# Patient Record
Sex: FEMALE | Race: WHITE | URBAN - METROPOLITAN AREA
[De-identification: names, ages, dates, MRNs, and addresses within clinical notes are randomized per-mention and may not be internally consistent; named-entity substitution may affect disease eponyms.]

---

## 2017-04-24 ENCOUNTER — OUTPATIENT (OUTPATIENT)
Dept: OUTPATIENT SERVICES | Facility: HOSPITAL | Age: 77
LOS: 1 days | Discharge: HOME | End: 2017-04-24

## 2017-06-27 DIAGNOSIS — Z79.899 OTHER LONG TERM (CURRENT) DRUG THERAPY: ICD-10-CM

## 2017-07-17 ENCOUNTER — OUTPATIENT (OUTPATIENT)
Dept: OUTPATIENT SERVICES | Facility: HOSPITAL | Age: 77
LOS: 1 days | Discharge: HOME | End: 2017-07-17

## 2017-07-17 DIAGNOSIS — R05 COUGH: ICD-10-CM

## 2017-07-17 DIAGNOSIS — L03.119 CELLULITIS OF UNSPECIFIED PART OF LIMB: ICD-10-CM

## 2018-01-11 ENCOUNTER — OUTPATIENT (OUTPATIENT)
Dept: OUTPATIENT SERVICES | Facility: HOSPITAL | Age: 78
LOS: 1 days | Discharge: HOME | End: 2018-01-11

## 2018-01-11 DIAGNOSIS — E03.9 HYPOTHYROIDISM, UNSPECIFIED: ICD-10-CM

## 2018-01-11 DIAGNOSIS — Z79.899 OTHER LONG TERM (CURRENT) DRUG THERAPY: ICD-10-CM

## 2018-01-11 DIAGNOSIS — L03.119 CELLULITIS OF UNSPECIFIED PART OF LIMB: ICD-10-CM

## 2018-01-25 ENCOUNTER — OUTPATIENT (OUTPATIENT)
Dept: OUTPATIENT SERVICES | Facility: HOSPITAL | Age: 78
LOS: 1 days | Discharge: HOME | End: 2018-01-25

## 2018-01-25 DIAGNOSIS — N39.0 URINARY TRACT INFECTION, SITE NOT SPECIFIED: ICD-10-CM

## 2018-02-04 DIAGNOSIS — L03.119 CELLULITIS OF UNSPECIFIED PART OF LIMB: ICD-10-CM

## 2018-03-02 ENCOUNTER — EMERGENCY (EMERGENCY)
Facility: HOSPITAL | Age: 78
LOS: 0 days | Discharge: HOME | End: 2018-03-03
Attending: EMERGENCY MEDICINE

## 2018-03-02 VITALS
HEART RATE: 77 BPM | OXYGEN SATURATION: 96 % | TEMPERATURE: 98 F | DIASTOLIC BLOOD PRESSURE: 65 MMHG | SYSTOLIC BLOOD PRESSURE: 125 MMHG | RESPIRATION RATE: 20 BRPM

## 2018-03-02 VITALS — TEMPERATURE: 98 F

## 2018-03-02 DIAGNOSIS — F31.9 BIPOLAR DISORDER, UNSPECIFIED: ICD-10-CM

## 2018-03-02 DIAGNOSIS — Z79.899 OTHER LONG TERM (CURRENT) DRUG THERAPY: ICD-10-CM

## 2018-03-02 DIAGNOSIS — J11.1 INFLUENZA DUE TO UNIDENTIFIED INFLUENZA VIRUS WITH OTHER RESPIRATORY MANIFESTATIONS: ICD-10-CM

## 2018-03-02 DIAGNOSIS — R50.9 FEVER, UNSPECIFIED: ICD-10-CM

## 2018-03-02 DIAGNOSIS — N39.0 URINARY TRACT INFECTION, SITE NOT SPECIFIED: ICD-10-CM

## 2018-03-02 LAB
ALBUMIN SERPL ELPH-MCNC: 3.6 G/DL — SIGNIFICANT CHANGE UP (ref 3–5.5)
ALP SERPL-CCNC: 57 U/L — SIGNIFICANT CHANGE UP (ref 30–115)
ALT FLD-CCNC: 30 U/L — SIGNIFICANT CHANGE UP (ref 0–41)
ANION GAP SERPL CALC-SCNC: 7 MMOL/L — SIGNIFICANT CHANGE UP (ref 7–14)
APTT BLD: 26.9 SEC — LOW (ref 27–39.2)
AST SERPL-CCNC: 42 U/L — HIGH (ref 0–41)
BASE EXCESS BLDV CALC-SCNC: 5.1 MMOL/L — HIGH (ref -2–2)
BASOPHILS # BLD AUTO: 0.02 K/UL — SIGNIFICANT CHANGE UP (ref 0–0.2)
BASOPHILS NFR BLD AUTO: 0.3 % — SIGNIFICANT CHANGE UP (ref 0–1)
BILIRUB SERPL-MCNC: 1.4 MG/DL — HIGH (ref 0.2–1.2)
BUN SERPL-MCNC: 22 MG/DL — HIGH (ref 10–20)
CA-I SERPL-SCNC: 1.15 MMOL/L — SIGNIFICANT CHANGE UP (ref 1.12–1.3)
CALCIUM SERPL-MCNC: 8.6 MG/DL — SIGNIFICANT CHANGE UP (ref 8.5–10.1)
CHLORIDE SERPL-SCNC: 103 MMOL/L — SIGNIFICANT CHANGE UP (ref 98–110)
CO2 SERPL-SCNC: 27 MMOL/L — SIGNIFICANT CHANGE UP (ref 17–32)
CREAT SERPL-MCNC: 0.6 MG/DL — LOW (ref 0.7–1.5)
EOSINOPHIL # BLD AUTO: 0 K/UL — SIGNIFICANT CHANGE UP (ref 0–0.7)
EOSINOPHIL NFR BLD AUTO: 0 % — SIGNIFICANT CHANGE UP (ref 0–8)
GAS PNL BLDV: 138 MMOL/L — SIGNIFICANT CHANGE UP (ref 136–145)
GAS PNL BLDV: SIGNIFICANT CHANGE UP
GLUCOSE SERPL-MCNC: 75 MG/DL — SIGNIFICANT CHANGE UP (ref 70–110)
HCO3 BLDV-SCNC: 30 MMOL/L — HIGH (ref 22–29)
HCT VFR BLD CALC: 39.3 % — SIGNIFICANT CHANGE UP (ref 37–47)
HGB BLD CALC-MCNC: 14 G/DL — SIGNIFICANT CHANGE UP (ref 14–18)
HGB BLD-MCNC: 13.4 G/DL — SIGNIFICANT CHANGE UP (ref 12–16)
IMM GRANULOCYTES NFR BLD AUTO: 0.5 % — HIGH (ref 0.1–0.3)
INR BLD: 1.15 RATIO — SIGNIFICANT CHANGE UP (ref 0.65–1.3)
LACTATE BLDV-MCNC: 0.8 MMOL/L — SIGNIFICANT CHANGE UP (ref 0.5–1.6)
LACTATE SERPL-SCNC: 1 MMOL/L — SIGNIFICANT CHANGE UP (ref 0.5–2.2)
LIDOCAIN IGE QN: 98 U/L — HIGH (ref 7–60)
LYMPHOCYTES # BLD AUTO: 1.66 K/UL — SIGNIFICANT CHANGE UP (ref 1.2–3.4)
LYMPHOCYTES # BLD AUTO: 28.7 % — SIGNIFICANT CHANGE UP (ref 20.5–51.1)
MAGNESIUM SERPL-MCNC: 2.1 MG/DL — SIGNIFICANT CHANGE UP (ref 1.8–2.4)
MCHC RBC-ENTMCNC: 31.5 PG — HIGH (ref 27–31)
MCHC RBC-ENTMCNC: 34.1 G/DL — SIGNIFICANT CHANGE UP (ref 32–37)
MCV RBC AUTO: 92.5 FL — SIGNIFICANT CHANGE UP (ref 81–99)
MONOCYTES # BLD AUTO: 1.11 K/UL — HIGH (ref 0.1–0.6)
MONOCYTES NFR BLD AUTO: 19.2 % — HIGH (ref 1.7–9.3)
NEUTROPHILS # BLD AUTO: 2.97 K/UL — SIGNIFICANT CHANGE UP (ref 1.4–6.5)
NEUTROPHILS NFR BLD AUTO: 51.3 % — SIGNIFICANT CHANGE UP (ref 42.2–75.2)
NRBC # BLD: 0 /100 WBCS — SIGNIFICANT CHANGE UP (ref 0–0)
PCO2 BLDV: 46 MMHG — SIGNIFICANT CHANGE UP (ref 41–51)
PH BLDV: 7.42 — SIGNIFICANT CHANGE UP (ref 7.26–7.43)
PLATELET # BLD AUTO: 123 K/UL — LOW (ref 130–400)
PO2 BLDV: 55 MMHG — HIGH (ref 20–40)
POTASSIUM BLDV-SCNC: 4.1 MMOL/L — SIGNIFICANT CHANGE UP (ref 3.3–5.6)
POTASSIUM SERPL-MCNC: 6.3 MMOL/L — CRITICAL HIGH (ref 3.5–5)
POTASSIUM SERPL-SCNC: 6.3 MMOL/L — CRITICAL HIGH (ref 3.5–5)
PROT SERPL-MCNC: 5.8 G/DL — LOW (ref 6–8)
PROTHROM AB SERPL-ACNC: 12.5 SEC — SIGNIFICANT CHANGE UP (ref 9.95–12.87)
RBC # BLD: 4.25 M/UL — SIGNIFICANT CHANGE UP (ref 4.2–5.4)
RBC # FLD: 11.7 % — SIGNIFICANT CHANGE UP (ref 11.5–14.5)
SAO2 % BLDV: 86 % — SIGNIFICANT CHANGE UP
SODIUM SERPL-SCNC: 137 MMOL/L — SIGNIFICANT CHANGE UP (ref 135–146)
WBC # BLD: 5.79 K/UL — SIGNIFICANT CHANGE UP (ref 4.8–10.8)
WBC # FLD AUTO: 5.79 K/UL — SIGNIFICANT CHANGE UP (ref 4.8–10.8)

## 2018-03-03 LAB
APPEARANCE UR: (no result)
BACTERIA # UR AUTO: (no result) /HPF
BILIRUB UR-MCNC: NEGATIVE — SIGNIFICANT CHANGE UP
COLOR SPEC: YELLOW — SIGNIFICANT CHANGE UP
DIFF PNL FLD: NEGATIVE — SIGNIFICANT CHANGE UP
EPI CELLS # UR: (no result) /HPF
FLU A RESULT: NEGATIVE — SIGNIFICANT CHANGE UP
FLU A RESULT: NEGATIVE — SIGNIFICANT CHANGE UP
FLUAV AG NPH QL: NEGATIVE — SIGNIFICANT CHANGE UP
FLUBV AG NPH QL: POSITIVE
GLUCOSE UR QL: NEGATIVE MG/DL — SIGNIFICANT CHANGE UP
KETONES UR-MCNC: (no result)
LEUKOCYTE ESTERASE UR-ACNC: (no result)
NITRITE UR-MCNC: POSITIVE
PH UR: 6 — SIGNIFICANT CHANGE UP (ref 5–8)
PROT UR-MCNC: NEGATIVE MG/DL — SIGNIFICANT CHANGE UP
SP GR SPEC: 1.02 — SIGNIFICANT CHANGE UP (ref 1.01–1.03)
UROBILINOGEN FLD QL: 0.2 MG/DL — SIGNIFICANT CHANGE UP (ref 0.2–0.2)
WBC UR QL: (no result) /HPF

## 2018-03-03 RX ORDER — CEPHALEXIN 500 MG
1 CAPSULE ORAL
Qty: 30 | Refills: 0 | OUTPATIENT
Start: 2018-03-03 | End: 2018-03-12

## 2018-03-03 RX ORDER — CEFTRIAXONE 500 MG/1
1 INJECTION, POWDER, FOR SOLUTION INTRAMUSCULAR; INTRAVENOUS ONCE
Qty: 0 | Refills: 0 | Status: COMPLETED | OUTPATIENT
Start: 2018-03-03 | End: 2018-03-03

## 2018-03-03 RX ADMIN — Medication 75 MILLIGRAM(S): at 02:26

## 2018-03-03 RX ADMIN — CEFTRIAXONE 100 GRAM(S): 500 INJECTION, POWDER, FOR SOLUTION INTRAMUSCULAR; INTRAVENOUS at 01:59

## 2018-03-03 NOTE — ED PROVIDER NOTE - PHYSICAL EXAMINATION
VITAL SIGNS: I have reviewed nursing notes and confirm.  CONSTITUTIONAL: Well-developed; well-nourished; in no acute distress.  SKIN: Skin exam is warm and dry, no acute rash.  HEAD: Normocephalic; atraumatic.  EYES: PERRL, EOM intact; conjunctiva and sclera clear.  ENT: clear nasal discharge, airway clear. TMs clear. macroglossia  NECK: Supple; non tender.  CARD: S1, S2 normal; no murmurs, gallops, or rubs. Regular rate and rhythm.  RESP: No wheezes, rales or rhonchi.  ABD: Normal bowel sounds; soft; non-distended; non-tender; no hepatosplenomegaly.  EXT: Normal ROM. No clubbing, cyanosis or edema.  NEURO: Alert, oriented. Grossly unremarkable. No focal deficits.  PSYCH: Cooperative, appropriate.

## 2018-03-03 NOTE — ED PROVIDER NOTE - PROGRESS NOTE DETAILS
+uti and flu b.  started on abx and tamiflu.  aide informed and she will relay message to the home nurse.  will give rx to be filled

## 2018-03-03 NOTE — ED PROVIDER NOTE - OBJECTIVE STATEMENT
76 yo f with pmh of mr, cp, seizures, sent from group home for fever and congestion. as per records, pt had fever of 101 at home, unclear if was given tylenol.  pt arrived afebrile in ED and has remained afebrile.  +nasal congestion.  +soft stool.  no dysuria, no blood in her diaper.  no abd pain, no n/v/d.

## 2018-03-08 LAB
CULTURE RESULTS: SIGNIFICANT CHANGE UP
CULTURE RESULTS: SIGNIFICANT CHANGE UP
SPECIMEN SOURCE: SIGNIFICANT CHANGE UP
SPECIMEN SOURCE: SIGNIFICANT CHANGE UP

## 2018-03-16 ENCOUNTER — EMERGENCY (EMERGENCY)
Facility: HOSPITAL | Age: 78
LOS: 0 days | Discharge: HOME | End: 2018-03-17
Attending: EMERGENCY MEDICINE

## 2018-03-16 VITALS
SYSTOLIC BLOOD PRESSURE: 140 MMHG | DIASTOLIC BLOOD PRESSURE: 56 MMHG | HEART RATE: 87 BPM | RESPIRATION RATE: 20 BRPM | OXYGEN SATURATION: 94 %

## 2018-03-16 DIAGNOSIS — Z87.891 PERSONAL HISTORY OF NICOTINE DEPENDENCE: ICD-10-CM

## 2018-03-16 DIAGNOSIS — Z79.2 LONG TERM (CURRENT) USE OF ANTIBIOTICS: ICD-10-CM

## 2018-03-16 DIAGNOSIS — Z79.899 OTHER LONG TERM (CURRENT) DRUG THERAPY: ICD-10-CM

## 2018-03-16 DIAGNOSIS — F31.9 BIPOLAR DISORDER, UNSPECIFIED: ICD-10-CM

## 2018-03-16 DIAGNOSIS — R05 COUGH: ICD-10-CM

## 2018-03-16 DIAGNOSIS — E78.5 HYPERLIPIDEMIA, UNSPECIFIED: ICD-10-CM

## 2018-03-16 DIAGNOSIS — J02.9 ACUTE PHARYNGITIS, UNSPECIFIED: ICD-10-CM

## 2018-03-16 LAB
ALBUMIN SERPL ELPH-MCNC: 3.7 G/DL — SIGNIFICANT CHANGE UP (ref 3.5–5.2)
ALP SERPL-CCNC: 65 U/L — SIGNIFICANT CHANGE UP (ref 30–115)
ALT FLD-CCNC: 33 U/L — SIGNIFICANT CHANGE UP (ref 0–41)
ANION GAP SERPL CALC-SCNC: 13 MMOL/L — SIGNIFICANT CHANGE UP (ref 7–14)
AST SERPL-CCNC: 42 U/L — HIGH (ref 0–41)
BASE EXCESS BLDV CALC-SCNC: 4.8 MMOL/L — HIGH (ref -2–2)
BASOPHILS # BLD AUTO: 0.04 K/UL — SIGNIFICANT CHANGE UP (ref 0–0.2)
BASOPHILS NFR BLD AUTO: 0.4 % — SIGNIFICANT CHANGE UP (ref 0–1)
BILIRUB SERPL-MCNC: 0.3 MG/DL — SIGNIFICANT CHANGE UP (ref 0.2–1.2)
BUN SERPL-MCNC: 17 MG/DL — SIGNIFICANT CHANGE UP (ref 10–20)
CALCIUM SERPL-MCNC: 8.8 MG/DL — SIGNIFICANT CHANGE UP (ref 8.5–10.1)
CHLORIDE SERPL-SCNC: 105 MMOL/L — SIGNIFICANT CHANGE UP (ref 98–110)
CO2 SERPL-SCNC: 24 MMOL/L — SIGNIFICANT CHANGE UP (ref 17–32)
CREAT SERPL-MCNC: 0.6 MG/DL — LOW (ref 0.7–1.5)
EOSINOPHIL # BLD AUTO: 0.09 K/UL — SIGNIFICANT CHANGE UP (ref 0–0.7)
EOSINOPHIL NFR BLD AUTO: 1 % — SIGNIFICANT CHANGE UP (ref 0–8)
GLUCOSE SERPL-MCNC: 89 MG/DL — SIGNIFICANT CHANGE UP (ref 70–110)
HCO3 BLDV-SCNC: 30 MMOL/L — HIGH (ref 22–29)
HCT VFR BLD CALC: 38.2 % — SIGNIFICANT CHANGE UP (ref 37–47)
HGB BLD-MCNC: 12.8 G/DL — SIGNIFICANT CHANGE UP (ref 12–16)
IMM GRANULOCYTES NFR BLD AUTO: 0.4 % — HIGH (ref 0.1–0.3)
LACTATE BLDV-MCNC: 0.8 MMOL/L — SIGNIFICANT CHANGE UP (ref 0.5–1.6)
LYMPHOCYTES # BLD AUTO: 2.22 K/UL — SIGNIFICANT CHANGE UP (ref 1.2–3.4)
LYMPHOCYTES # BLD AUTO: 24.7 % — SIGNIFICANT CHANGE UP (ref 20.5–51.1)
MCHC RBC-ENTMCNC: 31.4 PG — HIGH (ref 27–31)
MCHC RBC-ENTMCNC: 33.5 G/DL — SIGNIFICANT CHANGE UP (ref 32–37)
MCV RBC AUTO: 93.9 FL — SIGNIFICANT CHANGE UP (ref 81–99)
MONOCYTES # BLD AUTO: 0.71 K/UL — HIGH (ref 0.1–0.6)
MONOCYTES NFR BLD AUTO: 7.9 % — SIGNIFICANT CHANGE UP (ref 1.7–9.3)
NEUTROPHILS # BLD AUTO: 5.88 K/UL — SIGNIFICANT CHANGE UP (ref 1.4–6.5)
NEUTROPHILS NFR BLD AUTO: 65.6 % — SIGNIFICANT CHANGE UP (ref 42.2–75.2)
NRBC # BLD: 0 /100 WBCS — SIGNIFICANT CHANGE UP (ref 0–0)
NT-PROBNP SERPL-SCNC: 216 PG/ML — SIGNIFICANT CHANGE UP (ref 0–300)
PCO2 BLDV: 48 MMHG — SIGNIFICANT CHANGE UP (ref 41–51)
PH BLDV: 7.41 — SIGNIFICANT CHANGE UP (ref 7.26–7.43)
PLATELET # BLD AUTO: 176 K/UL — SIGNIFICANT CHANGE UP (ref 130–400)
PO2 BLDV: 33 MMHG — SIGNIFICANT CHANGE UP (ref 20–40)
POTASSIUM SERPL-MCNC: 5.2 MMOL/L — HIGH (ref 3.5–5)
POTASSIUM SERPL-SCNC: 5.2 MMOL/L — HIGH (ref 3.5–5)
PROT SERPL-MCNC: 6.1 G/DL — SIGNIFICANT CHANGE UP (ref 6–8)
RBC # BLD: 4.07 M/UL — LOW (ref 4.2–5.4)
RBC # FLD: 11.9 % — SIGNIFICANT CHANGE UP (ref 11.5–14.5)
SAO2 % BLDV: 58 % — SIGNIFICANT CHANGE UP
SODIUM SERPL-SCNC: 142 MMOL/L — SIGNIFICANT CHANGE UP (ref 135–146)
WBC # BLD: 8.98 K/UL — SIGNIFICANT CHANGE UP (ref 4.8–10.8)
WBC # FLD AUTO: 8.98 K/UL — SIGNIFICANT CHANGE UP (ref 4.8–10.8)

## 2018-03-16 NOTE — ED PROVIDER NOTE - PHYSICAL EXAMINATION
Constitutional: Well developed, well nourished. NAD.  Head: Atraumatic.  Eyes: EOMI without discomfort.   ENT: No nasal discharge. Mucous membranes moist. Poor dentition. Pharyngeal erythema without exudates.   Neck: Supple. Painless ROM.  Cardiovascular: Regular rhythm. Regular rate. Normal S1 and S2. No murmurs. 2+ pulses in all extremities.   Pulmonary: Normal respiratory rate and effort. Lungs clear to auscultation bilaterally. No wheezing, rales, or rhonchi. Bilateral, equal lung expansion. Dry cough occasionally during exam.   Abdominal: Soft. Nondistended. Nontender. No rebound or guarding.   Extremities: No lower extremity edema. Symmetric calves.  Skin: No rashes.   Neuro: Awake, alert, and oriented to self and location. No focal neurological deficits.  Psych:

## 2018-03-16 NOTE — ED PROVIDER NOTE - OBJECTIVE STATEMENT
77y F w [facility] bipolar, MR, HLD, Brito's esophagus BIBEMS from [facility] for evaluation of a cough for concern of pna. Pt was diagnosed with the flu 2 weeks ago, and since then has had a chronic cough, productive of white mucous, that has not shown significant improvement. No f/c/n/v/d. Normal appetite. Pt complaining only of cough. No CP or abd pn.

## 2018-03-16 NOTE — ED PROVIDER NOTE - NS ED ROS FT
Constitutional: No fever or chills. Normal appetite. No unintended weight loss.   Eyes: No vision changes.  ENT: No hearing changes. No ear pain. +sore throat  Neck: No neck pain or stiffness.  Cardiovascular: No chest pain, palpitations, or edema.  Pulmonary: No SOB. No hemoptysis.  Abdominal: No abdominal pain, nausea, vomiting, or diarrhea.   : No dysuria or frequency. No hematuria.   Neuro: No headache, syncope, or dizziness.  MS: No back pain. No calf pain/swelling.  Psych: No suicidal or homicidal ideations.

## 2018-03-16 NOTE — ED PROVIDER NOTE - ATTENDING CONTRIBUTION TO CARE
pt with recent flu (3/3/18), +cough. no fever, cp, sob. sent for eval. pt in nad, speaking full sent, no resp dist, ctab, rrr, ab soft, nt, no LE pitting edema. will get cxr, labs.

## 2018-03-16 NOTE — ED ADULT NURSE NOTE - OBJECTIVE STATEMENT
pt accompanied by aide from [facility]. pt was recently on tamiflu and keflex for PNA. presents with cough

## 2018-03-29 ENCOUNTER — OUTPATIENT (OUTPATIENT)
Dept: OUTPATIENT SERVICES | Facility: HOSPITAL | Age: 78
LOS: 1 days | Discharge: HOME | End: 2018-03-29

## 2018-03-29 DIAGNOSIS — Z79.899 OTHER LONG TERM (CURRENT) DRUG THERAPY: ICD-10-CM

## 2018-05-13 ENCOUNTER — EMERGENCY (EMERGENCY)
Facility: HOSPITAL | Age: 78
LOS: 0 days | Discharge: HOME | End: 2018-05-13
Admitting: PHYSICIAN ASSISTANT

## 2018-05-13 VITALS
SYSTOLIC BLOOD PRESSURE: 135 MMHG | OXYGEN SATURATION: 98 % | RESPIRATION RATE: 18 BRPM | DIASTOLIC BLOOD PRESSURE: 63 MMHG | HEART RATE: 81 BPM | WEIGHT: 126.1 LBS | TEMPERATURE: 97 F
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
## 2018-05-13 DIAGNOSIS — F79 UNSPECIFIED INTELLECTUAL DISABILITIES: ICD-10-CM
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
## 2018-05-13 DIAGNOSIS — E78.5 HYPERLIPIDEMIA, UNSPECIFIED: ICD-10-CM

## 2018-05-13 DIAGNOSIS — L53.9 ERYTHEMATOUS CONDITION, UNSPECIFIED: ICD-10-CM

## 2018-05-13 DIAGNOSIS — Z79.2 LONG TERM (CURRENT) USE OF ANTIBIOTICS: ICD-10-CM

## 2018-05-13 DIAGNOSIS — S40.021A CONTUSION OF RIGHT UPPER ARM, INITIAL ENCOUNTER: ICD-10-CM

## 2018-05-13 DIAGNOSIS — M79.603 PAIN IN ARM, UNSPECIFIED: ICD-10-CM

## 2018-05-13 DIAGNOSIS — Z79.899 OTHER LONG TERM (CURRENT) DRUG THERAPY: ICD-10-CM

## 2018-05-13 DIAGNOSIS — F03.90 UNSPECIFIED DEMENTIA WITHOUT BEHAVIORAL DISTURBANCE: ICD-10-CM

## 2018-05-13 NOTE — ED ADULT NURSE NOTE - OBJECTIVE STATEMENT
pt was brought in by staff member who states she was showering her and noticed bruising and swelling to right arm. Pain when touching arm.

## 2018-05-13 NOTE — ED PROVIDER NOTE - SKIN, MLM
mild erythema noted  to proximal right forearm with mild swelling and tenderness. mixed of new and old ecchymosis also noted to b/l forearm. temp normal and equal b/ll

## 2018-05-13 NOTE — ED PROVIDER NOTE - MUSCULOSKELETAL, MLM
right shoulder/elbow/wrist without signs of trauma. FROM and normal strength. no bony tenderness to right forearm. 2+ radial pulses. right arm n/v intact.

## 2018-05-13 NOTE — ED PROVIDER NOTE - OBJECTIVE STATEMENT
78 yo female hx of MR/Bipolar/HLD sent from group home 2/2 right forearm swelling and pain. Aid reports she noticed her right forearm pain and swelling when she was cleaning her up. denies fall. as per staff, patient sometime like to pick on her skin.

## 2018-05-22 ENCOUNTER — OUTPATIENT (OUTPATIENT)
Dept: OUTPATIENT SERVICES | Facility: HOSPITAL | Age: 78
LOS: 1 days | Discharge: HOME | End: 2018-05-22

## 2018-05-22 DIAGNOSIS — Z01.20 ENCOUNTER FOR DENTAL EXAMINATION AND CLEANING WITHOUT ABNORMAL FINDINGS: ICD-10-CM

## 2018-06-13 ENCOUNTER — OUTPATIENT (OUTPATIENT)
Dept: OUTPATIENT SERVICES | Facility: HOSPITAL | Age: 78
LOS: 1 days | Discharge: HOME | End: 2018-06-13

## 2018-06-14 DIAGNOSIS — N39.0 URINARY TRACT INFECTION, SITE NOT SPECIFIED: ICD-10-CM

## 2018-06-15 ENCOUNTER — OUTPATIENT (OUTPATIENT)
Dept: OUTPATIENT SERVICES | Facility: HOSPITAL | Age: 78
LOS: 1 days | Discharge: HOME | End: 2018-06-15

## 2018-06-15 DIAGNOSIS — D69.6 THROMBOCYTOPENIA, UNSPECIFIED: ICD-10-CM

## 2018-06-15 DIAGNOSIS — Z79.899 OTHER LONG TERM (CURRENT) DRUG THERAPY: ICD-10-CM

## 2018-10-17 ENCOUNTER — OUTPATIENT (OUTPATIENT)
Dept: OUTPATIENT SERVICES | Facility: HOSPITAL | Age: 78
LOS: 1 days | Discharge: HOME | End: 2018-10-17

## 2018-10-18 DIAGNOSIS — Z79.899 OTHER LONG TERM (CURRENT) DRUG THERAPY: ICD-10-CM

## 2018-10-18 PROBLEM — F31.89 OTHER BIPOLAR DISORDER: Chronic | Status: ACTIVE | Noted: 2018-03-02

## 2018-10-18 PROBLEM — F70 MILD INTELLECTUAL DISABILITIES: Chronic | Status: ACTIVE | Noted: 2018-03-02

## 2018-10-18 PROBLEM — E78.5 HYPERLIPIDEMIA, UNSPECIFIED: Chronic | Status: ACTIVE | Noted: 2018-03-16

## 2018-10-18 PROBLEM — K22.70 BARRETT'S ESOPHAGUS WITHOUT DYSPLASIA: Chronic | Status: ACTIVE | Noted: 2018-03-16

## 2019-01-15 NOTE — ED ADULT NURSE NOTE - PATIENT DISCHARGE SIGNATURE
03-Mar-2018 Complex Repair And Rotation Flap Text: The defect edges were debeveled with a #15 scalpel blade.  The primary defect was closed partially with a complex linear closure.  Given the location of the remaining defect, shape of the defect and the proximity to free margins a rotation flap was deemed most appropriate for complete closure of the defect.  Using a sterile surgical marker, an appropriate advancement flap was drawn incorporating the defect and placing the expected incisions within the relaxed skin tension lines where possible.    The area thus outlined was incised deep to adipose tissue with a #15 scalpel blade.  The skin margins were undermined to an appropriate distance in all directions utilizing iris scissors.

## 2019-03-07 ENCOUNTER — OUTPATIENT (OUTPATIENT)
Dept: OUTPATIENT SERVICES | Facility: HOSPITAL | Age: 79
LOS: 1 days | Discharge: HOME | End: 2019-03-07

## 2019-03-07 DIAGNOSIS — Z79.899 OTHER LONG TERM (CURRENT) DRUG THERAPY: ICD-10-CM

## 2019-04-26 ENCOUNTER — EMERGENCY (EMERGENCY)
Facility: HOSPITAL | Age: 79
LOS: 0 days | Discharge: HOME | End: 2019-04-26
Attending: EMERGENCY MEDICINE | Admitting: EMERGENCY MEDICINE
Payer: MEDICARE

## 2019-04-26 VITALS
SYSTOLIC BLOOD PRESSURE: 120 MMHG | HEART RATE: 110 BPM | WEIGHT: 134.92 LBS | DIASTOLIC BLOOD PRESSURE: 64 MMHG | TEMPERATURE: 99 F | OXYGEN SATURATION: 96 % | RESPIRATION RATE: 17 BRPM | HEIGHT: 62 IN

## 2019-04-26 DIAGNOSIS — E78.5 HYPERLIPIDEMIA, UNSPECIFIED: ICD-10-CM

## 2019-04-26 DIAGNOSIS — R11.10 VOMITING, UNSPECIFIED: ICD-10-CM

## 2019-04-26 DIAGNOSIS — Z93.1 GASTROSTOMY STATUS: Chronic | ICD-10-CM

## 2019-04-26 DIAGNOSIS — R10.84 GENERALIZED ABDOMINAL PAIN: ICD-10-CM

## 2019-04-26 DIAGNOSIS — Z98.890 OTHER SPECIFIED POSTPROCEDURAL STATES: ICD-10-CM

## 2019-04-26 DIAGNOSIS — F31.9 BIPOLAR DISORDER, UNSPECIFIED: ICD-10-CM

## 2019-04-26 DIAGNOSIS — Z79.899 OTHER LONG TERM (CURRENT) DRUG THERAPY: ICD-10-CM

## 2019-04-26 DIAGNOSIS — F70 MILD INTELLECTUAL DISABILITIES: ICD-10-CM

## 2019-04-26 LAB
ALBUMIN SERPL ELPH-MCNC: 4.3 G/DL — SIGNIFICANT CHANGE UP (ref 3.5–5.2)
ALP SERPL-CCNC: 63 U/L — SIGNIFICANT CHANGE UP (ref 30–115)
ALT FLD-CCNC: 7 U/L — SIGNIFICANT CHANGE UP (ref 0–41)
ANION GAP SERPL CALC-SCNC: 11 MMOL/L — SIGNIFICANT CHANGE UP (ref 7–14)
APPEARANCE UR: ABNORMAL
AST SERPL-CCNC: 13 U/L — SIGNIFICANT CHANGE UP (ref 0–41)
BACTERIA # UR AUTO: ABNORMAL
BASOPHILS # BLD AUTO: 0.01 K/UL — SIGNIFICANT CHANGE UP (ref 0–0.2)
BASOPHILS NFR BLD AUTO: 0.1 % — SIGNIFICANT CHANGE UP (ref 0–1)
BILIRUB DIRECT SERPL-MCNC: <0.2 MG/DL — SIGNIFICANT CHANGE UP (ref 0–0.2)
BILIRUB INDIRECT FLD-MCNC: >0.1 MG/DL — LOW (ref 0.2–1.2)
BILIRUB SERPL-MCNC: 0.3 MG/DL — SIGNIFICANT CHANGE UP (ref 0.2–1.2)
BILIRUB UR-MCNC: NEGATIVE — SIGNIFICANT CHANGE UP
BUN SERPL-MCNC: 19 MG/DL — SIGNIFICANT CHANGE UP (ref 10–20)
CALCIUM SERPL-MCNC: 8.8 MG/DL — SIGNIFICANT CHANGE UP (ref 8.5–10.1)
CHLORIDE SERPL-SCNC: 103 MMOL/L — SIGNIFICANT CHANGE UP (ref 98–110)
CO2 SERPL-SCNC: 29 MMOL/L — SIGNIFICANT CHANGE UP (ref 17–32)
COLOR SPEC: YELLOW — SIGNIFICANT CHANGE UP
CREAT SERPL-MCNC: 0.5 MG/DL — LOW (ref 0.7–1.5)
DIFF PNL FLD: ABNORMAL
EOSINOPHIL # BLD AUTO: 0.06 K/UL — SIGNIFICANT CHANGE UP (ref 0–0.7)
EOSINOPHIL NFR BLD AUTO: 0.6 % — SIGNIFICANT CHANGE UP (ref 0–8)
EPI CELLS # UR: ABNORMAL /HPF
GLUCOSE SERPL-MCNC: 113 MG/DL — HIGH (ref 70–99)
GLUCOSE UR QL: NEGATIVE MG/DL — SIGNIFICANT CHANGE UP
HCT VFR BLD CALC: 46.6 % — SIGNIFICANT CHANGE UP (ref 37–47)
HGB BLD-MCNC: 15.9 G/DL — SIGNIFICANT CHANGE UP (ref 12–16)
IMM GRANULOCYTES NFR BLD AUTO: 0.4 % — HIGH (ref 0.1–0.3)
KETONES UR-MCNC: NEGATIVE — SIGNIFICANT CHANGE UP
LACTATE SERPL-SCNC: 1.6 MMOL/L — SIGNIFICANT CHANGE UP (ref 0.5–2.2)
LEUKOCYTE ESTERASE UR-ACNC: NEGATIVE — SIGNIFICANT CHANGE UP
LIDOCAIN IGE QN: 82 U/L — HIGH (ref 7–60)
LYMPHOCYTES # BLD AUTO: 0.34 K/UL — LOW (ref 1.2–3.4)
LYMPHOCYTES # BLD AUTO: 3.6 % — LOW (ref 20.5–51.1)
MCHC RBC-ENTMCNC: 32.9 PG — HIGH (ref 27–31)
MCHC RBC-ENTMCNC: 34.1 G/DL — SIGNIFICANT CHANGE UP (ref 32–37)
MCV RBC AUTO: 96.3 FL — SIGNIFICANT CHANGE UP (ref 81–99)
MONOCYTES # BLD AUTO: 0.42 K/UL — SIGNIFICANT CHANGE UP (ref 0.1–0.6)
MONOCYTES NFR BLD AUTO: 4.5 % — SIGNIFICANT CHANGE UP (ref 1.7–9.3)
NEUTROPHILS # BLD AUTO: 8.54 K/UL — HIGH (ref 1.4–6.5)
NEUTROPHILS NFR BLD AUTO: 90.8 % — HIGH (ref 42.2–75.2)
NITRITE UR-MCNC: NEGATIVE — SIGNIFICANT CHANGE UP
NRBC # BLD: 0 /100 WBCS — SIGNIFICANT CHANGE UP (ref 0–0)
PH UR: 6.5 — SIGNIFICANT CHANGE UP (ref 5–8)
PLATELET # BLD AUTO: 154 K/UL — SIGNIFICANT CHANGE UP (ref 130–400)
POTASSIUM SERPL-MCNC: 4.3 MMOL/L — SIGNIFICANT CHANGE UP (ref 3.5–5)
POTASSIUM SERPL-SCNC: 4.3 MMOL/L — SIGNIFICANT CHANGE UP (ref 3.5–5)
PROT SERPL-MCNC: 6.9 G/DL — SIGNIFICANT CHANGE UP (ref 6–8)
PROT UR-MCNC: >=300 MG/DL
RBC # BLD: 4.84 M/UL — SIGNIFICANT CHANGE UP (ref 4.2–5.4)
RBC # FLD: 11.5 % — SIGNIFICANT CHANGE UP (ref 11.5–14.5)
RBC CASTS # UR COMP ASSIST: ABNORMAL /HPF
SODIUM SERPL-SCNC: 143 MMOL/L — SIGNIFICANT CHANGE UP (ref 135–146)
SP GR SPEC: 1.01 — SIGNIFICANT CHANGE UP (ref 1.01–1.03)
UROBILINOGEN FLD QL: 0.2 MG/DL — SIGNIFICANT CHANGE UP (ref 0.2–0.2)
VALPROATE SERPL-MCNC: 79 UG/ML — SIGNIFICANT CHANGE UP (ref 50–100)
WBC # BLD: 9.41 K/UL — SIGNIFICANT CHANGE UP (ref 4.8–10.8)
WBC # FLD AUTO: 9.41 K/UL — SIGNIFICANT CHANGE UP (ref 4.8–10.8)
WBC UR QL: SIGNIFICANT CHANGE UP /HPF

## 2019-04-26 PROCEDURE — 74177 CT ABD & PELVIS W/CONTRAST: CPT | Mod: 26

## 2019-04-26 PROCEDURE — 99284 EMERGENCY DEPT VISIT MOD MDM: CPT

## 2019-04-26 RX ORDER — QUETIAPINE FUMARATE 200 MG/1
1 TABLET, FILM COATED ORAL
Qty: 0 | Refills: 0 | COMMUNITY

## 2019-04-26 RX ORDER — SODIUM CHLORIDE 9 MG/ML
1000 INJECTION INTRAMUSCULAR; INTRAVENOUS; SUBCUTANEOUS ONCE
Qty: 0 | Refills: 0 | Status: COMPLETED | OUTPATIENT
Start: 2019-04-26 | End: 2019-04-26

## 2019-04-26 RX ADMIN — SODIUM CHLORIDE 2000 MILLILITER(S): 9 INJECTION INTRAMUSCULAR; INTRAVENOUS; SUBCUTANEOUS at 14:08

## 2019-04-26 RX ADMIN — SODIUM CHLORIDE 1000 MILLILITER(S): 9 INJECTION INTRAMUSCULAR; INTRAVENOUS; SUBCUTANEOUS at 15:50

## 2019-04-26 NOTE — ED ADULT NURSE NOTE - CHPI ED NUR SYMPTOMS NEG
no dysuria/no chills/no burning urination/no blood in stool/no abdominal distension/no fever/no hematuria/no nausea/no vomiting/no diarrhea

## 2019-04-26 NOTE — ED ADULT NURSE NOTE - NSIMPLEMENTINTERV_GEN_ALL_ED
Implemented All Fall Risk Interventions:  [employer] to call system. Call bell, personal items and telephone within reach. Instruct patient to call for assistance. Room bathroom lighting operational. Non-slip footwear when patient is off stretcher. Physically safe environment: no spills, clutter or unnecessary equipment. Stretcher in lowest position, wheels locked, appropriate side rails in place. Provide visual cue, wrist band, yellow gown, etc. Monitor gait and stability. Monitor for mental status changes and reorient to person, place, and time. Review medications for side effects contributing to fall risk. Reinforce activity limits and safety measures with patient and family.

## 2019-04-26 NOTE — ED PROVIDER NOTE - ATTENDING CONTRIBUTION TO CARE
I am unable to obtain a comprehensive history, review of systems, past medical history, and/or physical exam due to constraints imposed by the urgency of the patient's clinical condition and/or mental status.     78f w a hx of HLD, bipolar, MR, and prior G tube removed. Pt presents from group home w aide reporting large BM & vomiting x1 today. Symptoms are moderate, episodic, no exacerbating/alleviating. Pt presents w aide from group home who provides hx; patient very poor historian. Pt does report vague abdominal pain. No black/bloody stools.    Review of Systems  Constitutional:  No fever or chills.   Eyes:  Negative.   ENMT:  No nasal congestion, discharge, or throat pain.   Cardiac:  No chest pain, syncope, or edema.  Respiratory:  No dyspnea, wheezing, or cough. No hemoptysis.  GI:  See HPI  :  No dysuria or hematuria.   Musculoskeletal:  No joint swelling, joint pain, or back pain.  Skin:  No skin rash, jaundice, or lesions.  Neuro:  No headache, dizziness, loss of sensation, or focal weakness.  No change in mental status.     Physical Exam  General: Awake, alert, NAD, elderly/frail, non-toxic appearing, NCAT  Eyes: PERRL, EOMI, no icterus, lids and conjunctivae are normal  ENT: External inspection normal, pink/moist membranes, pharynx normal  CV: S1S2, regular rate and rhythm, no murmur/gallops/rubs, no JVD, 2+ pulses b/l, no edema/cords/homans, warm/well-perfused  Respiratory: Normal respiratory rate/effort, no respiratory distress, normal voice, speaking full sentences, lungs clear to auscultation b/l, no wheezing/rales/rhonchi, no retractions, no stridor  Abdomen: Soft abdomen, mild diffuse discomfort, no distended/guarding/rebound, no CVA tender  Musculoskeletal: FROM all 4 extremities, N/V intact  Neck: FROM neck, supple, no meningismus, trachea midline, no JVD  Integumentary: Color normal for race, warm and dry, no rash  Neuro: Alert/interactive, baseline mental status according to aide, CN 2-12 grossly intact, normal motor, normal sensory    78f w bipolar/MR reporting mild abdominal discomfort w vomiting & large BM. nontoxic appearing, n/v intact. --Labs, IV fluids, Analgesia/antiemetics as needed, CT, observe/re-assess.

## 2019-04-26 NOTE — ED PROVIDER NOTE - PHYSICAL EXAMINATION
CONSTITUTIONAL: Well-appearing; well-nourished; in no apparent distress.   NECK: Supple; non-tender; no cervical lymphadenopathy. No JVD.   CARDIOVASCULAR: Normal S1, S2; no murmurs, rubs, or gallops.   RESPIRATORY: Normal chest excursion with respiration; breath sounds clear and equal bilaterally; no wheezes, rhonchi, or rales.  GI/: Normal bowel sounds; non-distended; + mild generalized ttp. No rebound or guarding. Negative murphys sign. No ttp at m[place] point. No CVA ttp.   MS: No evidence of trauma or deformity. Normal ROM in all four extremities; non-tender to palpation; distal pulses are normal.   SKIN: Normal for age and race; warm; dry; good turgor; no apparent lesions or exudate.   NEURO/PSYCH: A & O x 4; grossly unremarkable. mood and manner are appropriate. Grooming and personal hygiene are appropriate.

## 2019-04-26 NOTE — ED ADULT NURSE REASSESSMENT NOTE - NS ED NURSE REASSESS COMMENT FT1
IVF completed. patient had Jello and pudding , tolerated it well, no vomiting , Incontinent of urine , skin care rendered , skin clean and intact to perineal area

## 2019-04-26 NOTE — ED PROVIDER NOTE - CLINICAL SUMMARY MEDICAL DECISION MAKING FREE TEXT BOX
78f w bipolar/MR reporting mild abdominal discomfort w vomiting & large BM. nontoxic appearing, n/v intact. Labs, EKG, & imaging reviewed. IV fluids given. Pt tolerating PO intake in ED. Aide advised regarding symptomatic/supportive care, importance of PMD f/u, and symptoms to prompt ED return. Copy of results given to patient.

## 2019-04-26 NOTE — ED PROVIDER NOTE - NSFOLLOWUPINSTRUCTIONS_ED_ALL_ED_FT
Return to the ER for worsening or concerning symptoms.    See printed discharge information sheets for further instructions on care for your condition.    Abdominal Pain, Adult  Abdominal pain can be caused by many things. Often, abdominal pain is not serious and it gets better with no treatment or by being treated at home. However, sometimes abdominal pain is serious. Your health care provider will do a medical history and a physical exam to try to determine the cause of your abdominal pain.    Follow these instructions at home:  Take over-the-counter and prescription medicines only as told by your health care provider. Do not take a laxative unless told by your health care provider.  ImageDrink enough fluid to keep your urine clear or pale yellow.  Watch your condition for any changes.  Keep all follow-up visits as told by your health care provider. This is important.  Contact a health care provider if:  Your abdominal pain changes or gets worse.  You are not hungry or you lose weight without trying.  You are constipated or have diarrhea for more than 2–3 days.  You have pain when you urinate or have a bowel movement.  Your abdominal pain wakes you up at night.  Your pain gets worse with meals, after eating, or with certain foods.  You are throwing up and cannot keep anything down.  You have a fever.  Get help right away if:  Your pain does not go away as soon as your health care provider told you to expect.  You cannot stop throwing up.  Your pain is only in areas of the abdomen, such as the right side or the left lower portion of the abdomen.  You have bloody or black stools, or stools that look like tar.  You have severe pain, cramping, or bloating in your abdomen.  You have signs of dehydration, such as:    Dark urine, very little urine, or no urine.  Cracked lips.  Dry mouth.  Sunken eyes.  Sleepiness.  Weakness.    This information is not intended to replace advice given to you by your health care provider. Make sure you discuss any questions you have with your health care provider Return to the ER for worsening or concerning symptoms.    See printed discharge information sheets for further instructions on care for your condition.    Abdominal Pain, Adult  Abdominal pain can be caused by many things. Often, abdominal pain is not serious and it gets better with no treatment or by being treated at home. However, sometimes abdominal pain is serious. Your health care provider will do a medical history and a physical exam to try to determine the cause of your abdominal pain.    Follow these instructions at home:  Take over-the-counter and prescription medicines only as told by your health care provider. Do not take a laxative unless told by your health care provider.  ImageDrink enough fluid to keep your urine clear or pale yellow.  Watch your condition for any changes.  Keep all follow-up visits as told by your health care provider. This is important.  Contact a health care provider if:  Your abdominal pain changes or gets worse.  You are not hungry or you lose weight without trying.  You are constipated or have diarrhea for more than 2–3 days.  You have pain when you urinate or have a bowel movement.  Your abdominal pain wakes you up at night.  Your pain gets worse with meals, after eating, or with certain foods.  You are throwing up and cannot keep anything down.  You have a fever.  Get help right away if:  Your pain does not go away as soon as your health care provider told you to expect.  You cannot stop throwing up.  Your pain is only in areas of the abdomen, such as the right side or the left lower portion of the abdomen.  You have bloody or black stools, or stools that look like tar.  You have severe pain, cramping, or bloating in your abdomen.  You have signs of dehydration, such as:    Dark urine, very little urine, or no urine.  Cracked lips.  Dry mouth.  Sunken eyes.  Sleepiness.  Weakness.    This information is not intended to replace advice given to you by your health care provider. Make sure you discuss any questions you have with your health care provider    Please follow up with your primary care doctor within one week

## 2019-04-26 NOTE — ED ADULT NURSE NOTE - PMH
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

## 2019-04-26 NOTE — ED PROVIDER NOTE - NS ED ROS FT
Constitutional: no fever, chills, no recent weight loss, change in appetite or malaise  Cardiac: No chest pain, SOB or edema.  Respiratory: No cough or respiratory distress  GI: + one episode of vomiting. No diarrhea/abdominal pain  : No dysuria, frequency, urgency or hematuria  MS: no pain to back or extremities, no loss of ROM, no weakness  Neuro: No headache or weakness. No LOC.  Skin: No skin rash.  Endocrine: No history of thyroid disease or diabetes.  Except as documented in the HPI, all other systems are negative.

## 2019-04-26 NOTE — ED PROVIDER NOTE - OBJECTIVE STATEMENT
78 year old F with 78 year old F from group home with hx of MRI, bipolar d/o, HLD sent in for episode of vomiting this morning. As per aid pt had one large non bloody bowel movement and had one episode of non bloody vomiting. Sts she was able to eat breakfast today. Denies any further vomiting episodes. As per aid pt is acting at baseline now. Pt is poor historian but denies any abdominal pain, urinary symptoms, chest pain, sob, fever/chills, cough, uri symptoms, sick contacts.

## 2019-04-29 NOTE — ED POST DISCHARGE NOTE - DETAILS
VIELKAO PATIENT CALLED RN [phone] AT FAXED REPORT TO HER -[phone], SHE CONFIRMED THAT SHE RECEIVED REPORT AND WILL CONTACT MD AT FACILITY ABOUT + UCX.  MY TELEPHONE NUMBER WAS GIVEN TO HER TO CALL ME WHEN SHE RECEIVED REPORT. ANJEL PATIENT,  CALLED RNRAVEN [phone] AT FAXED REPORT TO HER -[phone], SHE CONFIRMED THAT SHE RECEIVED REPORT AND WILL CONTACT MD AT FACILITY ABOUT + UCX.  MY TELEPHONE NUMBER WAS GIVEN TO HER TO CALL ME WHEN SHE RECEIVED REPORT.

## 2019-05-21 ENCOUNTER — INPATIENT (INPATIENT)
Facility: HOSPITAL | Age: 79
LOS: 12 days | Discharge: HOME | End: 2019-06-03
Attending: INTERNAL MEDICINE | Admitting: INTERNAL MEDICINE
Payer: MEDICARE

## 2019-05-21 VITALS
DIASTOLIC BLOOD PRESSURE: 82 MMHG | TEMPERATURE: 98 F | OXYGEN SATURATION: 100 % | SYSTOLIC BLOOD PRESSURE: 136 MMHG | HEART RATE: 86 BPM | RESPIRATION RATE: 18 BRPM

## 2019-05-21 DIAGNOSIS — D69.6 THROMBOCYTOPENIA, UNSPECIFIED: ICD-10-CM

## 2019-05-21 DIAGNOSIS — E78.5 HYPERLIPIDEMIA, UNSPECIFIED: ICD-10-CM
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
## 2019-05-21 DIAGNOSIS — R13.10 DYSPHAGIA, UNSPECIFIED: ICD-10-CM

## 2019-05-21 DIAGNOSIS — D62 ACUTE POSTHEMORRHAGIC ANEMIA: ICD-10-CM

## 2019-05-21 DIAGNOSIS — Z93.1 GASTROSTOMY STATUS: Chronic | ICD-10-CM

## 2019-05-21 DIAGNOSIS — S72.001A FRACTURE OF UNSPECIFIED PART OF NECK OF RIGHT FEMUR, INITIAL ENCOUNTER FOR CLOSED FRACTURE: ICD-10-CM

## 2019-05-21 DIAGNOSIS — E83.42 HYPOMAGNESEMIA: ICD-10-CM

## 2019-05-21 DIAGNOSIS — A41.9 SEPSIS, UNSPECIFIED ORGANISM: ICD-10-CM

## 2019-05-21 DIAGNOSIS — F31.9 BIPOLAR DISORDER, UNSPECIFIED: ICD-10-CM

## 2019-05-21 DIAGNOSIS — I85.00 ESOPHAGEAL VARICES WITHOUT BLEEDING: ICD-10-CM

## 2019-05-21 DIAGNOSIS — F79 UNSPECIFIED INTELLECTUAL DISABILITIES: ICD-10-CM
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
## 2019-05-21 DIAGNOSIS — J69.0 PNEUMONITIS DUE TO INHALATION OF FOOD AND VOMIT: ICD-10-CM

## 2019-05-21 PROBLEM — N63.10 UNSPECIFIED LUMP IN THE RIGHT BREAST, UNSPECIFIED QUADRANT: Chronic | Status: ACTIVE | Noted: 2019-04-26

## 2019-05-21 LAB
ALBUMIN SERPL ELPH-MCNC: 4.2 G/DL — SIGNIFICANT CHANGE UP (ref 3.5–5.2)
ALP SERPL-CCNC: 63 U/L — SIGNIFICANT CHANGE UP (ref 30–115)
ALT FLD-CCNC: 10 U/L — SIGNIFICANT CHANGE UP (ref 0–41)
ANION GAP SERPL CALC-SCNC: 15 MMOL/L — HIGH (ref 7–14)
APTT BLD: 28.4 SEC — SIGNIFICANT CHANGE UP (ref 27–39.2)
APTT BLD: SIGNIFICANT CHANGE UP SEC (ref 27–39.2)
AST SERPL-CCNC: 22 U/L — SIGNIFICANT CHANGE UP (ref 0–41)
BASOPHILS # BLD AUTO: 0.02 K/UL — SIGNIFICANT CHANGE UP (ref 0–0.2)
BASOPHILS NFR BLD AUTO: 0.1 % — SIGNIFICANT CHANGE UP (ref 0–1)
BILIRUB SERPL-MCNC: 0.2 MG/DL — SIGNIFICANT CHANGE UP (ref 0.2–1.2)
BLD GP AB SCN SERPL QL: SIGNIFICANT CHANGE UP
BUN SERPL-MCNC: 22 MG/DL — HIGH (ref 10–20)
CALCIUM SERPL-MCNC: 9.2 MG/DL — SIGNIFICANT CHANGE UP (ref 8.5–10.1)
CHLORIDE SERPL-SCNC: 101 MMOL/L — SIGNIFICANT CHANGE UP (ref 98–110)
CO2 SERPL-SCNC: 21 MMOL/L — SIGNIFICANT CHANGE UP (ref 17–32)
CREAT SERPL-MCNC: 0.6 MG/DL — LOW (ref 0.7–1.5)
EOSINOPHIL # BLD AUTO: 0.01 K/UL — SIGNIFICANT CHANGE UP (ref 0–0.7)
EOSINOPHIL NFR BLD AUTO: 0.1 % — SIGNIFICANT CHANGE UP (ref 0–8)
GLUCOSE SERPL-MCNC: 118 MG/DL — HIGH (ref 70–99)
HCT VFR BLD CALC: 39 % — SIGNIFICANT CHANGE UP (ref 37–47)
HGB BLD-MCNC: 13.4 G/DL — SIGNIFICANT CHANGE UP (ref 12–16)
IMM GRANULOCYTES NFR BLD AUTO: 0.7 % — HIGH (ref 0.1–0.3)
INR BLD: 1.01 RATIO — SIGNIFICANT CHANGE UP (ref 0.65–1.3)
INR BLD: 1.05 RATIO — SIGNIFICANT CHANGE UP (ref 0.65–1.3)
LYMPHOCYTES # BLD AUTO: 1 K/UL — LOW (ref 1.2–3.4)
LYMPHOCYTES # BLD AUTO: 7.4 % — LOW (ref 20.5–51.1)
MCHC RBC-ENTMCNC: 32.5 PG — HIGH (ref 27–31)
MCHC RBC-ENTMCNC: 34.4 G/DL — SIGNIFICANT CHANGE UP (ref 32–37)
MCV RBC AUTO: 94.7 FL — SIGNIFICANT CHANGE UP (ref 81–99)
MONOCYTES # BLD AUTO: 0.87 K/UL — HIGH (ref 0.1–0.6)
MONOCYTES NFR BLD AUTO: 6.4 % — SIGNIFICANT CHANGE UP (ref 1.7–9.3)
NEUTROPHILS # BLD AUTO: 11.51 K/UL — HIGH (ref 1.4–6.5)
NEUTROPHILS NFR BLD AUTO: 85.3 % — HIGH (ref 42.2–75.2)
NRBC # BLD: 0 /100 WBCS — SIGNIFICANT CHANGE UP (ref 0–0)
PLATELET # BLD AUTO: 130 K/UL — SIGNIFICANT CHANGE UP (ref 130–400)
POTASSIUM SERPL-MCNC: 4.7 MMOL/L — SIGNIFICANT CHANGE UP (ref 3.5–5)
POTASSIUM SERPL-SCNC: 4.7 MMOL/L — SIGNIFICANT CHANGE UP (ref 3.5–5)
PROT SERPL-MCNC: 6.7 G/DL — SIGNIFICANT CHANGE UP (ref 6–8)
PROTHROM AB SERPL-ACNC: 11.6 SEC — SIGNIFICANT CHANGE UP (ref 9.95–12.87)
PROTHROM AB SERPL-ACNC: 12.1 SEC — SIGNIFICANT CHANGE UP (ref 9.95–12.87)
RBC # BLD: 4.12 M/UL — LOW (ref 4.2–5.4)
RBC # FLD: 11.8 % — SIGNIFICANT CHANGE UP (ref 11.5–14.5)
SODIUM SERPL-SCNC: 137 MMOL/L — SIGNIFICANT CHANGE UP (ref 135–146)
TYPE + AB SCN PNL BLD: SIGNIFICANT CHANGE UP
WBC # BLD: 13.5 K/UL — HIGH (ref 4.8–10.8)
WBC # FLD AUTO: 13.5 K/UL — HIGH (ref 4.8–10.8)

## 2019-05-21 PROCEDURE — 70450 CT HEAD/BRAIN W/O DYE: CPT | Mod: 26

## 2019-05-21 PROCEDURE — 73501 X-RAY EXAM HIP UNI 1 VIEW: CPT | Mod: 26,RT

## 2019-05-21 PROCEDURE — 73560 X-RAY EXAM OF KNEE 1 OR 2: CPT | Mod: 26,RT

## 2019-05-21 PROCEDURE — 71260 CT THORAX DX C+: CPT | Mod: 26

## 2019-05-21 PROCEDURE — 74177 CT ABD & PELVIS W/CONTRAST: CPT | Mod: 26

## 2019-05-21 PROCEDURE — 72125 CT NECK SPINE W/O DYE: CPT | Mod: 26

## 2019-05-21 PROCEDURE — 93010 ELECTROCARDIOGRAM REPORT: CPT

## 2019-05-21 PROCEDURE — 73552 X-RAY EXAM OF FEMUR 2/>: CPT | Mod: 26,RT

## 2019-05-21 PROCEDURE — 99285 EMERGENCY DEPT VISIT HI MDM: CPT

## 2019-05-21 PROCEDURE — 71045 X-RAY EXAM CHEST 1 VIEW: CPT | Mod: 26

## 2019-05-21 RX ORDER — ACETAMINOPHEN 500 MG
650 TABLET ORAL ONCE
Refills: 0 | Status: COMPLETED | OUTPATIENT
Start: 2019-05-21 | End: 2019-05-21

## 2019-05-21 RX ORDER — SODIUM CHLORIDE 9 MG/ML
1000 INJECTION INTRAMUSCULAR; INTRAVENOUS; SUBCUTANEOUS ONCE
Refills: 0 | Status: COMPLETED | OUTPATIENT
Start: 2019-05-21 | End: 2019-05-21

## 2019-05-21 RX ADMIN — Medication 650 MILLIGRAM(S): at 19:20

## 2019-05-21 RX ADMIN — Medication 650 MILLIGRAM(S): at 17:27

## 2019-05-21 RX ADMIN — SODIUM CHLORIDE 1000 MILLILITER(S): 9 INJECTION INTRAMUSCULAR; INTRAVENOUS; SUBCUTANEOUS at 16:40

## 2019-05-21 NOTE — ED ADULT NURSE NOTE - NSIMPLEMENTINTERV_GEN_ALL_ED
Implemented All Fall with Harm Risk Interventions:  [place] to call system. Call bell, personal items and telephone within reach. Instruct patient to call for assistance. Room bathroom lighting operational. Non-slip footwear when patient is off stretcher. Physically safe environment: no spills, clutter or unnecessary equipment. Stretcher in lowest position, wheels locked, appropriate side rails in place. Provide visual cue, wrist band, yellow gown, etc. Monitor gait and stability. Monitor for mental status changes and reorient to person, place, and time. Review medications for side effects contributing to fall risk. Reinforce activity limits and safety measures with patient and family. Provide visual clues: red socks.

## 2019-05-21 NOTE — CONSULT NOTE ADULT - ATTENDING COMMENTS
agree with above  seen and examined  will proceed with surgical intervention pending clearance and or availability

## 2019-05-21 NOTE — CONSULT NOTE ADULT - SUBJECTIVE AND OBJECTIVE BOX
Orthopedics Consult Note:    Patient is a 78F with PMH intellectual disability and psychiatric disorder who sustained a mechincal fall from standing onto her right side. She refused to ambulate subsequently and was brought to the ED. Radiographs revealed a right femoral neck fracture.    Patient resides in a group home and attends a day program. She usually ambulates with a walker and assistance. No healthcare proxy. Patient accompanied by employee of group facility.    NOK: Nichole Dee (Cousin) ph [phone]  : Melquiades Nichols ph [phone]    Past Medical & Surgical History:  Breast mass, right  Hyperlipemia  Brito esophagus  Mild mental retardation  Atypical bipolar disorder  Feeding by G-tube    Allergies:  No Known Allergies    Vital Signs:  T(C): 36.6 (05-21-19 @ 15:59), Max: 36.7 (05-21-19 @ 13:53)  HR: 85 (05-21-19 @ 15:59) (85 - 86)  BP: 127/71 (05-21-19 @ 17:00) (127/71 - 199/140)  RR: 18 (05-21-19 @ 15:59) (18 - 18)  SpO2: 100% (05-21-19 @ 15:59) (100% - 100%)    Labs:                        13.4   13.50 )-----------( 130      ( 21 May 2019 17:30 )             39.0   05-21    137  |  101  |  22<H>  ----------------------------<  118<H>  4.7   |  21  |  0.6<L>    Ca    9.2      21 May 2019 16:05    TPro  6.7  /  Alb  4.2  /  TBili  0.2  /  DBili  x   /  AST  22  /  ALT  10  /  AlkPhos  63  05-21    Img  XR pelvis, R hip, femur, knee  Displaced right femoral neck fracture    PhyX  NAD  Resting comfortably  Responds to name  RLE  Shortened and externally rotated  Moves foot and toes, sensation grossly intact. Foot WWP    Assessment:  78y Female with a right femoral neck fracture    Plan:  Attempted to call [facility] as listed above, no response  Patient added on for surgery tomorrow for ORIF right hip  We will continue to reach out to TAMI WILLIAM to obtain consent  Admit to Medical service for optimization and medical clearance.  f/u medical clearance.  EKG  CXR  Active T&S  f/u labs/imaging.  Complete bedrest  Pain control.  NPO and hold chemical anticoagulation after midnight for possible OR tomorrow pending medical optimization and clearance.   DVT ppx with SCDs for now.    Case discussed with Carrillo who agrees with plan.

## 2019-05-21 NOTE — ED PROVIDER NOTE - PROGRESS NOTE DETAILS
Attending Note: 79 y/o F PMHx MR, not on any blood thinners presents s/p mechanical trip and fall earlier today. Pt cannot provide any other HX. PE: Well appearing, nontoxic, awake, alert. NCAT. PERRLM EOMI. No midline spinal TT. No pain with compression of ribs. Pelvis stable. No bony ext TTP, R leg shortened and rotated. DP pulses intact.  S1S2. CTAB. Abdomen soft nt/nd. GCS15. Motor and sensation grossly intact. No abrasion/laceration. Plan: Will pan-scan and reassess. spoke with ortho. scheduled for surgery tomorrow. admitted to medicine for pre-surgical work up Wrap-up note: Pt with isolated R hip fracture. Spoke to ortho. Will admit pt. colitis found on Ct scan. no diarrhea or fever. MAR aware. no abx at this time.

## 2019-05-21 NOTE — ED ADULT NURSE NOTE - OBJECTIVE STATEMENT
Patient awake, alert, oriented to person and place, not time. Seeks medical attention for fall. No LOC. HHA @bedside. R foot shortened and turned to the right. Pain with the slightest movement. Will continue to monitor/assess while awaiting MD Dispo.

## 2019-05-21 NOTE — ED PROVIDER NOTE - OBJECTIVE STATEMENT
79 y/o F, h/o bipolar,  ID, presents with R leg pain and inability to ambulate s/p witnessed mechanical fall from standing at her program today. no known head injury or LOC. pt is an unreliable historian-but pleasant and acting normally a t baseline according to aid. no AC. denies abd pain, nv/ cp, sob.

## 2019-05-21 NOTE — ED PROVIDER NOTE - PHYSICAL EXAMINATION
VITAL SIGNS: I have reviewed nursing notes and confirm.  CONSTITUTIONAL: Well-developed; well-nourished; in no acute distress.  SKIN: Skin exam is warm and dry, <2 sec cap refill  HEAD: Normocephalic; atraumatic.  EYES: PERRL, EOM intact; normal conjunctiva.  ENT: MMM; airway clear.   NECK: Supple; non tender.  CARD: RRR, 2+ dp pulses  RESP: No wheezes, rales or rhonchi, speaking in full sentences  ABD: soft non tender.   EXT: R leg shortened and externally rotated. NVI. no midline vertebral tenderness. no stepoffs. R hip TTP. no other joint TTP  NEURO: Alert, oriented x 2 (does not know date). Grossly unremarkable. No focal deficits.  PSYCH: Cooperative, appropriate.

## 2019-05-22 ENCOUNTER — RESULT REVIEW (OUTPATIENT)
Age: 79
End: 2019-05-22

## 2019-05-22 LAB
ANION GAP SERPL CALC-SCNC: 12 MMOL/L — SIGNIFICANT CHANGE UP (ref 7–14)
BASOPHILS # BLD AUTO: 0.02 K/UL — SIGNIFICANT CHANGE UP (ref 0–0.2)
BASOPHILS NFR BLD AUTO: 0.2 % — SIGNIFICANT CHANGE UP (ref 0–1)
BUN SERPL-MCNC: 13 MG/DL — SIGNIFICANT CHANGE UP (ref 10–20)
CALCIUM SERPL-MCNC: 8.5 MG/DL — SIGNIFICANT CHANGE UP (ref 8.5–10.1)
CHLORIDE SERPL-SCNC: 105 MMOL/L — SIGNIFICANT CHANGE UP (ref 98–110)
CO2 SERPL-SCNC: 23 MMOL/L — SIGNIFICANT CHANGE UP (ref 17–32)
CREAT SERPL-MCNC: 0.5 MG/DL — LOW (ref 0.7–1.5)
EOSINOPHIL # BLD AUTO: 0 K/UL — SIGNIFICANT CHANGE UP (ref 0–0.7)
EOSINOPHIL NFR BLD AUTO: 0 % — SIGNIFICANT CHANGE UP (ref 0–8)
GLUCOSE BLDC GLUCOMTR-MCNC: 117 MG/DL — HIGH (ref 70–99)
GLUCOSE BLDC GLUCOMTR-MCNC: 118 MG/DL — HIGH (ref 70–99)
GLUCOSE BLDC GLUCOMTR-MCNC: 121 MG/DL — HIGH (ref 70–99)
GLUCOSE BLDC GLUCOMTR-MCNC: 124 MG/DL — HIGH (ref 70–99)
GLUCOSE SERPL-MCNC: 96 MG/DL — SIGNIFICANT CHANGE UP (ref 70–99)
HCT VFR BLD CALC: 29.1 % — LOW (ref 37–47)
HCT VFR BLD CALC: 35.7 % — LOW (ref 37–47)
HGB BLD-MCNC: 12.1 G/DL — SIGNIFICANT CHANGE UP (ref 12–16)
HGB BLD-MCNC: 9.6 G/DL — LOW (ref 12–16)
IMM GRANULOCYTES NFR BLD AUTO: 0.4 % — HIGH (ref 0.1–0.3)
LYMPHOCYTES # BLD AUTO: 1.32 K/UL — SIGNIFICANT CHANGE UP (ref 1.2–3.4)
LYMPHOCYTES # BLD AUTO: 16.4 % — LOW (ref 20.5–51.1)
MCHC RBC-ENTMCNC: 32 PG — HIGH (ref 27–31)
MCHC RBC-ENTMCNC: 32.9 PG — HIGH (ref 27–31)
MCHC RBC-ENTMCNC: 33 G/DL — SIGNIFICANT CHANGE UP (ref 32–37)
MCHC RBC-ENTMCNC: 33.9 G/DL — SIGNIFICANT CHANGE UP (ref 32–37)
MCV RBC AUTO: 94.4 FL — SIGNIFICANT CHANGE UP (ref 81–99)
MCV RBC AUTO: 99.7 FL — HIGH (ref 81–99)
MONOCYTES # BLD AUTO: 0.94 K/UL — HIGH (ref 0.1–0.6)
MONOCYTES NFR BLD AUTO: 11.7 % — HIGH (ref 1.7–9.3)
NEUTROPHILS # BLD AUTO: 5.75 K/UL — SIGNIFICANT CHANGE UP (ref 1.4–6.5)
NEUTROPHILS NFR BLD AUTO: 71.3 % — SIGNIFICANT CHANGE UP (ref 42.2–75.2)
NRBC # BLD: 0 /100 WBCS — SIGNIFICANT CHANGE UP (ref 0–0)
NRBC # BLD: 0 /100 WBCS — SIGNIFICANT CHANGE UP (ref 0–0)
PLATELET # BLD AUTO: 138 K/UL — SIGNIFICANT CHANGE UP (ref 130–400)
PLATELET # BLD AUTO: 90 K/UL — LOW (ref 130–400)
POTASSIUM SERPL-MCNC: 4.1 MMOL/L — SIGNIFICANT CHANGE UP (ref 3.5–5)
POTASSIUM SERPL-SCNC: 4.1 MMOL/L — SIGNIFICANT CHANGE UP (ref 3.5–5)
RBC # BLD: 2.92 M/UL — LOW (ref 4.2–5.4)
RBC # BLD: 3.78 M/UL — LOW (ref 4.2–5.4)
RBC # FLD: 11.9 % — SIGNIFICANT CHANGE UP (ref 11.5–14.5)
RBC # FLD: 12.1 % — SIGNIFICANT CHANGE UP (ref 11.5–14.5)
SODIUM SERPL-SCNC: 140 MMOL/L — SIGNIFICANT CHANGE UP (ref 135–146)
WBC # BLD: 8.06 K/UL — SIGNIFICANT CHANGE UP (ref 4.8–10.8)
WBC # BLD: 8.11 K/UL — SIGNIFICANT CHANGE UP (ref 4.8–10.8)
WBC # FLD AUTO: 8.06 K/UL — SIGNIFICANT CHANGE UP (ref 4.8–10.8)
WBC # FLD AUTO: 8.11 K/UL — SIGNIFICANT CHANGE UP (ref 4.8–10.8)

## 2019-05-22 PROCEDURE — 99222 1ST HOSP IP/OBS MODERATE 55: CPT

## 2019-05-22 PROCEDURE — 88305 TISSUE EXAM BY PATHOLOGIST: CPT | Mod: 26

## 2019-05-22 PROCEDURE — 93306 TTE W/DOPPLER COMPLETE: CPT | Mod: 26

## 2019-05-22 PROCEDURE — 88311 DECALCIFY TISSUE: CPT | Mod: 26

## 2019-05-22 RX ORDER — CALCIUM POLYCARBOPHIL 625 MG/1
1 TABLET, FILM COATED ORAL
Qty: 0 | Refills: 0 | DISCHARGE

## 2019-05-22 RX ORDER — SENNA PLUS 8.6 MG/1
2 TABLET ORAL AT BEDTIME
Refills: 0 | Status: DISCONTINUED | OUTPATIENT
Start: 2019-05-22 | End: 2019-06-03

## 2019-05-22 RX ORDER — SODIUM CHLORIDE 9 MG/ML
1000 INJECTION INTRAMUSCULAR; INTRAVENOUS; SUBCUTANEOUS
Refills: 0 | Status: DISCONTINUED | OUTPATIENT
Start: 2019-05-22 | End: 2019-05-22

## 2019-05-22 RX ORDER — ONDANSETRON 8 MG/1
4 TABLET, FILM COATED ORAL ONCE
Refills: 0 | Status: DISCONTINUED | OUTPATIENT
Start: 2019-05-22 | End: 2019-05-22

## 2019-05-22 RX ORDER — MORPHINE SULFATE 50 MG/1
2 CAPSULE, EXTENDED RELEASE ORAL
Refills: 0 | Status: DISCONTINUED | OUTPATIENT
Start: 2019-05-22 | End: 2019-05-22

## 2019-05-22 RX ORDER — POLYETHYLENE GLYCOL 3350 17 G/17G
17 POWDER, FOR SOLUTION ORAL DAILY
Refills: 0 | Status: DISCONTINUED | OUTPATIENT
Start: 2019-05-22 | End: 2019-05-31

## 2019-05-22 RX ORDER — BENZTROPINE MESYLATE 1 MG
0.5 TABLET ORAL
Refills: 0 | Status: DISCONTINUED | OUTPATIENT
Start: 2019-05-22 | End: 2019-06-03

## 2019-05-22 RX ORDER — ASPIRIN/CALCIUM CARB/MAGNESIUM 324 MG
81 TABLET ORAL
Refills: 0 | Status: DISCONTINUED | OUTPATIENT
Start: 2019-05-22 | End: 2019-05-23

## 2019-05-22 RX ORDER — ONDANSETRON 8 MG/1
4 TABLET, FILM COATED ORAL EVERY 6 HOURS
Refills: 0 | Status: DISCONTINUED | OUTPATIENT
Start: 2019-05-22 | End: 2019-06-03

## 2019-05-22 RX ORDER — MORPHINE SULFATE 50 MG/1
2 CAPSULE, EXTENDED RELEASE ORAL EVERY 4 HOURS
Refills: 0 | Status: DISCONTINUED | OUTPATIENT
Start: 2019-05-22 | End: 2019-05-22

## 2019-05-22 RX ORDER — HALOPERIDOL DECANOATE 100 MG/ML
1 INJECTION INTRAMUSCULAR AT BEDTIME
Refills: 0 | Status: DISCONTINUED | OUTPATIENT
Start: 2019-05-22 | End: 2019-05-22

## 2019-05-22 RX ORDER — SENNA PLUS 8.6 MG/1
2 TABLET ORAL AT BEDTIME
Refills: 0 | Status: DISCONTINUED | OUTPATIENT
Start: 2019-05-22 | End: 2019-05-22

## 2019-05-22 RX ORDER — HALOPERIDOL DECANOATE 100 MG/ML
1 INJECTION INTRAMUSCULAR AT BEDTIME
Refills: 0 | Status: DISCONTINUED | OUTPATIENT
Start: 2019-05-22 | End: 2019-06-03

## 2019-05-22 RX ORDER — SODIUM CHLORIDE 9 MG/ML
1000 INJECTION, SOLUTION INTRAVENOUS
Refills: 0 | Status: DISCONTINUED | OUTPATIENT
Start: 2019-05-22 | End: 2019-05-22

## 2019-05-22 RX ORDER — BENZTROPINE MESYLATE 1 MG
0.5 TABLET ORAL
Refills: 0 | Status: DISCONTINUED | OUTPATIENT
Start: 2019-05-22 | End: 2019-05-22

## 2019-05-22 RX ORDER — OXYCODONE HYDROCHLORIDE 5 MG/1
5 TABLET ORAL EVERY 4 HOURS
Refills: 0 | Status: DISCONTINUED | OUTPATIENT
Start: 2019-05-22 | End: 2019-05-22

## 2019-05-22 RX ORDER — DIVALPROEX SODIUM 500 MG/1
1500 TABLET, DELAYED RELEASE ORAL AT BEDTIME
Refills: 0 | Status: DISCONTINUED | OUTPATIENT
Start: 2019-05-22 | End: 2019-05-22

## 2019-05-22 RX ORDER — BENZTROPINE MESYLATE 1 MG
1 TABLET ORAL
Qty: 0 | Refills: 0 | DISCHARGE

## 2019-05-22 RX ORDER — DIVALPROEX SODIUM 500 MG/1
1500 TABLET, DELAYED RELEASE ORAL AT BEDTIME
Refills: 0 | Status: DISCONTINUED | OUTPATIENT
Start: 2019-05-22 | End: 2019-06-03

## 2019-05-22 RX ORDER — CEFAZOLIN SODIUM 1 G
1000 VIAL (EA) INJECTION EVERY 8 HOURS
Refills: 0 | Status: COMPLETED | OUTPATIENT
Start: 2019-05-23 | End: 2019-05-23

## 2019-05-22 RX ORDER — ACETAMINOPHEN 500 MG
650 TABLET ORAL EVERY 6 HOURS
Refills: 0 | Status: DISCONTINUED | OUTPATIENT
Start: 2019-05-22 | End: 2019-05-27

## 2019-05-22 RX ORDER — DIVALPROEX SODIUM 500 MG/1
1 TABLET, DELAYED RELEASE ORAL
Qty: 0 | Refills: 0 | DISCHARGE

## 2019-05-22 RX ORDER — DOCUSATE SODIUM 100 MG
100 CAPSULE ORAL THREE TIMES A DAY
Refills: 0 | Status: DISCONTINUED | OUTPATIENT
Start: 2019-05-22 | End: 2019-06-03

## 2019-05-22 RX ORDER — DOCUSATE SODIUM 100 MG
100 CAPSULE ORAL THREE TIMES A DAY
Refills: 0 | Status: DISCONTINUED | OUTPATIENT
Start: 2019-05-22 | End: 2019-05-22

## 2019-05-22 RX ORDER — HALOPERIDOL DECANOATE 100 MG/ML
0.5 INJECTION INTRAMUSCULAR
Qty: 0 | Refills: 0 | DISCHARGE

## 2019-05-22 RX ORDER — MORPHINE SULFATE 50 MG/1
2 CAPSULE, EXTENDED RELEASE ORAL
Refills: 0 | Status: DISCONTINUED | OUTPATIENT
Start: 2019-05-22 | End: 2019-05-24

## 2019-05-22 RX ORDER — MAGNESIUM HYDROXIDE 400 MG/1
30 TABLET, CHEWABLE ORAL DAILY
Refills: 0 | Status: DISCONTINUED | OUTPATIENT
Start: 2019-05-22 | End: 2019-05-31

## 2019-05-22 RX ORDER — DIVALPROEX SODIUM 500 MG/1
3 TABLET, DELAYED RELEASE ORAL
Qty: 0 | Refills: 0 | DISCHARGE

## 2019-05-22 RX ORDER — HEPARIN SODIUM 5000 [USP'U]/ML
5000 INJECTION INTRAVENOUS; SUBCUTANEOUS EVERY 8 HOURS
Refills: 0 | Status: DISCONTINUED | OUTPATIENT
Start: 2019-05-22 | End: 2019-05-22

## 2019-05-22 RX ORDER — HALOPERIDOL DECANOATE 100 MG/ML
0 INJECTION INTRAMUSCULAR
Qty: 0 | Refills: 0 | DISCHARGE

## 2019-05-22 RX ORDER — OXYCODONE HYDROCHLORIDE 5 MG/1
10 TABLET ORAL EVERY 4 HOURS
Refills: 0 | Status: DISCONTINUED | OUTPATIENT
Start: 2019-05-22 | End: 2019-05-22

## 2019-05-22 RX ORDER — SODIUM CHLORIDE 9 MG/ML
1000 INJECTION, SOLUTION INTRAVENOUS
Refills: 0 | Status: DISCONTINUED | OUTPATIENT
Start: 2019-05-22 | End: 2019-05-23

## 2019-05-22 RX ADMIN — SODIUM CHLORIDE 50 MILLILITER(S): 9 INJECTION, SOLUTION INTRAVENOUS at 05:35

## 2019-05-22 RX ADMIN — SODIUM CHLORIDE 75 MILLILITER(S): 9 INJECTION INTRAMUSCULAR; INTRAVENOUS; SUBCUTANEOUS at 19:28

## 2019-05-22 RX ADMIN — Medication 0.5 MILLIGRAM(S): at 05:28

## 2019-05-22 NOTE — CONSULT NOTE ADULT - SUBJECTIVE AND OBJECTIVE BOX
CHIEF COMPLAINT:Patient is a 78y old  Female who presents with a chief complaint of Fall (22 May 2019 10:25)      HISTORY OF PRESENT ILLNESS:   HPI:  77 yo lady with hx of MR and bipolar , DLD , esophageal varices sent for the above CC . As per aid , patient was in rehab today , had witnessed mechanical fall , with no head trauma .  No LOC . (22 May 2019 00:44)  Pt has R femoral neck fx and is scheduled for ORIF. Has no complaints at this time except hip pain. Denies SOB, Chest pain, palpitation or dizziness    PAST MEDICAL & SURGICAL HISTORY:  Breast mass, right: s/p lumpectomy &amp; radiation 2007  Hyperlipemia  Brito esophagus  Mild mental retardation  Atypical bipolar disorder  Feeding by G-tube: s/p removal 2012    FAMILY HISTORY:  No pertinent family history in first degree relatives    Allergies    No Known Allergies    Intolerances    	  Home Medications:  alendronate 70 mg oral tablet: 1 tab(s) orally once a week (22 May 2019 00:49)  benztropine 0.5 mg oral tablet: 1 tab(s) orally 2 times a day (22 May 2019 00:49)  Depakote  mg oral tablet, extended release: 3 tab(s) orally once a day (at bedtime) (22 May 2019 00:49)  FiberCon 625 mg oral tablet: 1 tab(s) orally 2 times a day (22 May 2019 00:49)  haloperidol 2 mg/mL oral concentrate: 0.5 milliliter(s) orally once a day (at bedtime) (22 May 2019 00:49)    MEDICATIONS  (STANDING):  benztropine 0.5 milliGRAM(s) Oral two times a day  dextrose 5% + sodium chloride 0.45%. 1000 milliLiter(s) (50 mL/Hr) IV Continuous <Continuous>  diVALproex ER 1500 milliGRAM(s) Oral at bedtime  docusate sodium 100 milliGRAM(s) Oral three times a day  haloperidol    Concentrate 1 milliGRAM(s) Oral at bedtime  heparin  Injectable 5000 Unit(s) SubCutaneous every 8 hours  senna 2 Tablet(s) Oral at bedtime    MEDICATIONS  (PRN):  morphine  - Injectable 2 milliGRAM(s) IV Push every 4 hours PRN Moderate Pain (4 - 6)        SOCIAL HISTORY:    [  ] active smoker  [  ] non smoker  [  ] Etoh  [  ] recreational drugs    REVIEW OF SYSTEMS:  14 point ROS negative except as mentioned above in HPI    PHYSICAL EXAM:  T(C): 36.9 (05-22-19 @ 11:14), Max: 38.2 (05-21-19 @ 23:27)  HR: 97 (05-22-19 @ 11:14) (85 - 113)  BP: 119/82 (05-22-19 @ 11:14) (98/57 - 199/140)  RR: 17 (05-22-19 @ 05:37) (17 - 18)  SpO2: 100% (05-21-19 @ 15:59) (100% - 100%)  Wt(kg): --  I&O's Summary      General Appearance: in NAD	  HEENT: NC, No JVD appreciated  Cardiovascular: Normal S1 S2, No murmurs  Respiratory: cta b/l  Gastrointestinal:  Soft, Non-tender, BS +	  Neurologic: No focal deficits  Extremities: ROM wnl, No clubbing/cyanosis/edema  Skin: No rashes, No ecchymoses, No cyanosis  Vascular: Peripheral pulses palpable 2+ bilaterally    LABS:	 	                        12.1   8.06  )-----------( 138      ( 22 May 2019 06:34 )             35.7     05-22    140  |  105  |  13  ----------------------------<  96  4.1   |  23  |  0.5<L>    Ca    8.5      22 May 2019 06:34    TPro  6.7  /  Alb  4.2  /  TBili  0.2  /  DBili  x   /  AST  22  /  ALT  10  /  AlkPhos  63  05-21    eGFR if Non : 93 mL/min/1.73M2 (05-22-19 @ 06:34)  eGFR if Non African American: 87 mL/min/1.73M2 (05-21-19 @ 16:05)            CARDIAC MARKERS:      TELEMETRY EVENTS: 	    ECG:  	Normal Sinus, LVH.  RADIOLOGY: < from: Xray Femur 2 Views, Right (05.21.19 @ 21:06) >  Right femoral neck displaced fracture with varus deformity.   Right hip degenerative changes. Osteopenia.    < end of copied text >    	    PREVIOUS DIAGNOSTIC TESTING:    [ ] Echocardiogram:  [ ] Catheterization:  [ ] Stress Test: CHIEF COMPLAINT:Patient is a 78y old  Female who presents with a chief complaint of Fall (22 May 2019 10:25)      HISTORY OF PRESENT ILLNESS:   HPI:  77 yo lady with hx of MR and bipolar , DLD , esophageal varices sent for the above CC . As per aid , patient was in rehab today , had witnessed mechanical fall , with no head trauma .  No LOC . (22 May 2019 00:44)  Pt has R femoral neck fx and is scheduled for ORIF. Has no complaints at this time except hip pain. Denies SOB, Chest pain, palpitation or dizziness    PAST MEDICAL & SURGICAL HISTORY:  Breast mass, right: s/p lumpectomy &amp; radiation 2007  Hyperlipemia  Brito esophagus  Mild mental retardation  Atypical bipolar disorder  Feeding by G-tube: s/p removal 2012    FAMILY HISTORY:  No pertinent family history in first degree relatives    Allergies    No Known Allergies    Intolerances    	  Home Medications:  alendronate 70 mg oral tablet: 1 tab(s) orally once a week (22 May 2019 00:49)  benztropine 0.5 mg oral tablet: 1 tab(s) orally 2 times a day (22 May 2019 00:49)  Depakote  mg oral tablet, extended release: 3 tab(s) orally once a day (at bedtime) (22 May 2019 00:49)  FiberCon 625 mg oral tablet: 1 tab(s) orally 2 times a day (22 May 2019 00:49)  haloperidol 2 mg/mL oral concentrate: 0.5 milliliter(s) orally once a day (at bedtime) (22 May 2019 00:49)    MEDICATIONS  (STANDING):  benztropine 0.5 milliGRAM(s) Oral two times a day  dextrose 5% + sodium chloride 0.45%. 1000 milliLiter(s) (50 mL/Hr) IV Continuous <Continuous>  diVALproex ER 1500 milliGRAM(s) Oral at bedtime  docusate sodium 100 milliGRAM(s) Oral three times a day  haloperidol    Concentrate 1 milliGRAM(s) Oral at bedtime  heparin  Injectable 5000 Unit(s) SubCutaneous every 8 hours  senna 2 Tablet(s) Oral at bedtime    MEDICATIONS  (PRN):  morphine  - Injectable 2 milliGRAM(s) IV Push every 4 hours PRN Moderate Pain (4 - 6)        SOCIAL HISTORY:    [  ] active smoker  [  ] non smoker  [  ] Etoh  [  ] recreational drugs    REVIEW OF SYSTEMS:  14 point ROS negative except as mentioned above in HPI    PHYSICAL EXAM:  T(C): 36.9 (05-22-19 @ 11:14), Max: 38.2 (05-21-19 @ 23:27)  HR: 97 (05-22-19 @ 11:14) (85 - 113)  BP: 119/82 (05-22-19 @ 11:14) (98/57 - 199/140)  RR: 17 (05-22-19 @ 05:37) (17 - 18)  SpO2: 100% (05-21-19 @ 15:59) (100% - 100%)  Wt(kg): --  I&O's Summary      General Appearance: in NAD	  HEENT: NC, poor dentition  Neck: No JVD appreciated  Cardiovascular: Normal S1 S2, No murmurs  Respiratory: cta b/l  Gastrointestinal:  Soft, Non-tender, BS +	  Neurologic: No focal deficits  ExtremitiesMSK: ROM wnl, No clubbing/cyanosis/edema  Skin: No rashes, No ecchymoses, No cyanosis  Vascular: Peripheral pulses palpable 2+ bilaterally  Psych: unable to assess    LABS:	 	                        12.1   8.06  )-----------( 138      ( 22 May 2019 06:34 )             35.7     05-22    140  |  105  |  13  ----------------------------<  96  4.1   |  23  |  0.5<L>    Ca    8.5      22 May 2019 06:34    TPro  6.7  /  Alb  4.2  /  TBili  0.2  /  DBili  x   /  AST  22  /  ALT  10  /  AlkPhos  63  05-21    eGFR if Non : 93 mL/min/1.73M2 (05-22-19 @ 06:34)  eGFR if Non African American: 87 mL/min/1.73M2 (05-21-19 @ 16:05)            CARDIAC MARKERS:      TELEMETRY EVENTS: 	    ECG:  	Normal Sinus, LVH.  RADIOLOGY: < from: Xray Femur 2 Views, Right (05.21.19 @ 21:06) >  Right femoral neck displaced fracture with varus deformity.   Right hip degenerative changes. Osteopenia.    < end of copied text >    	    PREVIOUS DIAGNOSTIC TESTING:    [ ] Echocardiogram:  [ ] Catheterization:  [ ] Stress Test:

## 2019-05-22 NOTE — H&P ADULT - ATTENDING COMMENTS
77 yo lady with hx of MR and bipolar , DLD , esophageal varices sent for the above CC . As per aid , patient was in rehab today , had witnessed mechanical fall , with no head trauma .  No LOC .    )R femoral neck fracture    Ortho following    Plan for OR today    Cardio clearance on chart     Pain control     Active T/S    2)Bipolar disorder    Keep Haldol, Valproate , and Benztropine     #DVT px: SCD , if no surgery tomorrow start Hep SC 79 yo lady with hx of MR and bipolar, DLD , esophageal varices sent to ED after staff at group [place] witnessed mechanical fall. Pt/aide deny CP, SOB, palpitations, fevers, seizure activity. As per aid , patient was in rehab today: had witnessed mechanical fall , with no head trauma .  No LOC . Pt is poor historian but answers questions appropriately. Only c/o Rt hip discomfort.    Gen: NAD, AA0x2, pleasant, poor dentition  HEENT: PERRLA, EOM, no LAD  CV: nl S1 S2  Resp: decreased BS b/l  GI: NT/ND/S +BS  MS: no c/c/e  Neuro: decreased ROM RLE, otherwise nonfocal      #R femoral neck fracture    Ortho following    Plan for OR today    Cardio clearance on chart     Pain control     Active T/S    D/w Dr. Menjivar (medical director of the [facility]) - consent should be obtained from Koko Marie (pt's cousin) [phone]    #Esophageal varices  -PPI  -?nonselective BB    #HLD  -statin    #Bipolar disorder    Cont Haldol, Valproate, and Benztropine     #DVT px: SCD for now    #Progress Note Handoff  Pending (specify):  Hip ORIF__,Medical stability______x___, PT________  Disposition: Home______/SNF_______/4A________/To be determined___x_____/Waiting for Auth_____ 79 yo lady with hx of MR and bipolar, DLD , esophageal varices sent to ED after staff at group [facility] witnessed mechanical fall. Pt/aide deny CP, SOB, palpitations, fevers, seizure activity. As per aid , patient was in rehab today: had witnessed mechanical fall , with no head trauma .  No LOC . Pt is poor historian but answers questions appropriately. Only c/o Rt hip discomfort. As per aide, at baseline pt able to walk short distances with the walker but usually uses wheelchair. EKG - no acute ischemic changes (my read), CXR - [facility].    Gen: NAD, AA0x2, pleasant, poor dentition  HEENT: PERRLA, EOM, no LAD  CV: nl S1 S2  Resp: decreased BS b/l  GI: NT/ND/S +BS  MS: no c/c/e  Neuro: decreased ROM RLE, otherwise nonfocal      #R femoral neck fracture    Ortho following    Plan for OR today    Cardio clearance on chart     Pain control     Active T/S    D/w Dr. Menjivar (medical director of the senior care) - consent should be obtained from Koko Marie (pt's cousin) [phone]   NPO, IVFs      #Esophageal varices  -PPI  -?nonselective BB    #HLD  -statin    #Bipolar disorder    Cont Haldol, Valproate, and Benztropine     #DVT px: SCD for now    #Progress Note Handoff  Pending (specify):  Hip ORIF__,Medical stability______x___, PT________  Disposition: Home______/SNF_______/4A________/To be determined___x_____/Waiting for Auth_____

## 2019-05-22 NOTE — CONSULT NOTE ADULT - ASSESSMENT
Assessment and Plan    79 yo lady with hx of MR and bipolar , DLD , esophageal varices admitted for R femoral neck fx    R femoral neck fx scheduled for ORIF    - No cardiac Hx and no Symptoms at this time  - Pt is Intermediate risk for intermediate risk Surgery    Will discuss with Cardiologist Assessment and Plan    77 yo lady with hx of MR and bipolar , DLD , esophageal varices admitted for R femoral neck fx    R femoral neck fx scheduled for ORIF    - No cardiac Hx and no Symptoms at this time  - no PE findings to suggest cardiac pathology  - Pt is Intermediate risk for intermediate risk Surgery

## 2019-05-22 NOTE — H&P ADULT - NSHPLABSRESULTS_GEN_ALL_CORE
13.4   13.50 )-----------( 130      ( 21 May 2019 17:30 )             39.0       05-21    137  |  101  |  22<H>  ----------------------------<  118<H>  4.7   |  21  |  0.6<L>    Ca    9.2      21 May 2019 16:05    TPro  6.7  /  Alb  4.2  /  TBili  0.2  /  DBili  x   /  AST  22  /  ALT  10  /  AlkPhos  63  05-21                  PT/INR - ( 21 May 2019 17:30 )   PT: 12.10 sec;   INR: 1.05 ratio         PTT - ( 21 May 2019 17:30 )  PTT:28.4 sec    Lactate Trend            CAPILLARY BLOOD GLUCOSE

## 2019-05-22 NOTE — PROGRESS NOTE ADULT - SUBJECTIVE AND OBJECTIVE BOX
SUBJECTIVE:    Patient is a 78y old Female who presents with a chief complaint of Fall (22 May 2019 00:44)    Currently admitted to medicine with the primary diagnosis of Femoral neck fracture     Today is hospital day 1d. No acute overnight events. Patient presented from day program s/p mechanical fall with R femoral neck fracture. No reports of pain this AM, patient in good spirits.     PAST MEDICAL & SURGICAL HISTORY  Breast mass, right: s/p lumpectomy &amp; radiation 2007  Hyperlipemia  Brito esophagus  Mild mental retardation  Atypical bipolar disorder  Feeding by G-tube: s/p removal 2012    SOCIAL HISTORY:  No alcohol, tobacco, illicit drug use     From ( ) home ( ) nursing home (x ) other: [facility] [phone] [place] [id number]   Ambulation status: ( ) ambulates independently ( ) ambulates with assistance (x ) ambulates with device (x ) dependent   Device: ( x) rolling walker ( ) cane (x) wheelchair     ALLERGIES:  No Known Allergies    MEDICATIONS:  STANDING MEDICATIONS  benztropine 0.5 milliGRAM(s) Oral two times a day  dextrose 5% + sodium chloride 0.45%. 1000 milliLiter(s) IV Continuous <Continuous>  diVALproex ER 1500 milliGRAM(s) Oral at bedtime  docusate sodium 100 milliGRAM(s) Oral three times a day  haloperidol    Concentrate 1 milliGRAM(s) Oral at bedtime  heparin  Injectable 5000 Unit(s) SubCutaneous every 8 hours  senna 2 Tablet(s) Oral at bedtime    PRN MEDICATIONS  morphine  - Injectable 2 milliGRAM(s) IV Push every 4 hours PRN    VITALS:   T(F): 97.2  HR: 106  BP: 98/57  RR: 17  SpO2: 100%    LABS:                        12.1   8.06  )-----------( 138      ( 22 May 2019 06:34 )             35.7     05-22    140  |  105  |  13  ----------------------------<  96  4.1   |  23  |  0.5<L>    Ca    8.5      22 May 2019 06:34    TPro  6.7  /  Alb  4.2  /  TBili  0.2  /  DBili  x   /  AST  22  /  ALT  10  /  AlkPhos  63  05-21    PT/INR - ( 21 May 2019 17:30 )   PT: 12.10 sec;   INR: 1.05 ratio      PTT - ( 21 May 2019 17:30 )  PTT:28.4 sec    RADIOLOGY:  X-ray femur  Right femoral neck displaced fracture with varus deformity.   Right hip degenerative changes. Osteopenia.    PHYSICAL EXAM:  GEN: No acute distress, lying comfortably in bed   LUNGS: Clear to auscultation bilaterally, no labored breathing   HEART: S1/S2 present. Sinus tachycardia, no murmur appreciated   ABD: Soft, non-tender, non-distended. Bowel sounds present.   EXT: Noncyanotic, nonedematous, 2+ peripheral pulses, skin intact   NEURO: AAOX1, CN II-XII grossly intact

## 2019-05-22 NOTE — H&P ADULT - ASSESSMENT
79 yo lady with hx of MR and bipolar , DLD , esophageal varices presenting S/P mechanical fall     1)R femoral neck fracture    Ortho following    Plan for OR after medical clearance     Pain control     Active T/S    2)Bipolar disorder    Keep Haldol, Valproate , and Benztropine     #DVT px: SCD , if no surgery tomorrow start Hep SC

## 2019-05-22 NOTE — H&P ADULT - NSICDXPASTMEDICALHX_GEN_ALL_CORE_FT
PAST MEDICAL HISTORY:  Atypical bipolar disorder     Brito esophagus     Breast mass, right s/p lumpectomy & radiation 2007    Hyperlipemia     Mild mental retardation

## 2019-05-22 NOTE — PROGRESS NOTE ADULT - ASSESSMENT
78F with PMH of intellectual disability from group home and bipolar disorder, DLD, and esophageal varices presented s/p mechanical fall at day program.    #Acute Right Femoral Neck Fracture s/p mechanical fall  -Fall precautions  -Strict bedrest, NWB R LE   -Orthopedic surgery on board, awaiting consent for ORIF R hip  -Pre-op labs/EKG completed, cardiac fellow called for urgent cardiac clearance, medical clearance to be documented  -Pain control   -On alendronate at home     #PMH Bipolar Disorder: continue haldol, benztropine, Depakote     #PMH DLD: not on statin, f/u lipid panel     Contacted group [facility]. Patient's father used to make medical decisions for her, but he passed away recently. There is 1 documented relative, a cousin (Nichole Dee [phone]) but they do not know if she is the HCP. We will reach out to Nichole.     DVT ppx: Heparin sub-q (held for OR)  GI ppx: not indicated   Diet: NPO for OR -> regular diet   Activity: bedrest, fall precautions   Dispo: from group home, acute, needs ORIF   Code status: FULL CODE

## 2019-05-22 NOTE — H&P ADULT - HISTORY OF PRESENT ILLNESS
77 yo lady with hx of MR and bipolar , DLD , esophageal varices sent for the above CC . As per aid , patient was in rehab today , had witnessed mechanical fall , with no head trauma .  No LOC .

## 2019-05-22 NOTE — H&P ADULT - NSHPPHYSICALEXAM_GEN_ALL_CORE
T(C): 38.2 (05-21-19 @ 23:27), Max: 38.2 (05-21-19 @ 23:27)  HR: 96 (05-21-19 @ 23:27) (85 - 96)  BP: 111/53 (05-21-19 @ 23:27) (111/53 - 199/140)  RR: 18 (05-21-19 @ 23:27) (18 - 18)  SpO2: 100% (05-21-19 @ 15:59) (100% - 100%)    PHYSICAL EXAM:  GENERAL: NAD, well-developed  NECK: Supple, No JVD  CHEST/LUNG: Clear to auscultation bilaterally; No wheeze  HEART: Regular rate and rhythm; No murmurs, rubs, or gallops  ABDOMEN: Soft, Nontender, Nondistended; Bowel sounds present  EXTREMITIES:  2+ Peripheral Pulses, No clubbing, cyanosis, or edema

## 2019-05-23 LAB
ANION GAP SERPL CALC-SCNC: 11 MMOL/L — SIGNIFICANT CHANGE UP (ref 7–14)
BASOPHILS # BLD AUTO: 0.01 K/UL — SIGNIFICANT CHANGE UP (ref 0–0.2)
BASOPHILS NFR BLD AUTO: 0.1 % — SIGNIFICANT CHANGE UP (ref 0–1)
BUN SERPL-MCNC: 16 MG/DL — SIGNIFICANT CHANGE UP (ref 10–20)
CALCIUM SERPL-MCNC: 8 MG/DL — LOW (ref 8.5–10.1)
CHLORIDE SERPL-SCNC: 107 MMOL/L — SIGNIFICANT CHANGE UP (ref 98–110)
CHOLEST SERPL-MCNC: 139 MG/DL — SIGNIFICANT CHANGE UP (ref 100–200)
CO2 SERPL-SCNC: 21 MMOL/L — SIGNIFICANT CHANGE UP (ref 17–32)
CREAT SERPL-MCNC: 0.6 MG/DL — LOW (ref 0.7–1.5)
EOSINOPHIL # BLD AUTO: 0 K/UL — SIGNIFICANT CHANGE UP (ref 0–0.7)
EOSINOPHIL NFR BLD AUTO: 0 % — SIGNIFICANT CHANGE UP (ref 0–8)
GLUCOSE SERPL-MCNC: 127 MG/DL — HIGH (ref 70–99)
HCT VFR BLD CALC: 29.8 % — LOW (ref 37–47)
HDLC SERPL-MCNC: 71 MG/DL — SIGNIFICANT CHANGE UP
HGB BLD-MCNC: 10 G/DL — LOW (ref 12–16)
IMM GRANULOCYTES NFR BLD AUTO: 0.3 % — SIGNIFICANT CHANGE UP (ref 0.1–0.3)
LIPID PNL WITH DIRECT LDL SERPL: 60 MG/DL — SIGNIFICANT CHANGE UP (ref 4–129)
LYMPHOCYTES # BLD AUTO: 1.02 K/UL — LOW (ref 1.2–3.4)
LYMPHOCYTES # BLD AUTO: 11.7 % — LOW (ref 20.5–51.1)
MAGNESIUM SERPL-MCNC: 1.6 MG/DL — LOW (ref 1.8–2.4)
MCHC RBC-ENTMCNC: 32.3 PG — HIGH (ref 27–31)
MCHC RBC-ENTMCNC: 33.6 G/DL — SIGNIFICANT CHANGE UP (ref 32–37)
MCV RBC AUTO: 96.1 FL — SIGNIFICANT CHANGE UP (ref 81–99)
MONOCYTES # BLD AUTO: 1.02 K/UL — HIGH (ref 0.1–0.6)
MONOCYTES NFR BLD AUTO: 11.7 % — HIGH (ref 1.7–9.3)
NEUTROPHILS # BLD AUTO: 6.65 K/UL — HIGH (ref 1.4–6.5)
NEUTROPHILS NFR BLD AUTO: 76.2 % — HIGH (ref 42.2–75.2)
NRBC # BLD: 0 /100 WBCS — SIGNIFICANT CHANGE UP (ref 0–0)
PLATELET # BLD AUTO: 95 K/UL — LOW (ref 130–400)
POTASSIUM SERPL-MCNC: 4.5 MMOL/L — SIGNIFICANT CHANGE UP (ref 3.5–5)
POTASSIUM SERPL-SCNC: 4.5 MMOL/L — SIGNIFICANT CHANGE UP (ref 3.5–5)
RBC # BLD: 3.1 M/UL — LOW (ref 4.2–5.4)
RBC # FLD: 12 % — SIGNIFICANT CHANGE UP (ref 11.5–14.5)
SODIUM SERPL-SCNC: 139 MMOL/L — SIGNIFICANT CHANGE UP (ref 135–146)
TOTAL CHOLESTEROL/HDL RATIO MEASUREMENT: 2 RATIO — LOW (ref 4–5.5)
TRIGL SERPL-MCNC: 71 MG/DL — SIGNIFICANT CHANGE UP (ref 10–149)
WBC # BLD: 8.73 K/UL — SIGNIFICANT CHANGE UP (ref 4.8–10.8)
WBC # FLD AUTO: 8.73 K/UL — SIGNIFICANT CHANGE UP (ref 4.8–10.8)

## 2019-05-23 RX ORDER — MAGNESIUM SULFATE 500 MG/ML
2 VIAL (ML) INJECTION ONCE
Refills: 0 | Status: COMPLETED | OUTPATIENT
Start: 2019-05-23 | End: 2019-05-23

## 2019-05-23 RX ORDER — CHLORHEXIDINE GLUCONATE 213 G/1000ML
1 SOLUTION TOPICAL
Refills: 0 | Status: DISCONTINUED | OUTPATIENT
Start: 2019-05-23 | End: 2019-06-03

## 2019-05-23 RX ORDER — HEPARIN SODIUM 5000 [USP'U]/ML
5000 INJECTION INTRAVENOUS; SUBCUTANEOUS EVERY 8 HOURS
Refills: 0 | Status: DISCONTINUED | OUTPATIENT
Start: 2019-05-23 | End: 2019-06-03

## 2019-05-23 RX ADMIN — Medication 100 MILLIGRAM(S): at 10:21

## 2019-05-23 RX ADMIN — HALOPERIDOL DECANOATE 1 MILLIGRAM(S): 100 INJECTION INTRAMUSCULAR at 21:54

## 2019-05-23 RX ADMIN — Medication 100 MILLIGRAM(S): at 13:03

## 2019-05-23 RX ADMIN — Medication 100 MILLIGRAM(S): at 21:54

## 2019-05-23 RX ADMIN — POLYETHYLENE GLYCOL 3350 17 GRAM(S): 17 POWDER, FOR SOLUTION ORAL at 11:49

## 2019-05-23 RX ADMIN — DIVALPROEX SODIUM 1500 MILLIGRAM(S): 500 TABLET, DELAYED RELEASE ORAL at 21:53

## 2019-05-23 RX ADMIN — HEPARIN SODIUM 5000 UNIT(S): 5000 INJECTION INTRAVENOUS; SUBCUTANEOUS at 21:54

## 2019-05-23 RX ADMIN — Medication 81 MILLIGRAM(S): at 05:09

## 2019-05-23 RX ADMIN — Medication 650 MILLIGRAM(S): at 17:40

## 2019-05-23 RX ADMIN — Medication 100 MILLIGRAM(S): at 05:07

## 2019-05-23 RX ADMIN — Medication 650 MILLIGRAM(S): at 17:41

## 2019-05-23 RX ADMIN — Medication 100 MILLIGRAM(S): at 17:38

## 2019-05-23 RX ADMIN — Medication 650 MILLIGRAM(S): at 11:49

## 2019-05-23 RX ADMIN — SODIUM CHLORIDE 80 MILLILITER(S): 9 INJECTION, SOLUTION INTRAVENOUS at 00:01

## 2019-05-23 RX ADMIN — Medication 100 MILLIGRAM(S): at 05:09

## 2019-05-23 RX ADMIN — Medication 650 MILLIGRAM(S): at 05:09

## 2019-05-23 RX ADMIN — Medication 650 MILLIGRAM(S): at 00:35

## 2019-05-23 RX ADMIN — HEPARIN SODIUM 5000 UNIT(S): 5000 INJECTION INTRAVENOUS; SUBCUTANEOUS at 14:36

## 2019-05-23 RX ADMIN — Medication 650 MILLIGRAM(S): at 07:14

## 2019-05-23 RX ADMIN — Medication 25 GRAM(S): at 11:50

## 2019-05-23 RX ADMIN — MORPHINE SULFATE 2 MILLIGRAM(S): 50 CAPSULE, EXTENDED RELEASE ORAL at 19:08

## 2019-05-23 RX ADMIN — Medication 0.5 MILLIGRAM(S): at 17:40

## 2019-05-23 RX ADMIN — Medication 650 MILLIGRAM(S): at 16:25

## 2019-05-23 RX ADMIN — Medication 0.5 MILLIGRAM(S): at 05:08

## 2019-05-23 RX ADMIN — Medication 650 MILLIGRAM(S): at 23:54

## 2019-05-23 NOTE — PHYSICAL THERAPY INITIAL EVALUATION ADULT - CRITERIA FOR SKILLED THERAPEUTIC INTERVENTIONS
therapy frequency/predicted duration of therapy intervention/anticipated equipment needs at discharge/rehab potential/impairments found/anticipated discharge recommendation/functional limitations in following categories

## 2019-05-23 NOTE — PROGRESS NOTE ADULT - SUBJECTIVE AND OBJECTIVE BOX
BABAR ENRIQUEZ 78y Female  MRN#: [id number]   CODE STATUS       SUBJECTIVE  Patient is a 78y old Female who presents with a chief complaint of Fall (23 May 2019 11:28)  Currently admitted to medicine with the primary diagnosis of Femoral neck fracture  Hospital course has been complicated by undergoing surgery for Right femoral fracture.  Today is hospital day 2d, and this morning she is complaining of pain in the surgical site.   Her speech is incoherent which is her baseline as per the health aid.    OBJECTIVE  PAST MEDICAL & SURGICAL HISTORY  Breast mass, right: s/p lumpectomy &amp; radiation 2007  Hyperlipemia  Brito esophagus  Mild mental retardation  Atypical bipolar disorder  Feeding by G-tube: s/p removal 2012    ALLERGIES:  No Known Allergies    MEDICATIONS:  STANDING MEDICATIONS  acetaminophen   Tablet .. 650 milliGRAM(s) Oral every 6 hours  aspirin enteric coated 81 milliGRAM(s) Oral two times a day  benztropine 0.5 milliGRAM(s) Oral two times a day  ceFAZolin   IVPB 1000 milliGRAM(s) IV Intermittent every 8 hours  chlorhexidine 4% Liquid 1 Application(s) Topical <User Schedule>  diVALproex ER 1500 milliGRAM(s) Oral at bedtime  docusate sodium 100 milliGRAM(s) Oral three times a day  haloperidol    Concentrate 1 milliGRAM(s) Oral at bedtime  heparin  Injectable 5000 Unit(s) SubCutaneous every 8 hours  lactated ringers. 1000 milliLiter(s) IV Continuous <Continuous>  polyethylene glycol 3350 17 Gram(s) Oral daily    PRN MEDICATIONS  aluminum hydroxide/magnesium hydroxide/simethicone Suspension 30 milliLiter(s) Oral four times a day PRN  magnesium hydroxide Suspension 30 milliLiter(s) Oral daily PRN  morphine  - Injectable 2 milliGRAM(s) IV Push every 3 hours PRN  ondansetron Injectable 4 milliGRAM(s) IV Push every 6 hours PRN  oxyCODONE    IR 5 milliGRAM(s) Oral every 4 hours PRN  oxyCODONE    IR 10 milliGRAM(s) Oral every 4 hours PRN  senna 2 Tablet(s) Oral at bedtime PRN      VITAL SIGNS: Last 24 Hours  T(C): 36.4 (23 May 2019 07:52), Max: 37.2 (23 May 2019 05:21)  T(F): 97.5 (23 May 2019 07:52), Max: 98.9 (23 May 2019 05:21)  HR: 76 (23 May 2019 07:52) (72 - 89)  BP: 104/58 (23 May 2019 07:52) (97/55 - 129/68)  BP(mean): --  RR: 18 (23 May 2019 07:52) (12 - 71)  SpO2: 96% (23 May 2019 10:02) (94% - 99%)    LABS:                        10.0   8.73  )-----------( 95       ( 23 May 2019 06:01 )             29.8     05-23    139  |  107  |  16  ----------------------------<  127<H>  4.5   |  21  |  0.6<L>    Ca    8.0<L>      23 May 2019 06:01  Mg     1.6     05-23    TPro  6.7  /  Alb  4.2  /  TBili  0.2  /  DBili  x   /  AST  22  /  ALT  10  /  AlkPhos  63  05-21    PT/INR - ( 21 May 2019 17:30 )   PT: 12.10 sec;   INR: 1.05 ratio         PTT - ( 21 May 2019 17:30 )  PTT:28.4 sec              RADIOLOGY:  < from: CT Head No Cont (05.21.19 @ 22:21) >    IMPRESSION:    No CT evidence of acute hemorrhage, midline shift, or mass effect.    < end of copied text >  < from: Xray Femur 2 Views, Right (05.21.19 @ 21:06) >  Findings/  impression: Right femoral neck displaced fracture with varus deformity.   Right hip degenerative changes. Osteopenia.      < end of copied text >  < from: CT Abdomen and Pelvis w/ IV Cont (05.21.19 @ 18:09) >  IMPRESSION:    Acute right femoral neck fracture of the right femur.    Mild anterior mediastinal adenopathy, nonspecific    Mild rectal stercoral colitis    < end of copied text >      PHYSICAL EXAM:    GENERAL: NAD, AAOx2  HEENT:  Atraumatic, Normocephalic. EOMI, PERRLA, conjunctiva and sclera clear, No JVD  PULMONARY: Clear to auscultation bilaterally; No wheeze  CARDIOVASCULAR: Regular rate and rhythm; No murmurs, rubs, or gallops  GASTROINTESTINAL: Soft, Nontender, Nondistended; Bowel sounds present  MUSCULOSKELETAL:  2+ Peripheral Pulses, No clubbing, cyanosis, or edema. Right hip dressing  NEUROLOGY: non-focal  SKIN: No rashes or lesions

## 2019-05-23 NOTE — PHYSICAL THERAPY INITIAL EVALUATION ADULT - PLANNED THERAPY INTERVENTIONS, PT EVAL
gait training/neuromuscular re-education/postural re-education/stretching/lumbar stabilization/ROM/strengthening/transfer training/bed mobility training/manual therapy techniques/balance training

## 2019-05-23 NOTE — PHYSICAL THERAPY INITIAL EVALUATION ADULT - FOLLOWS COMMANDS/ANSWERS QUESTIONS, REHAB EVAL
50% of the time/unable to follow multi-step instructions/able to follow single-step instructions/speech unintelligible/at times

## 2019-05-23 NOTE — PHYSICAL THERAPY INITIAL EVALUATION ADULT - IMPAIRMENTS FOUND, PT EVAL
hx of MR and bipolar disorder strongly influence pts ability to participate and follow commands/ROM/joint integrity and mobility/aerobic capacity/endurance/poor safety awareness/cognitive impairment/muscle strength/gait, locomotion, and balance

## 2019-05-23 NOTE — OCCUPATIONAL THERAPY INITIAL EVALUATION ADULT - IMPAIRMENTS CONTRIBUTING IMPAIRED BED MOBILITY, REHAB EVAL
impaired balance/cognition/impaired coordination/decreased flexibility/decreased strength/decreased ROM

## 2019-05-23 NOTE — SWALLOW BEDSIDE ASSESSMENT ADULT - COMMENTS
+toleration w/o overt s/s penetration/aspiration w/ puree +toleration w/o overt s/s penetration/aspiration w/ solids

## 2019-05-23 NOTE — OCCUPATIONAL THERAPY INITIAL EVALUATION ADULT - ADDITIONAL COMMENTS
According to aid pt. requires help with all ADL's and primarily uses w/c. Pt uses RW to walk to bathroom.

## 2019-05-23 NOTE — OCCUPATIONAL THERAPY INITIAL EVALUATION ADULT - PLANNED THERAPY INTERVENTIONS, OT EVAL
strengthening/stretching/balance training/joint mobilization/ADL retraining/bed mobility training/ROM

## 2019-05-23 NOTE — CONSULT NOTE ADULT - SUBJECTIVE AND OBJECTIVE BOX
HPI:  77 yo lady with hx of MR and bipolar , DLD , esophageal varices sent for the above CC . As per aid , patient was in rehab today , had witnessed mechanical fall , with no head trauma .  No LOC . (22 May 2019 00:44)      PAST MEDICAL & SURGICAL HISTORY:  Breast mass, right: s/p lumpectomy &amp; radiation 2007  Hyperlipemia  Brito esophagus  Mild mental retardation  Atypical bipolar disorder  Feeding by G-tube: s/p removal 2012      Hospital Course:  She has a history of a developmental disability with intellectual impairment and bipolar ddisorder. she at baseline primarily uses a wheelchiar for mobility but could walk with a walker 1--15 feet S/P ha. sHE IS wbat rle. aBLE TO TAKE ONE STEP WITH pt TODAy.   TODAY'S SUBJECTIVE & REVIEW OF SYMPTOMS:     Constitutional WNL   Cardio WNL   Resp WNL   GI WNL  Heme WNL  Endo WNL  Skin WNL  MSK WNL  Neuro WNL  Cognitive WNL  Psych WNL      MEDICATIONS  (STANDING):  acetaminophen   Tablet .. 650 milliGRAM(s) Oral every 6 hours  aspirin enteric coated 81 milliGRAM(s) Oral two times a day  benztropine 0.5 milliGRAM(s) Oral two times a day  ceFAZolin   IVPB 1000 milliGRAM(s) IV Intermittent every 8 hours  chlorhexidine 4% Liquid 1 Application(s) Topical <User Schedule>  diVALproex ER 1500 milliGRAM(s) Oral at bedtime  docusate sodium 100 milliGRAM(s) Oral three times a day  haloperidol    Concentrate 1 milliGRAM(s) Oral at bedtime  lactated ringers. 1000 milliLiter(s) (80 mL/Hr) IV Continuous <Continuous>  magnesium sulfate  IVPB 2 Gram(s) IV Intermittent once  polyethylene glycol 3350 17 Gram(s) Oral daily    MEDICATIONS  (PRN):  aluminum hydroxide/magnesium hydroxide/simethicone Suspension 30 milliLiter(s) Oral four times a day PRN Indigestion  magnesium hydroxide Suspension 30 milliLiter(s) Oral daily PRN Constipation  morphine  - Injectable 2 milliGRAM(s) IV Push every 3 hours PRN Severe Pain (7 - 10)  ondansetron Injectable 4 milliGRAM(s) IV Push every 6 hours PRN Nausea and/or Vomiting  oxyCODONE    IR 5 milliGRAM(s) Oral every 4 hours PRN Mild Pain (1 - 3)  oxyCODONE    IR 10 milliGRAM(s) Oral every 4 hours PRN Moderate Pain (4 - 6)  senna 2 Tablet(s) Oral at bedtime PRN Constipation      FAMILY HISTORY:  No pertinent family history in first degree relatives      Allergies    No Known Allergies    Intolerances        SOCIAL HISTORY:    [  ] Etoh  [  ] Smoking  [  ] Substance abuse     Home Environment:  [  ] Home Alone  [  ] Lives with Family  [  ] Home Health Aid    Dwelling:  [  ] Apartment  [  ] Private House  [x  ] a very special place--Adult Home  [  ] Skilled Nursing Facility      [  ] Short Term  [  ] Long Term  [  ] Stairs       Elevator [  ]    FUNCTIONAL STATUS PTA: (Check all that apply)  Ambulation: [   ]Independent    [  ] Dependent     [  ] Non-Ambulatory  Assistive Device: [  ] SA Cane  [  ]  Q Cane  [  ] Walker  [  ]  Wheelchair  ADL : [  ] Independent  [  ]  Dependent       Vital Signs Last 24 Hrs  T(C): 36.4 (23 May 2019 07:52), Max: 37.2 (23 May 2019 05:21)  T(F): 97.5 (23 May 2019 07:52), Max: 98.9 (23 May 2019 05:21)  HR: 76 (23 May 2019 07:52) (72 - 89)  BP: 104/58 (23 May 2019 07:52) (97/55 - 129/68)  BP(mean): --  RR: 18 (23 May 2019 07:52) (12 - 71)  SpO2: 97% (23 May 2019 05:21) (94% - 99%)      PHYSICAL EXAM: Alert & Oriented X1, ANXIOUS  GENERAL: NAD, well-groomed, well-developed  HEAD:  Atraumatic, Normocephalic  EYES: EOMI, PERRLA, conjunctiva and sclera clear  NECK: Supple, No JVD, Normal thyroid  CHEST/LUNG: Clear to percussion bilaterally; No rales, rhonchi, wheezing, or rubs  HEART: Regular rate and rhythm; No murmurs, rubs, or gallops  ABDOMEN: Soft, Nontender, Nondistended; Bowel sounds present  EXTREMITIES:  2+ Peripheral Pulses, No clubbing, cyanosis, or edema    NERVOUS SYSTEM:  Cranial Nerves 2-12 intact [  ] Abnormal  [  ]  ROM: WFL all extremities [  ]  Abnormal [  ]  Motor Strength: WFL all extremities  [  ]  Abnormal [  ] WEAKNESS RIGHT HIP WITH PAIN  Sensation: intact to light touch [  ] Abnormal [  ]  Reflexes: Symmetric [  ]  Abnormal [  ]    FUNCTIONAL STATUS:  Bed Mobility: Independent [  ]  Supervision [  ]  Needs Assistance [  ]  N/A [  ]  Transfers: Independent [  ]  Supervision [  ]  Needs Assistance [  ]  N/A [  ]   Ambulation: Independent [  ]  Supervision [  ]  Needs Assistance [  ]  N/A [  ]  ADL: Independent [  ] Requires Assistance [  ] N/A [  ]      LABS:                        10.0   8.73  )-----------( 95       ( 23 May 2019 06:01 )             29.8     05-23    139  |  107  |  16  ----------------------------<  127<H>  4.5   |  21  |  0.6<L>    Ca    8.0<L>      23 May 2019 06:01  Mg     1.6     05-23    TPro  6.7  /  Alb  4.2  /  TBili  0.2  /  DBili  x   /  AST  22  /  ALT  10  /  AlkPhos  63  05-21    PT/INR - ( 21 May 2019 17:30 )   PT: 12.10 sec;   INR: 1.05 ratio         PTT - ( 21 May 2019 17:30 )  PTT:28.4 sec      RADIOLOGY & ADDITIONAL STUDIES:    Assesment:

## 2019-05-23 NOTE — PROGRESS NOTE ADULT - ASSESSMENT
78F with PMH of intellectual disability from group home and bipolar disorder, DLD, and esophageal varices presented s/p mechanical fall at day program.    #Acute Right Femoral Neck Fracture s/p mechanical fall  -s/p Right Hip Giuliano POD # 1  -RLE WBAT, c/w PT/OT.  -Pain control   -Resume DVT ppx; It was held for OR.  -appreciate physiatry eval; pt will go to SNF.    #PMH Bipolar Disorder: continue haldol, benztropine, Depakote       Contacted group home. Patient's father used to make medical decisions for her, but he passed away recently. The cousin (Nichole Dee [phone]) makes decisions for the pt now.    DVT ppx: Heparin sub-q  GI ppx: not indicated   Diet: regular diet   Activity: Increase as tolerated, WBAT LLE fall precautions   Dispo: from group home, will go to SNF.   Code status: FULL CODE

## 2019-05-23 NOTE — PROGRESS NOTE ADULT - SUBJECTIVE AND OBJECTIVE BOX
ORTHO POC    Patient seen and examined after surgery. Resting comfortably, denies fevers, chills, SOB.    PE :  RLE :   Dressings c/d/i  Thigh and calf soft and compressible  EHL TA GS motor intact  SILT distally  Foot warm and perfused    A/P  78F s/p R Hip Giuliano:  - Pain control  - DVT PPX  - IS  - IV ABX for 24 hours  - WBAT RLE  - PT OT OOBTC  - Home meds  - Dispo pending  - Medical management per primary team  - Ortho to continue following

## 2019-05-23 NOTE — CONSULT NOTE ADULT - ASSESSMENT
IMPRESSION: Rehab of RIGHT HIP FX, s/p ha (WITH DR. Zavala), developmental disability with cognitive impairment, bipolar disorder    PRECAUTIONS: [X  ] Cardiac  [  ] Respiratory  [  ] Seizures [  ] Contact Isolation  [  ] Droplet Isolation  [  ] Other    Weight Bearing Status:   wbat rle.    RECOMMENDATION:    Out of Bed to Chair     DVT/Decubiti Prophylaxis    REHAB PLAN:     [   ] Bedside P/T 3-5 times a week   [   ]   Bedside O/T  2-3 times a week             [   ] No Rehab Therapy Indicated                   [   ]  Speech Therapy   Conditioning/ROM                                    ADL  Bed Mobility                                               Conditioning/ROM  Transfers                                                     Bed Mobility  Sitting /Standing Balance                         Transfers                                        Gait Training                                               Sitting/Standing Balance  Stair Training [   ]Applicable                    Home equipment Eval                                                                        Splinting  [   ] Only      GOALS:   ADL   [   ]   Independent                    Transfers  [   ] Independent                          Ambulation  [   ] Independent     [    ] With device                            [   ]  CG                                                         [   ]  CG                                                                  [   ] CG                            [    ] Min A                                                   [   ] Min A                                                              [   ] Min  A          DISCHARGE PLAN:   [   ]  Good candidate for Intensive Rehabilitation/Hospital based-4A [facility]                                             Will tolerate 3hrs Intensive Rehab Daily                                       [  xx  ]  Short Term Rehab in Skilled Nursing Facility is suggested.                                       [    ]  Home with Outpatient or VN services                                         [    ]  Possible Candidate for Intensive Hospital based Rehab

## 2019-05-23 NOTE — PHYSICAL THERAPY INITIAL EVALUATION ADULT - GAIT DISTANCE, PT EVAL
bed to chair/attempted straight line ambulation. Pt able to perform 1 step before returning to chair d/t pain in R hip

## 2019-05-23 NOTE — SWALLOW BEDSIDE ASSESSMENT ADULT - SLP PERTINENT HISTORY OF CURRENT PROBLEM
Pt adm s/p mechanical fall w/ femoral neck fracture. PMHx: MR, bipolar disorder, DLD, esophageal varices, hoover's esophagus, breast mass s/p lumpectomy/RT (2007), hx g-tube removal 2012

## 2019-05-23 NOTE — OCCUPATIONAL THERAPY INITIAL EVALUATION ADULT - PERTINENT HX OF CURRENT PROBLEM, REHAB EVAL
Pt lives in [facility] with h/o MR and Bipolar Disorder. S/P Mechanical Fall at home.

## 2019-05-23 NOTE — PHYSICAL THERAPY INITIAL EVALUATION ADULT - ADDITIONAL COMMENTS
as per HHA at b/s: pt has a W/C and RW at home. Uses W/C most of the time, but is able to walk short distances to the restroom

## 2019-05-24 LAB
ALBUMIN SERPL ELPH-MCNC: 3 G/DL — LOW (ref 3.5–5.2)
ALP SERPL-CCNC: 57 U/L — SIGNIFICANT CHANGE UP (ref 30–115)
ALT FLD-CCNC: 22 U/L — SIGNIFICANT CHANGE UP (ref 0–41)
ANION GAP SERPL CALC-SCNC: 10 MMOL/L — SIGNIFICANT CHANGE UP (ref 7–14)
APPEARANCE UR: ABNORMAL
AST SERPL-CCNC: 25 U/L — SIGNIFICANT CHANGE UP (ref 0–41)
BACTERIA # UR AUTO: ABNORMAL /HPF
BASOPHILS # BLD AUTO: 0.02 K/UL — SIGNIFICANT CHANGE UP (ref 0–0.2)
BASOPHILS NFR BLD AUTO: 0.3 % — SIGNIFICANT CHANGE UP (ref 0–1)
BILIRUB SERPL-MCNC: 0.4 MG/DL — SIGNIFICANT CHANGE UP (ref 0.2–1.2)
BILIRUB UR-MCNC: NEGATIVE — SIGNIFICANT CHANGE UP
BUN SERPL-MCNC: 15 MG/DL — SIGNIFICANT CHANGE UP (ref 10–20)
CALCIUM SERPL-MCNC: 8 MG/DL — LOW (ref 8.5–10.1)
CHLORIDE SERPL-SCNC: 106 MMOL/L — SIGNIFICANT CHANGE UP (ref 98–110)
CO2 SERPL-SCNC: 24 MMOL/L — SIGNIFICANT CHANGE UP (ref 17–32)
COLOR SPEC: YELLOW — SIGNIFICANT CHANGE UP
CREAT SERPL-MCNC: 0.5 MG/DL — LOW (ref 0.7–1.5)
DIFF PNL FLD: NEGATIVE — SIGNIFICANT CHANGE UP
EOSINOPHIL # BLD AUTO: 0.04 K/UL — SIGNIFICANT CHANGE UP (ref 0–0.7)
EOSINOPHIL NFR BLD AUTO: 0.5 % — SIGNIFICANT CHANGE UP (ref 0–8)
EPI CELLS # UR: ABNORMAL /HPF
GLUCOSE SERPL-MCNC: 105 MG/DL — HIGH (ref 70–99)
GLUCOSE UR QL: NEGATIVE MG/DL — SIGNIFICANT CHANGE UP
HCT VFR BLD CALC: 27.1 % — LOW (ref 37–47)
HGB BLD-MCNC: 9.1 G/DL — LOW (ref 12–16)
IMM GRANULOCYTES NFR BLD AUTO: 0.5 % — HIGH (ref 0.1–0.3)
KETONES UR-MCNC: NEGATIVE — SIGNIFICANT CHANGE UP
LEUKOCYTE ESTERASE UR-ACNC: ABNORMAL
LYMPHOCYTES # BLD AUTO: 1.72 K/UL — SIGNIFICANT CHANGE UP (ref 1.2–3.4)
LYMPHOCYTES # BLD AUTO: 22.1 % — SIGNIFICANT CHANGE UP (ref 20.5–51.1)
MAGNESIUM SERPL-MCNC: 1.9 MG/DL — SIGNIFICANT CHANGE UP (ref 1.8–2.4)
MCHC RBC-ENTMCNC: 32.6 PG — HIGH (ref 27–31)
MCHC RBC-ENTMCNC: 33.6 G/DL — SIGNIFICANT CHANGE UP (ref 32–37)
MCV RBC AUTO: 97.1 FL — SIGNIFICANT CHANGE UP (ref 81–99)
MONOCYTES # BLD AUTO: 0.94 K/UL — HIGH (ref 0.1–0.6)
MONOCYTES NFR BLD AUTO: 12.1 % — HIGH (ref 1.7–9.3)
NEUTROPHILS # BLD AUTO: 5.01 K/UL — SIGNIFICANT CHANGE UP (ref 1.4–6.5)
NEUTROPHILS NFR BLD AUTO: 64.5 % — SIGNIFICANT CHANGE UP (ref 42.2–75.2)
NITRITE UR-MCNC: NEGATIVE — SIGNIFICANT CHANGE UP
NRBC # BLD: 0 /100 WBCS — SIGNIFICANT CHANGE UP (ref 0–0)
PH UR: 6 — SIGNIFICANT CHANGE UP (ref 5–8)
PLATELET # BLD AUTO: 92 K/UL — LOW (ref 130–400)
POTASSIUM SERPL-MCNC: 4 MMOL/L — SIGNIFICANT CHANGE UP (ref 3.5–5)
POTASSIUM SERPL-SCNC: 4 MMOL/L — SIGNIFICANT CHANGE UP (ref 3.5–5)
PROT SERPL-MCNC: 5 G/DL — LOW (ref 6–8)
PROT UR-MCNC: NEGATIVE MG/DL — SIGNIFICANT CHANGE UP
RBC # BLD: 2.79 M/UL — LOW (ref 4.2–5.4)
RBC # FLD: 12 % — SIGNIFICANT CHANGE UP (ref 11.5–14.5)
SODIUM SERPL-SCNC: 140 MMOL/L — SIGNIFICANT CHANGE UP (ref 135–146)
SP GR SPEC: 1.02 — SIGNIFICANT CHANGE UP (ref 1.01–1.03)
UROBILINOGEN FLD QL: 0.2 MG/DL — SIGNIFICANT CHANGE UP (ref 0.2–0.2)
WBC # BLD: 7.77 K/UL — SIGNIFICANT CHANGE UP (ref 4.8–10.8)
WBC # FLD AUTO: 7.77 K/UL — SIGNIFICANT CHANGE UP (ref 4.8–10.8)
WBC UR QL: ABNORMAL /HPF

## 2019-05-24 PROCEDURE — 71045 X-RAY EXAM CHEST 1 VIEW: CPT | Mod: 26

## 2019-05-24 PROCEDURE — 93931 UPPER EXTREMITY STUDY: CPT | Mod: 26

## 2019-05-24 RX ADMIN — DIVALPROEX SODIUM 1500 MILLIGRAM(S): 500 TABLET, DELAYED RELEASE ORAL at 21:17

## 2019-05-24 RX ADMIN — Medication 0.5 MILLIGRAM(S): at 17:04

## 2019-05-24 RX ADMIN — CHLORHEXIDINE GLUCONATE 1 APPLICATION(S): 213 SOLUTION TOPICAL at 05:46

## 2019-05-24 RX ADMIN — Medication 650 MILLIGRAM(S): at 11:02

## 2019-05-24 RX ADMIN — Medication 0.5 MILLIGRAM(S): at 05:45

## 2019-05-24 RX ADMIN — Medication 650 MILLIGRAM(S): at 11:45

## 2019-05-24 RX ADMIN — ONDANSETRON 4 MILLIGRAM(S): 8 TABLET, FILM COATED ORAL at 05:46

## 2019-05-24 RX ADMIN — HEPARIN SODIUM 5000 UNIT(S): 5000 INJECTION INTRAVENOUS; SUBCUTANEOUS at 05:45

## 2019-05-24 RX ADMIN — HALOPERIDOL DECANOATE 1 MILLIGRAM(S): 100 INJECTION INTRAMUSCULAR at 21:17

## 2019-05-24 RX ADMIN — Medication 650 MILLIGRAM(S): at 17:04

## 2019-05-24 RX ADMIN — Medication 650 MILLIGRAM(S): at 05:45

## 2019-05-24 RX ADMIN — Medication 650 MILLIGRAM(S): at 00:08

## 2019-05-24 RX ADMIN — Medication 650 MILLIGRAM(S): at 05:46

## 2019-05-24 RX ADMIN — Medication 100 MILLIGRAM(S): at 05:47

## 2019-05-24 RX ADMIN — HEPARIN SODIUM 5000 UNIT(S): 5000 INJECTION INTRAVENOUS; SUBCUTANEOUS at 13:08

## 2019-05-24 RX ADMIN — HEPARIN SODIUM 5000 UNIT(S): 5000 INJECTION INTRAVENOUS; SUBCUTANEOUS at 21:16

## 2019-05-24 RX ADMIN — Medication 100 MILLIGRAM(S): at 21:16

## 2019-05-24 RX ADMIN — POLYETHYLENE GLYCOL 3350 17 GRAM(S): 17 POWDER, FOR SOLUTION ORAL at 11:02

## 2019-05-24 NOTE — PROVIDER CONTACT NOTE (MEDICATION) - SITUATION
Pt requests tylenol, last dose given was at 545am pt is not due for tylenol at the moment  Can pt receive tylenol early?   Pt home health aide requests that pt receive puree diet as pt has hx fo choking.  HHA was educated on speech and swallow evaluation

## 2019-05-24 NOTE — PROGRESS NOTE ADULT - SUBJECTIVE AND OBJECTIVE BOX
BABAR ENRIQUEZ 78y Female  MRN#: [id number]   CODE STATUS:     SUBJECTIVE  Patient is a 78y old Female who presents with a chief complaint of Fall (24 May 2019 07:15)  Currently admitted to medicine with the primary diagnosis of Femoral neck fracture  Hospital course has been complicated by undergoing surgery for Right femoral fracture and developing fever post op.  Today is hospital day 3d, and this morning she is complaining of pain in the surgical site which is controlled with medications.  The health aid is at the bed side who denied any hx of cough, diarrhea, urinary incontinence or drainage from the surgical site.      OBJECTIVE  PAST MEDICAL & SURGICAL HISTORY  Breast mass, right: s/p lumpectomy &amp; radiation   Hyperlipemia  Brito esophagus  Mild mental retardation  Atypical bipolar disorder  Feeding by G-tube: s/p removal     ALLERGIES:  No Known Allergies    MEDICATIONS:  STANDING MEDICATIONS  acetaminophen   Tablet .. 650 milliGRAM(s) Oral every 6 hours  benztropine 0.5 milliGRAM(s) Oral two times a day  chlorhexidine 4% Liquid 1 Application(s) Topical <User Schedule>  diVALproex ER 1500 milliGRAM(s) Oral at bedtime  docusate sodium 100 milliGRAM(s) Oral three times a day  haloperidol    Concentrate 1 milliGRAM(s) Oral at bedtime  heparin  Injectable 5000 Unit(s) SubCutaneous every 8 hours  polyethylene glycol 3350 17 Gram(s) Oral daily    PRN MEDICATIONS  aluminum hydroxide/magnesium hydroxide/simethicone Suspension 30 milliLiter(s) Oral four times a day PRN  bisacodyl Suppository 10 milliGRAM(s) Rectal daily PRN  magnesium hydroxide Suspension 30 milliLiter(s) Oral daily PRN  ondansetron Injectable 4 milliGRAM(s) IV Push every 6 hours PRN  oxyCODONE    IR 5 milliGRAM(s) Oral every 4 hours PRN  oxyCODONE    IR 10 milliGRAM(s) Oral every 4 hours PRN  senna 2 Tablet(s) Oral at bedtime PRN      VITAL SIGNS: Last 24 Hours  T(C): 37.2 (24 May 2019 12:18), Max: 39.1 (24 May 2019 01:32)  T(F): 99 (24 May 2019 12:18), Max: 102.4 (24 May 2019 01:32)  HR: 94 (24 May 2019 07:30) (94 - 109)  BP: 100/52 (24 May 2019 07:30) (100/52 - 110/53)  BP(mean): --  RR: 18 (24 May 2019 08:20) (18 - 18)  SpO2: 96% (24 May 2019 08:20) (91% - 96%)    LABS:                        9.1    7.77  )-----------( 92       ( 24 May 2019 06:22 )             27.1     05-24    140  |  106  |  15  ----------------------------<  105<H>  4.0   |  24  |  0.5<L>    Ca    8.0<L>      24 May 2019 06:22  Mg     1.9         TPro  5.0<L>  /  Alb  3.0<L>  /  TBili  0.4  /  DBili  x   /  AST  25  /  ALT  22  /  AlkPhos  57        Urinalysis Basic - ( 24 May 2019 08:37 )    Color: Yellow / Appearance: Cloudy / S.020 / pH: x  Gluc: x / Ketone: Negative  / Bili: Negative / Urobili: 0.2 mg/dL   Blood: x / Protein: Negative mg/dL / Nitrite: Negative   Leuk Esterase: Small / RBC: x / WBC 6-10 /HPF   Sq Epi: x / Non Sq Epi: Occasional /HPF / Bacteria: Few /HPF                RADIOLOGY:  < from: CT Head No Cont (19 @ 22:21) >    IMPRESSION:    No CT evidence of acute hemorrhage, midline shift, or mass effect.    < end of copied text >  < from: Xray Femur 2 Views, Right (19 @ 21:06) >  Findings/  impression: Right femoral neck displaced fracture with varus deformity.   Right hip degenerative changes. Osteopenia.      < end of copied text >  < from: CT Abdomen and Pelvis w/ IV Cont (19 @ 18:09) >  IMPRESSION:    Acute right femoral neck fracture of the right femur.    Mild anterior mediastinal adenopathy, nonspecific    Mild rectal stercoral colitis    < end of copied text >      PHYSICAL EXAM:    GENERAL: NAD, AAOx2  HEENT:  Atraumatic, Normocephalic. EOMI, PERRLA, conjunctiva and sclera clear, No JVD  PULMONARY: Clear to auscultation bilaterally; No wheeze  CARDIOVASCULAR: Regular rate and rhythm; No murmurs, rubs, or gallops  GASTROINTESTINAL: Soft, Nontender, Nondistended; Bowel sounds present  MUSCULOSKELETAL:  2+ Peripheral Pulses, No clubbing, cyanosis, or edema. Right hip dressing, swelling of RLE non tender.  NEUROLOGY: non-focal  SKIN: No rashes or lesions

## 2019-05-24 NOTE — PROGRESS NOTE ADULT - ATTENDING COMMENTS
pt seen and examined.  agree w above.    wbat  rehab  pain control
#Progress Note Handoff  Pending (specify):  Consults_________, Tests________, Test Results_______, Other___fevers______  Disposition: Home___/SNF__x_/Other________/Unknown at this time________
Patient seen and examined. Denies any pain. OOB to chair. Tolerating diet.     #Acute post op blood loss anemia - stable, monitor   #Thrombocytopenia - mild thrombocytopenia at baseline, worsened after surgery, likely due to dilutional coagulopathy  #Hypomagnesemia - likely due to volume expansion     #Progress Note Handoff  Pending (specify):  Consults_________, Tests________, Test Results_______, Other__STR placement_______  Family discussion: dw cousin   Disposition: Home___/SNF_x__/Other________/Unknown at this time________

## 2019-05-24 NOTE — PROGRESS NOTE ADULT - ASSESSMENT
78F with PMH of intellectual disability from group home and bipolar disorder, DLD, and esophageal varices presented s/p mechanical fall at day program.    #Acute Right Femoral Neck Fracture s/p mechanical fall  -s/p Right Hip Giuliano POD # 2  -RLE WBAT, c/w PT/OT.  -Pain control   -c/w DVT ppx;  -appreciate physiatry eval; pt will go to SNF.    #Post op fever  -pt spiked up fever with Tmax of 102 F  -no localizing signs or symptoms, no elevated WBCs.  -will get CXR, UA, urine cx, blood cx, will check LE duplex to r/o dvt as RLE is swollen which could be from trauma/surgery as well.    #PMH Bipolar Disorder: continue haldol, benztropine, Depakote     #Acute Blood loss anemia s/p surgery  -Hb stable around 9 mg/dl continue to monitor.    #Acute thrombocytopenia  -likely from consumption from trauma/surgery  -continue to monitor.    #Hypomagnesemia - resolved  -continue to monitor.    #Hx of Dysphagia,  -pt was on puree diet at nursing home  -seen by Speech and swallow c/w regular with thins alternating solids with liquids.    Contacted group [facility]. Patient's father used to make medical decisions for her, but he passed away recently. The cousin (Nichole Dee [phone]) makes decisions for the pt now.    DVT ppx: Heparin sub-q  GI ppx: not indicated   Diet: regular diet   Activity: Increase as tolerated, WBAT LLE fall precautions   Dispo: from group home, will go to SNF once Fever resolves.  Code status: FULL CODE

## 2019-05-24 NOTE — PROGRESS NOTE ADULT - SUBJECTIVE AND OBJECTIVE BOX
ORTHO PROGRESS NOTE    Patient seen and examined after surgery. Resting comfortably, denies fevers, chills, SOB.    PE :  RLE :   Dressings c/d/i  Thigh and calf soft and compressible  EHL TA GS motor intact  SILT distally  Foot warm and perfused    A/P  78F s/p R Hip Giuliano:  - Pain control  - DVT PPX  - IS  - WBAT RLE  - PT OT OOBTC  - Home meds  - Dispo pending  - Medical management per primary team  - Ortho to continue following

## 2019-05-25 LAB
ALBUMIN SERPL ELPH-MCNC: 2.6 G/DL — LOW (ref 3.5–5.2)
ALP SERPL-CCNC: 60 U/L — SIGNIFICANT CHANGE UP (ref 30–115)
ALT FLD-CCNC: 46 U/L — HIGH (ref 0–41)
ANION GAP SERPL CALC-SCNC: 9 MMOL/L — SIGNIFICANT CHANGE UP (ref 7–14)
AST SERPL-CCNC: 42 U/L — HIGH (ref 0–41)
BASOPHILS # BLD AUTO: 0.01 K/UL — SIGNIFICANT CHANGE UP (ref 0–0.2)
BASOPHILS # BLD AUTO: 0.02 K/UL — SIGNIFICANT CHANGE UP (ref 0–0.2)
BASOPHILS NFR BLD AUTO: 0.2 % — SIGNIFICANT CHANGE UP (ref 0–1)
BASOPHILS NFR BLD AUTO: 0.3 % — SIGNIFICANT CHANGE UP (ref 0–1)
BILIRUB SERPL-MCNC: 0.3 MG/DL — SIGNIFICANT CHANGE UP (ref 0.2–1.2)
BUN SERPL-MCNC: 12 MG/DL — SIGNIFICANT CHANGE UP (ref 10–20)
CALCIUM SERPL-MCNC: 7.7 MG/DL — LOW (ref 8.5–10.1)
CHLORIDE SERPL-SCNC: 107 MMOL/L — SIGNIFICANT CHANGE UP (ref 98–110)
CO2 SERPL-SCNC: 26 MMOL/L — SIGNIFICANT CHANGE UP (ref 17–32)
CREAT SERPL-MCNC: 0.5 MG/DL — LOW (ref 0.7–1.5)
CULTURE RESULTS: NO GROWTH — SIGNIFICANT CHANGE UP
EOSINOPHIL # BLD AUTO: 0.07 K/UL — SIGNIFICANT CHANGE UP (ref 0–0.7)
EOSINOPHIL # BLD AUTO: 0.08 K/UL — SIGNIFICANT CHANGE UP (ref 0–0.7)
EOSINOPHIL NFR BLD AUTO: 1 % — SIGNIFICANT CHANGE UP (ref 0–8)
EOSINOPHIL NFR BLD AUTO: 1.2 % — SIGNIFICANT CHANGE UP (ref 0–8)
GLUCOSE SERPL-MCNC: 93 MG/DL — SIGNIFICANT CHANGE UP (ref 70–99)
HCT VFR BLD CALC: 23.2 % — LOW (ref 37–47)
HCT VFR BLD CALC: 24.9 % — LOW (ref 37–47)
HGB BLD-MCNC: 7.7 G/DL — LOW (ref 12–16)
HGB BLD-MCNC: 8.4 G/DL — LOW (ref 12–16)
IMM GRANULOCYTES NFR BLD AUTO: 0.5 % — HIGH (ref 0.1–0.3)
IMM GRANULOCYTES NFR BLD AUTO: 0.6 % — HIGH (ref 0.1–0.3)
IRON SATN MFR SERPL: 14 % — LOW (ref 15–50)
IRON SATN MFR SERPL: 26 UG/DL — LOW (ref 35–150)
LYMPHOCYTES # BLD AUTO: 1.54 K/UL — SIGNIFICANT CHANGE UP (ref 1.2–3.4)
LYMPHOCYTES # BLD AUTO: 1.77 K/UL — SIGNIFICANT CHANGE UP (ref 1.2–3.4)
LYMPHOCYTES # BLD AUTO: 22.8 % — SIGNIFICANT CHANGE UP (ref 20.5–51.1)
LYMPHOCYTES # BLD AUTO: 27.3 % — SIGNIFICANT CHANGE UP (ref 20.5–51.1)
MAGNESIUM SERPL-MCNC: 1.8 MG/DL — SIGNIFICANT CHANGE UP (ref 1.8–2.4)
MCHC RBC-ENTMCNC: 32.2 PG — HIGH (ref 27–31)
MCHC RBC-ENTMCNC: 32.9 PG — HIGH (ref 27–31)
MCHC RBC-ENTMCNC: 33.2 G/DL — SIGNIFICANT CHANGE UP (ref 32–37)
MCHC RBC-ENTMCNC: 33.7 G/DL — SIGNIFICANT CHANGE UP (ref 32–37)
MCV RBC AUTO: 97.1 FL — SIGNIFICANT CHANGE UP (ref 81–99)
MCV RBC AUTO: 97.6 FL — SIGNIFICANT CHANGE UP (ref 81–99)
MONOCYTES # BLD AUTO: 0.67 K/UL — HIGH (ref 0.1–0.6)
MONOCYTES # BLD AUTO: 0.69 K/UL — HIGH (ref 0.1–0.6)
MONOCYTES NFR BLD AUTO: 10.6 % — HIGH (ref 1.7–9.3)
MONOCYTES NFR BLD AUTO: 9.9 % — HIGH (ref 1.7–9.3)
NEUTROPHILS # BLD AUTO: 3.9 K/UL — SIGNIFICANT CHANGE UP (ref 1.4–6.5)
NEUTROPHILS # BLD AUTO: 4.4 K/UL — SIGNIFICANT CHANGE UP (ref 1.4–6.5)
NEUTROPHILS NFR BLD AUTO: 60.2 % — SIGNIFICANT CHANGE UP (ref 42.2–75.2)
NEUTROPHILS NFR BLD AUTO: 65.4 % — SIGNIFICANT CHANGE UP (ref 42.2–75.2)
NRBC # BLD: 0 /100 WBCS — SIGNIFICANT CHANGE UP (ref 0–0)
NRBC # BLD: 0 /100 WBCS — SIGNIFICANT CHANGE UP (ref 0–0)
PLATELET # BLD AUTO: 111 K/UL — LOW (ref 130–400)
PLATELET # BLD AUTO: 96 K/UL — LOW (ref 130–400)
POTASSIUM SERPL-MCNC: 4.2 MMOL/L — SIGNIFICANT CHANGE UP (ref 3.5–5)
POTASSIUM SERPL-SCNC: 4.2 MMOL/L — SIGNIFICANT CHANGE UP (ref 3.5–5)
PROT SERPL-MCNC: 4.5 G/DL — LOW (ref 6–8)
RBC # BLD: 2.39 M/UL — LOW (ref 4.2–5.4)
RBC # BLD: 2.55 M/UL — LOW (ref 4.2–5.4)
RBC # FLD: 12.1 % — SIGNIFICANT CHANGE UP (ref 11.5–14.5)
RBC # FLD: 12.1 % — SIGNIFICANT CHANGE UP (ref 11.5–14.5)
SODIUM SERPL-SCNC: 142 MMOL/L — SIGNIFICANT CHANGE UP (ref 135–146)
SPECIMEN SOURCE: SIGNIFICANT CHANGE UP
TIBC SERPL-MCNC: 184 UG/DL — LOW (ref 220–430)
TRANSFERRIN SERPL-MCNC: 148 MG/DL — LOW (ref 200–360)
UIBC SERPL-MCNC: 158 UG/DL — SIGNIFICANT CHANGE UP (ref 110–370)
WBC # BLD: 6.48 K/UL — SIGNIFICANT CHANGE UP (ref 4.8–10.8)
WBC # BLD: 6.74 K/UL — SIGNIFICANT CHANGE UP (ref 4.8–10.8)
WBC # FLD AUTO: 6.48 K/UL — SIGNIFICANT CHANGE UP (ref 4.8–10.8)
WBC # FLD AUTO: 6.74 K/UL — SIGNIFICANT CHANGE UP (ref 4.8–10.8)

## 2019-05-25 RX ADMIN — HEPARIN SODIUM 5000 UNIT(S): 5000 INJECTION INTRAVENOUS; SUBCUTANEOUS at 21:26

## 2019-05-25 RX ADMIN — HEPARIN SODIUM 5000 UNIT(S): 5000 INJECTION INTRAVENOUS; SUBCUTANEOUS at 13:41

## 2019-05-25 RX ADMIN — Medication 100 MILLIGRAM(S): at 13:40

## 2019-05-25 RX ADMIN — Medication 0.5 MILLIGRAM(S): at 17:21

## 2019-05-25 RX ADMIN — CHLORHEXIDINE GLUCONATE 1 APPLICATION(S): 213 SOLUTION TOPICAL at 06:03

## 2019-05-25 RX ADMIN — Medication 0.5 MILLIGRAM(S): at 06:03

## 2019-05-25 RX ADMIN — Medication 650 MILLIGRAM(S): at 06:03

## 2019-05-25 RX ADMIN — Medication 100 MILLIGRAM(S): at 06:03

## 2019-05-25 RX ADMIN — HALOPERIDOL DECANOATE 1 MILLIGRAM(S): 100 INJECTION INTRAMUSCULAR at 21:27

## 2019-05-25 RX ADMIN — HEPARIN SODIUM 5000 UNIT(S): 5000 INJECTION INTRAVENOUS; SUBCUTANEOUS at 06:03

## 2019-05-25 RX ADMIN — POLYETHYLENE GLYCOL 3350 17 GRAM(S): 17 POWDER, FOR SOLUTION ORAL at 13:40

## 2019-05-25 RX ADMIN — Medication 100 MILLIGRAM(S): at 21:26

## 2019-05-25 RX ADMIN — DIVALPROEX SODIUM 1500 MILLIGRAM(S): 500 TABLET, DELAYED RELEASE ORAL at 21:25

## 2019-05-25 RX ADMIN — Medication 650 MILLIGRAM(S): at 17:22

## 2019-05-25 RX ADMIN — Medication 650 MILLIGRAM(S): at 17:21

## 2019-05-25 NOTE — PROGRESS NOTE ADULT - SUBJECTIVE AND OBJECTIVE BOX
BABAR ENRIQUEZ 78y Female  MRN#: [id number]   CODE STATUS:       SUBJECTIVE  Patient is a 78y old Female who presents with a chief complaint of Fall (25 May 2019 06:24)  Patient is a 78y old Female who presents with a chief complaint of Fall (24 May 2019 07:15)  Currently admitted to medicine with the primary diagnosis of Femoral neck fracture  Hospital course has been complicated by undergoing surgery for Right femoral fracture and developing fever post op.  Today is hospital day 4d, and this morning she is complaining of pain in the surgical site which is controlled with medications.  The health aid is at the bed side who denied any hx of cough, diarrhea, urinary incontinence or drainage from the surgical site.        OBJECTIVE  PAST MEDICAL & SURGICAL HISTORY  Breast mass, right: s/p lumpectomy &amp; radiation   Hyperlipemia  Brito esophagus  Mild mental retardation  Atypical bipolar disorder  Feeding by G-tube: s/p removal     ALLERGIES:  No Known Allergies    MEDICATIONS:  STANDING MEDICATIONS  acetaminophen   Tablet .. 650 milliGRAM(s) Oral every 6 hours  benztropine 0.5 milliGRAM(s) Oral two times a day  chlorhexidine 4% Liquid 1 Application(s) Topical <User Schedule>  diVALproex ER 1500 milliGRAM(s) Oral at bedtime  docusate sodium 100 milliGRAM(s) Oral three times a day  haloperidol    Concentrate 1 milliGRAM(s) Oral at bedtime  heparin  Injectable 5000 Unit(s) SubCutaneous every 8 hours  polyethylene glycol 3350 17 Gram(s) Oral daily    PRN MEDICATIONS  aluminum hydroxide/magnesium hydroxide/simethicone Suspension 30 milliLiter(s) Oral four times a day PRN  bisacodyl Suppository 10 milliGRAM(s) Rectal daily PRN  magnesium hydroxide Suspension 30 milliLiter(s) Oral daily PRN  ondansetron Injectable 4 milliGRAM(s) IV Push every 6 hours PRN  oxyCODONE    IR 5 milliGRAM(s) Oral every 4 hours PRN  oxyCODONE    IR 10 milliGRAM(s) Oral every 4 hours PRN  senna 2 Tablet(s) Oral at bedtime PRN      VITAL SIGNS: Last 24 Hours  T(C): 36.1 (25 May 2019 08:46), Max: 37.7 (25 May 2019 00:00)  T(F): 96.9 (25 May 2019 08:46), Max: 99.8 (25 May 2019 00:00)  HR: 94 (25 May 2019 08:46) (94 - 109)  BP: 99/53 (25 May 2019 08:46) (99/53 - 113/58)  BP(mean): --  RR: 16 (25 May 2019 08:46) (16 - 18)  SpO2: --    LABS:                        7.7    6.48  )-----------( 96       ( 25 May 2019 06:01 )             23.2         142  |  107  |  12  ----------------------------<  93  4.2   |  26  |  0.5<L>    Ca    7.7<L>      25 May 2019 06:01  Mg     1.8         TPro  4.5<L>  /  Alb  2.6<L>  /  TBili  0.3  /  DBili  x   /  AST  42<H>  /  ALT  46<H>  /  AlkPhos  60        Urinalysis Basic - ( 24 May 2019 08:37 )    Color: Yellow / Appearance: Cloudy / S.020 / pH: x  Gluc: x / Ketone: Negative  / Bili: Negative / Urobili: 0.2 mg/dL   Blood: x / Protein: Negative mg/dL / Nitrite: Negative   Leuk Esterase: Small / RBC: x / WBC 6-10 /HPF   Sq Epi: x / Non Sq Epi: Occasional /HPF / Bacteria: Few /HPF            Culture - Urine (collected 24 May 2019 08:37)  Source: .Urine Clean Catch (Midstream)  Final Report (25 May 2019 11:39):    No growth          RADIOLOGY:  < from: CT Head No Cont (19 @ 22:21) >    IMPRESSION:    No CT evidence of acute hemorrhage, midline shift, or mass effect.    < end of copied text >  < from: Xray Femur 2 Views, Right (19 @ 21:06) >  Findings/  impression: Right femoral neck displaced fracture with varus deformity.   Right hip degenerative changes. Osteopenia.      < end of copied text >  < from: CT Abdomen and Pelvis w/ IV Cont (19 @ 18:09) >  IMPRESSION:    Acute right femoral neck fracture of the right femur.    Mild anterior mediastinal adenopathy, nonspecific    Mild rectal stercoral colitis    < end of copied text >      PHYSICAL EXAM:    GENERAL: NAD, AAOx2  HEENT:  Atraumatic, Normocephalic. EOMI, PERRLA, conjunctiva and sclera clear, No JVD  PULMONARY: Clear to auscultation bilaterally; No wheeze  CARDIOVASCULAR: Regular rate and rhythm; No murmurs, rubs, or gallops  GASTROINTESTINAL: Soft, Nontender, Nondistended; Bowel sounds present  MUSCULOSKELETAL:  2+ Peripheral Pulses, No clubbing, cyanosis, or edema. Right hip dressing, swelling of RLE non tender.  NEUROLOGY: non-focal  SKIN: No rashes or lesions

## 2019-05-25 NOTE — PROGRESS NOTE ADULT - ASSESSMENT
78F with PMH of intellectual disability from group home and bipolar disorder, DLD, and esophageal varices presented s/p mechanical fall at day program.    #Acute Right Femoral Neck Fracture s/p mechanical fall  -s/p Right Hip Giuliano POD # 3  -RLE WBAT, c/w PT/OT.  -Pain control   -c/w DVT ppx;  -appreciate physiatry eval; pt will go to SNF.    #Post op fever - resolved  -pt spiked up fever with Tmax of 102 F on 05/24/2019  -no localizing signs or symptoms, no elevated WBCs.  -CXR -ve, UA -ve, urine cx and blood cx NGTD, LE duplex -ve for DVT  -continue to monitor off ABX for now    #PMH Bipolar Disorder: continue haldol, benztropine, Depakote     #Acute Blood loss anemia s/p surgery  -Hb dropped to 7.7 s/p surgery from 10 on 05/23, baseline Hb around 12  -will repeat CBC in pm and transfuse prn.  -will check iron stores, b 12 and folate    #Acute thrombocytopenia  -likely from consumption from trauma/surgery  -continue to monitor.  -will check iron stores, b 12 and folate    #Hypomagnesemia - resolved  -continue to monitor.    #Hx of Dysphagia,  -pt was on puree diet at nursing home  -seen by Speech and swallow c/w regular with thins alternating solids with liquids.    Contacted group [place]. Patient's father used to make medical decisions for her, but he passed away recently. The cousin (Nichole Dee [phone]) makes decisions for the pt now.    DVT ppx: Heparin sub-q  GI ppx: not indicated   Diet: regular diet   Activity: Increase as tolerated, WBAT LLE fall precautions   Dispo: from group home, will go to SNF once Fever resolves and CBC is stable.  Code status: FULL CODE

## 2019-05-25 NOTE — PROGRESS NOTE ADULT - SUBJECTIVE AND OBJECTIVE BOX
KRISH ENRIQUEZYN  78y  Female    s/p rt hip surgery  assumed her care since she belongs to [facility]  low grade temp  vitals stable  no acute events  mg replaced  k stable  platelets stable  acute decrease in hgb- 7.7, may need transfusion- consent from cousin  has cough, productive of copious clear sputum-  INTERVAL EVENTS:    T(C): 37.3 (19 @ 16:07), Max: 37.7 (19 @ 00:00)  HR: 111 (19 @ 16:07) (94 - 111)  BP: 113/50 (19 @ 16:07) (99/53 - 113/50)  RR: 18 (19 @ 16:07) (16 - 18)  SpO2: --  Wt(kg): --Vital Signs Last 24 Hrs  T(C): 37.3 (25 May 2019 16:07), Max: 37.7 (25 May 2019 00:00)  T(F): 99.2 (25 May 2019 16:07), Max: 99.8 (25 May 2019 00:00)  HR: 111 (25 May 2019 16:07) (94 - 111)  BP: 113/50 (25 May 2019 16:07) (99/53 - 113/50)  BP(mean): 75 (25 May 2019 16:07) (75 - 75)  RR: 18 (25 May 2019 16:07) (16 - 18)  SpO2: --    PHYSICAL EXAM:  GENERAL: resting comfortably  NECK: Supple, No JVD, Normal thyroid  CHEST/LUNG: Clear; No crackles or wheezing  HEART: S1, S2, Regular rate and rhythm;   ABDOMEN: Soft, Nontender, Nondistended; Bowel sounds present  EXTREMITIES: No clubbing, cyanosis, or edema  SKIN: No rashes or lesions    LABS:                          7.7    6.48  )-----------( 96       ( 25 May 2019 06:01 )             23.2         142  |  107  |  12  ----------------------------<  93  4.2   |  26  |  0.5<L>    Ca    7.7<L>      25 May 2019 06:01  Mg     1.8         TPro  4.5<L>  /  Alb  2.6<L>  /  TBili  0.3  /  DBili  x   /  AST  42<H>  /  ALT  46<H>  /  AlkPhos  60  05-25      Urinalysis Basic - ( 24 May 2019 08:37 )    Color: Yellow / Appearance: Cloudy / S.020 / pH: x  Gluc: x / Ketone: Negative  / Bili: Negative / Urobili: 0.2 mg/dL   Blood: x / Protein: Negative mg/dL / Nitrite: Negative   Leuk Esterase: Small / RBC: x / WBC 6-10 /HPF   Sq Epi: x / Non Sq Epi: Occasional /HPF / Bacteria: Few /HPF        Culture - Urine (collected 24 May 2019 08:37)  Source: .Urine Clean Catch (Midstream)  Final Report (25 May 2019 11:39):    No growth          acetaminophen   Tablet .. 650 milliGRAM(s) Oral every 6 hours  aluminum hydroxide/magnesium hydroxide/simethicone Suspension 30 milliLiter(s) Oral four times a day PRN  benztropine 0.5 milliGRAM(s) Oral two times a day  bisacodyl Suppository 10 milliGRAM(s) Rectal daily PRN  chlorhexidine 4% Liquid 1 Application(s) Topical <User Schedule>  diVALproex ER 1500 milliGRAM(s) Oral at bedtime  docusate sodium 100 milliGRAM(s) Oral three times a day  haloperidol    Concentrate 1 milliGRAM(s) Oral at bedtime  heparin  Injectable 5000 Unit(s) SubCutaneous every 8 hours  magnesium hydroxide Suspension 30 milliLiter(s) Oral daily PRN  ondansetron Injectable 4 milliGRAM(s) IV Push every 6 hours PRN  oxyCODONE    IR 5 milliGRAM(s) Oral every 4 hours PRN  oxyCODONE    IR 10 milliGRAM(s) Oral every 4 hours PRN  polyethylene glycol 3350 17 Gram(s) Oral daily  senna 2 Tablet(s) Oral at bedtime PRN      RADIOLOGY & ADDITIONAL TESTS:    case discussed with the resident  Available consult notes reviewed    Assessment and plan:   cxr-  urine culture-  duplex-  decrease in hgb- had large bm- repeat and transfuse if lower than8.0  low albumin- poor oral intake  monitor closely

## 2019-05-25 NOTE — PROGRESS NOTE ADULT - SUBJECTIVE AND OBJECTIVE BOX
ORTHO PROGRESS NOTE    Patient seen and examined after surgery. Resting comfortably, denies fevers, chills, SOB.    Vital Signs Last 24 Hrs  T(C): 37.7 (25 May 2019 00:00), Max: 37.9 (24 May 2019 07:30)  T(F): 99.8 (25 May 2019 00:00), Max: 100.2 (24 May 2019 07:30)  HR: 98 (25 May 2019 00:00) (94 - 109)  BP: 105/52 (25 May 2019 00:00) (100/52 - 113/58)  BP(mean): --  RR: 16 (25 May 2019 00:00) (16 - 18)  SpO2: 96% (24 May 2019 08:20) (91% - 96%)    PE :  RLE :   Dressings c/d/i  Thigh and calf soft and compressible  EHL TA GS motor intact  SILT distally  Foot warm and perfused    A/P  78F s/p R Hip Giuliano:  - Pain control  - DVT PPX  - IS  - WBAT RLE  - PT OT OOBTC  - Home meds  - Dispo pending  - Medical management per primary team  - Ortho to continue following

## 2019-05-26 LAB
ALBUMIN SERPL ELPH-MCNC: 2.7 G/DL — LOW (ref 3.5–5.2)
ALP SERPL-CCNC: 63 U/L — SIGNIFICANT CHANGE UP (ref 30–115)
ALT FLD-CCNC: 39 U/L — SIGNIFICANT CHANGE UP (ref 0–41)
ANION GAP SERPL CALC-SCNC: 12 MMOL/L — SIGNIFICANT CHANGE UP (ref 7–14)
AST SERPL-CCNC: 26 U/L — SIGNIFICANT CHANGE UP (ref 0–41)
BASOPHILS # BLD AUTO: 0.01 K/UL — SIGNIFICANT CHANGE UP (ref 0–0.2)
BASOPHILS NFR BLD AUTO: 0.2 % — SIGNIFICANT CHANGE UP (ref 0–1)
BILIRUB SERPL-MCNC: 0.4 MG/DL — SIGNIFICANT CHANGE UP (ref 0.2–1.2)
BUN SERPL-MCNC: 13 MG/DL — SIGNIFICANT CHANGE UP (ref 10–20)
CALCIUM SERPL-MCNC: 8 MG/DL — LOW (ref 8.5–10.1)
CHLORIDE SERPL-SCNC: 109 MMOL/L — SIGNIFICANT CHANGE UP (ref 98–110)
CO2 SERPL-SCNC: 24 MMOL/L — SIGNIFICANT CHANGE UP (ref 17–32)
CREAT SERPL-MCNC: <0.5 MG/DL — LOW (ref 0.7–1.5)
EOSINOPHIL # BLD AUTO: 0.08 K/UL — SIGNIFICANT CHANGE UP (ref 0–0.7)
EOSINOPHIL NFR BLD AUTO: 1.3 % — SIGNIFICANT CHANGE UP (ref 0–8)
FERRITIN SERPL-MCNC: 426 NG/ML — HIGH (ref 15–150)
FOLATE SERPL-MCNC: 13.2 NG/ML — SIGNIFICANT CHANGE UP
GLUCOSE SERPL-MCNC: 92 MG/DL — SIGNIFICANT CHANGE UP (ref 70–99)
HCT VFR BLD CALC: 24.7 % — LOW (ref 37–47)
HGB BLD-MCNC: 8.1 G/DL — LOW (ref 12–16)
IMM GRANULOCYTES NFR BLD AUTO: 0.3 % — SIGNIFICANT CHANGE UP (ref 0.1–0.3)
LYMPHOCYTES # BLD AUTO: 1.72 K/UL — SIGNIFICANT CHANGE UP (ref 1.2–3.4)
LYMPHOCYTES # BLD AUTO: 27.3 % — SIGNIFICANT CHANGE UP (ref 20.5–51.1)
MAGNESIUM SERPL-MCNC: 1.9 MG/DL — SIGNIFICANT CHANGE UP (ref 1.8–2.4)
MCHC RBC-ENTMCNC: 32.3 PG — HIGH (ref 27–31)
MCHC RBC-ENTMCNC: 32.8 G/DL — SIGNIFICANT CHANGE UP (ref 32–37)
MCV RBC AUTO: 98.4 FL — SIGNIFICANT CHANGE UP (ref 81–99)
MONOCYTES # BLD AUTO: 0.63 K/UL — HIGH (ref 0.1–0.6)
MONOCYTES NFR BLD AUTO: 10 % — HIGH (ref 1.7–9.3)
NEUTROPHILS # BLD AUTO: 3.85 K/UL — SIGNIFICANT CHANGE UP (ref 1.4–6.5)
NEUTROPHILS NFR BLD AUTO: 60.9 % — SIGNIFICANT CHANGE UP (ref 42.2–75.2)
NRBC # BLD: 0 /100 WBCS — SIGNIFICANT CHANGE UP (ref 0–0)
PLATELET # BLD AUTO: 123 K/UL — LOW (ref 130–400)
POTASSIUM SERPL-MCNC: 4.6 MMOL/L — SIGNIFICANT CHANGE UP (ref 3.5–5)
POTASSIUM SERPL-SCNC: 4.6 MMOL/L — SIGNIFICANT CHANGE UP (ref 3.5–5)
PROT SERPL-MCNC: 4.8 G/DL — LOW (ref 6–8)
RBC # BLD: 2.51 M/UL — LOW (ref 4.2–5.4)
RBC # FLD: 12.2 % — SIGNIFICANT CHANGE UP (ref 11.5–14.5)
SODIUM SERPL-SCNC: 145 MMOL/L — SIGNIFICANT CHANGE UP (ref 135–146)
VIT B12 SERPL-MCNC: 667 PG/ML — SIGNIFICANT CHANGE UP (ref 232–1245)
WBC # BLD: 6.31 K/UL — SIGNIFICANT CHANGE UP (ref 4.8–10.8)
WBC # FLD AUTO: 6.31 K/UL — SIGNIFICANT CHANGE UP (ref 4.8–10.8)

## 2019-05-26 RX ADMIN — HALOPERIDOL DECANOATE 1 MILLIGRAM(S): 100 INJECTION INTRAMUSCULAR at 22:41

## 2019-05-26 RX ADMIN — Medication 0.5 MILLIGRAM(S): at 17:28

## 2019-05-26 RX ADMIN — Medication 100 MILLIGRAM(S): at 05:41

## 2019-05-26 RX ADMIN — DIVALPROEX SODIUM 1500 MILLIGRAM(S): 500 TABLET, DELAYED RELEASE ORAL at 22:25

## 2019-05-26 RX ADMIN — HEPARIN SODIUM 5000 UNIT(S): 5000 INJECTION INTRAVENOUS; SUBCUTANEOUS at 05:41

## 2019-05-26 RX ADMIN — HEPARIN SODIUM 5000 UNIT(S): 5000 INJECTION INTRAVENOUS; SUBCUTANEOUS at 13:33

## 2019-05-26 RX ADMIN — Medication 100 MILLIGRAM(S): at 22:24

## 2019-05-26 RX ADMIN — CHLORHEXIDINE GLUCONATE 1 APPLICATION(S): 213 SOLUTION TOPICAL at 05:42

## 2019-05-26 RX ADMIN — HEPARIN SODIUM 5000 UNIT(S): 5000 INJECTION INTRAVENOUS; SUBCUTANEOUS at 22:26

## 2019-05-26 RX ADMIN — Medication 0.5 MILLIGRAM(S): at 05:41

## 2019-05-26 RX ADMIN — POLYETHYLENE GLYCOL 3350 17 GRAM(S): 17 POWDER, FOR SOLUTION ORAL at 13:33

## 2019-05-26 NOTE — PROGRESS NOTE ADULT - SUBJECTIVE AND OBJECTIVE BOX
BABAR ENRIQUEZ  78y  Female  s/p hip arthroplasty  t max 99.7  cough, but clear phlegm  no diarrhea  hgb relatively stable  platelets improving  ortho noted    INTERVAL EVENTS:    T(C): 37.6 (05-26-19 @ 07:47), Max: 37.6 (05-26-19 @ 07:47)  HR: 97 (05-26-19 @ 07:47) (97 - 111)  BP: 114/58 (05-26-19 @ 07:47) (110/53 - 114/58)  RR: 18 (05-26-19 @ 07:47) (18 - 18)  SpO2: --  Wt(kg): --Vital Signs Last 24 Hrs  T(C): 37.6 (26 May 2019 07:47), Max: 37.6 (26 May 2019 07:47)  T(F): 99.7 (26 May 2019 07:47), Max: 99.7 (26 May 2019 07:47)  HR: 97 (26 May 2019 07:47) (97 - 111)  BP: 114/58 (26 May 2019 07:47) (110/53 - 114/58)  BP(mean): 75 (25 May 2019 16:07) (75 - 75)  RR: 18 (26 May 2019 07:47) (18 - 18)  SpO2: --    PHYSICAL EXAM:  GENERAL: resting comfortably  NECK: Supple, No JVD, Normal thyroid  CHEST/LUNG: Clear; No crackles or wheezing  HEART: S1, S2, Regular rate and rhythm;   ABDOMEN: Soft, Nontender, Nondistended; Bowel sounds present  EXTREMITIES: No clubbing, cyanosis, or edema  SKIN: No rashes or lesions    LABS:                          8.1    6.31  )-----------( 123      ( 26 May 2019 04:30 )             24.7     05-26    145  |  109  |  13  ----------------------------<  92  4.6   |  24  |  <0.5<L>    Ca    8.0<L>      26 May 2019 04:30  Mg     1.9     05-26    TPro  4.8<L>  /  Alb  2.7<L>  /  TBili  0.4  /  DBili  x   /  AST  26  /  ALT  39  /  AlkPhos  63  05-26          Culture - Blood (collected 24 May 2019 11:19)  Source: .Blood None  Preliminary Report (26 May 2019 01:01):    No growth to date.    Culture - Urine (collected 24 May 2019 08:37)  Source: .Urine Clean Catch (Midstream)  Final Report (25 May 2019 11:39):    No growth          acetaminophen   Tablet .. 650 milliGRAM(s) Oral every 6 hours  aluminum hydroxide/magnesium hydroxide/simethicone Suspension 30 milliLiter(s) Oral four times a day PRN  benztropine 0.5 milliGRAM(s) Oral two times a day  bisacodyl Suppository 10 milliGRAM(s) Rectal daily PRN  chlorhexidine 4% Liquid 1 Application(s) Topical <User Schedule>  diVALproex ER 1500 milliGRAM(s) Oral at bedtime  docusate sodium 100 milliGRAM(s) Oral three times a day  haloperidol    Concentrate 1 milliGRAM(s) Oral at bedtime  heparin  Injectable 5000 Unit(s) SubCutaneous every 8 hours  magnesium hydroxide Suspension 30 milliLiter(s) Oral daily PRN  ondansetron Injectable 4 milliGRAM(s) IV Push every 6 hours PRN  oxyCODONE    IR 5 milliGRAM(s) Oral every 4 hours PRN  oxyCODONE    IR 10 milliGRAM(s) Oral every 4 hours PRN  polyethylene glycol 3350 17 Gram(s) Oral daily  senna 2 Tablet(s) Oral at bedtime PRN      RADIOLOGY & ADDITIONAL TESTS:    case discussed with the resident  Available consult notes reviewed    Assessment and plan:     attempt PT  OOB  robitussin  if stable, will consider d/c to her own group home w OP PT  monitor cbc

## 2019-05-26 NOTE — PROGRESS NOTE ADULT - SUBJECTIVE AND OBJECTIVE BOX
ORTHO PROGRESS NOTE    Patient seen and examined after surgery. Resting comfortably, denies fevers, chills, SOB.    Vital Signs Last 24 Hrs  T(C): 37.6 (26 May 2019 07:47), Max: 37.6 (26 May 2019 07:47)  T(F): 99.7 (26 May 2019 07:47), Max: 99.7 (26 May 2019 07:47)  HR: 97 (26 May 2019 07:47) (94 - 111)  BP: 114/58 (26 May 2019 07:47) (99/53 - 114/58)  BP(mean): 75 (25 May 2019 16:07) (75 - 75)  RR: 18 (26 May 2019 07:47) (16 - 18)  SpO2: --    PE :  RLE :   Dressings c/d/i, removed  Incision c/d/i without drainage on bandage  Dressing replaced  Thigh and calf soft and compressible  EHL TA GS motor intact  SILT distally  Foot warm and perfused    A/P  78F s/p R Hip Giuliano:  - Pain control  - DVT PPX  - IS  - WBAT RLE  - PT OT OOBTC  - Home meds  - Dispo pending  - Medical management per primary team  - Ortho to continue following

## 2019-05-27 LAB
ALBUMIN SERPL ELPH-MCNC: 2.8 G/DL — LOW (ref 3.5–5.2)
ALBUMIN SERPL ELPH-MCNC: 3.1 G/DL — LOW (ref 3.5–5.2)
ALP SERPL-CCNC: 59 U/L — SIGNIFICANT CHANGE UP (ref 30–115)
ALP SERPL-CCNC: 65 U/L — SIGNIFICANT CHANGE UP (ref 30–115)
ALT FLD-CCNC: 27 U/L — SIGNIFICANT CHANGE UP (ref 0–41)
ALT FLD-CCNC: 34 U/L — SIGNIFICANT CHANGE UP (ref 0–41)
ANION GAP SERPL CALC-SCNC: 12 MMOL/L — SIGNIFICANT CHANGE UP (ref 7–14)
ANION GAP SERPL CALC-SCNC: 13 MMOL/L — SIGNIFICANT CHANGE UP (ref 7–14)
AST SERPL-CCNC: 15 U/L — SIGNIFICANT CHANGE UP (ref 0–41)
AST SERPL-CCNC: 19 U/L — SIGNIFICANT CHANGE UP (ref 0–41)
BASOPHILS # BLD AUTO: 0.02 K/UL — SIGNIFICANT CHANGE UP (ref 0–0.2)
BASOPHILS # BLD AUTO: 0.02 K/UL — SIGNIFICANT CHANGE UP (ref 0–0.2)
BASOPHILS # BLD AUTO: 0.03 K/UL — SIGNIFICANT CHANGE UP (ref 0–0.2)
BASOPHILS NFR BLD AUTO: 0.2 % — SIGNIFICANT CHANGE UP (ref 0–1)
BASOPHILS NFR BLD AUTO: 0.2 % — SIGNIFICANT CHANGE UP (ref 0–1)
BASOPHILS NFR BLD AUTO: 0.3 % — SIGNIFICANT CHANGE UP (ref 0–1)
BILIRUB SERPL-MCNC: 0.5 MG/DL — SIGNIFICANT CHANGE UP (ref 0.2–1.2)
BILIRUB SERPL-MCNC: 0.5 MG/DL — SIGNIFICANT CHANGE UP (ref 0.2–1.2)
BLD GP AB SCN SERPL QL: SIGNIFICANT CHANGE UP
BUN SERPL-MCNC: 14 MG/DL — SIGNIFICANT CHANGE UP (ref 10–20)
BUN SERPL-MCNC: 15 MG/DL — SIGNIFICANT CHANGE UP (ref 10–20)
CALCIUM SERPL-MCNC: 8.2 MG/DL — LOW (ref 8.5–10.1)
CALCIUM SERPL-MCNC: 8.4 MG/DL — LOW (ref 8.5–10.1)
CHLORIDE SERPL-SCNC: 102 MMOL/L — SIGNIFICANT CHANGE UP (ref 98–110)
CHLORIDE SERPL-SCNC: 103 MMOL/L — SIGNIFICANT CHANGE UP (ref 98–110)
CO2 SERPL-SCNC: 23 MMOL/L — SIGNIFICANT CHANGE UP (ref 17–32)
CO2 SERPL-SCNC: 24 MMOL/L — SIGNIFICANT CHANGE UP (ref 17–32)
CREAT SERPL-MCNC: 0.5 MG/DL — LOW (ref 0.7–1.5)
CREAT SERPL-MCNC: 0.5 MG/DL — LOW (ref 0.7–1.5)
EOSINOPHIL # BLD AUTO: 0.03 K/UL — SIGNIFICANT CHANGE UP (ref 0–0.7)
EOSINOPHIL # BLD AUTO: 0.03 K/UL — SIGNIFICANT CHANGE UP (ref 0–0.7)
EOSINOPHIL # BLD AUTO: 0.04 K/UL — SIGNIFICANT CHANGE UP (ref 0–0.7)
EOSINOPHIL NFR BLD AUTO: 0.3 % — SIGNIFICANT CHANGE UP (ref 0–8)
EOSINOPHIL NFR BLD AUTO: 0.3 % — SIGNIFICANT CHANGE UP (ref 0–8)
EOSINOPHIL NFR BLD AUTO: 0.4 % — SIGNIFICANT CHANGE UP (ref 0–8)
GLUCOSE SERPL-MCNC: 107 MG/DL — HIGH (ref 70–99)
GLUCOSE SERPL-MCNC: 124 MG/DL — HIGH (ref 70–99)
HCT VFR BLD CALC: 25.1 % — LOW (ref 37–47)
HCT VFR BLD CALC: 26 % — LOW (ref 37–47)
HCT VFR BLD CALC: 27.1 % — LOW (ref 37–47)
HGB BLD-MCNC: 8.5 G/DL — LOW (ref 12–16)
HGB BLD-MCNC: 8.7 G/DL — LOW (ref 12–16)
HGB BLD-MCNC: 9 G/DL — LOW (ref 12–16)
IMM GRANULOCYTES NFR BLD AUTO: 0.5 % — HIGH (ref 0.1–0.3)
IMM GRANULOCYTES NFR BLD AUTO: 0.8 % — HIGH (ref 0.1–0.3)
IMM GRANULOCYTES NFR BLD AUTO: 0.9 % — HIGH (ref 0.1–0.3)
LYMPHOCYTES # BLD AUTO: 1.25 K/UL — SIGNIFICANT CHANGE UP (ref 1.2–3.4)
LYMPHOCYTES # BLD AUTO: 1.26 K/UL — SIGNIFICANT CHANGE UP (ref 1.2–3.4)
LYMPHOCYTES # BLD AUTO: 1.9 K/UL — SIGNIFICANT CHANGE UP (ref 1.2–3.4)
LYMPHOCYTES # BLD AUTO: 11.4 % — LOW (ref 20.5–51.1)
LYMPHOCYTES # BLD AUTO: 13.8 % — LOW (ref 20.5–51.1)
LYMPHOCYTES # BLD AUTO: 16.6 % — LOW (ref 20.5–51.1)
MAGNESIUM SERPL-MCNC: 1.7 MG/DL — LOW (ref 1.8–2.4)
MCHC RBC-ENTMCNC: 31.9 PG — HIGH (ref 27–31)
MCHC RBC-ENTMCNC: 32.3 PG — HIGH (ref 27–31)
MCHC RBC-ENTMCNC: 32.6 PG — HIGH (ref 27–31)
MCHC RBC-ENTMCNC: 33.2 G/DL — SIGNIFICANT CHANGE UP (ref 32–37)
MCHC RBC-ENTMCNC: 33.5 G/DL — SIGNIFICANT CHANGE UP (ref 32–37)
MCHC RBC-ENTMCNC: 33.9 G/DL — SIGNIFICANT CHANGE UP (ref 32–37)
MCV RBC AUTO: 96.1 FL — SIGNIFICANT CHANGE UP (ref 81–99)
MCV RBC AUTO: 96.2 FL — SIGNIFICANT CHANGE UP (ref 81–99)
MCV RBC AUTO: 96.7 FL — SIGNIFICANT CHANGE UP (ref 81–99)
MONOCYTES # BLD AUTO: 0.94 K/UL — HIGH (ref 0.1–0.6)
MONOCYTES # BLD AUTO: 1.09 K/UL — HIGH (ref 0.1–0.6)
MONOCYTES # BLD AUTO: 1.18 K/UL — HIGH (ref 0.1–0.6)
MONOCYTES NFR BLD AUTO: 10.3 % — HIGH (ref 1.7–9.3)
MONOCYTES NFR BLD AUTO: 10.8 % — HIGH (ref 1.7–9.3)
MONOCYTES NFR BLD AUTO: 9.5 % — HIGH (ref 1.7–9.3)
NEUTROPHILS # BLD AUTO: 6.83 K/UL — HIGH (ref 1.4–6.5)
NEUTROPHILS # BLD AUTO: 8.27 K/UL — HIGH (ref 1.4–6.5)
NEUTROPHILS # BLD AUTO: 8.38 K/UL — HIGH (ref 1.4–6.5)
NEUTROPHILS NFR BLD AUTO: 72.4 % — SIGNIFICANT CHANGE UP (ref 42.2–75.2)
NEUTROPHILS NFR BLD AUTO: 74.9 % — SIGNIFICANT CHANGE UP (ref 42.2–75.2)
NEUTROPHILS NFR BLD AUTO: 76.4 % — HIGH (ref 42.2–75.2)
NRBC # BLD: 0 /100 WBCS — SIGNIFICANT CHANGE UP (ref 0–0)
PLATELET # BLD AUTO: 152 K/UL — SIGNIFICANT CHANGE UP (ref 130–400)
PLATELET # BLD AUTO: 177 K/UL — SIGNIFICANT CHANGE UP (ref 130–400)
PLATELET # BLD AUTO: 187 K/UL — SIGNIFICANT CHANGE UP (ref 130–400)
POTASSIUM SERPL-MCNC: 4.2 MMOL/L — SIGNIFICANT CHANGE UP (ref 3.5–5)
POTASSIUM SERPL-MCNC: 4.3 MMOL/L — SIGNIFICANT CHANGE UP (ref 3.5–5)
POTASSIUM SERPL-SCNC: 4.2 MMOL/L — SIGNIFICANT CHANGE UP (ref 3.5–5)
POTASSIUM SERPL-SCNC: 4.3 MMOL/L — SIGNIFICANT CHANGE UP (ref 3.5–5)
PROT SERPL-MCNC: 5 G/DL — LOW (ref 6–8)
PROT SERPL-MCNC: 5.3 G/DL — LOW (ref 6–8)
RBC # BLD: 2.61 M/UL — LOW (ref 4.2–5.4)
RBC # BLD: 2.69 M/UL — LOW (ref 4.2–5.4)
RBC # BLD: 2.82 M/UL — LOW (ref 4.2–5.4)
RBC # FLD: 12 % — SIGNIFICANT CHANGE UP (ref 11.5–14.5)
RBC # FLD: 12 % — SIGNIFICANT CHANGE UP (ref 11.5–14.5)
RBC # FLD: 12.1 % — SIGNIFICANT CHANGE UP (ref 11.5–14.5)
SODIUM SERPL-SCNC: 138 MMOL/L — SIGNIFICANT CHANGE UP (ref 135–146)
SODIUM SERPL-SCNC: 139 MMOL/L — SIGNIFICANT CHANGE UP (ref 135–146)
TYPE + AB SCN PNL BLD: SIGNIFICANT CHANGE UP
WBC # BLD: 10.96 K/UL — HIGH (ref 4.8–10.8)
WBC # BLD: 11.42 K/UL — HIGH (ref 4.8–10.8)
WBC # BLD: 9.13 K/UL — SIGNIFICANT CHANGE UP (ref 4.8–10.8)
WBC # FLD AUTO: 10.96 K/UL — HIGH (ref 4.8–10.8)
WBC # FLD AUTO: 11.42 K/UL — HIGH (ref 4.8–10.8)
WBC # FLD AUTO: 9.13 K/UL — SIGNIFICANT CHANGE UP (ref 4.8–10.8)

## 2019-05-27 PROCEDURE — 71045 X-RAY EXAM CHEST 1 VIEW: CPT | Mod: 26

## 2019-05-27 RX ORDER — IPRATROPIUM BROMIDE 0.2 MG/ML
500 SOLUTION, NON-ORAL INHALATION EVERY 6 HOURS
Refills: 0 | Status: DISCONTINUED | OUTPATIENT
Start: 2019-05-27 | End: 2019-05-29

## 2019-05-27 RX ORDER — CEFEPIME 1 G/1
INJECTION, POWDER, FOR SOLUTION INTRAMUSCULAR; INTRAVENOUS
Refills: 0 | Status: DISCONTINUED | OUTPATIENT
Start: 2019-05-27 | End: 2019-05-28

## 2019-05-27 RX ORDER — ACETAMINOPHEN 500 MG
650 TABLET ORAL EVERY 6 HOURS
Refills: 0 | Status: DISCONTINUED | OUTPATIENT
Start: 2019-05-27 | End: 2019-06-03

## 2019-05-27 RX ORDER — CEFEPIME 1 G/1
2000 INJECTION, POWDER, FOR SOLUTION INTRAMUSCULAR; INTRAVENOUS ONCE
Refills: 0 | Status: COMPLETED | OUTPATIENT
Start: 2019-05-27 | End: 2019-05-27

## 2019-05-27 RX ORDER — MAGNESIUM SULFATE 500 MG/ML
1 VIAL (ML) INJECTION ONCE
Refills: 0 | Status: COMPLETED | OUTPATIENT
Start: 2019-05-27 | End: 2019-05-27

## 2019-05-27 RX ORDER — CEFEPIME 1 G/1
2000 INJECTION, POWDER, FOR SOLUTION INTRAMUSCULAR; INTRAVENOUS EVERY 8 HOURS
Refills: 0 | Status: DISCONTINUED | OUTPATIENT
Start: 2019-05-27 | End: 2019-05-28

## 2019-05-27 RX ADMIN — DIVALPROEX SODIUM 1500 MILLIGRAM(S): 500 TABLET, DELAYED RELEASE ORAL at 22:05

## 2019-05-27 RX ADMIN — CHLORHEXIDINE GLUCONATE 1 APPLICATION(S): 213 SOLUTION TOPICAL at 05:18

## 2019-05-27 RX ADMIN — Medication 650 MILLIGRAM(S): at 08:41

## 2019-05-27 RX ADMIN — HEPARIN SODIUM 5000 UNIT(S): 5000 INJECTION INTRAVENOUS; SUBCUTANEOUS at 22:04

## 2019-05-27 RX ADMIN — Medication 650 MILLIGRAM(S): at 17:00

## 2019-05-27 RX ADMIN — CEFEPIME 100 MILLIGRAM(S): 1 INJECTION, POWDER, FOR SOLUTION INTRAMUSCULAR; INTRAVENOUS at 13:16

## 2019-05-27 RX ADMIN — Medication 650 MILLIGRAM(S): at 09:30

## 2019-05-27 RX ADMIN — Medication 500 MICROGRAM(S): at 12:10

## 2019-05-27 RX ADMIN — Medication 100 MILLIGRAM(S): at 13:16

## 2019-05-27 RX ADMIN — POLYETHYLENE GLYCOL 3350 17 GRAM(S): 17 POWDER, FOR SOLUTION ORAL at 11:57

## 2019-05-27 RX ADMIN — Medication 0.5 MILLIGRAM(S): at 05:18

## 2019-05-27 RX ADMIN — HEPARIN SODIUM 5000 UNIT(S): 5000 INJECTION INTRAVENOUS; SUBCUTANEOUS at 13:16

## 2019-05-27 RX ADMIN — Medication 100 MILLIGRAM(S): at 05:18

## 2019-05-27 RX ADMIN — HALOPERIDOL DECANOATE 1 MILLIGRAM(S): 100 INJECTION INTRAMUSCULAR at 22:07

## 2019-05-27 RX ADMIN — Medication 0.5 MILLIGRAM(S): at 17:00

## 2019-05-27 RX ADMIN — HEPARIN SODIUM 5000 UNIT(S): 5000 INJECTION INTRAVENOUS; SUBCUTANEOUS at 05:18

## 2019-05-27 RX ADMIN — CEFEPIME 100 MILLIGRAM(S): 1 INJECTION, POWDER, FOR SOLUTION INTRAMUSCULAR; INTRAVENOUS at 22:05

## 2019-05-27 RX ADMIN — Medication 25 GRAM(S): at 19:30

## 2019-05-27 RX ADMIN — Medication 100 MILLIGRAM(S): at 22:04

## 2019-05-27 RX ADMIN — Medication 650 MILLIGRAM(S): at 16:46

## 2019-05-27 NOTE — PROGRESS NOTE ADULT - ASSESSMENT
78F with PMH of intellectual disability from group home and bipolar disorder, DLD, and esophageal varices s/p mechanical fall and right giuliano-arthroplasty POD 5    #Acute Right Femoral Neck Fracture s/p mechanical fall  -s/p Right Hip Giuliano POD # 3  -RLE WBAT, c/w PT/OT.  -Pain control   -c/w DVT ppx;    #Post op fever - resolved  -pt spiked up fever with Tmax of 102 F on 05/24/2019  -no localizing signs or symptoms, no elevated WBCs.  -CXR -ve, UA -ve, urine cx and blood cx NGTD, LE duplex -ve for DVT  -continue to monitor off ABX for now  -f/u chest xray today, having more congestion    #PMH Bipolar Disorder: continue haldol, benztropine, Depakote     #Acute Blood loss anemia s/p surgery  -Hb above 8 the past three days    #Acute thrombocytopenia  -likely from consumption from trauma/surgery  -resolved    #Hypomagnesemia - resolved  -continue to monitor.    #Hx of Dysphagia,  -pt was on puree diet at nursing home  -seen by Speech and swallow c/w regular with thins alternating solids with liquids.  -changed back to pureed dysphagia 1 nectar thick due to worsening chest congestion    Contacted group home. Patient's father used to make medical decisions for her, but he passed away recently. The cousin (Nichole Dee [phone]) makes decisions for the pt now.    PLAN: f/u chest xray, wbc, hgb, likely discharge tomorrow to group home    DVT ppx: Heparin sub-q  GI ppx: not indicated   Diet: pureed dysphagia 1 nectar thick   Activity: Increase as tolerated, WBAT LLE fall precautions   Dispo: seen by Dr. Bishop, will go back to group home possibly tomorrow assuming no increase in WBC or drop in Hb  Code status: FULL CODE

## 2019-05-27 NOTE — PROGRESS NOTE ADULT - SUBJECTIVE AND OBJECTIVE BOX
SUBJECTIVE:    Patient is a 78y old Female who presents with a chief complaint of Fall (26 May 2019 12:59)      HPI:  79 yo lady with hx of MR and bipolar , DLD , esophageal varices sent for the above CC . As per aid , patient was in rehab today , had witnessed mechanical fall , with no head trauma .  No LOC . (22 May 2019 00:44)      Currently admitted to medicine with the primary diagnosis of Femoral neck fracture     patient is having more congestion today, not altered not septic today    Besides the pertinent positives and negatives described above, the ROS was within normal limits.    PAST MEDICAL & SURGICAL HISTORY  Breast mass, right: s/p lumpectomy &amp; radiation 2007  Hyperlipemia  Brito esophagus  Mild mental retardation  Atypical bipolar disorder  Feeding by G-tube: s/p removal 2012    SOCIAL HISTORY:    ALLERGIES:  No Known Allergies    MEDICATIONS:  STANDING MEDICATIONS  acetaminophen   Tablet .. 650 milliGRAM(s) Oral every 6 hours  benztropine 0.5 milliGRAM(s) Oral two times a day  chlorhexidine 4% Liquid 1 Application(s) Topical <User Schedule>  diVALproex ER 1500 milliGRAM(s) Oral at bedtime  docusate sodium 100 milliGRAM(s) Oral three times a day  haloperidol    Concentrate 1 milliGRAM(s) Oral at bedtime  heparin  Injectable 5000 Unit(s) SubCutaneous every 8 hours  polyethylene glycol 3350 17 Gram(s) Oral daily    PRN MEDICATIONS  aluminum hydroxide/magnesium hydroxide/simethicone Suspension 30 milliLiter(s) Oral four times a day PRN  bisacodyl Suppository 10 milliGRAM(s) Rectal daily PRN  magnesium hydroxide Suspension 30 milliLiter(s) Oral daily PRN  ondansetron Injectable 4 milliGRAM(s) IV Push every 6 hours PRN  oxyCODONE    IR 5 milliGRAM(s) Oral every 4 hours PRN  oxyCODONE    IR 10 milliGRAM(s) Oral every 4 hours PRN  senna 2 Tablet(s) Oral at bedtime PRN    VITALS:   T(F): 99  HR: 104  BP: 135/64  RR: 18  SpO2: --    LABS:                        8.7    9.13  )-----------( 152      ( 27 May 2019 06:07 )             26.0     05-26    145  |  109  |  13  ----------------------------<  92  4.6   |  24  |  <0.5<L>    Ca    8.0<L>      26 May 2019 04:30  Mg     1.9     05-26    TPro  4.8<L>  /  Alb  2.7<L>  /  TBili  0.4  /  DBili  x   /  AST  26  /  ALT  39  /  AlkPhos  63  05-26              Culture - Blood (collected 24 May 2019 11:19)  Source: .Blood None  Preliminary Report (26 May 2019 01:01):    No growth to date.    Culture - Urine (collected 24 May 2019 08:37)  Source: .Urine Clean Catch (Midstream)  Final Report (25 May 2019 11:39):    No growth          RADIOLOGY:    PHYSICAL EXAM:  GEN: No acute distress  LUNGS: productive cough, more left sided congestion   HEART: Regular, tachycardic  ABD: Soft, non-tender, non-distended.  EXT: NC/NE/2+PP/SCOTT/Skin Intact.   NEURO: AAOX3    Intravenous access: yes  NG tube: no   Mcqueen Catheter: no

## 2019-05-27 NOTE — PROGRESS NOTE ADULT - SUBJECTIVE AND OBJECTIVE BOX
Orthopaedics Progress Note    BABAR ENRIQUEZ  [id number]      Patient is a 78y year old Female who is POD#5 from right hip hemiarthroplasty. Patient seen and examined bedside. Pain well controlled. No other complaints    acetaminophen   Tablet .. 650 milliGRAM(s) Oral every 6 hours  aluminum hydroxide/magnesium hydroxide/simethicone Suspension 30 milliLiter(s) Oral four times a day PRN  benztropine 0.5 milliGRAM(s) Oral two times a day  bisacodyl Suppository 10 milliGRAM(s) Rectal daily PRN  chlorhexidine 4% Liquid 1 Application(s) Topical <User Schedule>  diVALproex ER 1500 milliGRAM(s) Oral at bedtime  docusate sodium 100 milliGRAM(s) Oral three times a day  haloperidol    Concentrate 1 milliGRAM(s) Oral at bedtime  heparin  Injectable 5000 Unit(s) SubCutaneous every 8 hours  magnesium hydroxide Suspension 30 milliLiter(s) Oral daily PRN  ondansetron Injectable 4 milliGRAM(s) IV Push every 6 hours PRN  oxyCODONE    IR 5 milliGRAM(s) Oral every 4 hours PRN  oxyCODONE    IR 10 milliGRAM(s) Oral every 4 hours PRN  polyethylene glycol 3350 17 Gram(s) Oral daily  senna 2 Tablet(s) Oral at bedtime PRN      T(C): 37.2 (05-27-19 @ 00:00), Max: 37.6 (05-26-19 @ 07:47)  HR: 104 (05-27-19 @ 00:00) (97 - 104)  BP: 135/64 (05-27-19 @ 00:00) (112/55 - 135/64)  RR: 18 (05-27-19 @ 00:00) (18 - 18)  SpO2: --    Physical Exam  NAD,  Breathing comfortably on RA  Resting comfortably    RLE   Dressings c/d/i  Thigh and calf soft and compressible  EHL TA GS motor intact  SILT distally  Foot warm and perfused    Labs                        8.7    9.13  )-----------( 152      ( 27 May 2019 06:07 )             26.0     05-26    145  |  109  |  13  ----------------------------<  92  4.6   |  24  |  <0.5<L>    Ca    8.0<L>      26 May 2019 04:30  Mg     1.9     05-26    TPro  4.8<L>  /  Alb  2.7<L>  /  TBili  0.4  /  DBili  x   /  AST  26  /  ALT  39  /  AlkPhos  63  05-26    LIVER FUNCTIONS - ( 26 May 2019 04:30 )  Alb: 2.7 g/dL / Pro: 4.8 g/dL / ALK PHOS: 63 U/L / ALT: 39 U/L / AST: 26 U/L / GGT: x           Img  No new    A/P: Patient is a 78y year old Female as above    PT / Physiatry consult  WBAT on RLE  DVT ppx: SCD, HSQ  Abx: ppx completed  Pain control  Home medications: resume  Trend labs  Dispo: Pending PT recommendations

## 2019-05-27 NOTE — PROGRESS NOTE ADULT - SUBJECTIVE AND OBJECTIVE BOX
BABAR ENRIQUEZ  78y  Female  s/p hemiarthroplasty  has cough, productive of sputum- now turned green- low grade temp,?bronchitis-get cxr, may need nebulizers and robitussin, antibiotics if elevated wbc, temp  got oob yesterday  somewhat more subdued today- monitor after breakfast  had loose bm- monitor  hgb stable, platelets improved    INTERVAL EVENTS:    T(C): 37.2 (05-27-19 @ 00:00), Max: 37.6 (05-26-19 @ 07:47)  HR: 104 (05-27-19 @ 00:00) (97 - 104)  BP: 135/64 (05-27-19 @ 00:00) (112/55 - 135/64)  RR: 18 (05-27-19 @ 00:00) (18 - 18)  SpO2: --  Wt(kg): --Vital Signs Last 24 Hrs  T(C): 37.2 (27 May 2019 00:00), Max: 37.6 (26 May 2019 07:47)  T(F): 99 (27 May 2019 00:00), Max: 99.7 (26 May 2019 07:47)  HR: 104 (27 May 2019 00:00) (97 - 104)  BP: 135/64 (27 May 2019 00:00) (112/55 - 135/64)  BP(mean): --  RR: 18 (27 May 2019 00:00) (18 - 18)  SpO2: --    PHYSICAL EXAM:  GENERAL: resting comfortably  NECK: Supple, No JVD, Normal thyroid  CHEST/LUNG: Clear; No crackles or wheezing, scattered rhonchi  HEART: S1, S2, Regular rate and rhythm;   ABDOMEN: Soft, Nontender, Nondistended; Bowel sounds present  EXTREMITIES: No clubbing, cyanosis, or edema  SKIN: No rashes or lesions    LABS:                          8.7    9.13  )-----------( 152      ( 27 May 2019 06:07 )             26.0     05-27    139  |  103  |  14  ----------------------------<  107<H>  4.3   |  24  |  0.5<L>    Ca    8.4<L>      27 May 2019 06:07  Mg     1.7     05-27    TPro  5.3<L>  /  Alb  3.1<L>  /  TBili  0.5  /  DBili  x   /  AST  19  /  ALT  34  /  AlkPhos  65  05-27          Culture - Blood (collected 24 May 2019 11:19)  Source: .Blood None  Preliminary Report (26 May 2019 01:01):    No growth to date.    Culture - Urine (collected 24 May 2019 08:37)  Source: .Urine Clean Catch (Midstream)  Final Report (25 May 2019 11:39):    No growth          acetaminophen   Tablet .. 650 milliGRAM(s) Oral every 6 hours  aluminum hydroxide/magnesium hydroxide/simethicone Suspension 30 milliLiter(s) Oral four times a day PRN  benztropine 0.5 milliGRAM(s) Oral two times a day  bisacodyl Suppository 10 milliGRAM(s) Rectal daily PRN  chlorhexidine 4% Liquid 1 Application(s) Topical <User Schedule>  diVALproex ER 1500 milliGRAM(s) Oral at bedtime  docusate sodium 100 milliGRAM(s) Oral three times a day  haloperidol    Concentrate 1 milliGRAM(s) Oral at bedtime  heparin  Injectable 5000 Unit(s) SubCutaneous every 8 hours  magnesium hydroxide Suspension 30 milliLiter(s) Oral daily PRN  ondansetron Injectable 4 milliGRAM(s) IV Push every 6 hours PRN  oxyCODONE    IR 5 milliGRAM(s) Oral every 4 hours PRN  oxyCODONE    IR 10 milliGRAM(s) Oral every 4 hours PRN  polyethylene glycol 3350 17 Gram(s) Oral daily  senna 2 Tablet(s) Oral at bedtime PRN      RADIOLOGY & ADDITIONAL TESTS:    case discussed with the resident  Available consult notes reviewed    Assessment and plan:     monitor cough, get cxr again  repeat one more cbc for hgb and wbc- if stable, may anticipate d/c to her own group home tomorrow w OP PT

## 2019-05-28 LAB
ALBUMIN SERPL ELPH-MCNC: 2.8 G/DL — LOW (ref 3.5–5.2)
ALP SERPL-CCNC: 65 U/L — SIGNIFICANT CHANGE UP (ref 30–115)
ALT FLD-CCNC: 25 U/L — SIGNIFICANT CHANGE UP (ref 0–41)
ANION GAP SERPL CALC-SCNC: 11 MMOL/L — SIGNIFICANT CHANGE UP (ref 7–14)
AST SERPL-CCNC: 16 U/L — SIGNIFICANT CHANGE UP (ref 0–41)
BASOPHILS # BLD AUTO: 0.02 K/UL — SIGNIFICANT CHANGE UP (ref 0–0.2)
BASOPHILS NFR BLD AUTO: 0.2 % — SIGNIFICANT CHANGE UP (ref 0–1)
BILIRUB SERPL-MCNC: 0.5 MG/DL — SIGNIFICANT CHANGE UP (ref 0.2–1.2)
BUN SERPL-MCNC: 11 MG/DL — SIGNIFICANT CHANGE UP (ref 10–20)
CALCIUM SERPL-MCNC: 8.1 MG/DL — LOW (ref 8.5–10.1)
CHLORIDE SERPL-SCNC: 103 MMOL/L — SIGNIFICANT CHANGE UP (ref 98–110)
CO2 SERPL-SCNC: 26 MMOL/L — SIGNIFICANT CHANGE UP (ref 17–32)
CREAT SERPL-MCNC: <0.5 MG/DL — LOW (ref 0.7–1.5)
EOSINOPHIL # BLD AUTO: 0.09 K/UL — SIGNIFICANT CHANGE UP (ref 0–0.7)
EOSINOPHIL NFR BLD AUTO: 0.8 % — SIGNIFICANT CHANGE UP (ref 0–8)
GLUCOSE SERPL-MCNC: 94 MG/DL — SIGNIFICANT CHANGE UP (ref 70–99)
HCT VFR BLD CALC: 25.7 % — LOW (ref 37–47)
HGB BLD-MCNC: 8.6 G/DL — LOW (ref 12–16)
IMM GRANULOCYTES NFR BLD AUTO: 0.8 % — HIGH (ref 0.1–0.3)
LACTATE SERPL-SCNC: 0.7 MMOL/L — SIGNIFICANT CHANGE UP (ref 0.5–2.2)
LYMPHOCYTES # BLD AUTO: 1.89 K/UL — SIGNIFICANT CHANGE UP (ref 1.2–3.4)
LYMPHOCYTES # BLD AUTO: 17.1 % — LOW (ref 20.5–51.1)
MAGNESIUM SERPL-MCNC: 2 MG/DL — SIGNIFICANT CHANGE UP (ref 1.8–2.4)
MCHC RBC-ENTMCNC: 32.6 PG — HIGH (ref 27–31)
MCHC RBC-ENTMCNC: 33.5 G/DL — SIGNIFICANT CHANGE UP (ref 32–37)
MCV RBC AUTO: 97.3 FL — SIGNIFICANT CHANGE UP (ref 81–99)
MONOCYTES # BLD AUTO: 0.92 K/UL — HIGH (ref 0.1–0.6)
MONOCYTES NFR BLD AUTO: 8.3 % — SIGNIFICANT CHANGE UP (ref 1.7–9.3)
NEUTROPHILS # BLD AUTO: 8.06 K/UL — HIGH (ref 1.4–6.5)
NEUTROPHILS NFR BLD AUTO: 72.8 % — SIGNIFICANT CHANGE UP (ref 42.2–75.2)
NRBC # BLD: 0 /100 WBCS — SIGNIFICANT CHANGE UP (ref 0–0)
PLATELET # BLD AUTO: 200 K/UL — SIGNIFICANT CHANGE UP (ref 130–400)
POTASSIUM SERPL-MCNC: 4.1 MMOL/L — SIGNIFICANT CHANGE UP (ref 3.5–5)
POTASSIUM SERPL-SCNC: 4.1 MMOL/L — SIGNIFICANT CHANGE UP (ref 3.5–5)
PROT SERPL-MCNC: 5.2 G/DL — LOW (ref 6–8)
RBC # BLD: 2.64 M/UL — LOW (ref 4.2–5.4)
RBC # FLD: 12.1 % — SIGNIFICANT CHANGE UP (ref 11.5–14.5)
SODIUM SERPL-SCNC: 140 MMOL/L — SIGNIFICANT CHANGE UP (ref 135–146)
WBC # BLD: 11.07 K/UL — HIGH (ref 4.8–10.8)
WBC # FLD AUTO: 11.07 K/UL — HIGH (ref 4.8–10.8)

## 2019-05-28 RX ORDER — SODIUM CHLORIDE 9 MG/ML
500 INJECTION INTRAMUSCULAR; INTRAVENOUS; SUBCUTANEOUS ONCE
Refills: 0 | Status: COMPLETED | OUTPATIENT
Start: 2019-05-28 | End: 2019-05-28

## 2019-05-28 RX ORDER — CEFEPIME 1 G/1
1000 INJECTION, POWDER, FOR SOLUTION INTRAMUSCULAR; INTRAVENOUS EVERY 12 HOURS
Refills: 0 | Status: DISCONTINUED | OUTPATIENT
Start: 2019-05-28 | End: 2019-06-01

## 2019-05-28 RX ORDER — CEFEPIME 1 G/1
2000 INJECTION, POWDER, FOR SOLUTION INTRAMUSCULAR; INTRAVENOUS ONCE
Refills: 0 | Status: DISCONTINUED | OUTPATIENT
Start: 2019-05-28 | End: 2019-05-28

## 2019-05-28 RX ORDER — CEFEPIME 1 G/1
INJECTION, POWDER, FOR SOLUTION INTRAMUSCULAR; INTRAVENOUS
Refills: 0 | Status: DISCONTINUED | OUTPATIENT
Start: 2019-05-28 | End: 2019-06-01

## 2019-05-28 RX ORDER — CEFEPIME 1 G/1
1000 INJECTION, POWDER, FOR SOLUTION INTRAMUSCULAR; INTRAVENOUS ONCE
Refills: 0 | Status: COMPLETED | OUTPATIENT
Start: 2019-05-28 | End: 2019-05-28

## 2019-05-28 RX ADMIN — Medication 0.5 MILLIGRAM(S): at 18:13

## 2019-05-28 RX ADMIN — CEFEPIME 100 MILLIGRAM(S): 1 INJECTION, POWDER, FOR SOLUTION INTRAMUSCULAR; INTRAVENOUS at 17:14

## 2019-05-28 RX ADMIN — CHLORHEXIDINE GLUCONATE 1 APPLICATION(S): 213 SOLUTION TOPICAL at 06:20

## 2019-05-28 RX ADMIN — DIVALPROEX SODIUM 1500 MILLIGRAM(S): 500 TABLET, DELAYED RELEASE ORAL at 21:16

## 2019-05-28 RX ADMIN — SODIUM CHLORIDE 500 MILLILITER(S): 9 INJECTION INTRAMUSCULAR; INTRAVENOUS; SUBCUTANEOUS at 10:01

## 2019-05-28 RX ADMIN — HEPARIN SODIUM 5000 UNIT(S): 5000 INJECTION INTRAVENOUS; SUBCUTANEOUS at 14:00

## 2019-05-28 RX ADMIN — CEFEPIME 100 MILLIGRAM(S): 1 INJECTION, POWDER, FOR SOLUTION INTRAMUSCULAR; INTRAVENOUS at 10:40

## 2019-05-28 RX ADMIN — Medication 0.5 MILLIGRAM(S): at 06:01

## 2019-05-28 RX ADMIN — HEPARIN SODIUM 5000 UNIT(S): 5000 INJECTION INTRAVENOUS; SUBCUTANEOUS at 06:03

## 2019-05-28 RX ADMIN — HEPARIN SODIUM 5000 UNIT(S): 5000 INJECTION INTRAVENOUS; SUBCUTANEOUS at 21:15

## 2019-05-28 RX ADMIN — POLYETHYLENE GLYCOL 3350 17 GRAM(S): 17 POWDER, FOR SOLUTION ORAL at 12:42

## 2019-05-28 RX ADMIN — Medication 100 MILLIGRAM(S): at 06:01

## 2019-05-28 RX ADMIN — Medication 100 MILLIGRAM(S): at 21:16

## 2019-05-28 RX ADMIN — HALOPERIDOL DECANOATE 1 MILLIGRAM(S): 100 INJECTION INTRAMUSCULAR at 21:21

## 2019-05-28 NOTE — CONSULT NOTE ADULT - ASSESSMENT
ASSESSMENT  78y F breast mass, hoover esophagus, mild MR, bipolar, HLD admitted with FEMORAL NECK FRACTURE  Found to have a hip fracture, s/p R hemarthroplasty 5/22. Noted to be febrile 5/23 to 101, 5/24 102.4, 5/25-26 afebrile then febrile again 5/27. Pt received ancef 5/22 pre-op then cefepime started 5/27.     PROBLEMS  Sepsis ruled out on admission, now with sepsis tm 102.4   CXR no PNA  Duplex no DVT  5/24 UA & ucx NG, bcx NG    unclear etiology of fever, r/o UTI, atelectasis    RECOMMENDATIONS  - repeat cultures as febrile  - Decrease to Cefepime 1g q12h   - Incentive spirometry     Spectra [id number]

## 2019-05-28 NOTE — DIETITIAN INITIAL EVALUATION ADULT. - DIET TYPE
Diet order placed 5/27. Prior to this, pt was receiving Regular diet from 5/22 to 5/27. Per staff, pt has been consuming 25-50% of meals./dysphagia 1, pureed, nectar consistency fluid

## 2019-05-28 NOTE — DIETITIAN INITIAL EVALUATION ADULT. - PHYSICAL APPEARANCE
BMI: 23.6. Confused, disoriented. Last BM 5/27. Skin: Bruised, ecchymotic. R hip surgical incision. Per 5/23 SLP eval - +toleration for thin liquids and regular consistency solids w/o overt s/s penetration/aspiration. Recommends Regular consistency solids w/ Thin liquids.

## 2019-05-28 NOTE — DIETITIAN INITIAL EVALUATION ADULT. - SOURCE
NKFA per chart. Pt unable to provide nutrition. No family at bedside during multiple attempts. No response from emergency contact x3 attempts.

## 2019-05-28 NOTE — DIETITIAN INITIAL EVALUATION ADULT. - ENERGY NEEDS
Energy: 9217-1772 kcal/day (MSJx1.2-1.3 AF)    Protein: 64-77 g/day (1-1.2 g/kg ABW)    Fluids: 6277-5007 mL/day (1 mL/kcal)

## 2019-05-28 NOTE — PROGRESS NOTE ADULT - SUBJECTIVE AND OBJECTIVE BOX
BABAR ENRIQUEZ  78y  Female    was improving after hemiarthroplasty post hip fracture, but spiked fever yesterday and developed green sputum  afebrile today, but more lethargic  poor oral intake  had hypotensive episode, improved w fluid bolus  septic due to temp     INTERVAL EVENTS:    T(C): 36.3 (05-28-19 @ 15:51), Max: 38.5 (05-28-19 @ 00:00)  HR: 95 (05-28-19 @ 15:51) (95 - 105)  BP: 98/49 (05-28-19 @ 15:51) (98/49 - 108/57)  RR: 18 (05-28-19 @ 15:51) (18 - 18)  SpO2: --  Wt(kg): --Vital Signs Last 24 Hrs  T(C): 36.3 (28 May 2019 15:51), Max: 38.5 (28 May 2019 00:00)  T(F): 97.3 (28 May 2019 15:51), Max: 101.3 (28 May 2019 00:00)  HR: 95 (28 May 2019 15:51) (95 - 105)  BP: 98/49 (28 May 2019 15:51) (98/49 - 108/57)  BP(mean): --  RR: 18 (28 May 2019 15:51) (18 - 18)  SpO2: --    PHYSICAL EXAM:  GENERAL: resting comfortably, more lethargic than usual  NECK: Supple, No JVD, Normal thyroid  CHEST/LUNG: b/lat creps and rhonchi  HEART: S1, S2, Regular rate and rhythm;   ABDOMEN: Soft, Nontender, Nondistended; Bowel sounds present  EXTREMITIES: No clubbing, cyanosis, or edema, rt hip wound clear  SKIN: No rashes or lesions    LABS:                          8.6    11.07 )-----------( 200      ( 28 May 2019 07:11 )             25.7     05-28    140  |  103  |  11  ----------------------------<  94  4.1   |  26  |  <0.5<L>    Ca    8.1<L>      28 May 2019 07:11  Mg     2.0     05-28    TPro  5.2<L>  /  Alb  2.8<L>  /  TBili  0.5  /  DBili  x   /  AST  16  /  ALT  25  /  AlkPhos  65  05-28              acetaminophen   Tablet .. 650 milliGRAM(s) Oral every 6 hours PRN  aluminum hydroxide/magnesium hydroxide/simethicone Suspension 30 milliLiter(s) Oral four times a day PRN  benztropine 0.5 milliGRAM(s) Oral two times a day  bisacodyl Suppository 10 milliGRAM(s) Rectal daily PRN  cefepime   IVPB      cefepime   IVPB 1000 milliGRAM(s) IV Intermittent every 12 hours  chlorhexidine 4% Liquid 1 Application(s) Topical <User Schedule>  diVALproex ER 1500 milliGRAM(s) Oral at bedtime  docusate sodium 100 milliGRAM(s) Oral three times a day  haloperidol    Concentrate 1 milliGRAM(s) Oral at bedtime  heparin  Injectable 5000 Unit(s) SubCutaneous every 8 hours  ipratropium    for Nebulization 500 MICROGram(s) Nebulizer every 6 hours PRN  magnesium hydroxide Suspension 30 milliLiter(s) Oral daily PRN  ondansetron Injectable 4 milliGRAM(s) IV Push every 6 hours PRN  oxyCODONE    IR 5 milliGRAM(s) Oral every 4 hours PRN  oxyCODONE    IR 10 milliGRAM(s) Oral every 4 hours PRN  polyethylene glycol 3350 17 Gram(s) Oral daily  senna 2 Tablet(s) Oral at bedtime PRN      RADIOLOGY & ADDITIONAL TESTS:    case discussed with the resident  Available consult notes reviewed    Assessment and plan:     septic, probably bronchopna, few days ago cxr-  ID noted  cont antibiotics  monitor closely  doubt will be able to perform incentive stirometry

## 2019-05-28 NOTE — PROGRESS NOTE ADULT - SUBJECTIVE AND OBJECTIVE BOX
SUBJECTIVE:    Patient is a 78y old Female who presents with a chief complaint of Fall (27 May 2019 07:35)      HPI:  77 yo lady with hx of MR and bipolar , DLD , esophageal varices sent for the above CC . As per aid , patient was in rehab today , had witnessed mechanical fall , with no head trauma .  No LOC . (22 May 2019 00:44)      Currently admitted to medicine with the primary diagnosis of Femoral neck fracture     as per aid patient at her baseline mentally, has been slightly lethargic    Besides the pertinent positives and negatives described above, the ROS was within normal limits.    PAST MEDICAL & SURGICAL HISTORY  Breast mass, right: s/p lumpectomy &amp; radiation 2007  Hyperlipemia  Brito esophagus  Mild mental retardation  Atypical bipolar disorder  Feeding by G-tube: s/p removal 2012    SOCIAL HISTORY:    ALLERGIES:  No Known Allergies    MEDICATIONS:  STANDING MEDICATIONS  benztropine 0.5 milliGRAM(s) Oral two times a day  cefepime   IVPB      chlorhexidine 4% Liquid 1 Application(s) Topical <User Schedule>  diVALproex ER 1500 milliGRAM(s) Oral at bedtime  docusate sodium 100 milliGRAM(s) Oral three times a day  haloperidol    Concentrate 1 milliGRAM(s) Oral at bedtime  heparin  Injectable 5000 Unit(s) SubCutaneous every 8 hours  polyethylene glycol 3350 17 Gram(s) Oral daily    PRN MEDICATIONS  acetaminophen   Tablet .. 650 milliGRAM(s) Oral every 6 hours PRN  aluminum hydroxide/magnesium hydroxide/simethicone Suspension 30 milliLiter(s) Oral four times a day PRN  bisacodyl Suppository 10 milliGRAM(s) Rectal daily PRN  ipratropium    for Nebulization 500 MICROGram(s) Nebulizer every 6 hours PRN  magnesium hydroxide Suspension 30 milliLiter(s) Oral daily PRN  ondansetron Injectable 4 milliGRAM(s) IV Push every 6 hours PRN  oxyCODONE    IR 5 milliGRAM(s) Oral every 4 hours PRN  oxyCODONE    IR 10 milliGRAM(s) Oral every 4 hours PRN  senna 2 Tablet(s) Oral at bedtime PRN    VITALS:   T(F): 99.6  HR: 99  BP: 107/53  RR: 18  SpO2: --    LABS:                        8.6    11.07 )-----------( 200      ( 28 May 2019 07:11 )             25.7     05-28    140  |  103  |  11  ----------------------------<  94  4.1   |  26  |  <0.5<L>    Ca    8.1<L>      28 May 2019 07:11  Mg     2.0     05-28    TPro  5.2<L>  /  Alb  2.8<L>  /  TBili  0.5  /  DBili  x   /  AST  16  /  ALT  25  /  AlkPhos  65  05-28          Lactate, Blood: 0.7 mmol/L (05-28-19 @ 07:11)          RADIOLOGY:    PHYSICAL EXAM:  GEN: No acute distress  LUNGS: mild left sided wheezing likely congestion  HEART: Regular  ABD: Soft, non-tender, non-distended.  EXT: NC/NE/2+PP/SCOTT/Skin Intact.   NEURO: AAOX1 MR    Intravenous access: yes  NG tube: no  Mcqueen Catheter: no

## 2019-05-28 NOTE — DIETITIAN INITIAL EVALUATION ADULT. - MD RECOMMEND
Recommendation: (1) Order Ensure Pudding q12hrs. (2) Order MVI q24hrs. (3) Please re-consult SLP to evaluate swallow function and provide diet consistency recommendations.

## 2019-05-28 NOTE — CONSULT NOTE ADULT - SUBJECTIVE AND OBJECTIVE BOX
BABAR ENRIQUEZ  78y, Female  Allergy: No Known Allergies      CHIEF COMPLAINT: Fall (28 May 2019 10:08)      HPI:  77 yo lady with hx of MR and bipolar , DLD , esophageal varices sent for the above CC . As per aid , patient was in rehab today , had witnessed mechanical fall , with no head trauma .  No LOC . (22 May 2019 00:44)    Found to have a hip fracture, s/p R hemarthroplasty 5/22. Noted to be febrile 5/23 to 101, 5/24 102.4, 5/25-26 afebrile then febrile again 5/27. Pt received ancef 5/22 pre-op then cefepime started 5/27.     FAMILY HISTORY:  No pertinent family history in first degree relatives    PAST MEDICAL & SURGICAL HISTORY:  Breast mass, right: s/p lumpectomy &amp; radiation 2007  Hyperlipemia  Brito esophagus  Mild mental retardation  Atypical bipolar disorder  Feeding by G-tube: s/p removal 2012      SOCIAL HISTORY    Substance Use (  ) never used  (  ) IVDU (  ) Other:  Tobacco Usage:  (   ) never smoked   (   ) former smoker   (   ) current smoker   Alcohol Usage: (   ) social  (   ) daily use (   ) denies  Sexual History:       ROS  General: Denies fevers, chills, nightsweats, weight loss  HEENT: Denies headache, rhinorrhea, sore throat, eye pain  CV: Denies CP, palpitations  PULM: Denies SOB, cough  GI: Denies abdominal pain, diarrhea  : Denies dysuria, hematuria  MSK: Denies arthralgias  SKIN: Denies rash  NEURO: Denies paresthesias, weakness  PSYCH: Denies depression    VITALS:  T(F): 99.6, Max: 101.3 (05-28-19 @ 00:00)  HR: 99  BP: 107/53  RR: 18Vital Signs Last 24 Hrs  T(C): 37.6 (28 May 2019 08:04), Max: 38.5 (28 May 2019 00:00)  T(F): 99.6 (28 May 2019 08:04), Max: 101.3 (28 May 2019 00:00)  HR: 99 (28 May 2019 08:04) (99 - 107)  BP: 107/53 (28 May 2019 08:04) (107/53 - 111/56)  BP(mean): --  RR: 18 (28 May 2019 08:04) (18 - 18)  SpO2: --    PHYSICAL EXAM:  Gen: NAD, resting in bed  HEENT: Normocephalic, atraumatic  Neck: supple, no lymphadenopathy  CV: Regular rate & regular rhythm  Lungs: decreased BS at bases, no fremitus  Abdomen: Soft, BS present  Ext: Warm, well perfused  Neuro: non focal, awake  Skin: no rash, no erythema    TESTS & MEASUREMENTS:                        8.6    11.07 )-----------( 200      ( 28 May 2019 07:11 )             25.7     05-28    140  |  103  |  11  ----------------------------<  94  4.1   |  26  |  <0.5<L>    Ca    8.1<L>      28 May 2019 07:11  Mg     2.0     05-28    TPro  5.2<L>  /  Alb  2.8<L>  /  TBili  0.5  /  DBili  x   /  AST  16  /  ALT  25  /  AlkPhos  65  05-28    eGFR if Non African American: 100 mL/min/1.73M2 (05-28-19 @ 07:11)  eGFR if African American: 116 mL/min/1.73M2 (05-28-19 @ 07:11)  eGFR if Non : 93 mL/min/1.73M2 (05-27-19 @ 21:57)  eGFR if : 107 mL/min/1.73M2 (05-27-19 @ 21:57)    LIVER FUNCTIONS - ( 28 May 2019 07:11 )  Alb: 2.8 g/dL / Pro: 5.2 g/dL / ALK PHOS: 65 U/L / ALT: 25 U/L / AST: 16 U/L / GGT: x               Culture - Blood (collected 05-24-19 @ 11:19)  Source: .Blood None  Preliminary Report (05-26-19 @ 01:01):    No growth to date.    Culture - Urine (collected 05-24-19 @ 08:37)  Source: .Urine Clean Catch (Midstream)  Final Report (05-25-19 @ 11:39):    No growth        Lactate, Blood: 0.7 mmol/L (05-28-19 @ 07:11)      INFECTIOUS DISEASES TESTING      RADIOLOGY & ADDITIONAL TESTS:  I have personally reviewed the last Chest xray  CXR  Xray Chest 1 View- PORTABLE-Urgent:   EXAM:  XR CHEST PORTABLE URGENT 1V            PROCEDURE DATE:  05/27/2019            INTERPRETATION:  Clinical History / Reason for exam: Congestion    Comparison : Chest radiograph 5/24/2019.    Technique/Positioning: Satisfactory.    Findings:    Support devices: None.    Cardiac/mediastinum/hilum: Difficult to evaluate due to rotation.    Lung parenchyma/Pleura: Within normal limits.    Skeleton/soft tissues: Unchanged.    Impression:      No focal pulmonary consolidation.                      XUAN FITZGERALD M.D., ATTENDING RADIOLOGIST  This document has been electronically signed. May 27 2019 11:54AM             (05-27-19 @ 09:56)      CT      CARDIOLOGY TESTING  Transthoracic Echocardiogram:    EXAM:  2-D ECHO (TTE) COMPLETE        PROCEDURE DATE:  05/22/2019      INTERPRETATION:  REPORT:    TRANSTHORACIC ECHOCARDIOGRAM REPORT         Patient Name:   BABAR ENRIQUEZ Accession #: [id number]  Medical Rec #:  [id number]      Height:      60.0 in 152.4 cm  YOB: 1940     Weight:      185.0 lb 83.91 kg  Patient Age:    78 years      BSA:         1.81 m²  Patient Gender: F             BP:          119/82 mmHg       Date of Exam:        5/22/2019 12:44:59 PM  Referring Physician: [id number] RANJANA LOPEZ  Sonographer:         Ellyn Felipe  Reading Physician:   Kayode Nash M.D.    Procedure:     2D Echo/Doppler/Color Doppler Complete.  Indications:   R94.31 - Abnormal Electrocardiogram [ECG] [EKG]  Diagnosis:     Abnormal electrocardiogram [ECG] [EKG] - R94.31  Study Details: Technically suboptimal study. Study quality was adversely                 affected due to the patient being uncooperative.         Summary:   1. LV Ejection Fraction by Penaloza's Method with a biplane EF of69 %.   2. Spectral Doppler shows impaired relaxation pattern of left   ventricular myocardial filling (Grade I diastolic dysfunction).   3. Moderate mitral annular calcification.   4. Moderate thickening and calcification of the posterior mitral valve   leaflet.    PHYSICIAN INTERPRETATION:  Left Ventricle: Normal left ventricular size and wall thicknesses, with   normal systolic and diastolic function. Spectral Doppler shows impaired   relaxation pattern of left ventricular myocardial filling (Grade I   diastolic dysfunction).  Right Ventricle: Normal right ventricular size and function.  Left Atrium: Normal left atrial size.  Right Atrium: Normal right atrial size.  Pericardium: There is no evidence of pericardial effusion.  Mitral Valve: Structurally normal mitral valve, with normal leaflet   excursion. The mitral valve is normal in structure. Moderate thickening   and calcification of the posterior mitral valve leaflet. There is   moderate mitral annular calcification. No evidence ofmitral valve   regurgitation is seen.  Tricuspid Valve: Structurally normal tricuspid valve, with normal leaflet   excursion. The tricuspid valve is normal in structure. Mild tricuspid   regurgitation is visualized.  Aortic Valve: The aortic valve was not well visualized. The aortic valve   is normal. No evidence of aortic stenosis. No evidence of aortic valve   regurgitation is seen.  Pulmonic Valve: The pulmonic valve was not well visualized. The pulmonic   valve is normal. No indication of pulmonic valve regurgitation.  Aorta: The aortic root and ascending aorta are structurally normal, with   no evidence of dilitation.  Pulmonary Artery: The main pulmonary artery is normal in size.       2D AND M-MODE MEASUREMENTS (normal ranges within parentheses):  Left                  Normal   Aorta/Left             Normal  Ventricle:                     Atrium:  IVSd (2D):  1.04 cm  (0.7-1.1) AoV Cusp       1.67  (1.5-2.6)  LVPWd       0.98 cm  (0.7-1.1) Separation:     cm  (2D):        Left Atrium    2.81  (1.9-4.0)  LVIDd       3.41 cm  (3.4-5.7) (Mmode):        cm  (2D):                          LA Volume      10.2  LVIDs       1.79 cm            Index         ml/m²  (2D):                          Right  LV FS       47.7 %    (>25%)   Ventricle:  (2D):                          RVd (2D):      2.68 cm  IVSd        0.71 cm  (0.7-1.1)  (Mmode):  LVPWd       0.86 cm  (0.7-1.1)  (Mmode):  LVIDd       3.86 cm  (3.4-5.7)  (Mmode):  LVIDs       2.07 cm  (Mmode):  LV FS       46.4 %    (>25%)  (Mmode):  Relative     0.57     (<0.42)  Wall  Thickness  Rel. Wall    0.45     (<0.42)  Thickness  Mm  LV Mass    47.7 g/m²  Index:  Mmode    SPECTRAL DOPPLER ANALYSIS:  LV DIASTOLIC FUNCTION:  MV Peak E: 0.84 m/s Decel Time: 140 msec  MV Peak A: 1.27 m/s  E/A Ratio: 0.66    LVOT Vmax: 1.22 m/s  LVOT VTI:  0.19 m  LVOT Diam: 1.96 cm    Mitral Valve:  MV P1/2 Time: 40.49 msec  MV Area, PHT: 5.43 cm²    Tricuspid Valve and PA/RV Systolic Pressure: TR Max Velocity: 1.24 m/s RA   Pressure: 10 mmHg RVSP/PASP: 16.2 mmHg       [id number] Kayode Nash M.D., Electronically signed on 5/22/2019 at 2:51:04   PM              *** Final ***                    KAYODE NASH MD  This document has been electronically signed. May 22 2019 12:44PM            (05-22-19 @ 12:44)  12 Lead ECG:   Ventricular Rate 111 BPM    Atrial Rate 111 BPM    P-R Interval 168 ms    QRS Duration 88 ms    Q-T Interval 340 ms    QTC Calculation(Bezet) 462 ms    P Axis 60 degrees    R Axis -46 degrees    T Axis 19 degrees    Diagnosis Line Sinus tachycardia  Pulmonary disease pattern  Left anterior fascicular block  Moderate voltage criteria for LVH, may be normal variant  Abnormal ECG    Confirmed by Jas Lopez ([id number]) on 5/21/2019 10:26:44 PM (05-21-19 @ 21:42)      MEDICATIONS  benztropine 0.5  cefepime   IVPB   cefepime   IVPB 1000  chlorhexidine 4% Liquid 1  diVALproex ER 1500  docusate sodium 100  haloperidol    Concentrate 1  heparin  Injectable 5000  polyethylene glycol 3350 17      ANTIBIOTICS:  cefepime   IVPB      cefepime   IVPB 1000 milliGRAM(s) IV Intermittent every 12 hours

## 2019-05-28 NOTE — DIETITIAN INITIAL EVALUATION ADULT. - OTHER INFO
Reason for RD assessment: LOS assessment. Pertinent Medical Information: Admitted s/p fall. Pt was improving after hemiarthroplasty post hip fracture, but spiked fever yesterday and developed green sputum. Noted afebrile today but more lethargic.

## 2019-05-28 NOTE — PROGRESS NOTE ADULT - ASSESSMENT
78F with PMH of intellectual disability from group home and bipolar disorder, DLD, and esophageal varices s/p mechanical fall and right giuliano-arthroplasty POD 6    #Acute Right Femoral Neck Fracture s/p mechanical fall  -s/p Right Hip Giuliano POD # 6  -RLE WBAT, c/w PT/OT.  -Pain control   -c/w DVT ppx;    #Post op fever likely infectious, no consolidation seen on xray but having congestion and coughing  -pt spiked up fever with Tmax of 102 F on 05/24/2019  -WBCs trending up  -CXR -ve, UA -ve, urine cx and blood cx NGTD, LE duplex -ve for DVT  -on cefepime 1g q12h    #PMH Bipolar Disorder: continue haldol, benztropine, Depakote     #Acute Blood loss anemia s/p surgery  -resolved    #Acute thrombocytopenia  -likely from consumption from trauma/surgery  -resolved    #Hypomagnesemia - resolved  -continue to monitor.    #Hx of Dysphagia,  -pt was on puree diet at nursing home  -seen by Speech and swallow c/w regular with thins alternating solids with liquids.  -changed back to pureed dysphagia 1 nectar thick due to worsening chest congestion    Contacted group home. Patient's father used to make medical decisions for her, but he passed away recently. The cousin (Nichole Dee [phone]) makes decisions for the pt now.    PLAN: monitor temp and white count on cefepime    DVT ppx: Heparin sub-q  GI ppx: not indicated   Diet: pureed dysphagia 1 nectar thick   Activity: Increase as tolerated, WBAT LLE fall precautions   Dispo: back to group [place] once possible infxn resolved  Code status: FULL CODE

## 2019-05-29 LAB
ALBUMIN SERPL ELPH-MCNC: 2.7 G/DL — LOW (ref 3.5–5.2)
ALP SERPL-CCNC: 55 U/L — SIGNIFICANT CHANGE UP (ref 30–115)
ALT FLD-CCNC: 25 U/L — SIGNIFICANT CHANGE UP (ref 0–41)
ANION GAP SERPL CALC-SCNC: 11 MMOL/L — SIGNIFICANT CHANGE UP (ref 7–14)
AST SERPL-CCNC: 18 U/L — SIGNIFICANT CHANGE UP (ref 0–41)
BASOPHILS # BLD AUTO: 0.02 K/UL — SIGNIFICANT CHANGE UP (ref 0–0.2)
BASOPHILS NFR BLD AUTO: 0.2 % — SIGNIFICANT CHANGE UP (ref 0–1)
BILIRUB SERPL-MCNC: 0.3 MG/DL — SIGNIFICANT CHANGE UP (ref 0.2–1.2)
BUN SERPL-MCNC: 17 MG/DL — SIGNIFICANT CHANGE UP (ref 10–20)
CALCIUM SERPL-MCNC: 8.4 MG/DL — LOW (ref 8.5–10.1)
CHLORIDE SERPL-SCNC: 109 MMOL/L — SIGNIFICANT CHANGE UP (ref 98–110)
CO2 SERPL-SCNC: 23 MMOL/L — SIGNIFICANT CHANGE UP (ref 17–32)
CREAT SERPL-MCNC: <0.5 MG/DL — LOW (ref 0.7–1.5)
EOSINOPHIL # BLD AUTO: 0.15 K/UL — SIGNIFICANT CHANGE UP (ref 0–0.7)
EOSINOPHIL NFR BLD AUTO: 1.8 % — SIGNIFICANT CHANGE UP (ref 0–8)
GLUCOSE SERPL-MCNC: 103 MG/DL — HIGH (ref 70–99)
HCT VFR BLD CALC: 24.2 % — LOW (ref 37–47)
HGB BLD-MCNC: 7.8 G/DL — LOW (ref 12–16)
IMM GRANULOCYTES NFR BLD AUTO: 1.2 % — HIGH (ref 0.1–0.3)
LACTATE SERPL-SCNC: 1.1 MMOL/L — SIGNIFICANT CHANGE UP (ref 0.5–2.2)
LYMPHOCYTES # BLD AUTO: 1.07 K/UL — LOW (ref 1.2–3.4)
LYMPHOCYTES # BLD AUTO: 12.8 % — LOW (ref 20.5–51.1)
MAGNESIUM SERPL-MCNC: 2 MG/DL — SIGNIFICANT CHANGE UP (ref 1.8–2.4)
MCHC RBC-ENTMCNC: 32 PG — HIGH (ref 27–31)
MCHC RBC-ENTMCNC: 32.2 G/DL — SIGNIFICANT CHANGE UP (ref 32–37)
MCV RBC AUTO: 99.2 FL — HIGH (ref 81–99)
MONOCYTES # BLD AUTO: 0.75 K/UL — HIGH (ref 0.1–0.6)
MONOCYTES NFR BLD AUTO: 8.9 % — SIGNIFICANT CHANGE UP (ref 1.7–9.3)
NEUTROPHILS # BLD AUTO: 6.29 K/UL — SIGNIFICANT CHANGE UP (ref 1.4–6.5)
NEUTROPHILS NFR BLD AUTO: 75.1 % — SIGNIFICANT CHANGE UP (ref 42.2–75.2)
NRBC # BLD: 0 /100 WBCS — SIGNIFICANT CHANGE UP (ref 0–0)
PLATELET # BLD AUTO: 203 K/UL — SIGNIFICANT CHANGE UP (ref 130–400)
POTASSIUM SERPL-MCNC: 3.9 MMOL/L — SIGNIFICANT CHANGE UP (ref 3.5–5)
POTASSIUM SERPL-SCNC: 3.9 MMOL/L — SIGNIFICANT CHANGE UP (ref 3.5–5)
PROT SERPL-MCNC: 4.9 G/DL — LOW (ref 6–8)
RBC # BLD: 2.44 M/UL — LOW (ref 4.2–5.4)
RBC # FLD: 12 % — SIGNIFICANT CHANGE UP (ref 11.5–14.5)
SODIUM SERPL-SCNC: 143 MMOL/L — SIGNIFICANT CHANGE UP (ref 135–146)
WBC # BLD: 8.38 K/UL — SIGNIFICANT CHANGE UP (ref 4.8–10.8)
WBC # FLD AUTO: 8.38 K/UL — SIGNIFICANT CHANGE UP (ref 4.8–10.8)

## 2019-05-29 PROCEDURE — 71045 X-RAY EXAM CHEST 1 VIEW: CPT | Mod: 26

## 2019-05-29 RX ORDER — IPRATROPIUM BROMIDE 0.2 MG/ML
500 SOLUTION, NON-ORAL INHALATION EVERY 6 HOURS
Refills: 0 | Status: DISCONTINUED | OUTPATIENT
Start: 2019-05-29 | End: 2019-06-03

## 2019-05-29 RX ORDER — MULTIVIT-MIN/FERROUS GLUCONATE 9 MG/15 ML
1 LIQUID (ML) ORAL DAILY
Refills: 0 | Status: DISCONTINUED | OUTPATIENT
Start: 2019-05-29 | End: 2019-06-03

## 2019-05-29 RX ORDER — SODIUM CHLORIDE 9 MG/ML
1000 INJECTION INTRAMUSCULAR; INTRAVENOUS; SUBCUTANEOUS
Refills: 0 | Status: DISCONTINUED | OUTPATIENT
Start: 2019-05-29 | End: 2019-06-01

## 2019-05-29 RX ADMIN — Medication 100 MILLIGRAM(S): at 21:11

## 2019-05-29 RX ADMIN — HEPARIN SODIUM 5000 UNIT(S): 5000 INJECTION INTRAVENOUS; SUBCUTANEOUS at 13:54

## 2019-05-29 RX ADMIN — Medication 0.5 MILLIGRAM(S): at 05:44

## 2019-05-29 RX ADMIN — POLYETHYLENE GLYCOL 3350 17 GRAM(S): 17 POWDER, FOR SOLUTION ORAL at 12:00

## 2019-05-29 RX ADMIN — HALOPERIDOL DECANOATE 1 MILLIGRAM(S): 100 INJECTION INTRAMUSCULAR at 21:12

## 2019-05-29 RX ADMIN — Medication 0.5 MILLIGRAM(S): at 16:59

## 2019-05-29 RX ADMIN — Medication 500 MICROGRAM(S): at 20:08

## 2019-05-29 RX ADMIN — Medication 100 MILLIGRAM(S): at 05:46

## 2019-05-29 RX ADMIN — HEPARIN SODIUM 5000 UNIT(S): 5000 INJECTION INTRAVENOUS; SUBCUTANEOUS at 05:45

## 2019-05-29 RX ADMIN — Medication 1 TABLET(S): at 12:00

## 2019-05-29 RX ADMIN — CEFEPIME 100 MILLIGRAM(S): 1 INJECTION, POWDER, FOR SOLUTION INTRAMUSCULAR; INTRAVENOUS at 17:00

## 2019-05-29 RX ADMIN — DIVALPROEX SODIUM 1500 MILLIGRAM(S): 500 TABLET, DELAYED RELEASE ORAL at 21:12

## 2019-05-29 RX ADMIN — CEFEPIME 100 MILLIGRAM(S): 1 INJECTION, POWDER, FOR SOLUTION INTRAMUSCULAR; INTRAVENOUS at 05:54

## 2019-05-29 RX ADMIN — SODIUM CHLORIDE 50 MILLILITER(S): 9 INJECTION INTRAMUSCULAR; INTRAVENOUS; SUBCUTANEOUS at 07:58

## 2019-05-29 RX ADMIN — CHLORHEXIDINE GLUCONATE 1 APPLICATION(S): 213 SOLUTION TOPICAL at 05:50

## 2019-05-29 RX ADMIN — HEPARIN SODIUM 5000 UNIT(S): 5000 INJECTION INTRAVENOUS; SUBCUTANEOUS at 21:13

## 2019-05-29 NOTE — PROGRESS NOTE ADULT - ASSESSMENT
78F with PMH of intellectual disability from group home and bipolar disorder, DLD, and esophageal varices s/p mechanical fall and right giuliano-arthroplasty POD 7, has been having post op fever with productive cough and congestion    #Acute Right Femoral Neck Fracture s/p mechanical fall  -s/p Right Hip Giuliano POD # 7  -RLE WBAT, c/w PT/OT.  -Pain control   -c/w DVT ppx;    #Post op fever likely infectious, no consolidation seen on xray but having congestion and coughing, started needing NC 2L  -pt spiked up fever with Tmax of 102 F on 05/24/2019  -WBCs trending up  -CXR -ve, UA -ve, urine cx and blood cx NGTD, LE duplex -ve for DVT / repeating chest xray today  -on cefepime 1g q12h as per ID    #PMH Bipolar Disorder: continue haldol, benztropine, Depakote     #Acute Blood loss anemia s/p surgery  -resolved    #Acute thrombocytopenia  -likely from consumption from trauma/surgery  -resolved    #Hypomagnesemia - resolved  -continue to monitor.    #Hx of Dysphagia,  -pt was on puree diet at nursing home  -seen by Speech and swallow c/w regular with thins alternating solids with liquids.  -changed back to pureed dysphagia 1 nectar thick due to worsening chest congestion    Contacted group home. Patient's father used to make medical decisions for her, but he passed away recently. The cousin (Nichole Dee [phone]) makes decisions for the pt now.    PLAN: monitor temp and white count on cefepime, f/u chest xray and urinalysis urine culture    DVT ppx: Heparin sub-q  GI ppx: not indicated   Diet: pureed dysphagia 1 nectar thick   Activity: Increase as tolerated, WBAT LLE fall precautions   Dispo: back to group home once possible infxn resolved  Code status: FULL CODE

## 2019-05-29 NOTE — PROGRESS NOTE ADULT - SUBJECTIVE AND OBJECTIVE BOX
BABAR ENRIQUEZ  78y  Female  missed few doses of antibiotics and had high temp again  lethargic  ate well  less cough    INTERVAL EVENTS:    T(C): 37.4 (05-29-19 @ 07:59), Max: 37.6 (05-29-19 @ 00:00)  HR: 94 (05-29-19 @ 07:59) (94 - 101)  BP: 103/61 (05-29-19 @ 07:59) (98/49 - 125/56)  RR: 18 (05-29-19 @ 07:59) (16 - 18)  SpO2: --  Wt(kg): --Vital Signs Last 24 Hrs  T(C): 37.4 (29 May 2019 07:59), Max: 37.6 (29 May 2019 00:00)  T(F): 99.4 (29 May 2019 07:59), Max: 99.6 (29 May 2019 00:00)  HR: 94 (29 May 2019 07:59) (94 - 101)  BP: 103/61 (29 May 2019 07:59) (98/49 - 125/56)  BP(mean): --  RR: 18 (29 May 2019 07:59) (16 - 18)  SpO2: --    PHYSICAL EXAM:  GENERAL: resting comfortably  NECK: Supple, No JVD, Normal thyroid  CHEST/LUNG: Clear; No crackles or wheezing  HEART: S1, S2, Regular rate and rhythm;   ABDOMEN: Soft, Nontender, Nondistended; Bowel sounds present  EXTREMITIES: No clubbing, cyanosis, or edema  SKIN: No rashes or lesions    LABS:                          8.6    11.07 )-----------( 200      ( 28 May 2019 07:11 )             25.7     05-28    140  |  103  |  11  ----------------------------<  94  4.1   |  26  |  <0.5<L>    Ca    8.1<L>      28 May 2019 07:11  Mg     2.0     05-28    TPro  5.2<L>  /  Alb  2.8<L>  /  TBili  0.5  /  DBili  x   /  AST  16  /  ALT  25  /  AlkPhos  65  05-28          Culture - Blood (collected 27 May 2019 21:57)  Source: .Blood None  Preliminary Report (29 May 2019 06:01):    No growth to date.          acetaminophen   Tablet .. 650 milliGRAM(s) Oral every 6 hours PRN  aluminum hydroxide/magnesium hydroxide/simethicone Suspension 30 milliLiter(s) Oral four times a day PRN  benztropine 0.5 milliGRAM(s) Oral two times a day  bisacodyl Suppository 10 milliGRAM(s) Rectal daily PRN  cefepime   IVPB      cefepime   IVPB 1000 milliGRAM(s) IV Intermittent every 12 hours  chlorhexidine 4% Liquid 1 Application(s) Topical <User Schedule>  diVALproex ER 1500 milliGRAM(s) Oral at bedtime  docusate sodium 100 milliGRAM(s) Oral three times a day  haloperidol    Concentrate 1 milliGRAM(s) Oral at bedtime  heparin  Injectable 5000 Unit(s) SubCutaneous every 8 hours  ipratropium    for Nebulization 500 MICROGram(s) Nebulizer every 6 hours  magnesium hydroxide Suspension 30 milliLiter(s) Oral daily PRN  multivitamin/minerals 1 Tablet(s) Oral daily  ondansetron Injectable 4 milliGRAM(s) IV Push every 6 hours PRN  oxyCODONE    IR 5 milliGRAM(s) Oral every 4 hours PRN  oxyCODONE    IR 10 milliGRAM(s) Oral every 4 hours PRN  polyethylene glycol 3350 17 Gram(s) Oral daily  senna 2 Tablet(s) Oral at bedtime PRN  sodium chloride 0.9%. 1000 milliLiter(s) IV Continuous <Continuous>      RADIOLOGY & ADDITIONAL TESTS:    case discussed with the resident  Available consult notes reviewed    Assessment and plan:     would repeat cxr  ID f/u  monitor cbc

## 2019-05-29 NOTE — PROGRESS NOTE ADULT - SUBJECTIVE AND OBJECTIVE BOX
SUBJECTIVE:    Patient is a 78y old Female who presents with a chief complaint of Fall (28 May 2019 16:38)      HPI:  79 yo lady with hx of MR and bipolar , DLD , esophageal varices sent for the above CC . As per aid , patient was in rehab today , had witnessed mechanical fall , with no head trauma .  No LOC . (22 May 2019 00:44)      Currently admitted to medicine with the primary diagnosis of Femoral neck fracture     appearing more lethargic today, needing 2L NC satting 89-90 without it, 94 with it    Besides the pertinent positives and negatives described above, the ROS was within normal limits.    PAST MEDICAL & SURGICAL HISTORY  Breast mass, right: s/p lumpectomy &amp; radiation 2007  Hyperlipemia  Brito esophagus  Mild mental retardation  Atypical bipolar disorder  Feeding by G-tube: s/p removal 2012    SOCIAL HISTORY:    ALLERGIES:  No Known Allergies    MEDICATIONS:  STANDING MEDICATIONS  benztropine 0.5 milliGRAM(s) Oral two times a day  cefepime   IVPB      cefepime   IVPB 1000 milliGRAM(s) IV Intermittent every 12 hours  chlorhexidine 4% Liquid 1 Application(s) Topical <User Schedule>  diVALproex ER 1500 milliGRAM(s) Oral at bedtime  docusate sodium 100 milliGRAM(s) Oral three times a day  haloperidol    Concentrate 1 milliGRAM(s) Oral at bedtime  heparin  Injectable 5000 Unit(s) SubCutaneous every 8 hours  multivitamin/minerals 1 Tablet(s) Oral daily  polyethylene glycol 3350 17 Gram(s) Oral daily  sodium chloride 0.9%. 1000 milliLiter(s) IV Continuous <Continuous>    PRN MEDICATIONS  acetaminophen   Tablet .. 650 milliGRAM(s) Oral every 6 hours PRN  aluminum hydroxide/magnesium hydroxide/simethicone Suspension 30 milliLiter(s) Oral four times a day PRN  bisacodyl Suppository 10 milliGRAM(s) Rectal daily PRN  ipratropium    for Nebulization 500 MICROGram(s) Nebulizer every 6 hours PRN  magnesium hydroxide Suspension 30 milliLiter(s) Oral daily PRN  ondansetron Injectable 4 milliGRAM(s) IV Push every 6 hours PRN  oxyCODONE    IR 5 milliGRAM(s) Oral every 4 hours PRN  oxyCODONE    IR 10 milliGRAM(s) Oral every 4 hours PRN  senna 2 Tablet(s) Oral at bedtime PRN    VITALS:   T(F): 99.4  HR: 94  BP: 103/61  RR: 18  SpO2: --    LABS:                        8.6    11.07 )-----------( 200      ( 28 May 2019 07:11 )             25.7     05-28    140  |  103  |  11  ----------------------------<  94  4.1   |  26  |  <0.5<L>    Ca    8.1<L>      28 May 2019 07:11  Mg     2.0     05-28    TPro  5.2<L>  /  Alb  2.8<L>  /  TBili  0.5  /  DBili  x   /  AST  16  /  ALT  25  /  AlkPhos  65  05-28              Culture - Blood (collected 27 May 2019 21:57)  Source: .Blood None  Preliminary Report (29 May 2019 06:01):    No growth to date.          RADIOLOGY:    PHYSICAL EXAM:  GEN: No acute distress  LUNGS: mild wheezes from congestion bl  HEART: Regular  ABD: Soft, non-tender, non-distended.  EXT: NC/NE/2+PP/SCOTT/Skin Intact.   NEURO: AAOX1 MR and lethargic    Intravenous access: yes  NG tube: no  Mcqueen Catheter: no

## 2019-05-29 NOTE — PROGRESS NOTE ADULT - SUBJECTIVE AND OBJECTIVE BOX
BABAR ENRIQUEZ  78y, Female  Allergy: No Known Allergies      CHIEF COMPLAINT: Fall (29 May 2019 09:37)      INTERVAL EVENTS/HPI  - No acute events overnight  - T(F): , Max: 99.6 (05-29-19 @ 00:00)  - Denies any worsening symptoms  - Tolerating medication    ROS  Negative except as per noted above in HPI    FH noncontributory    VITALS:  T(F): 99.4, Max: 99.6 (05-29-19 @ 00:00)  HR: 94  BP: 103/61  RR: 18Vital Signs Last 24 Hrs  T(C): 37.4 (29 May 2019 07:59), Max: 37.6 (29 May 2019 00:00)  T(F): 99.4 (29 May 2019 07:59), Max: 99.6 (29 May 2019 00:00)  HR: 94 (29 May 2019 07:59) (94 - 101)  BP: 103/61 (29 May 2019 07:59) (98/49 - 125/56)  BP(mean): --  RR: 18 (29 May 2019 07:59) (16 - 18)  SpO2: --    PHYSICAL EXAM:  Gen: NAD, resting in bed  HEENT: Normocephalic, atraumatic  Neck: supple, no lymphadenopathy  CV: Regular rate & regular rhythm  Lungs: decreased BS at bases, no fremitus  Abdomen: Soft, BS present  Ext: Warm, well perfused, R hip no erythema or fluctuance  Neuro: non focal, awake  Skin: no rash, no erythema      TESTS & MEASUREMENTS:                        8.6    11.07 )-----------( 200      ( 28 May 2019 07:11 )             25.7     05-28    140  |  103  |  11  ----------------------------<  94  4.1   |  26  |  <0.5<L>    Ca    8.1<L>      28 May 2019 07:11  Mg     2.0     05-28    TPro  5.2<L>  /  Alb  2.8<L>  /  TBili  0.5  /  DBili  x   /  AST  16  /  ALT  25  /  AlkPhos  65  05-28      LIVER FUNCTIONS - ( 28 May 2019 07:11 )  Alb: 2.8 g/dL / Pro: 5.2 g/dL / ALK PHOS: 65 U/L / ALT: 25 U/L / AST: 16 U/L / GGT: x               Culture - Blood (collected 05-27-19 @ 21:57)  Source: .Blood None  Preliminary Report (05-29-19 @ 06:01):    No growth to date.    Culture - Blood (collected 05-24-19 @ 11:19)  Source: .Blood None  Preliminary Report (05-26-19 @ 01:01):    No growth to date.    Culture - Urine (collected 05-24-19 @ 08:37)  Source: .Urine Clean Catch (Midstream)  Final Report (05-25-19 @ 11:39):    No growth        Lactate, Blood: 0.7 mmol/L (05-28-19 @ 07:11)      INFECTIOUS DISEASES TESTING      RADIOLOGY & ADDITIONAL TESTS:  I have personally reviewed the last Chest xray  CXR  Xray Chest 1 View- PORTABLE-Urgent:   EXAM:  XR CHEST PORTABLE URGENT 1V            PROCEDURE DATE:  05/27/2019            INTERPRETATION:  Clinical History / Reason for exam: Congestion    Comparison : Chest radiograph 5/24/2019.    Technique/Positioning: Satisfactory.    Findings:    Support devices: None.    Cardiac/mediastinum/hilum: Difficult to evaluate due to rotation.    Lung parenchyma/Pleura: Within normal limits.    Skeleton/soft tissues: Unchanged.    Impression:      No focal pulmonary consolidation.                      XUAN FITZGERALD M.D., ATTENDING RADIOLOGIST  This document has been electronically signed. May 27 2019 11:54AM             (05-27-19 @ 09:56)      CT      CARDIOLOGY TESTING  Transthoracic Echocardiogram:    EXAM:  2-D ECHO (TTE) COMPLETE        PROCEDURE DATE:  05/22/2019      INTERPRETATION:  REPORT:    TRANSTHORACIC ECHOCARDIOGRAM REPORT         Patient Name:   BABAR ENRIQUEZ Accession #: [id number]  Medical Rec #:  [id number]      Height:      60.0 in 152.4 cm  YOB: 1940     Weight:      185.0 lb 83.91 kg  Patient Age:    78 years      BSA:         1.81 m²  Patient Gender: F             BP:          119/82 mmHg       Date of Exam:        5/22/2019 12:44:59 PM  Referring Physician: [id number] RANJANA LOPEZ  Sonographer:         Ellyn Felipe  Reading Physician:   Kayode Nash M.D.    Procedure:     2D Echo/Doppler/Color Doppler Complete.  Indications:   R94.31 - Abnormal Electrocardiogram [ECG] [EKG]  Diagnosis:     Abnormal electrocardiogram [ECG] [EKG] - R94.31  Study Details: Technically suboptimal study. Study quality was adversely                 affected due to the patient being uncooperative.         Summary:   1. LV Ejection Fraction by Penaloza's Method with a biplane EF of69 %.   2. Spectral Doppler shows impaired relaxation pattern of left   ventricular myocardial filling (Grade I diastolic dysfunction).   3. Moderate mitral annular calcification.   4. Moderate thickening and calcification of the posterior mitral valve   leaflet.    PHYSICIAN INTERPRETATION:  Left Ventricle: Normal left ventricular size and wall thicknesses, with   normal systolic and diastolic function. Spectral Doppler shows impaired   relaxation pattern of left ventricular myocardial filling (Grade I   diastolic dysfunction).  Right Ventricle: Normal right ventricular size and function.  Left Atrium: Normal left atrial size.  Right Atrium: Normal right atrial size.  Pericardium: There is no evidence of pericardial effusion.  Mitral Valve: Structurally normal mitral valve, with normal leaflet   excursion. The mitral valve is normal in structure. Moderate thickening   and calcification of the posterior mitral valve leaflet. There is   moderate mitral annular calcification. No evidence ofmitral valve   regurgitation is seen.  Tricuspid Valve: Structurally normal tricuspid valve, with normal leaflet   excursion. The tricuspid valve is normal in structure. Mild tricuspid   regurgitation is visualized.  Aortic Valve: The aortic valve was not well visualized. The aortic valve   is normal. No evidence of aortic stenosis. No evidence of aortic valve   regurgitation is seen.  Pulmonic Valve: The pulmonic valve was not well visualized. The pulmonic   valve is normal. No indication of pulmonic valve regurgitation.  Aorta: The aortic root and ascending aorta are structurally normal, with   no evidence of dilitation.  Pulmonary Artery: The main pulmonary artery is normal in size.       2D AND M-MODE MEASUREMENTS (normal ranges within parentheses):  Left                  Normal   Aorta/Left             Normal  Ventricle:                     Atrium:  IVSd (2D):  1.04 cm  (0.7-1.1) AoV Cusp       1.67  (1.5-2.6)  LVPWd       0.98 cm  (0.7-1.1) Separation:     cm  (2D):        Left Atrium    2.81  (1.9-4.0)  LVIDd       3.41 cm  (3.4-5.7) (Mmode):        cm  (2D):                          LA Volume      10.2  LVIDs       1.79 cm            Index         ml/m²  (2D):                          Right  LV FS       47.7 %    (>25%)   Ventricle:  (2D):                          RVd (2D):      2.68 cm  IVSd        0.71 cm  (0.7-1.1)  (Mmode):  LVPWd       0.86 cm  (0.7-1.1)  (Mmode):  LVIDd       3.86 cm  (3.4-5.7)  (Mmode):  LVIDs       2.07 cm  (Mmode):  LV FS       46.4 %    (>25%)  (Mmode):  Relative     0.57     (<0.42)  Wall  Thickness  Rel. Wall    0.45     (<0.42)  Thickness  Mm  LV Mass    47.7 g/m²  Index:  Mmode    SPECTRAL DOPPLER ANALYSIS:  LV DIASTOLIC FUNCTION:  MV Peak E: 0.84 m/s Decel Time: 140 msec  MV Peak A: 1.27 m/s  E/A Ratio: 0.66    LVOT Vmax: 1.22 m/s  LVOT VTI:  0.19 m  LVOT Diam: 1.96 cm    Mitral Valve:  MV P1/2 Time: 40.49 msec  MV Area, PHT: 5.43 cm²    Tricuspid Valve and PA/RV Systolic Pressure: TR Max Velocity: 1.24 m/s RA   Pressure: 10 mmHg RVSP/PASP: 16.2 mmHg       [id number] Kayode Nash M.D., Electronically signed on 5/22/2019 at 2:51:04   PM              *** Final ***                    KAYODE NASH MD  This document has been electronically signed. May 22 2019 12:44PM            (05-22-19 @ 12:44)  12 Lead ECG:   Ventricular Rate 111 BPM    Atrial Rate 111 BPM    P-R Interval 168 ms    QRS Duration 88 ms    Q-T Interval 340 ms    QTC Calculation(Bezet) 462 ms    P Axis 60 degrees    R Axis -46 degrees    T Axis 19 degrees    Diagnosis Line Sinus tachycardia  Pulmonary disease pattern  Left anterior fascicular block  Moderate voltage criteria for LVH, may be normal variant  Abnormal ECG    Confirmed by Jas Lopez ([id number]) on 5/21/2019 10:26:44 PM (05-21-19 @ 21:42)      MEDICATIONS  benztropine 0.5  cefepime   IVPB   cefepime   IVPB 1000  chlorhexidine 4% Liquid 1  diVALproex ER 1500  docusate sodium 100  haloperidol    Concentrate 1  heparin  Injectable 5000  ipratropium    for Nebulization 500  multivitamin/minerals 1  polyethylene glycol 3350 17  sodium chloride 0.9%. 1000      ANTIBIOTICS:  cefepime   IVPB      cefepime   IVPB 1000 milliGRAM(s) IV Intermittent every 12 hours BABAR ENRIQUEZ  78y, Female  Allergy: No Known Allergies      CHIEF COMPLAINT: Fall (29 May 2019 09:37)      INTERVAL EVENTS/HPI  - No acute events overnight, reports cough , feels constipated  - T(F): , Max: 99.6 (05-29-19 @ 00:00)  - Denies any worsening symptoms  - Tolerating medication    ROS  Negative except as per noted above in HPI    FH noncontributory    VITALS:  T(F): 99.4, Max: 99.6 (05-29-19 @ 00:00)  HR: 94  BP: 103/61  RR: 18Vital Signs Last 24 Hrs  T(C): 37.4 (29 May 2019 07:59), Max: 37.6 (29 May 2019 00:00)  T(F): 99.4 (29 May 2019 07:59), Max: 99.6 (29 May 2019 00:00)  HR: 94 (29 May 2019 07:59) (94 - 101)  BP: 103/61 (29 May 2019 07:59) (98/49 - 125/56)  BP(mean): --  RR: 18 (29 May 2019 07:59) (16 - 18)  SpO2: --    PHYSICAL EXAM:  Gen: NAD, resting in bed  HEENT: Normocephalic, atraumatic  Neck: supple, no lymphadenopathy  CV: Regular rate & regular rhythm  Lungs: decreased BS at bases, no fremitus  Abdomen: Soft, BS present  Ext: Warm, well perfused, R hip no erythema or fluctuance  Neuro: non focal, awake  Skin: no rash, no erythema      TESTS & MEASUREMENTS:                        8.6    11.07 )-----------( 200      ( 28 May 2019 07:11 )             25.7     05-28    140  |  103  |  11  ----------------------------<  94  4.1   |  26  |  <0.5<L>    Ca    8.1<L>      28 May 2019 07:11  Mg     2.0     05-28    TPro  5.2<L>  /  Alb  2.8<L>  /  TBili  0.5  /  DBili  x   /  AST  16  /  ALT  25  /  AlkPhos  65  05-28      LIVER FUNCTIONS - ( 28 May 2019 07:11 )  Alb: 2.8 g/dL / Pro: 5.2 g/dL / ALK PHOS: 65 U/L / ALT: 25 U/L / AST: 16 U/L / GGT: x               Culture - Blood (collected 05-27-19 @ 21:57)  Source: .Blood None  Preliminary Report (05-29-19 @ 06:01):    No growth to date.    Culture - Blood (collected 05-24-19 @ 11:19)  Source: .Blood None  Preliminary Report (05-26-19 @ 01:01):    No growth to date.    Culture - Urine (collected 05-24-19 @ 08:37)  Source: .Urine Clean Catch (Midstream)  Final Report (05-25-19 @ 11:39):    No growth        Lactate, Blood: 0.7 mmol/L (05-28-19 @ 07:11)      INFECTIOUS DISEASES TESTING      RADIOLOGY & ADDITIONAL TESTS:  I have personally reviewed the last Chest xray  CXR  Xray Chest 1 View- PORTABLE-Urgent:   EXAM:  XR CHEST PORTABLE URGENT 1V            PROCEDURE DATE:  05/27/2019            INTERPRETATION:  Clinical History / Reason for exam: Congestion    Comparison : Chest radiograph 5/24/2019.    Technique/Positioning: Satisfactory.    Findings:    Support devices: None.    Cardiac/mediastinum/hilum: Difficult to evaluate due to rotation.    Lung parenchyma/Pleura: Within normal limits.    Skeleton/soft tissues: Unchanged.    Impression:      No focal pulmonary consolidation.                      XUAN FITZGERALD M.D., ATTENDING RADIOLOGIST  This document has been electronically signed. May 27 2019 11:54AM             (05-27-19 @ 09:56)      CT      CARDIOLOGY TESTING  Transthoracic Echocardiogram:    EXAM:  2-D ECHO (TTE) COMPLETE        PROCEDURE DATE:  05/22/2019      INTERPRETATION:  REPORT:    TRANSTHORACIC ECHOCARDIOGRAM REPORT         Patient Name:   BABAR ENRIQUEZ Accession #: [id number]  Medical Rec #:  [id number]      Height:      60.0 in 152.4 cm  YOB: 1940     Weight:      185.0 lb 83.91 kg  Patient Age:    78 years      BSA:         1.81 m²  Patient Gender: F             BP:          119/82 mmHg       Date of Exam:        5/22/2019 12:44:59 PM  Referring Physician: [id number] RANJANA LOPEZ  Sonographer:         Ellyn Felipe  Reading Physician:   Kayode Nash M.D.    Procedure:     2D Echo/Doppler/Color Doppler Complete.  Indications:   R94.31 - Abnormal Electrocardiogram [ECG] [EKG]  Diagnosis:     Abnormal electrocardiogram [ECG] [EKG] - R94.31  Study Details: Technically suboptimal study. Study quality was adversely                 affected due to the patient being uncooperative.         Summary:   1. LV Ejection Fraction by Penaloza's Method with a biplane EF of69 %.   2. Spectral Doppler shows impaired relaxation pattern of left   ventricular myocardial filling (Grade I diastolic dysfunction).   3. Moderate mitral annular calcification.   4. Moderate thickening and calcification of the posterior mitral valve   leaflet.    PHYSICIAN INTERPRETATION:  Left Ventricle: Normal left ventricular size and wall thicknesses, with   normal systolic and diastolic function. Spectral Doppler shows impaired   relaxation pattern of left ventricular myocardial filling (Grade I   diastolic dysfunction).  Right Ventricle: Normal right ventricular size and function.  Left Atrium: Normal left atrial size.  Right Atrium: Normal right atrial size.  Pericardium: There is no evidence of pericardial effusion.  Mitral Valve: Structurally normal mitral valve, with normal leaflet   excursion. The mitral valve is normal in structure. Moderate thickening   and calcification of the posterior mitral valve leaflet. There is   moderate mitral annular calcification. No evidence ofmitral valve   regurgitation is seen.  Tricuspid Valve: Structurally normal tricuspid valve, with normal leaflet   excursion. The tricuspid valve is normal in structure. Mild tricuspid   regurgitation is visualized.  Aortic Valve: The aortic valve was not well visualized. The aortic valve   is normal. No evidence of aortic stenosis. No evidence of aortic valve   regurgitation is seen.  Pulmonic Valve: The pulmonic valve was not well visualized. The pulmonic   valve is normal. No indication of pulmonic valve regurgitation.  Aorta: The aortic root and ascending aorta are structurally normal, with   no evidence of dilitation.  Pulmonary Artery: The main pulmonary artery is normal in size.       2D AND M-MODE MEASUREMENTS (normal ranges within parentheses):  Left                  Normal   Aorta/Left             Normal  Ventricle:                     Atrium:  IVSd (2D):  1.04 cm  (0.7-1.1) AoV Cusp       1.67  (1.5-2.6)  LVPWd       0.98 cm  (0.7-1.1) Separation:     cm  (2D):        Left Atrium    2.81  (1.9-4.0)  LVIDd       3.41 cm  (3.4-5.7) (Mmode):        cm  (2D):                          LA Volume      10.2  LVIDs       1.79 cm            Index         ml/m²  (2D):                          Right  LV FS       47.7 %    (>25%)   Ventricle:  (2D):                          RVd (2D):      2.68 cm  IVSd        0.71 cm  (0.7-1.1)  (Mmode):  LVPWd       0.86 cm  (0.7-1.1)  (Mmode):  LVIDd       3.86 cm  (3.4-5.7)  (Mmode):  LVIDs       2.07 cm  (Mmode):  LV FS       46.4 %    (>25%)  (Mmode):  Relative     0.57     (<0.42)  Wall  Thickness  Rel. Wall    0.45     (<0.42)  Thickness  Mm  LV Mass    47.7 g/m²  Index:  Mmode    SPECTRAL DOPPLER ANALYSIS:  LV DIASTOLIC FUNCTION:  MV Peak E: 0.84 m/s Decel Time: 140 msec  MV Peak A: 1.27 m/s  E/A Ratio: 0.66    LVOT Vmax: 1.22 m/s  LVOT VTI:  0.19 m  LVOT Diam: 1.96 cm    Mitral Valve:  MV P1/2 Time: 40.49 msec  MV Area, PHT: 5.43 cm²    Tricuspid Valve and PA/RV Systolic Pressure: TR Max Velocity: 1.24 m/s RA   Pressure: 10 mmHg RVSP/PASP: 16.2 mmHg       [id number] Kayode Nash M.D., Electronically signed on 5/22/2019 at 2:51:04   PM              *** Final ***                    KAYODE NASH MD  This document has been electronically signed. May 22 2019 12:44PM            (05-22-19 @ 12:44)  12 Lead ECG:   Ventricular Rate 111 BPM    Atrial Rate 111 BPM    P-R Interval 168 ms    QRS Duration 88 ms    Q-T Interval 340 ms    QTC Calculation(Bezet) 462 ms    P Axis 60 degrees    R Axis -46 degrees    T Axis 19 degrees    Diagnosis Line Sinus tachycardia  Pulmonary disease pattern  Left anterior fascicular block  Moderate voltage criteria for LVH, may be normal variant  Abnormal ECG    Confirmed by Jas Lopez ([id number]) on 5/21/2019 10:26:44 PM (05-21-19 @ 21:42)      MEDICATIONS  benztropine 0.5  cefepime   IVPB   cefepime   IVPB 1000  chlorhexidine 4% Liquid 1  diVALproex ER 1500  docusate sodium 100  haloperidol    Concentrate 1  heparin  Injectable 5000  ipratropium    for Nebulization 500  multivitamin/minerals 1  polyethylene glycol 3350 17  sodium chloride 0.9%. 1000      ANTIBIOTICS:  cefepime   IVPB      cefepime   IVPB 1000 milliGRAM(s) IV Intermittent every 12 hours

## 2019-05-29 NOTE — PROGRESS NOTE ADULT - ASSESSMENT
ASSESSMENT  78y F breast mass, hoover esophagus, mild MR, bipolar, HLD admitted with FEMORAL NECK FRACTURE  Found to have a hip fracture, s/p R hemarthroplasty 5/22. Noted to be febrile 5/23 to 101, 5/24 102.4, 5/25-26 afebrile then febrile again 5/27. Pt received ancef 5/22 pre-op then cefepime started 5/27.     PROBLEMS  Sepsis ruled out on admission, now with sepsis tm 102.4   CXR no PNA  Duplex no DVT  5/24 UA & ucx NG, bcx NG  5/27 bcx NG    now afebrile    RECOMMENDATIONS  - Cefepime 1g q12h (5/27-), likely plan for empiric antibiotic 5-7 day course  - Incentive spirometry   - ?PE , duplex LE neg    Spectra [id number] ASSESSMENT  78y F breast mass, hoover esophagus, mild MR, bipolar, HLD admitted with FEMORAL NECK FRACTURE  Found to have a hip fracture, s/p R hemarthroplasty 5/22. Noted to be febrile 5/23 to 101, 5/24 102.4, 5/25-26 afebrile then febrile again 5/27. Pt received ancef 5/22 pre-op then cefepime started 5/27.     PROBLEMS  Sepsis ruled out on admission, now with sepsis tm 102.4   CXR no PNA  Duplex no DVT  5/24 UA & ucx NG, bcx NG  5/27 bcx NG    now afebrile  ?viral etiology ?PE    RECOMMENDATIONS  - Cefepime 1g q12h (5/27-), likely plan for empiric antibiotic 5-7 day course  - Incentive spirometry       Spectra [id number]

## 2019-05-30 LAB
ALBUMIN SERPL ELPH-MCNC: 2.8 G/DL — LOW (ref 3.5–5.2)
ALP SERPL-CCNC: 54 U/L — SIGNIFICANT CHANGE UP (ref 30–115)
ALT FLD-CCNC: 26 U/L — SIGNIFICANT CHANGE UP (ref 0–41)
ANION GAP SERPL CALC-SCNC: 13 MMOL/L — SIGNIFICANT CHANGE UP (ref 7–14)
AST SERPL-CCNC: 16 U/L — SIGNIFICANT CHANGE UP (ref 0–41)
BASOPHILS # BLD AUTO: 0.03 K/UL — SIGNIFICANT CHANGE UP (ref 0–0.2)
BASOPHILS NFR BLD AUTO: 0.4 % — SIGNIFICANT CHANGE UP (ref 0–1)
BILIRUB SERPL-MCNC: 0.4 MG/DL — SIGNIFICANT CHANGE UP (ref 0.2–1.2)
BUN SERPL-MCNC: 16 MG/DL — SIGNIFICANT CHANGE UP (ref 10–20)
CALCIUM SERPL-MCNC: 8.4 MG/DL — LOW (ref 8.5–10.1)
CHLORIDE SERPL-SCNC: 106 MMOL/L — SIGNIFICANT CHANGE UP (ref 98–110)
CO2 SERPL-SCNC: 24 MMOL/L — SIGNIFICANT CHANGE UP (ref 17–32)
CREAT SERPL-MCNC: <0.5 MG/DL — LOW (ref 0.7–1.5)
CULTURE RESULTS: SIGNIFICANT CHANGE UP
EOSINOPHIL # BLD AUTO: 0.22 K/UL — SIGNIFICANT CHANGE UP (ref 0–0.7)
EOSINOPHIL NFR BLD AUTO: 2.6 % — SIGNIFICANT CHANGE UP (ref 0–8)
GLUCOSE SERPL-MCNC: 103 MG/DL — HIGH (ref 70–99)
HCT VFR BLD CALC: 25.4 % — LOW (ref 37–47)
HGB BLD-MCNC: 8.2 G/DL — LOW (ref 12–16)
IMM GRANULOCYTES NFR BLD AUTO: 1.4 % — HIGH (ref 0.1–0.3)
LACTATE SERPL-SCNC: 1.2 MMOL/L — SIGNIFICANT CHANGE UP (ref 0.5–2.2)
LYMPHOCYTES # BLD AUTO: 1.83 K/UL — SIGNIFICANT CHANGE UP (ref 1.2–3.4)
LYMPHOCYTES # BLD AUTO: 21.6 % — SIGNIFICANT CHANGE UP (ref 20.5–51.1)
MAGNESIUM SERPL-MCNC: 2 MG/DL — SIGNIFICANT CHANGE UP (ref 1.8–2.4)
MCHC RBC-ENTMCNC: 32.3 G/DL — SIGNIFICANT CHANGE UP (ref 32–37)
MCHC RBC-ENTMCNC: 32.3 PG — HIGH (ref 27–31)
MCV RBC AUTO: 100 FL — HIGH (ref 81–99)
MONOCYTES # BLD AUTO: 0.69 K/UL — HIGH (ref 0.1–0.6)
MONOCYTES NFR BLD AUTO: 8.2 % — SIGNIFICANT CHANGE UP (ref 1.7–9.3)
NEUTROPHILS # BLD AUTO: 5.57 K/UL — SIGNIFICANT CHANGE UP (ref 1.4–6.5)
NEUTROPHILS NFR BLD AUTO: 65.8 % — SIGNIFICANT CHANGE UP (ref 42.2–75.2)
NRBC # BLD: 0 /100 WBCS — SIGNIFICANT CHANGE UP (ref 0–0)
PLATELET # BLD AUTO: 254 K/UL — SIGNIFICANT CHANGE UP (ref 130–400)
POTASSIUM SERPL-MCNC: 4.2 MMOL/L — SIGNIFICANT CHANGE UP (ref 3.5–5)
POTASSIUM SERPL-SCNC: 4.2 MMOL/L — SIGNIFICANT CHANGE UP (ref 3.5–5)
PROT SERPL-MCNC: 5.1 G/DL — LOW (ref 6–8)
RBC # BLD: 2.54 M/UL — LOW (ref 4.2–5.4)
RBC # FLD: 12 % — SIGNIFICANT CHANGE UP (ref 11.5–14.5)
SODIUM SERPL-SCNC: 143 MMOL/L — SIGNIFICANT CHANGE UP (ref 135–146)
SPECIMEN SOURCE: SIGNIFICANT CHANGE UP
WBC # BLD: 8.46 K/UL — SIGNIFICANT CHANGE UP (ref 4.8–10.8)
WBC # FLD AUTO: 8.46 K/UL — SIGNIFICANT CHANGE UP (ref 4.8–10.8)

## 2019-05-30 RX ORDER — GUAIFENESIN/DEXTROMETHORPHAN 600MG-30MG
10 TABLET, EXTENDED RELEASE 12 HR ORAL EVERY 4 HOURS
Refills: 0 | Status: DISCONTINUED | OUTPATIENT
Start: 2019-05-30 | End: 2019-06-01

## 2019-05-30 RX ADMIN — CHLORHEXIDINE GLUCONATE 1 APPLICATION(S): 213 SOLUTION TOPICAL at 05:29

## 2019-05-30 RX ADMIN — HEPARIN SODIUM 5000 UNIT(S): 5000 INJECTION INTRAVENOUS; SUBCUTANEOUS at 05:29

## 2019-05-30 RX ADMIN — Medication 500 MICROGRAM(S): at 11:13

## 2019-05-30 RX ADMIN — Medication 1 TABLET(S): at 14:14

## 2019-05-30 RX ADMIN — POLYETHYLENE GLYCOL 3350 17 GRAM(S): 17 POWDER, FOR SOLUTION ORAL at 14:06

## 2019-05-30 RX ADMIN — Medication 100 MILLIGRAM(S): at 21:43

## 2019-05-30 RX ADMIN — HEPARIN SODIUM 5000 UNIT(S): 5000 INJECTION INTRAVENOUS; SUBCUTANEOUS at 21:42

## 2019-05-30 RX ADMIN — Medication 0.5 MILLIGRAM(S): at 17:37

## 2019-05-30 RX ADMIN — Medication 500 MICROGRAM(S): at 14:04

## 2019-05-30 RX ADMIN — SODIUM CHLORIDE 50 MILLILITER(S): 9 INJECTION INTRAMUSCULAR; INTRAVENOUS; SUBCUTANEOUS at 08:21

## 2019-05-30 RX ADMIN — DIVALPROEX SODIUM 1500 MILLIGRAM(S): 500 TABLET, DELAYED RELEASE ORAL at 21:43

## 2019-05-30 RX ADMIN — Medication 1200 MILLIGRAM(S): at 14:06

## 2019-05-30 RX ADMIN — Medication 100 MILLIGRAM(S): at 14:07

## 2019-05-30 RX ADMIN — Medication 500 MICROGRAM(S): at 02:41

## 2019-05-30 RX ADMIN — Medication 10 MILLILITER(S): at 21:41

## 2019-05-30 RX ADMIN — HEPARIN SODIUM 5000 UNIT(S): 5000 INJECTION INTRAVENOUS; SUBCUTANEOUS at 14:08

## 2019-05-30 RX ADMIN — CEFEPIME 100 MILLIGRAM(S): 1 INJECTION, POWDER, FOR SOLUTION INTRAMUSCULAR; INTRAVENOUS at 17:45

## 2019-05-30 RX ADMIN — Medication 0.5 MILLIGRAM(S): at 05:29

## 2019-05-30 RX ADMIN — CEFEPIME 100 MILLIGRAM(S): 1 INJECTION, POWDER, FOR SOLUTION INTRAMUSCULAR; INTRAVENOUS at 05:29

## 2019-05-30 RX ADMIN — Medication 100 MILLIGRAM(S): at 05:29

## 2019-05-30 RX ADMIN — HALOPERIDOL DECANOATE 1 MILLIGRAM(S): 100 INJECTION INTRAMUSCULAR at 21:40

## 2019-05-30 RX ADMIN — Medication 500 MICROGRAM(S): at 20:51

## 2019-05-30 RX ADMIN — Medication 500 MICROGRAM(S): at 08:45

## 2019-05-30 NOTE — PROGRESS NOTE ADULT - SUBJECTIVE AND OBJECTIVE BOX
SUBJECTIVE:    Patient is a 78y old Female who presents with a chief complaint of Fall (30 May 2019 07:40)      HPI:  79 yo lady with hx of MR and bipolar , DLD , esophageal varices sent for the above CC . As per aid , patient was in rehab today , had witnessed mechanical fall , with no head trauma .  No LOC . (22 May 2019 00:44)      Currently admitted to medicine with the primary diagnosis of Femoral neck fracture     much more alert today, continues to cough    Besides the pertinent positives and negatives described above, the ROS was within normal limits.    PAST MEDICAL & SURGICAL HISTORY  Breast mass, right: s/p lumpectomy &amp; radiation 2007  Hyperlipemia  Brito esophagus  Mild mental retardation  Atypical bipolar disorder  Feeding by G-tube: s/p removal 2012    SOCIAL HISTORY:    ALLERGIES:  No Known Allergies    MEDICATIONS:  STANDING MEDICATIONS  benztropine 0.5 milliGRAM(s) Oral two times a day  cefepime   IVPB      cefepime   IVPB 1000 milliGRAM(s) IV Intermittent every 12 hours  chlorhexidine 4% Liquid 1 Application(s) Topical <User Schedule>  diVALproex ER 1500 milliGRAM(s) Oral at bedtime  docusate sodium 100 milliGRAM(s) Oral three times a day  guaiFENesin ER 1200 milliGRAM(s) Oral every 12 hours  haloperidol    Concentrate 1 milliGRAM(s) Oral at bedtime  heparin  Injectable 5000 Unit(s) SubCutaneous every 8 hours  ipratropium    for Nebulization 500 MICROGram(s) Nebulizer every 6 hours  multivitamin/minerals 1 Tablet(s) Oral daily  polyethylene glycol 3350 17 Gram(s) Oral daily  sodium chloride 0.9%. 1000 milliLiter(s) IV Continuous <Continuous>    PRN MEDICATIONS  acetaminophen   Tablet .. 650 milliGRAM(s) Oral every 6 hours PRN  aluminum hydroxide/magnesium hydroxide/simethicone Suspension 30 milliLiter(s) Oral four times a day PRN  bisacodyl Suppository 10 milliGRAM(s) Rectal daily PRN  magnesium hydroxide Suspension 30 milliLiter(s) Oral daily PRN  ondansetron Injectable 4 milliGRAM(s) IV Push every 6 hours PRN  senna 2 Tablet(s) Oral at bedtime PRN    VITALS:   T(F): 97.4  HR: 88  BP: 105/54  RR: 18  SpO2: 94%    LABS:                        8.2    8.46  )-----------( 254      ( 30 May 2019 06:36 )             25.4     05-30    143  |  106  |  16  ----------------------------<  103<H>  4.2   |  24  |  <0.5<L>    Ca    8.4<L>      30 May 2019 06:36  Mg     2.0     05-30    TPro  5.1<L>  /  Alb  2.8<L>  /  TBili  0.4  /  DBili  x   /  AST  16  /  ALT  26  /  AlkPhos  54  05-30          Lactate, Blood: 1.2 mmol/L (05-30-19 @ 06:36)  Lactate, Blood: 1.1 mmol/L (05-29-19 @ 11:36)      Culture - Blood (collected 28 May 2019 18:35)  Source: .Blood None  Preliminary Report (30 May 2019 01:01):    No growth to date.    Culture - Blood (collected 27 May 2019 21:57)  Source: .Blood None  Preliminary Report (29 May 2019 06:01):    No growth to date.          RADIOLOGY:    PHYSICAL EXAM:  GEN: No acute distress  LUNGS: Clear to auscultation bilaterally   HEART: Regular  ABD: Soft, non-tender, non-distended.  EXT: NC/NE/2+PP/SCOTT/Skin Intact.   NEURO: AAOX2 MR, at baseline    Intravenous access: yes  NG tube: no  Mcqueen Catheter: no

## 2019-05-30 NOTE — PROGRESS NOTE ADULT - SUBJECTIVE AND OBJECTIVE BOX
BABAR ENRIQUEZ  78y  Female  still low grade temp  still a little lethargic  deep cough productive of thick sputum, but more clear    INTERVAL EVENTS:    T(C): 37.8 (05-30-19 @ 00:07), Max: 37.8 (05-30-19 @ 00:07)  HR: 99 (05-30-19 @ 00:07) (93 - 99)  BP: 131/59 (05-30-19 @ 00:07) (103/61 - 131/59)  RR: 18 (05-30-19 @ 00:07) (18 - 18)  SpO2: 94% (05-29-19 @ 15:31) (94% - 94%)  Wt(kg): --Vital Signs Last 24 Hrs  T(C): 37.8 (30 May 2019 00:07), Max: 37.8 (30 May 2019 00:07)  T(F): 100 (30 May 2019 00:07), Max: 100 (30 May 2019 00:07)  HR: 99 (30 May 2019 00:07) (93 - 99)  BP: 131/59 (30 May 2019 00:07) (103/61 - 131/59)  BP(mean): --  RR: 18 (30 May 2019 00:07) (18 - 18)  SpO2: 94% (29 May 2019 15:31) (94% - 94%)    PHYSICAL EXAM:  GENERAL: resting comfortably  NECK: Supple, No JVD, Normal thyroid  CHEST/LUNG: congested, b/lat creps, wheezes  HEART: S1, S2, Regular rate and rhythm;   ABDOMEN: Soft, Nontender, Nondistended; Bowel sounds present  EXTREMITIES: No clubbing, cyanosis, or edema  SKIN: No rashes or lesions    LABS:                          8.2    8.46  )-----------( 254      ( 30 May 2019 06:36 )             25.4     05-29    143  |  109  |  17  ----------------------------<  103<H>  3.9   |  23  |  <0.5<L>    Ca    8.4<L>      29 May 2019 11:36  Mg     2.0     05-29    TPro  4.9<L>  /  Alb  2.7<L>  /  TBili  0.3  /  DBili  x   /  AST  18  /  ALT  25  /  AlkPhos  55  05-29          Culture - Blood (collected 28 May 2019 18:35)  Source: .Blood None  Preliminary Report (30 May 2019 01:01):    No growth to date.    Culture - Blood (collected 27 May 2019 21:57)  Source: .Blood None  Preliminary Report (29 May 2019 06:01):    No growth to date.          acetaminophen   Tablet .. 650 milliGRAM(s) Oral every 6 hours PRN  aluminum hydroxide/magnesium hydroxide/simethicone Suspension 30 milliLiter(s) Oral four times a day PRN  benztropine 0.5 milliGRAM(s) Oral two times a day  bisacodyl Suppository 10 milliGRAM(s) Rectal daily PRN  cefepime   IVPB      cefepime   IVPB 1000 milliGRAM(s) IV Intermittent every 12 hours  chlorhexidine 4% Liquid 1 Application(s) Topical <User Schedule>  diVALproex ER 1500 milliGRAM(s) Oral at bedtime  docusate sodium 100 milliGRAM(s) Oral three times a day  guaiFENesin ER 1200 milliGRAM(s) Oral every 12 hours  haloperidol    Concentrate 1 milliGRAM(s) Oral at bedtime  heparin  Injectable 5000 Unit(s) SubCutaneous every 8 hours  ipratropium    for Nebulization 500 MICROGram(s) Nebulizer every 6 hours  magnesium hydroxide Suspension 30 milliLiter(s) Oral daily PRN  multivitamin/minerals 1 Tablet(s) Oral daily  ondansetron Injectable 4 milliGRAM(s) IV Push every 6 hours PRN  polyethylene glycol 3350 17 Gram(s) Oral daily  senna 2 Tablet(s) Oral at bedtime PRN  sodium chloride 0.9%. 1000 milliLiter(s) IV Continuous <Continuous>      RADIOLOGY & ADDITIONAL TESTS:    case discussed with the resident  Available consult notes reviewed    Assessment and plan:       bronchitis  ? repeat cxr?  ID appreciated  wbc improving  hgb stable  robitussin  unable to comply w incentive spirometer, but attendant advised to encourage deep breathing and pulmonary toilet  cont nebulizers, antibiotics

## 2019-05-30 NOTE — CHART NOTE - NSCHARTNOTEFT_GEN_A_CORE
Registered Dietitian Follow-Up    ***Scroll to the bottom for RD recommendation***    Patient Profile Reviewed                           Yes [x]   No []  Nutrition History Previously Obtained        Yes []  No [x]  - aide from group home at bedside. see below for details      PERTINENT MEDICAL INFORMATIONS:  (1) Productive cough w/ thick sputum resolving.  (2) ID consulted for abx. WBC improving  (3) Hgb stable. Robitussin. c/w nebs      PERTINENT SUBJECTIVE INFORMATION:  (1) per Group home aide of patient for the past 3 years- pt usually eats well on dysphagia 1 pureed - nectar despite not liking them. No vitamin use. Used to drink ensure but no longer. NKFA.   (2) Pt currently eating well 1:1. Home regimen diet.      DIET ORDER:  Dysphagia 1 Pureed + Nectar        ANTHROPOMETRICS:  - Ht. 165.1cm  - Wt.  (5/22): 64.4kg - no new weight.  - BMI. 23.6  - IBW       PERTINENT LAB DATA: 5/30: RBC 2.54, h/h 8.2/25.4, Cr <0.5, glucose 103, ca 8.4, albumin 2.8  PERTINENT MEDS: heparin, abx, acetaminophen, bisacodyl, docusate, haldol, ondansetron, miralax, senna      PHYSICAL FINDINGS  - APPEARANCE:        pt is able to answer some questions. responsive. No edema  - GI FUNCTION:        LBM 5/29  - TUBES:                       - ORAL/MOUTH:      none reported  - SKIN:                        surgical incision        NUTRITION REQUIREMENTS  WEIGHT USED:                          Ht: 165.1 cm. Wt: 64.4 kg. IBW: 56.8 kg.  ESTIMATED ENERGY NEEDS:       CONTINUE [  x]      ADJUST [  ] - from admission note    ESTIMATED ENERGY NEEDS:         4360-1972 kcal/day (MSJx1.2-1.3 AF)  ESTIMATED PROTEIN NEEDS:        64-77 g/day (1-1.2 g/kg ABW)  ESTIMATED FLUID NEEDS:             1ml/kcal    CURRENT NUTRIENT NEEDS:    meeting likely          [ x ] PREVIOUS NUTRITION DIAGNOSIS:    (1) Inadequate oral intake - resolved             [ x ] ONGOING        [  ] RESOLVED             PATIENT INTERVENTION:    [ x ] ORAL        [ ] EN/TF     GOAL/EXPECTED OUTCOME:     pt to consume and tolerate >75% of all meals and snacks vs eat at baseline through LOS  INDICATOR/MONITORING:       RD to monitor diet order, energy intake, body composition, nutrition focused physical findings  NUTRITION INTERVENTION:        Meals and snacks.     RECS: (1) Continue current DYSPHAGIA 1 - PUREED - NECTAR diet (home regimen). Registered Dietitian Follow-Up    ***Scroll to the bottom for RD recommendation***    Patient Profile Reviewed                           Yes [x]   No []  Nutrition History Previously Obtained        Yes []  No [x]  - aide from group home at bedside. see below for details      PERTINENT MEDICAL INFORMATIONS:  (1) Productive cough w/ thick sputum resolving.  (2) ID consulted for abx. WBC improving  (3) Hgb stable. Robitussin. c/w nebs      PERTINENT SUBJECTIVE INFORMATION:  (1) per Group home aide of patient for the past 3 years- pt usually eats well on dysphagia 1 pureed - nectar despite not liking them. No vitamin use. Used to drink ensure but no longer. NKFA.   (2) Pt currently eating well 1:1. Home regimen diet.      DIET ORDER:  Dysphagia 1 Pureed + Nectar        ANTHROPOMETRICS:  - Ht. 165.1cm  - Wt.  (5/22): 64.4kg - no new weight.  - BMI. 23.6  - IBW       PERTINENT LAB DATA: 5/30: RBC 2.54, h/h 8.2/25.4, Cr <0.5, glucose 103, ca 8.4, albumin 2.8  PERTINENT MEDS: heparin, abx, acetaminophen, bisacodyl, docusate, haldol, ondansetron, miralax, senna      PHYSICAL FINDINGS  - APPEARANCE:        pt is able to answer some questions. responsive. No edema  - GI FUNCTION:        LBM 5/29  - TUBES:                       - ORAL/MOUTH:      on dysphagia diet, poor dentition   - SKIN:                        surgical incision        NUTRITION REQUIREMENTS  WEIGHT USED:                          Ht: 165.1 cm. Wt: 64.4 kg. IBW: 56.8 kg.  ESTIMATED ENERGY NEEDS:       CONTINUE [  x]      ADJUST [  ] - from admission note    ESTIMATED ENERGY NEEDS:         7235-1003 kcal/day (MSJx1.2-1.3 AF)  ESTIMATED PROTEIN NEEDS:        64-77 g/day (1-1.2 g/kg ABW)  ESTIMATED FLUID NEEDS:             1ml/kcal    CURRENT NUTRIENT NEEDS:    meeting likely          [ x ] PREVIOUS NUTRITION DIAGNOSIS:    (1) Inadequate oral intake - resolved             [ x ] ONGOING        [  ] RESOLVED             PATIENT INTERVENTION:    [ x ] ORAL        [ ] EN/TF     GOAL/EXPECTED OUTCOME:     pt to consume and tolerate >75% of all meals and snacks vs eat at baseline through LOS  INDICATOR/MONITORING:       RD to monitor diet order, energy intake, body composition, nutrition focused physical findings  NUTRITION INTERVENTION:        Meals and snacks.     RECS: (1) Continue current DYSPHAGIA 1 - PUREED - NECTAR diet (home regimen).

## 2019-05-30 NOTE — PROGRESS NOTE ADULT - ASSESSMENT
78F with PMH of intellectual disability from group home and bipolar disorder, DLD, and esophageal varices s/p mechanical fall and right giuliano-arthroplasty POD 8, has been having post op fever with productive cough and congestion which has been improving on cefepime    # Acute Right Femoral Neck Fracture s/p mechanical fall  -s/p Right Hip Giuliano POD # 8  -RLE WBAT, c/w PT/OT.  -Pain control   -c/w DVT ppx;    # Post op fever likely aspiration pneumonia, resolving  -no longer having fever, wbc normal now  -CXR shows right lower lobe opacities, UA -ve, urine cx and blood cx NGTD, LE duplex -ve for DVT  -on cefepime 1g q12h as per ID    # PMH Bipolar Disorder: continue haldol, benztropine, Depakote     #Hx of Dysphagia,  -pt was on puree diet at nursing home  -seen by Speech and swallow c/w regular with thins alternating solids with liquids.  -changed back to pureed dysphagia 1 nectar thick due to worsening chest congestion    Contacted group home. Patient's father used to make medical decisions for her, but he passed away recently. The cousin (Nichole Dee [phone]) makes decisions for the pt now.    PLAN: monitor temp, chest PT for cough, out of bed to chair    DVT ppx: Heparin sub-q  GI ppx: not indicated   Diet: pureed dysphagia 1 nectar thick   Activity: Increase as tolerated, WBAT LLE fall precautions   Dispo: back to group home once possible infxn resolved  Code status: FULL CODE

## 2019-05-31 LAB
ALBUMIN SERPL ELPH-MCNC: 2.7 G/DL — LOW (ref 3.5–5.2)
ALP SERPL-CCNC: 53 U/L — SIGNIFICANT CHANGE UP (ref 30–115)
ALT FLD-CCNC: 23 U/L — SIGNIFICANT CHANGE UP (ref 0–41)
ANION GAP SERPL CALC-SCNC: 9 MMOL/L — SIGNIFICANT CHANGE UP (ref 7–14)
AST SERPL-CCNC: 15 U/L — SIGNIFICANT CHANGE UP (ref 0–41)
BASOPHILS # BLD AUTO: 0 K/UL — SIGNIFICANT CHANGE UP (ref 0–0.2)
BASOPHILS NFR BLD AUTO: 0 % — SIGNIFICANT CHANGE UP (ref 0–1)
BILIRUB SERPL-MCNC: 0.4 MG/DL — SIGNIFICANT CHANGE UP (ref 0.2–1.2)
BUN SERPL-MCNC: 13 MG/DL — SIGNIFICANT CHANGE UP (ref 10–20)
CALCIUM SERPL-MCNC: 8.4 MG/DL — LOW (ref 8.5–10.1)
CHLORIDE SERPL-SCNC: 108 MMOL/L — SIGNIFICANT CHANGE UP (ref 98–110)
CO2 SERPL-SCNC: 26 MMOL/L — SIGNIFICANT CHANGE UP (ref 17–32)
CREAT SERPL-MCNC: <0.5 MG/DL — LOW (ref 0.7–1.5)
EOSINOPHIL # BLD AUTO: 0.28 K/UL — SIGNIFICANT CHANGE UP (ref 0–0.7)
EOSINOPHIL NFR BLD AUTO: 3.5 % — SIGNIFICANT CHANGE UP (ref 0–8)
GIANT PLATELETS BLD QL SMEAR: PRESENT — SIGNIFICANT CHANGE UP
GLUCOSE SERPL-MCNC: 92 MG/DL — SIGNIFICANT CHANGE UP (ref 70–99)
HCT VFR BLD CALC: 24.2 % — LOW (ref 37–47)
HGB BLD-MCNC: 7.9 G/DL — LOW (ref 12–16)
HYPOCHROMIA BLD QL: SLIGHT — SIGNIFICANT CHANGE UP
LYMPHOCYTES # BLD AUTO: 1.44 K/UL — SIGNIFICANT CHANGE UP (ref 1.2–3.4)
LYMPHOCYTES # BLD AUTO: 18.2 % — LOW (ref 20.5–51.1)
MAGNESIUM SERPL-MCNC: 1.9 MG/DL — SIGNIFICANT CHANGE UP (ref 1.8–2.4)
MCHC RBC-ENTMCNC: 32.1 PG — HIGH (ref 27–31)
MCHC RBC-ENTMCNC: 32.6 G/DL — SIGNIFICANT CHANGE UP (ref 32–37)
MCV RBC AUTO: 98.4 FL — SIGNIFICANT CHANGE UP (ref 81–99)
METAMYELOCYTES # FLD: 0.9 % — HIGH (ref 0–0)
MONOCYTES # BLD AUTO: 0.41 K/UL — SIGNIFICANT CHANGE UP (ref 0.1–0.6)
MONOCYTES NFR BLD AUTO: 5.2 % — SIGNIFICANT CHANGE UP (ref 1.7–9.3)
NEUTROPHILS # BLD AUTO: 5.72 K/UL — SIGNIFICANT CHANGE UP (ref 1.4–6.5)
NEUTROPHILS NFR BLD AUTO: 72.2 % — SIGNIFICANT CHANGE UP (ref 42.2–75.2)
PLAT MORPH BLD: NORMAL — SIGNIFICANT CHANGE UP
PLATELET # BLD AUTO: 256 K/UL — SIGNIFICANT CHANGE UP (ref 130–400)
POTASSIUM SERPL-MCNC: 4.5 MMOL/L — SIGNIFICANT CHANGE UP (ref 3.5–5)
POTASSIUM SERPL-SCNC: 4.5 MMOL/L — SIGNIFICANT CHANGE UP (ref 3.5–5)
PROT SERPL-MCNC: 5 G/DL — LOW (ref 6–8)
RBC # BLD: 2.46 M/UL — LOW (ref 4.2–5.4)
RBC # FLD: 11.9 % — SIGNIFICANT CHANGE UP (ref 11.5–14.5)
RBC BLD AUTO: NORMAL — SIGNIFICANT CHANGE UP
SODIUM SERPL-SCNC: 143 MMOL/L — SIGNIFICANT CHANGE UP (ref 135–146)
WBC # BLD: 7.92 K/UL — SIGNIFICANT CHANGE UP (ref 4.8–10.8)
WBC # FLD AUTO: 7.92 K/UL — SIGNIFICANT CHANGE UP (ref 4.8–10.8)

## 2019-05-31 RX ADMIN — DIVALPROEX SODIUM 1500 MILLIGRAM(S): 500 TABLET, DELAYED RELEASE ORAL at 21:52

## 2019-05-31 RX ADMIN — Medication 0.5 MILLIGRAM(S): at 18:00

## 2019-05-31 RX ADMIN — CEFEPIME 100 MILLIGRAM(S): 1 INJECTION, POWDER, FOR SOLUTION INTRAMUSCULAR; INTRAVENOUS at 17:57

## 2019-05-31 RX ADMIN — Medication 100 MILLIGRAM(S): at 14:23

## 2019-05-31 RX ADMIN — Medication 10 MILLILITER(S): at 01:14

## 2019-05-31 RX ADMIN — Medication 10 MILLILITER(S): at 18:00

## 2019-05-31 RX ADMIN — Medication 500 MICROGRAM(S): at 20:50

## 2019-05-31 RX ADMIN — CHLORHEXIDINE GLUCONATE 1 APPLICATION(S): 213 SOLUTION TOPICAL at 05:54

## 2019-05-31 RX ADMIN — Medication 10 MILLILITER(S): at 11:35

## 2019-05-31 RX ADMIN — Medication 1 TABLET(S): at 11:33

## 2019-05-31 RX ADMIN — Medication 650 MILLIGRAM(S): at 14:24

## 2019-05-31 RX ADMIN — Medication 10 MILLILITER(S): at 05:53

## 2019-05-31 RX ADMIN — HEPARIN SODIUM 5000 UNIT(S): 5000 INJECTION INTRAVENOUS; SUBCUTANEOUS at 21:51

## 2019-05-31 RX ADMIN — CEFEPIME 100 MILLIGRAM(S): 1 INJECTION, POWDER, FOR SOLUTION INTRAMUSCULAR; INTRAVENOUS at 05:52

## 2019-05-31 RX ADMIN — HEPARIN SODIUM 5000 UNIT(S): 5000 INJECTION INTRAVENOUS; SUBCUTANEOUS at 05:54

## 2019-05-31 RX ADMIN — HEPARIN SODIUM 5000 UNIT(S): 5000 INJECTION INTRAVENOUS; SUBCUTANEOUS at 14:25

## 2019-05-31 RX ADMIN — Medication 100 MILLIGRAM(S): at 21:52

## 2019-05-31 RX ADMIN — SODIUM CHLORIDE 50 MILLILITER(S): 9 INJECTION INTRAMUSCULAR; INTRAVENOUS; SUBCUTANEOUS at 21:50

## 2019-05-31 RX ADMIN — Medication 10 MILLILITER(S): at 21:53

## 2019-05-31 RX ADMIN — Medication 500 MICROGRAM(S): at 13:30

## 2019-05-31 RX ADMIN — Medication 0.5 MILLIGRAM(S): at 05:54

## 2019-05-31 RX ADMIN — Medication 100 MILLIGRAM(S): at 05:53

## 2019-05-31 RX ADMIN — Medication 500 MICROGRAM(S): at 08:03

## 2019-05-31 RX ADMIN — HALOPERIDOL DECANOATE 1 MILLIGRAM(S): 100 INJECTION INTRAMUSCULAR at 21:51

## 2019-05-31 NOTE — PROGRESS NOTE ADULT - SUBJECTIVE AND OBJECTIVE BOX
SUBJECTIVE:    Patient is a 78y old Female who presents with a chief complaint of Fall (31 May 2019 11:46)      HPI:  79 yo lady with hx of MR and bipolar , DLD , esophageal varices sent for the above CC . As per aid , patient was in rehab today , had witnessed mechanical fall , with no head trauma .  No LOC . (22 May 2019 00:44)      Currently admitted to medicine with the primary diagnosis of Femoral neck fracture     looks better today more responsive, continuing to have productive cough    Besides the pertinent positives and negatives described above, the ROS was within normal limits.    PAST MEDICAL & SURGICAL HISTORY  Breast mass, right: s/p lumpectomy &amp; radiation 2007  Hyperlipemia  Brito esophagus  Mild mental retardation  Atypical bipolar disorder  Feeding by G-tube: s/p removal 2012    SOCIAL HISTORY:    ALLERGIES:  No Known Allergies    MEDICATIONS:  STANDING MEDICATIONS  benztropine 0.5 milliGRAM(s) Oral two times a day  cefepime   IVPB      cefepime   IVPB 1000 milliGRAM(s) IV Intermittent every 12 hours  chlorhexidine 4% Liquid 1 Application(s) Topical <User Schedule>  diVALproex ER 1500 milliGRAM(s) Oral at bedtime  docusate sodium 100 milliGRAM(s) Oral three times a day  guaiFENesin/dextromethorphan  Syrup 10 milliLiter(s) Oral every 4 hours  haloperidol    Concentrate 1 milliGRAM(s) Oral at bedtime  heparin  Injectable 5000 Unit(s) SubCutaneous every 8 hours  ipratropium    for Nebulization 500 MICROGram(s) Nebulizer every 6 hours  multivitamin/minerals 1 Tablet(s) Oral daily  sodium chloride 0.9%. 1000 milliLiter(s) IV Continuous <Continuous>    PRN MEDICATIONS  acetaminophen   Tablet .. 650 milliGRAM(s) Oral every 6 hours PRN  aluminum hydroxide/magnesium hydroxide/simethicone Suspension 30 milliLiter(s) Oral four times a day PRN  bisacodyl Suppository 10 milliGRAM(s) Rectal daily PRN  ondansetron Injectable 4 milliGRAM(s) IV Push every 6 hours PRN  senna 2 Tablet(s) Oral at bedtime PRN    VITALS:   T(F): 97.3  HR: 81  BP: 108/52  RR: 16  SpO2: --    LABS:                        7.9    7.92  )-----------( 256      ( 31 May 2019 04:59 )             24.2     05-31    143  |  108  |  13  ----------------------------<  92  4.5   |  26  |  <0.5<L>    Ca    8.4<L>      31 May 2019 04:59  Mg     1.9     05-31    TPro  5.0<L>  /  Alb  2.7<L>  /  TBili  0.4  /  DBili  x   /  AST  15  /  ALT  23  /  AlkPhos  53  05-31              Culture - Blood (collected 28 May 2019 18:35)  Source: .Blood None  Preliminary Report (30 May 2019 01:01):    No growth to date.          RADIOLOGY:    PHYSICAL EXAM:  GEN: No acute distress  LUNGS: Clear to auscultation bilaterally, productive cough with greenish sputum  HEART: Regular  ABD: Soft, non-tender, non-distended.  EXT: NC/NE/2+PP/SCOTT/Skin Intact.   NEURO: AAOX2, MR, mental status at baseline    Intravenous access: yes  NG tube: no  Mcqueen Catheter: no

## 2019-05-31 NOTE — PROGRESS NOTE ADULT - ASSESSMENT
ASSESSMENT  78y F breast mass, hoover esophagus, mild MR, bipolar, HLD admitted with FEMORAL NECK FRACTURE  Found to have a hip fracture, s/p R hemarthroplasty 5/22. Noted to be febrile 5/23 to 101, 5/24 102.4, 5/25-26 afebrile then febrile again 5/27. Pt received ancef 5/22 pre-op then cefepime started 5/27.     PROBLEMS  Sepsis ruled out on admission, now with sepsis tm 102.4   CXR no PNA  Duplex no DVT  5/24 UA & ucx NG, bcx NG  5/27 bcx NG    now afebrile, clinically improved    RECOMMENDATIONS  - D/C abx after dosing today to complete 5 day empiric course  - IF continued dysuria, send UA  - Incentive spirometry       Spectra [phone]

## 2019-05-31 NOTE — PROGRESS NOTE ADULT - SUBJECTIVE AND OBJECTIVE BOX
BABAR ENRIQUEZ  78y, Female  Allergy: No Known Allergies      CHIEF COMPLAINT: Fall (31 May 2019 08:54)      INTERVAL EVENTS/HPI  - No acute events overnight, reports "my vagina hurts" +dysuria  - T(F): , Max: 97.3 (05-30-19 @ 15:40)  - WBC Count: 7.92 K/uL (05-31-19 @ 04:59)  - Tolerating medication    ROS  Negative except as per noted above in HPI  Denies cough  Denies diarrhea  Denies pain at any IV site     FH noncontributory    VITALS:  T(F): 97.3, Max: 97.3 (05-30-19 @ 15:40)  HR: 81  BP: 108/52  RR: 16Vital Signs Last 24 Hrs  T(C): 36.3 (31 May 2019 00:00), Max: 36.3 (30 May 2019 15:40)  T(F): 97.3 (31 May 2019 00:00), Max: 97.3 (30 May 2019 15:40)  HR: 81 (31 May 2019 00:00) (81 - 84)  BP: 108/52 (31 May 2019 00:00) (108/52 - 114/55)  BP(mean): --  RR: 16 (31 May 2019 00:00) (16 - 18)  SpO2: --      PHYSICAL EXAM:  Gen: NAD, resting in bed  HEENT: Normocephalic, atraumatic, poor dentition  Neck: supple, no lymphadenopathy  CV: Regular rate & regular rhythm  Lungs: decreased BS at bases, no fremitus  Abdomen: Soft, BS present  Ext: Warm, well perfused, R hip no erythema or fluctuance  Neuro: non focal, awake  Skin: no rash, no erythema        TESTS & MEASUREMENTS:                        7.9    7.92  )-----------( 256      ( 31 May 2019 04:59 )             24.2     05-31    143  |  108  |  13  ----------------------------<  92  4.5   |  26  |  <0.5<L>    Ca    8.4<L>      31 May 2019 04:59  Mg     1.9     05-31    TPro  5.0<L>  /  Alb  2.7<L>  /  TBili  0.4  /  DBili  x   /  AST  15  /  ALT  23  /  AlkPhos  53  05-31    eGFR if Non African American: 100 mL/min/1.73M2 (05-31-19 @ 04:59)  eGFR if African American: 116 mL/min/1.73M2 (05-31-19 @ 04:59)    LIVER FUNCTIONS - ( 31 May 2019 04:59 )  Alb: 2.7 g/dL / Pro: 5.0 g/dL / ALK PHOS: 53 U/L / ALT: 23 U/L / AST: 15 U/L / GGT: x               Culture - Blood (collected 05-28-19 @ 18:35)  Source: .Blood None  Preliminary Report (05-30-19 @ 01:01):    No growth to date.    Culture - Blood (collected 05-27-19 @ 21:57)  Source: .Blood None  Preliminary Report (05-29-19 @ 06:01):    No growth to date.        Lactate, Blood: 1.2 mmol/L (05-30-19 @ 06:36)  Lactate, Blood: 1.1 mmol/L (05-29-19 @ 11:36)  Lactate, Blood: 0.7 mmol/L (05-28-19 @ 07:11)      INFECTIOUS DISEASES TESTING      RADIOLOGY & ADDITIONAL TESTS:  I have personally reviewed the last Chest xray  CXR  Xray Chest 1 View- PORTABLE-Urgent:   EXAM:  XR CHEST PORTABLE URGENT 1V            PROCEDURE DATE:  05/29/2019            INTERPRETATION:  Clinical History / Reason for exam: Congestion.    Comparison : Chest radiograph May 27, 2019.    Technique/Positioning: AP portable chest radiograph, rotated exam.    Findings:    Support devices: None.    Cardiac/mediastinum/hilum: Unchanged.    Lung parenchyma/Pleura: Persistent pulmonary vascular congestion.   Increased right lung base opacity. No pneumothorax.    Skeleton/soft tissues: Unchanged.    Impression:      Increased right lung base opacity.    Persistent pulmonary vascular congestion.                  MARLIN CAGE M.D., RESIDENT RADIOLOGIST  This document has been electronically signed.  TARAN GOODEN M.D., ATTENDING RADIOLOGIST  This document has been electronically signed. May 29 2019 12:25PM             (05-29-19 @ 09:25)      CT      CARDIOLOGY TESTING  Transthoracic Echocardiogram:    EXAM:  2-D ECHO (TTE) COMPLETE        PROCEDURE DATE:  05/22/2019      INTERPRETATION:  REPORT:    TRANSTHORACIC ECHOCARDIOGRAM REPORT         Patient Name:   BABAR ENRIQUEZ Accession #: [id number]  Medical Rec #:  [id number]      Height:      60.0 in 152.4 cm  YOB: 1940     Weight:      185.0 lb 83.91 kg  Patient Age:    78 years      BSA:         1.81 m²  Patient Gender: F             BP:          119/82 mmHg       Date of Exam:        5/22/2019 12:44:59 PM  Referring Physician: [id number] RANJANA LOPEZ  Sonographer:         Ellyn Felipe  Reading Physician:   Kayode Nash M.D.    Procedure:     2D Echo/Doppler/Color Doppler Complete.  Indications:   R94.31 - Abnormal Electrocardiogram [ECG] [EKG]  Diagnosis:     Abnormal electrocardiogram [ECG] [EKG] - R94.31  Study Details: Technically suboptimal study. Study quality was adversely                 affected due to the patient being uncooperative.         Summary:   1. LV Ejection Fraction by Penaloza's Method with a biplane EF of69 %.   2. Spectral Doppler shows impaired relaxation pattern of left   ventricular myocardial filling (Grade I diastolic dysfunction).   3. Moderate mitral annular calcification.   4. Moderate thickening and calcification of the posterior mitral valve   leaflet.    PHYSICIAN INTERPRETATION:  Left Ventricle: Normal left ventricular size and wall thicknesses, with   normal systolic and diastolic function. Spectral Doppler shows impaired   relaxation pattern of left ventricular myocardial filling (Grade I   diastolic dysfunction).  Right Ventricle: Normal right ventricular size and function.  Left Atrium: Normal left atrial size.  Right Atrium: Normal right atrial size.  Pericardium: There is no evidence of pericardial effusion.  Mitral Valve: Structurally normal mitral valve, with normal leaflet   excursion. The mitral valve is normal in structure. Moderate thickening   and calcification of the posterior mitral valve leaflet. There is   moderate mitral annular calcification. No evidence ofmitral valve   regurgitation is seen.  Tricuspid Valve: Structurally normal tricuspid valve, with normal leaflet   excursion. The tricuspid valve is normal in structure. Mild tricuspid   regurgitation is visualized.  Aortic Valve: The aortic valve was not well visualized. The aortic valve   is normal. No evidence of aortic stenosis. No evidence of aortic valve   regurgitation is seen.  Pulmonic Valve: The pulmonic valve was not well visualized. The pulmonic   valve is normal. No indication of pulmonic valve regurgitation.  Aorta: The aortic root and ascending aorta are structurally normal, with   no evidence of dilitation.  Pulmonary Artery: The main pulmonary artery is normal in size.       2D AND M-MODE MEASUREMENTS (normal ranges within parentheses):  Left                  Normal   Aorta/Left             Normal  Ventricle:                     Atrium:  IVSd (2D):  1.04 cm  (0.7-1.1) AoV Cusp       1.67  (1.5-2.6)  LVPWd       0.98 cm  (0.7-1.1) Separation:     cm  (2D):        Left Atrium    2.81  (1.9-4.0)  LVIDd       3.41 cm  (3.4-5.7) (Mmode):        cm  (2D):                          LA Volume      10.2  LVIDs       1.79 cm            Index         ml/m²  (2D):                          Right  LV FS       47.7 %    (>25%)   Ventricle:  (2D):                          RVd (2D):      2.68 cm  IVSd        0.71 cm  (0.7-1.1)  (Mmode):  LVPWd       0.86 cm  (0.7-1.1)  (Mmode):  LVIDd       3.86 cm  (3.4-5.7)  (Mmode):  LVIDs       2.07 cm  (Mmode):  LV FS       46.4 %    (>25%)  (Mmode):  Relative     0.57     (<0.42)  Wall  Thickness  Rel. Wall    0.45     (<0.42)  Thickness  Mm  LV Mass    47.7 g/m²  Index:  Mmode    SPECTRAL DOPPLER ANALYSIS:  LV DIASTOLIC FUNCTION:  MV Peak E: 0.84 m/s Decel Time: 140 msec  MV Peak A: 1.27 m/s  E/A Ratio: 0.66    LVOT Vmax: 1.22 m/s  LVOT VTI:  0.19 m  LVOT Diam: 1.96 cm    Mitral Valve:  MV P1/2 Time: 40.49 msec  MV Area, PHT: 5.43 cm²    Tricuspid Valve and PA/RV Systolic Pressure: TR Max Velocity: 1.24 m/s RA   Pressure: 10 mmHg RVSP/PASP: 16.2 mmHg       [id number] Kayode Nash M.D., Electronically signed on 5/22/2019 at 2:51:04   PM              *** Final ***                    KAYODE NASH MD  This document has been electronically signed. May 22 2019 12:44PM            (05-22-19 @ 12:44)  12 Lead ECG:   Ventricular Rate 111 BPM    Atrial Rate 111 BPM    P-R Interval 168 ms    QRS Duration 88 ms    Q-T Interval 340 ms    QTC Calculation(Bezet) 462 ms    P Axis 60 degrees    R Axis -46 degrees    T Axis 19 degrees    Diagnosis Line Sinus tachycardia  Pulmonary disease pattern  Left anterior fascicular block  Moderate voltage criteria for LVH, may be normal variant  Abnormal ECG    Confirmed by Jas Lopez ([id number]) on 5/21/2019 10:26:44 PM (05-21-19 @ 21:42)      MEDICATIONS  benztropine 0.5  cefepime   IVPB   cefepime   IVPB 1000  chlorhexidine 4% Liquid 1  diVALproex ER 1500  docusate sodium 100  guaiFENesin/dextromethorphan  Syrup 10  haloperidol    Concentrate 1  heparin  Injectable 5000  ipratropium    for Nebulization 500  multivitamin/minerals 1  polyethylene glycol 3350 17  sodium chloride 0.9%. 1000      ANTIBIOTICS:  cefepime   IVPB      cefepime   IVPB 1000 milliGRAM(s) IV Intermittent every 12 hours

## 2019-05-31 NOTE — PROGRESS NOTE ADULT - ASSESSMENT
78F with PMH of intellectual disability from group home and bipolar disorder, DLD, and esophageal varices s/p mechanical fall and right giuliano-arthroplasty POD 8, has been having post op fever with productive cough and congestion which has been improving on cefepime    # Acute Right Femoral Neck Fracture s/p mechanical fall  -s/p Right Hip Giuliano POD # 8  -RLE WBAT, c/w PT/OT.  -Pain control   -c/w DVT ppx;    # Post op fever likely aspiration pneumonia, resolving  -no longer having fever, wbc normal now  -CXR shows right lower lobe opacities, UA -ve, urine cx and blood cx NGTD, LE duplex -ve for DVT  -on cefepime 1g q12h as per ID    # PMH Bipolar Disorder: continue haldol, benztropine, Depakote     #Hx of Dysphagia,  -pt was on puree diet at nursing home  -seen by Speech and swallow c/w regular with thins alternating solids with liquids.  -changed back to pureed dysphagia 1 nectar thick due to worsening chest congestion    Contacted group home. Patient's father used to make medical decisions for her, but he passed away recently. The cousin (Nichole Dee [phone]) makes decisions for the pt now.    PLAN: monitor temp, chest PT for cough, out of bed to chair    DVT ppx: Heparin sub-q  GI ppx: not indicated   Diet: pureed dysphagia 1 nectar thick   Activity: Increase as tolerated, WBAT LLE fall precautions   Dispo: back to group home once possible infxn resolved  Code status: FULL CODE 78F with PMH of intellectual disability from group home and bipolar disorder, DLD, and esophageal varices s/p mechanical fall and right giuliano-arthroplasty POD 9, has been having post op fever with productive cough and congestion which has been improving on cefepime    # Acute Right Femoral Neck Fracture s/p mechanical fall  -s/p Right Hip Giuliano POD # 9  -RLE WBAT, c/w PT/OT.  -Pain control   -c/w DVT ppx;    # Post op fever likely aspiration pneumonia, resolving  -no longer having fever, wbc normal now  -CXR shows right lower lobe opacities, UA -ve, urine cx and blood cx NGTD, LE duplex -ve for DVT  -on cefepime 1g q12h as per ID, last dose today    # PMH Bipolar Disorder: continue haldol, benztropine, Depakote     #Hx of Dysphagia,  -pt was on puree diet at nursing home  -seen by Speech and swallow c/w regular with thins alternating solids with liquids.  -changed back to pureed dysphagia 1 nectar thick due to worsening chest congestion    The cousin (Nichole Dee [phone]) makes decisions for the pt.    PLAN: monitor temp, chest PT for cough, out of bed to chair, stopped daily miralax due to soft bowel movements, monitor for cdif    DVT ppx: Heparin sub-q  GI ppx: not indicated   Diet: pureed dysphagia 1 nectar thick   Activity: Increase as tolerated, WBAT LLE fall precautions   Dispo: back to group [place] once possible infxn resolved  Code status: FULL CODE

## 2019-05-31 NOTE — PROGRESS NOTE ADULT - SUBJECTIVE AND OBJECTIVE BOX
BABAR ENRIQUEZ  78y  Female  looks better  more alert  sitting on chair  still has deep productive cough w greenish phlegm  had one loose bm  o2 sat 93 on ra    INTERVAL EVENTS:    T(C): 36.3 (05-31-19 @ 00:00), Max: 36.3 (05-30-19 @ 15:40)  HR: 81 (05-31-19 @ 00:00) (81 - 84)  BP: 108/52 (05-31-19 @ 00:00) (108/52 - 114/55)  RR: 16 (05-31-19 @ 00:00) (16 - 18)  SpO2: --  Wt(kg): --Vital Signs Last 24 Hrs  T(C): 36.3 (31 May 2019 00:00), Max: 36.3 (30 May 2019 15:40)  T(F): 97.3 (31 May 2019 00:00), Max: 97.3 (30 May 2019 15:40)  HR: 81 (31 May 2019 00:00) (81 - 84)  BP: 108/52 (31 May 2019 00:00) (108/52 - 114/55)  BP(mean): --  RR: 16 (31 May 2019 00:00) (16 - 18)  SpO2: --    PHYSICAL EXAM:  GENERAL: resting comfortably  NECK: Supple, No JVD, Normal thyroid  CHEST/LUNG: b/lat scattered creps and rhonchi  HEART: S1, S2, Regular rate and rhythm;   ABDOMEN: Soft, Nontender, Nondistended; Bowel sounds present  EXTREMITIES: No clubbing, cyanosis, or edema  SKIN: No rashes or lesions    LABS:                          7.9    7.92  )-----------( 256      ( 31 May 2019 04:59 )             24.2     05-31    143  |  108  |  13  ----------------------------<  92  4.5   |  26  |  <0.5<L>    Ca    8.4<L>      31 May 2019 04:59  Mg     1.9     05-31    TPro  5.0<L>  /  Alb  2.7<L>  /  TBili  0.4  /  DBili  x   /  AST  15  /  ALT  23  /  AlkPhos  53  05-31          Culture - Blood (collected 28 May 2019 18:35)  Source: .Blood None  Preliminary Report (30 May 2019 01:01):    No growth to date.          acetaminophen   Tablet .. 650 milliGRAM(s) Oral every 6 hours PRN  aluminum hydroxide/magnesium hydroxide/simethicone Suspension 30 milliLiter(s) Oral four times a day PRN  benztropine 0.5 milliGRAM(s) Oral two times a day  bisacodyl Suppository 10 milliGRAM(s) Rectal daily PRN  cefepime   IVPB      cefepime   IVPB 1000 milliGRAM(s) IV Intermittent every 12 hours  chlorhexidine 4% Liquid 1 Application(s) Topical <User Schedule>  diVALproex ER 1500 milliGRAM(s) Oral at bedtime  docusate sodium 100 milliGRAM(s) Oral three times a day  guaiFENesin/dextromethorphan  Syrup 10 milliLiter(s) Oral every 4 hours  haloperidol    Concentrate 1 milliGRAM(s) Oral at bedtime  heparin  Injectable 5000 Unit(s) SubCutaneous every 8 hours  ipratropium    for Nebulization 500 MICROGram(s) Nebulizer every 6 hours  magnesium hydroxide Suspension 30 milliLiter(s) Oral daily PRN  multivitamin/minerals 1 Tablet(s) Oral daily  ondansetron Injectable 4 milliGRAM(s) IV Push every 6 hours PRN  polyethylene glycol 3350 17 Gram(s) Oral daily  senna 2 Tablet(s) Oral at bedtime PRN  sodium chloride 0.9%. 1000 milliLiter(s) IV Continuous <Continuous>      RADIOLOGY & ADDITIONAL TESTS:    case discussed with the resident  Available consult notes reviewed    Assessment and plan:     cxr noted  previous one didnt indicate pulmonary vascular congestion or rll infiltrate  review w radiology  ID f/u for antibiotics  monitor cbc, hgn 7.9  probiotics, monitor bms  ? repeat cxr

## 2019-06-01 LAB
APPEARANCE UR: ABNORMAL
BACTERIA # UR AUTO: ABNORMAL /HPF
BILIRUB UR-MCNC: NEGATIVE — SIGNIFICANT CHANGE UP
COLOR SPEC: YELLOW — SIGNIFICANT CHANGE UP
DIFF PNL FLD: NEGATIVE — SIGNIFICANT CHANGE UP
EPI CELLS # UR: ABNORMAL /HPF
GLUCOSE UR QL: NEGATIVE MG/DL — SIGNIFICANT CHANGE UP
KETONES UR-MCNC: NEGATIVE — SIGNIFICANT CHANGE UP
LEUKOCYTE ESTERASE UR-ACNC: NEGATIVE — SIGNIFICANT CHANGE UP
NITRITE UR-MCNC: NEGATIVE — SIGNIFICANT CHANGE UP
PH UR: 7 — SIGNIFICANT CHANGE UP (ref 5–8)
PROT UR-MCNC: NEGATIVE MG/DL — SIGNIFICANT CHANGE UP
RBC CASTS # UR COMP ASSIST: ABNORMAL /HPF
SP GR SPEC: 1.01 — SIGNIFICANT CHANGE UP (ref 1.01–1.03)
UROBILINOGEN FLD QL: 0.2 MG/DL — SIGNIFICANT CHANGE UP (ref 0.2–0.2)
WBC UR QL: ABNORMAL /HPF

## 2019-06-01 RX ORDER — CEFEPIME 1 G/1
1000 INJECTION, POWDER, FOR SOLUTION INTRAMUSCULAR; INTRAVENOUS EVERY 12 HOURS
Refills: 0 | Status: DISCONTINUED | OUTPATIENT
Start: 2019-06-02 | End: 2019-06-03

## 2019-06-01 RX ORDER — CEFEPIME 1 G/1
1000 INJECTION, POWDER, FOR SOLUTION INTRAMUSCULAR; INTRAVENOUS ONCE
Refills: 0 | Status: COMPLETED | OUTPATIENT
Start: 2019-06-01 | End: 2019-06-01

## 2019-06-01 RX ORDER — CEFEPIME 1 G/1
INJECTION, POWDER, FOR SOLUTION INTRAMUSCULAR; INTRAVENOUS
Refills: 0 | Status: DISCONTINUED | OUTPATIENT
Start: 2019-06-01 | End: 2019-06-03

## 2019-06-01 RX ADMIN — Medication 0.5 MILLIGRAM(S): at 05:28

## 2019-06-01 RX ADMIN — Medication 200 MILLIGRAM(S): at 14:29

## 2019-06-01 RX ADMIN — Medication 500 MICROGRAM(S): at 15:33

## 2019-06-01 RX ADMIN — Medication 100 MILLIGRAM(S): at 22:40

## 2019-06-01 RX ADMIN — Medication 1 TABLET(S): at 14:28

## 2019-06-01 RX ADMIN — Medication 10 MILLILITER(S): at 05:28

## 2019-06-01 RX ADMIN — HEPARIN SODIUM 5000 UNIT(S): 5000 INJECTION INTRAVENOUS; SUBCUTANEOUS at 05:29

## 2019-06-01 RX ADMIN — HALOPERIDOL DECANOATE 1 MILLIGRAM(S): 100 INJECTION INTRAMUSCULAR at 22:39

## 2019-06-01 RX ADMIN — Medication 500 MICROGRAM(S): at 20:40

## 2019-06-01 RX ADMIN — Medication 200 MILLIGRAM(S): at 18:24

## 2019-06-01 RX ADMIN — CEFEPIME 100 MILLIGRAM(S): 1 INJECTION, POWDER, FOR SOLUTION INTRAMUSCULAR; INTRAVENOUS at 18:26

## 2019-06-01 RX ADMIN — Medication 100 MILLIGRAM(S): at 05:28

## 2019-06-01 RX ADMIN — HEPARIN SODIUM 5000 UNIT(S): 5000 INJECTION INTRAVENOUS; SUBCUTANEOUS at 22:38

## 2019-06-01 RX ADMIN — HEPARIN SODIUM 5000 UNIT(S): 5000 INJECTION INTRAVENOUS; SUBCUTANEOUS at 14:29

## 2019-06-01 RX ADMIN — DIVALPROEX SODIUM 1500 MILLIGRAM(S): 500 TABLET, DELAYED RELEASE ORAL at 22:39

## 2019-06-01 RX ADMIN — Medication 0.5 MILLIGRAM(S): at 18:25

## 2019-06-01 RX ADMIN — CHLORHEXIDINE GLUCONATE 1 APPLICATION(S): 213 SOLUTION TOPICAL at 05:29

## 2019-06-01 RX ADMIN — CEFEPIME 100 MILLIGRAM(S): 1 INJECTION, POWDER, FOR SOLUTION INTRAMUSCULAR; INTRAVENOUS at 05:29

## 2019-06-01 NOTE — PROGRESS NOTE ADULT - SUBJECTIVE AND OBJECTIVE BOX
SUBJECTIVE:    Patient is a 78y old Female who presents with a chief complaint of Fall (31 May 2019 14:20)      HPI:  77 yo lady with hx of MR and bipolar , DLD , esophageal varices sent for the above CC . As per aid , patient was in rehab today , had witnessed mechanical fall , with no head trauma .  No LOC . (22 May 2019 00:44)      Currently admitted to medicine with the primary diagnosis of Femoral neck fracture     stating that she's unable to urinate    Besides the pertinent positives and negatives described above, the ROS was within normal limits.    PAST MEDICAL & SURGICAL HISTORY  Breast mass, right: s/p lumpectomy &amp; radiation   Hyperlipemia  Brito esophagus  Mild mental retardation  Atypical bipolar disorder  Feeding by G-tube: s/p removal     SOCIAL HISTORY:    ALLERGIES:  No Known Allergies    MEDICATIONS:  STANDING MEDICATIONS  benztropine 0.5 milliGRAM(s) Oral two times a day  cefepime   IVPB      cefepime   IVPB 1000 milliGRAM(s) IV Intermittent every 12 hours  chlorhexidine 4% Liquid 1 Application(s) Topical <User Schedule>  diVALproex ER 1500 milliGRAM(s) Oral at bedtime  docusate sodium 100 milliGRAM(s) Oral three times a day  guaiFENesin/dextromethorphan  Syrup 10 milliLiter(s) Oral every 4 hours  haloperidol    Concentrate 1 milliGRAM(s) Oral at bedtime  heparin  Injectable 5000 Unit(s) SubCutaneous every 8 hours  ipratropium    for Nebulization 500 MICROGram(s) Nebulizer every 6 hours  multivitamin/minerals 1 Tablet(s) Oral daily  sodium chloride 0.9%. 1000 milliLiter(s) IV Continuous <Continuous>    PRN MEDICATIONS  acetaminophen   Tablet .. 650 milliGRAM(s) Oral every 6 hours PRN  aluminum hydroxide/magnesium hydroxide/simethicone Suspension 30 milliLiter(s) Oral four times a day PRN  ondansetron Injectable 4 milliGRAM(s) IV Push every 6 hours PRN  senna 2 Tablet(s) Oral at bedtime PRN    VITALS:   T(F): 96.5  HR: 88  BP: 106/61  RR: 19  SpO2: 94%    LABS:                        7.9    7.92  )-----------( 256      ( 31 May 2019 04:59 )             24.2     05-31    143  |  108  |  13  ----------------------------<  92  4.5   |  26  |  <0.5<L>    Ca    8.4<L>      31 May 2019 04:59  Mg     1.9         TPro  5.0<L>  /  Alb  2.7<L>  /  TBili  0.4  /  DBili  x   /  AST  15  /  ALT  23  /  AlkPhos  53        Urinalysis Basic - ( 2019 06:25 )    Color: Yellow / Appearance: Cloudy / S.015 / pH: x  Gluc: x / Ketone: Negative  / Bili: Negative / Urobili: 0.2 mg/dL   Blood: x / Protein: Negative mg/dL / Nitrite: Negative   Leuk Esterase: Negative / RBC: 3-5 /HPF / WBC 6-10 /HPF   Sq Epi: x / Non Sq Epi: Moderate /HPF / Bacteria: Few /HPF      RADIOLOGY:    PHYSICAL EXAM:  GEN: No acute distress  LUNGS: Clear to auscultation bilaterally   HEART: Regular  ABD: Soft, non-tender, non-distended.  EXT: NC/NE/2+PP/SCOTT/Skin Intact.   NEURO: AAOX3    Intravenous access: yes  NG tube: no  Mcqueen Catheter: no

## 2019-06-01 NOTE — PROGRESS NOTE ADULT - ASSESSMENT
78F with PMH of intellectual disability from group home and bipolar disorder, DLD, and esophageal varices s/p mechanical fall and right giuliano-arthroplasty POD 10, has been having post op fever with productive cough and congestion which has been improving on cefepime    # Acute Right Femoral Neck Fracture s/p mechanical fall  -s/p Right Hip Giuliano POD # 10  -RLE WBAT, c/w PT/OT.  -Pain control   -c/w DVT ppx;    # Urinary retention  -getting bladder scan now, UA negative    # Post op fever likely aspiration pneumonia, resolving  -no longer having fever, wbc normal now  -CXR shows right lower lobe opacities, UA -ve, urine cx and blood cx NGTD, LE duplex -ve for DVT  -completed course of cefepime    # PMH Bipolar Disorder: continue haldol, benztropine, Depakote     #Hx of Dysphagia,  -pt was on puree diet at nursing home  -seen by Speech and swallow c/w regular with thins alternating solids with liquids.  -on pureed dysphagia 1 nectar thick due to worsening chest congestion    The cousin (Nichole Dee [phone]) makes decisions for the pt.    PLAN: monitor temp, chest PT for cough, out of bed to chair, likely discharge Monday to group home, f/u urine culture    DVT ppx: Heparin sub-q  GI ppx: not indicated   Diet: pureed dysphagia 1 nectar thick   Activity: Increase as tolerated, WBAT LLE fall precautions   Code status: FULL CODE

## 2019-06-01 NOTE — PROGRESS NOTE ADULT - SUBJECTIVE AND OBJECTIVE BOX
BABAR ENRIQUEZ  78y  Female  afebrile, comfortable  thick yellow sputum production  ua-  cxr reviewed w radiologist- rortated, but could be rll infiltrate- aspiration pna likely- cont antibiotics for now    INTERVAL EVENTS:    T(C): 35.8 (19 @ 15:30), Max: 35.8 (19 @ 07:10)  HR: 83 (19 @ 15:30) (83 - 88)  BP: 118/56 (19 @ 15:30) (106/61 - 118/56)  RR: 18 (19 @ 15:30) (18 - 19)  SpO2: --  Wt(kg): --Vital Signs Last 24 Hrs  T(C): 35.8 (2019 15:30), Max: 35.8 (2019 07:10)  T(F): 96.5 (2019 15:30), Max: 96.5 (2019 07:10)  HR: 83 (2019 15:30) (83 - 88)  BP: 118/56 (2019 15:30) (106/61 - 118/56)  BP(mean): --  RR: 18 (2019 15:30) (18 - 19)  SpO2: --    PHYSICAL EXAM:  GENERAL: resting comfortably  NECK: Supple, No JVD, Normal thyroid  CHEST/LUNG: Clear; No crackles or wheezing  HEART: S1, S2, Regular rate and rhythm;   ABDOMEN: Soft, Nontender, Nondistended; Bowel sounds present  EXTREMITIES: No clubbing, cyanosis, or edema  SKIN: No rashes or lesions    LABS:                          7.9    7.92  )-----------( 256      ( 31 May 2019 04:59 )             24.2         143  |  108  |  13  ----------------------------<  92  4.5   |  26  |  <0.5<L>    Ca    8.4<L>      31 May 2019 04:59  Mg     1.9         TPro  5.0<L>  /  Alb  2.7<L>  /  TBili  0.4  /  DBili  x   /  AST  15  /  ALT  23  /  AlkPhos  53        Urinalysis Basic - ( 2019 06:25 )    Color: Yellow / Appearance: Cloudy / S.015 / pH: x  Gluc: x / Ketone: Negative  / Bili: Negative / Urobili: 0.2 mg/dL   Blood: x / Protein: Negative mg/dL / Nitrite: Negative   Leuk Esterase: Negative / RBC: 3-5 /HPF / WBC 6-10 /HPF   Sq Epi: x / Non Sq Epi: Moderate /HPF / Bacteria: Few /HPF            acetaminophen   Tablet .. 650 milliGRAM(s) Oral every 6 hours PRN  aluminum hydroxide/magnesium hydroxide/simethicone Suspension 30 milliLiter(s) Oral four times a day PRN  benztropine 0.5 milliGRAM(s) Oral two times a day  chlorhexidine 4% Liquid 1 Application(s) Topical <User Schedule>  diVALproex ER 1500 milliGRAM(s) Oral at bedtime  docusate sodium 100 milliGRAM(s) Oral three times a day  guaiFENesin    Syrup 200 milliGRAM(s) Oral every 6 hours  haloperidol    Concentrate 1 milliGRAM(s) Oral at bedtime  heparin  Injectable 5000 Unit(s) SubCutaneous every 8 hours  ipratropium    for Nebulization 500 MICROGram(s) Nebulizer every 6 hours  multivitamin/minerals 1 Tablet(s) Oral daily  ondansetron Injectable 4 milliGRAM(s) IV Push every 6 hours PRN  senna 2 Tablet(s) Oral at bedtime PRN      RADIOLOGY & ADDITIONAL TESTS:    case discussed with the resident  Available consult notes reviewed    Assessment and plan:     cont antibiotics, resp treatments,  repeat cbc tomorrow  anticipate d/c monday

## 2019-06-01 NOTE — PROVIDER CONTACT NOTE (OTHER) - SITUATION
Unable to obtain IV Access, pt has IV antibiotics.
Spoke with MD to inform that pt has a temp of 101 after tylenol was given.
bladder scan showed 148cc.

## 2019-06-01 NOTE — PROVIDER CONTACT NOTE (OTHER) - ACTION/TREATMENT ORDERED:
MD Sloan ext [phone] made aware. Made 3 attempts to use ultra sound guided with no success. MDs aware will retry to obtain access. IV antibiotics held.
MD stated she will order motrin
as per MD Cohen, continue to monitor urine output.
Will put in an order for Tylenol and neb.

## 2019-06-02 ENCOUNTER — TRANSCRIPTION ENCOUNTER (OUTPATIENT)
Age: 79
End: 2019-06-02

## 2019-06-02 LAB
ALBUMIN SERPL ELPH-MCNC: 2.9 G/DL — LOW (ref 3.5–5.2)
ALBUMIN SERPL ELPH-MCNC: 3 G/DL — LOW (ref 3.5–5.2)
ALP SERPL-CCNC: 53 U/L — SIGNIFICANT CHANGE UP (ref 30–115)
ALP SERPL-CCNC: 62 U/L — SIGNIFICANT CHANGE UP (ref 30–115)
ALT FLD-CCNC: 21 U/L — SIGNIFICANT CHANGE UP (ref 0–41)
ALT FLD-CCNC: 22 U/L — SIGNIFICANT CHANGE UP (ref 0–41)
ANION GAP SERPL CALC-SCNC: 11 MMOL/L — SIGNIFICANT CHANGE UP (ref 7–14)
ANION GAP SERPL CALC-SCNC: 11 MMOL/L — SIGNIFICANT CHANGE UP (ref 7–14)
AST SERPL-CCNC: 14 U/L — SIGNIFICANT CHANGE UP (ref 0–41)
AST SERPL-CCNC: 14 U/L — SIGNIFICANT CHANGE UP (ref 0–41)
BASOPHILS # BLD AUTO: 0.04 K/UL — SIGNIFICANT CHANGE UP (ref 0–0.2)
BASOPHILS # BLD AUTO: 0.06 K/UL — SIGNIFICANT CHANGE UP (ref 0–0.2)
BASOPHILS NFR BLD AUTO: 0.5 % — SIGNIFICANT CHANGE UP (ref 0–1)
BASOPHILS NFR BLD AUTO: 0.6 % — SIGNIFICANT CHANGE UP (ref 0–1)
BILIRUB SERPL-MCNC: 0.4 MG/DL — SIGNIFICANT CHANGE UP (ref 0.2–1.2)
BILIRUB SERPL-MCNC: 0.5 MG/DL — SIGNIFICANT CHANGE UP (ref 0.2–1.2)
BUN SERPL-MCNC: 11 MG/DL — SIGNIFICANT CHANGE UP (ref 10–20)
BUN SERPL-MCNC: 9 MG/DL — LOW (ref 10–20)
CALCIUM SERPL-MCNC: 8.6 MG/DL — SIGNIFICANT CHANGE UP (ref 8.5–10.1)
CALCIUM SERPL-MCNC: 8.8 MG/DL — SIGNIFICANT CHANGE UP (ref 8.5–10.1)
CHLORIDE SERPL-SCNC: 106 MMOL/L — SIGNIFICANT CHANGE UP (ref 98–110)
CHLORIDE SERPL-SCNC: 106 MMOL/L — SIGNIFICANT CHANGE UP (ref 98–110)
CO2 SERPL-SCNC: 25 MMOL/L — SIGNIFICANT CHANGE UP (ref 17–32)
CO2 SERPL-SCNC: 26 MMOL/L — SIGNIFICANT CHANGE UP (ref 17–32)
CREAT SERPL-MCNC: <0.5 MG/DL — LOW (ref 0.7–1.5)
CREAT SERPL-MCNC: <0.5 MG/DL — LOW (ref 0.7–1.5)
CULTURE RESULTS: SIGNIFICANT CHANGE UP
EOSINOPHIL # BLD AUTO: 0.17 K/UL — SIGNIFICANT CHANGE UP (ref 0–0.7)
EOSINOPHIL # BLD AUTO: 0.19 K/UL — SIGNIFICANT CHANGE UP (ref 0–0.7)
EOSINOPHIL NFR BLD AUTO: 1.9 % — SIGNIFICANT CHANGE UP (ref 0–8)
EOSINOPHIL NFR BLD AUTO: 1.9 % — SIGNIFICANT CHANGE UP (ref 0–8)
GLUCOSE SERPL-MCNC: 94 MG/DL — SIGNIFICANT CHANGE UP (ref 70–99)
GLUCOSE SERPL-MCNC: 97 MG/DL — SIGNIFICANT CHANGE UP (ref 70–99)
HCT VFR BLD CALC: 24.7 % — LOW (ref 37–47)
HCT VFR BLD CALC: 26.9 % — LOW (ref 37–47)
HGB BLD-MCNC: 7.9 G/DL — LOW (ref 12–16)
HGB BLD-MCNC: 8.8 G/DL — LOW (ref 12–16)
IMM GRANULOCYTES NFR BLD AUTO: 3.2 % — HIGH (ref 0.1–0.3)
IMM GRANULOCYTES NFR BLD AUTO: 3.9 % — HIGH (ref 0.1–0.3)
LYMPHOCYTES # BLD AUTO: 2.26 K/UL — SIGNIFICANT CHANGE UP (ref 1.2–3.4)
LYMPHOCYTES # BLD AUTO: 2.59 K/UL — SIGNIFICANT CHANGE UP (ref 1.2–3.4)
LYMPHOCYTES # BLD AUTO: 25.7 % — SIGNIFICANT CHANGE UP (ref 20.5–51.1)
LYMPHOCYTES # BLD AUTO: 25.8 % — SIGNIFICANT CHANGE UP (ref 20.5–51.1)
MAGNESIUM SERPL-MCNC: 2 MG/DL — SIGNIFICANT CHANGE UP (ref 1.8–2.4)
MCHC RBC-ENTMCNC: 31.7 PG — HIGH (ref 27–31)
MCHC RBC-ENTMCNC: 32 G/DL — SIGNIFICANT CHANGE UP (ref 32–37)
MCHC RBC-ENTMCNC: 32.4 PG — HIGH (ref 27–31)
MCHC RBC-ENTMCNC: 32.7 G/DL — SIGNIFICANT CHANGE UP (ref 32–37)
MCV RBC AUTO: 98.9 FL — SIGNIFICANT CHANGE UP (ref 81–99)
MCV RBC AUTO: 99.2 FL — HIGH (ref 81–99)
MONOCYTES # BLD AUTO: 0.55 K/UL — SIGNIFICANT CHANGE UP (ref 0.1–0.6)
MONOCYTES # BLD AUTO: 0.67 K/UL — HIGH (ref 0.1–0.6)
MONOCYTES NFR BLD AUTO: 6.3 % — SIGNIFICANT CHANGE UP (ref 1.7–9.3)
MONOCYTES NFR BLD AUTO: 6.7 % — SIGNIFICANT CHANGE UP (ref 1.7–9.3)
NEUTROPHILS # BLD AUTO: 5.49 K/UL — SIGNIFICANT CHANGE UP (ref 1.4–6.5)
NEUTROPHILS # BLD AUTO: 6.14 K/UL — SIGNIFICANT CHANGE UP (ref 1.4–6.5)
NEUTROPHILS NFR BLD AUTO: 61.1 % — SIGNIFICANT CHANGE UP (ref 42.2–75.2)
NEUTROPHILS NFR BLD AUTO: 62.4 % — SIGNIFICANT CHANGE UP (ref 42.2–75.2)
NRBC # BLD: 0 /100 WBCS — SIGNIFICANT CHANGE UP (ref 0–0)
NRBC # BLD: 0 /100 WBCS — SIGNIFICANT CHANGE UP (ref 0–0)
PLATELET # BLD AUTO: 339 K/UL — SIGNIFICANT CHANGE UP (ref 130–400)
PLATELET # BLD AUTO: 391 K/UL — SIGNIFICANT CHANGE UP (ref 130–400)
POTASSIUM SERPL-MCNC: 4.4 MMOL/L — SIGNIFICANT CHANGE UP (ref 3.5–5)
POTASSIUM SERPL-MCNC: 4.4 MMOL/L — SIGNIFICANT CHANGE UP (ref 3.5–5)
POTASSIUM SERPL-SCNC: 4.4 MMOL/L — SIGNIFICANT CHANGE UP (ref 3.5–5)
POTASSIUM SERPL-SCNC: 4.4 MMOL/L — SIGNIFICANT CHANGE UP (ref 3.5–5)
PROT SERPL-MCNC: 5.2 G/DL — LOW (ref 6–8)
PROT SERPL-MCNC: 5.5 G/DL — LOW (ref 6–8)
RBC # BLD: 2.49 M/UL — LOW (ref 4.2–5.4)
RBC # BLD: 2.72 M/UL — LOW (ref 4.2–5.4)
RBC # FLD: 12.1 % — SIGNIFICANT CHANGE UP (ref 11.5–14.5)
RBC # FLD: 12.3 % — SIGNIFICANT CHANGE UP (ref 11.5–14.5)
SODIUM SERPL-SCNC: 142 MMOL/L — SIGNIFICANT CHANGE UP (ref 135–146)
SODIUM SERPL-SCNC: 143 MMOL/L — SIGNIFICANT CHANGE UP (ref 135–146)
SPECIMEN SOURCE: SIGNIFICANT CHANGE UP
WBC # BLD: 10.04 K/UL — SIGNIFICANT CHANGE UP (ref 4.8–10.8)
WBC # BLD: 8.79 K/UL — SIGNIFICANT CHANGE UP (ref 4.8–10.8)
WBC # FLD AUTO: 10.04 K/UL — SIGNIFICANT CHANGE UP (ref 4.8–10.8)
WBC # FLD AUTO: 8.79 K/UL — SIGNIFICANT CHANGE UP (ref 4.8–10.8)

## 2019-06-02 RX ORDER — MULTIVIT-MIN/FERROUS GLUCONATE 9 MG/15 ML
1 LIQUID (ML) ORAL
Qty: 0 | Refills: 0 | DISCHARGE
Start: 2019-06-02

## 2019-06-02 RX ADMIN — Medication 200 MILLIGRAM(S): at 05:09

## 2019-06-02 RX ADMIN — Medication 650 MILLIGRAM(S): at 00:59

## 2019-06-02 RX ADMIN — HEPARIN SODIUM 5000 UNIT(S): 5000 INJECTION INTRAVENOUS; SUBCUTANEOUS at 14:35

## 2019-06-02 RX ADMIN — CEFEPIME 100 MILLIGRAM(S): 1 INJECTION, POWDER, FOR SOLUTION INTRAMUSCULAR; INTRAVENOUS at 05:08

## 2019-06-02 RX ADMIN — Medication 200 MILLIGRAM(S): at 23:57

## 2019-06-02 RX ADMIN — HALOPERIDOL DECANOATE 1 MILLIGRAM(S): 100 INJECTION INTRAMUSCULAR at 21:54

## 2019-06-02 RX ADMIN — Medication 0.5 MILLIGRAM(S): at 18:10

## 2019-06-02 RX ADMIN — CHLORHEXIDINE GLUCONATE 1 APPLICATION(S): 213 SOLUTION TOPICAL at 05:07

## 2019-06-02 RX ADMIN — Medication 200 MILLIGRAM(S): at 00:42

## 2019-06-02 RX ADMIN — Medication 100 MILLIGRAM(S): at 21:54

## 2019-06-02 RX ADMIN — Medication 200 MILLIGRAM(S): at 18:11

## 2019-06-02 RX ADMIN — DIVALPROEX SODIUM 1500 MILLIGRAM(S): 500 TABLET, DELAYED RELEASE ORAL at 21:54

## 2019-06-02 RX ADMIN — HEPARIN SODIUM 5000 UNIT(S): 5000 INJECTION INTRAVENOUS; SUBCUTANEOUS at 21:55

## 2019-06-02 RX ADMIN — Medication 500 MICROGRAM(S): at 07:50

## 2019-06-02 RX ADMIN — HEPARIN SODIUM 5000 UNIT(S): 5000 INJECTION INTRAVENOUS; SUBCUTANEOUS at 05:08

## 2019-06-02 RX ADMIN — Medication 1 TABLET(S): at 11:07

## 2019-06-02 RX ADMIN — CEFEPIME 100 MILLIGRAM(S): 1 INJECTION, POWDER, FOR SOLUTION INTRAMUSCULAR; INTRAVENOUS at 18:14

## 2019-06-02 RX ADMIN — Medication 0.5 MILLIGRAM(S): at 05:08

## 2019-06-02 RX ADMIN — Medication 650 MILLIGRAM(S): at 00:52

## 2019-06-02 RX ADMIN — Medication 100 MILLIGRAM(S): at 05:08

## 2019-06-02 RX ADMIN — Medication 200 MILLIGRAM(S): at 11:07

## 2019-06-02 RX ADMIN — Medication 500 MICROGRAM(S): at 14:09

## 2019-06-02 NOTE — PROGRESS NOTE ADULT - SUBJECTIVE AND OBJECTIVE BOX
BABAR ENRIQUEZ  78y  Female  s/p hip arthroplasty, complicated by aspiration pna  clinically improving  still has deep cough w phlegm, but now getting clear  no diarrhea  afebrile    INTERVAL EVENTS:    T(C): 35.9 (19 @ 00:00), Max: 35.9 (19 @ 00:00)  HR: 95 (19 @ 00:00) (83 - 95)  BP: 121/56 (19 @ 00:00) (118/56 - 121/56)  RR: 18 (19 @ 00:00) (18 - 18)  SpO2: --  Wt(kg): --Vital Signs Last 24 Hrs  T(C): 35.9 (2019 00:00), Max: 35.9 (2019 00:00)  T(F): 96.7 (2019 00:00), Max: 96.7 (2019 00:00)  HR: 95 (2019 00:00) (83 - 95)  BP: 121/56 (2019 00:00) (118/56 - 121/56)  BP(mean): --  RR: 18 (2019 00:00) (18 - 18)  SpO2: --    PHYSICAL EXAM:  GENERAL: resting comfortably  NECK: Supple, No JVD, Normal thyroid  CHEST/LUNG: still scattered creps and coughs when breathing deep  HEART: S1, S2, Regular rate and rhythm;   ABDOMEN: Soft, Nontender, Nondistended; Bowel sounds present  EXTREMITIES: No clubbing, cyanosis, or edema  SKIN: No rashes or lesions    LABS:                          8.8    10.04 )-----------( 391      ( 2019 06:50 )             26.9     06-02    142  |  106  |  11  ----------------------------<  97  4.4   |  25  |  <0.5<L>    Ca    8.6      2019 01:13    TPro  5.2<L>  /  Alb  2.9<L>  /  TBili  0.4  /  DBili  x   /  AST  14  /  ALT  21  /  AlkPhos  53  06-02      Urinalysis Basic - ( 2019 06:25 )    Color: Yellow / Appearance: Cloudy / S.015 / pH: x  Gluc: x / Ketone: Negative  / Bili: Negative / Urobili: 0.2 mg/dL   Blood: x / Protein: Negative mg/dL / Nitrite: Negative   Leuk Esterase: Negative / RBC: 3-5 /HPF / WBC 6-10 /HPF   Sq Epi: x / Non Sq Epi: Moderate /HPF / Bacteria: Few /HPF            acetaminophen   Tablet .. 650 milliGRAM(s) Oral every 6 hours PRN  aluminum hydroxide/magnesium hydroxide/simethicone Suspension 30 milliLiter(s) Oral four times a day PRN  benztropine 0.5 milliGRAM(s) Oral two times a day  cefepime   IVPB      cefepime   IVPB 1000 milliGRAM(s) IV Intermittent every 12 hours  chlorhexidine 4% Liquid 1 Application(s) Topical <User Schedule>  diVALproex ER 1500 milliGRAM(s) Oral at bedtime  docusate sodium 100 milliGRAM(s) Oral three times a day  guaiFENesin    Syrup 200 milliGRAM(s) Oral every 6 hours  haloperidol    Concentrate 1 milliGRAM(s) Oral at bedtime  heparin  Injectable 5000 Unit(s) SubCutaneous every 8 hours  ipratropium    for Nebulization 500 MICROGram(s) Nebulizer every 6 hours  multivitamin/minerals 1 Tablet(s) Oral daily  ondansetron Injectable 4 milliGRAM(s) IV Push every 6 hours PRN  senna 2 Tablet(s) Oral at bedtime PRN      RADIOLOGY & ADDITIONAL TESTS:    case discussed with the resident  Available consult notes reviewed    Assessment and plan:     hgb stable  clinically much improved  oob  cont resp treatments  o2 sat stable on room air  anticipate d/c in am

## 2019-06-02 NOTE — DISCHARGE NOTE PROVIDER - HOSPITAL COURSE
77 yo lady with hx of MR and bipolar , DLD , esophageal varices sent for the above CC . As per aid , patient was in rehab today , had witnessed mechanical fall , with no head trauma .  No LOC .   During course here had right hip surgery for displaced right femoral neck fracture.  Had post op fever found to have RLL pneumonia, treated with cefepime.  Productive cough has improved.  Cleared for discharge back to group home by medicine. 79 yo lady with hx of MR and bipolar , DLD , esophageal varices sent for the above CC . As per aid , patient was in rehab today , had witnessed mechanical fall , with no head trauma .  No LOC .   During course here had right hip surgery for displaced right femoral neck fracture.  Had post op fever found to have RLL pneumonia, treated with cefepime.  Productive cough has improved. UCx 10-49,000 E faecium likely contaminant, no fever, normal WBC. Cleared for discharge back to group home by medicine.

## 2019-06-02 NOTE — DISCHARGE NOTE PROVIDER - CARE PROVIDERS DIRECT ADDRESSES
,DirectAddress_Unknown ,DirectAddress_Unknown,[email or web address]

## 2019-06-02 NOTE — DISCHARGE NOTE PROVIDER - PROVIDER TOKENS
PROVIDER:[TOKEN:[[id number]],FOLLOWUP:[1-3 days]] PROVIDER:[TOKEN:[[id number]],FOLLOWUP:[1-3 days]],PROVIDER:[TOKEN:[[id number]]]

## 2019-06-02 NOTE — DISCHARGE NOTE PROVIDER - CARE PROVIDER_API CALL
Edna Johns (MD)  Medicine  [address] [place] [address]  Phone: [phone]  Fax: [phone]  Follow Up Time: 1-3 days Edna Johns)  Medicine  [address] [place] [address]  Phone: [phone]  Fax: [phone]  Follow Up Time: 1-3 days    Bryant Rose)  Orthopaedic Surgery  [address] [place] [address]  Phone: [phone]  Fax: [phone]  Follow Up Time:

## 2019-06-02 NOTE — DISCHARGE NOTE PROVIDER - NSDCCPCAREPLAN_GEN_ALL_CORE_FT
PRINCIPAL DISCHARGE DIAGNOSIS  Diagnosis: Femoral neck fracture  Assessment and Plan of Treatment: Please bring out of bed to chair in the group home, and walk with assistance as much as possible for rehab.      SECONDARY DISCHARGE DIAGNOSES  Diagnosis: Pneumonia  Assessment and Plan of Treatment: Please encourage incentive spirometry if possible and raise head of the bed high enough to help decrease congestion.  Chest PT and guaifenesin if needed. PRINCIPAL DISCHARGE DIAGNOSIS  Diagnosis: Femoral neck fracture  Assessment and Plan of Treatment: Please bring out of bed to chair in the group home, and walk with assistance as much as possible for rehab.  Weight bearing as tolerated on right leg.  Follow orthopedics Dr. Claudia Huffman in 1-2 weeks. Follow up with primary care doctor in 1 week.  Return to emergency room if worsening symptoms.      SECONDARY DISCHARGE DIAGNOSES  Diagnosis: Pneumonia  Assessment and Plan of Treatment: Please encourage incentive spirometry if possible and raise head of the bed high enough to help decrease congestion.  Chest physiotherapy and guaifenesin if needed.  You finished your course of antibiotics cefepime.

## 2019-06-03 ENCOUNTER — TRANSCRIPTION ENCOUNTER (OUTPATIENT)
Age: 79
End: 2019-06-03

## 2019-06-03 VITALS
SYSTOLIC BLOOD PRESSURE: 100 MMHG | DIASTOLIC BLOOD PRESSURE: 57 MMHG | RESPIRATION RATE: 18 BRPM | HEART RATE: 83 BPM | TEMPERATURE: 98 F

## 2019-06-03 LAB
-  AMPICILLIN: SIGNIFICANT CHANGE UP
-  CIPROFLOXACIN: SIGNIFICANT CHANGE UP
-  LEVOFLOXACIN: SIGNIFICANT CHANGE UP
-  NITROFURANTOIN: SIGNIFICANT CHANGE UP
-  TETRACYCLINE: SIGNIFICANT CHANGE UP
-  VANCOMYCIN: SIGNIFICANT CHANGE UP
ALBUMIN SERPL ELPH-MCNC: 3 G/DL — LOW (ref 3.5–5.2)
ALP SERPL-CCNC: 61 U/L — SIGNIFICANT CHANGE UP (ref 30–115)
ALT FLD-CCNC: 18 U/L — SIGNIFICANT CHANGE UP (ref 0–41)
ANION GAP SERPL CALC-SCNC: 16 MMOL/L — HIGH (ref 7–14)
AST SERPL-CCNC: 14 U/L — SIGNIFICANT CHANGE UP (ref 0–41)
BASOPHILS # BLD AUTO: 0 K/UL — SIGNIFICANT CHANGE UP (ref 0–0.2)
BASOPHILS NFR BLD AUTO: 0 % — SIGNIFICANT CHANGE UP (ref 0–1)
BILIRUB SERPL-MCNC: 0.3 MG/DL — SIGNIFICANT CHANGE UP (ref 0.2–1.2)
BUN SERPL-MCNC: 12 MG/DL — SIGNIFICANT CHANGE UP (ref 10–20)
CALCIUM SERPL-MCNC: 8.7 MG/DL — SIGNIFICANT CHANGE UP (ref 8.5–10.1)
CHLORIDE SERPL-SCNC: 105 MMOL/L — SIGNIFICANT CHANGE UP (ref 98–110)
CO2 SERPL-SCNC: 21 MMOL/L — SIGNIFICANT CHANGE UP (ref 17–32)
CREAT SERPL-MCNC: <0.5 MG/DL — LOW (ref 0.7–1.5)
CULTURE RESULTS: SIGNIFICANT CHANGE UP
CULTURE RESULTS: SIGNIFICANT CHANGE UP
EOSINOPHIL # BLD AUTO: 0.16 K/UL — SIGNIFICANT CHANGE UP (ref 0–0.7)
EOSINOPHIL NFR BLD AUTO: 1.7 % — SIGNIFICANT CHANGE UP (ref 0–8)
GIANT PLATELETS BLD QL SMEAR: PRESENT — SIGNIFICANT CHANGE UP
GLUCOSE SERPL-MCNC: 96 MG/DL — SIGNIFICANT CHANGE UP (ref 70–99)
HCT VFR BLD CALC: 25.5 % — LOW (ref 37–47)
HGB BLD-MCNC: 8.3 G/DL — LOW (ref 12–16)
LYMPHOCYTES # BLD AUTO: 2.46 K/UL — SIGNIFICANT CHANGE UP (ref 1.2–3.4)
LYMPHOCYTES # BLD AUTO: 26.1 % — SIGNIFICANT CHANGE UP (ref 20.5–51.1)
MAGNESIUM SERPL-MCNC: 1.9 MG/DL — SIGNIFICANT CHANGE UP (ref 1.8–2.4)
MANUAL SMEAR VERIFICATION: SIGNIFICANT CHANGE UP
MCHC RBC-ENTMCNC: 32 PG — HIGH (ref 27–31)
MCHC RBC-ENTMCNC: 32.5 G/DL — SIGNIFICANT CHANGE UP (ref 32–37)
MCV RBC AUTO: 98.5 FL — SIGNIFICANT CHANGE UP (ref 81–99)
METHOD TYPE: SIGNIFICANT CHANGE UP
MONOCYTES # BLD AUTO: 0.66 K/UL — HIGH (ref 0.1–0.6)
MONOCYTES NFR BLD AUTO: 7 % — SIGNIFICANT CHANGE UP (ref 1.7–9.3)
MYELOCYTES NFR BLD: 1.7 % — HIGH (ref 0–0)
NEUTROPHILS # BLD AUTO: 5.9 K/UL — SIGNIFICANT CHANGE UP (ref 1.4–6.5)
NEUTROPHILS NFR BLD AUTO: 62.6 % — SIGNIFICANT CHANGE UP (ref 42.2–75.2)
NRBC # BLD: 2 /100 — HIGH (ref 0–0)
NRBC # BLD: SIGNIFICANT CHANGE UP /100 WBCS (ref 0–0)
ORGANISM # SPEC MICROSCOPIC CNT: SIGNIFICANT CHANGE UP
ORGANISM # SPEC MICROSCOPIC CNT: SIGNIFICANT CHANGE UP
PLAT MORPH BLD: NORMAL — SIGNIFICANT CHANGE UP
PLATELET # BLD AUTO: 392 K/UL — SIGNIFICANT CHANGE UP (ref 130–400)
POLYCHROMASIA BLD QL SMEAR: SLIGHT — SIGNIFICANT CHANGE UP
POTASSIUM SERPL-MCNC: 4.6 MMOL/L — SIGNIFICANT CHANGE UP (ref 3.5–5)
POTASSIUM SERPL-SCNC: 4.6 MMOL/L — SIGNIFICANT CHANGE UP (ref 3.5–5)
PROMYELOCYTES # FLD: 0.9 % — HIGH (ref 0–0)
PROT SERPL-MCNC: 5.2 G/DL — LOW (ref 6–8)
RBC # BLD: 2.59 M/UL — LOW (ref 4.2–5.4)
RBC # FLD: 12.2 % — SIGNIFICANT CHANGE UP (ref 11.5–14.5)
RBC BLD AUTO: ABNORMAL
SODIUM SERPL-SCNC: 142 MMOL/L — SIGNIFICANT CHANGE UP (ref 135–146)
SPECIMEN SOURCE: SIGNIFICANT CHANGE UP
SPECIMEN SOURCE: SIGNIFICANT CHANGE UP
WBC # BLD: 9.43 K/UL — SIGNIFICANT CHANGE UP (ref 4.8–10.8)
WBC # FLD AUTO: 9.43 K/UL — SIGNIFICANT CHANGE UP (ref 4.8–10.8)

## 2019-06-03 RX ORDER — IPRATROPIUM BROMIDE 0.2 MG/ML
2.5 SOLUTION, NON-ORAL INHALATION
Qty: 0 | Refills: 0 | DISCHARGE
Start: 2019-06-03

## 2019-06-03 RX ADMIN — Medication 100 MILLIGRAM(S): at 05:51

## 2019-06-03 RX ADMIN — HEPARIN SODIUM 5000 UNIT(S): 5000 INJECTION INTRAVENOUS; SUBCUTANEOUS at 05:53

## 2019-06-03 RX ADMIN — Medication 500 MICROGRAM(S): at 13:21

## 2019-06-03 RX ADMIN — Medication 200 MILLIGRAM(S): at 05:52

## 2019-06-03 RX ADMIN — Medication 200 MILLIGRAM(S): at 13:36

## 2019-06-03 RX ADMIN — Medication 1 TABLET(S): at 13:36

## 2019-06-03 RX ADMIN — Medication 500 MICROGRAM(S): at 08:24

## 2019-06-03 RX ADMIN — CEFEPIME 100 MILLIGRAM(S): 1 INJECTION, POWDER, FOR SOLUTION INTRAMUSCULAR; INTRAVENOUS at 05:52

## 2019-06-03 RX ADMIN — Medication 0.5 MILLIGRAM(S): at 05:51

## 2019-06-03 NOTE — CHART NOTE - NSCHARTNOTEFT_GEN_A_CORE
<<<RESIDENT DISCHARGE NOTE>>>     BABAR ENRIQUEZ  MRN-[id number]    VITAL SIGNS:  T(F): 98 (06-03-19 @ 07:42), Max: 99.3 (06-02-19 @ 15:30)  HR: 83 (06-03-19 @ 07:42)  BP: 100/57 (06-03-19 @ 07:42)  SpO2: --      PHYSICAL EXAMINATION:  GEN: No acute distress  LUNGS: Clear to auscultation bilaterally   HEART: Regular  ABD: Soft, non-tender, non-distended. suprapubic not tender to palpation  EXT: Right hip surgical wound clean and dry  NEURO: AAOX2 to name and place    TEST RESULTS:                        8.3    9.43  )-----------( 392      ( 03 Jun 2019 05:44 )             25.5       06-03    142  |  105  |  12  ----------------------------<  96  4.6   |  21  |  <0.5<L>    Ca    8.7      03 Jun 2019 05:44  Mg     1.9     06-03    TPro  5.2<L>  /  Alb  3.0<L>  /  TBili  0.3  /  DBili  x   /  AST  14  /  ALT  18  /  AlkPhos  61  06-03      FINAL DISCHARGE INTERVIEW:  Resident(s) Present: (Name: Portia Barreto_____________), RN Present: (Name:  Gomez___________)    DISCHARGE MEDICATION RECONCILIATION  reviewed with Attending (Name:_Dr. Bishop__________) spoke with Dr. Bishop, cont ipratropium. Dr. Bishop report pt is unreliable historian and can report pain inappropriately. Pt appears comfortable. UCx E faecium likely contaminant, no leukocytosis or fever.    DISPOSITION:   [  ] Home,    [  ] Home with Visiting Nursing Services,   [  x  ]  SNF/ NH,    [   ] Acute Rehab (4A),   [   ] Other (Specify:_________)                 unable to reach cousin [phone]

## 2019-06-03 NOTE — PROGRESS NOTE ADULT - PROVIDER SPECIALTY LIST ADULT
Infectious Disease
Infectious Disease
Internal Medicine
Orthopedics
Internal Medicine
Internal Medicine

## 2019-06-03 NOTE — PROGRESS NOTE ADULT - SUBJECTIVE AND OBJECTIVE BOX
BABAR ENRIQUEZ  78y  Female  mostly afebrile- one reading of 99.3  urine - enterococcus-<100,000- monitor  cough c clear sputum  bp stable  may d/c to group home without antibiotics, but would cont resp treatments    INTERVAL EVENTS:    T(C): 36.7 (06-03-19 @ 07:42), Max: 37.4 (06-02-19 @ 15:30)  HR: 83 (06-03-19 @ 07:42) (80 - 102)  BP: 100/57 (06-03-19 @ 07:42) (100/57 - 145/60)  RR: 18 (06-03-19 @ 07:42) (16 - 18)  SpO2: --  Wt(kg): --Vital Signs Last 24 Hrs  T(C): 36.7 (03 Jun 2019 07:42), Max: 37.4 (02 Jun 2019 15:30)  T(F): 98 (03 Jun 2019 07:42), Max: 99.3 (02 Jun 2019 15:30)  HR: 83 (03 Jun 2019 07:42) (80 - 102)  BP: 100/57 (03 Jun 2019 07:42) (100/57 - 145/60)  BP(mean): --  RR: 18 (03 Jun 2019 07:42) (16 - 18)  SpO2: --    PHYSICAL EXAM:  GENERAL: resting comfortably  NECK: Supple, No JVD, Normal thyroid  CHEST/LUNG: Clear; No crackles or wheezing  HEART: S1, S2, Regular rate and rhythm;   ABDOMEN: Soft, Nontender, Nondistended; Bowel sounds present  EXTREMITIES: No clubbing, cyanosis, or edema  SKIN: No rashes or lesions    LABS:                          8.3    9.43  )-----------( 392      ( 03 Jun 2019 05:44 )             25.5     06-02    143  |  106  |  9<L>  ----------------------------<  94  4.4   |  26  |  <0.5<L>    Ca    8.8      02 Jun 2019 06:50  Mg     2.0     06-02    TPro  5.5<L>  /  Alb  3.0<L>  /  TBili  0.5  /  DBili  x   /  AST  14  /  ALT  22  /  AlkPhos  62  06-02          Culture - Urine (collected 01 Jun 2019 06:25)  Source: .Urine Clean Catch (Midstream)  Preliminary Report (02 Jun 2019 14:27):    10,000 - 49,000 CFU/mL Enterococcus faecium          acetaminophen   Tablet .. 650 milliGRAM(s) Oral every 6 hours PRN  aluminum hydroxide/magnesium hydroxide/simethicone Suspension 30 milliLiter(s) Oral four times a day PRN  benztropine 0.5 milliGRAM(s) Oral two times a day  cefepime   IVPB      cefepime   IVPB 1000 milliGRAM(s) IV Intermittent every 12 hours  chlorhexidine 4% Liquid 1 Application(s) Topical <User Schedule>  diVALproex ER 1500 milliGRAM(s) Oral at bedtime  docusate sodium 100 milliGRAM(s) Oral three times a day  guaiFENesin    Syrup 200 milliGRAM(s) Oral every 6 hours  haloperidol    Concentrate 1 milliGRAM(s) Oral at bedtime  heparin  Injectable 5000 Unit(s) SubCutaneous every 8 hours  ipratropium    for Nebulization 500 MICROGram(s) Nebulizer every 6 hours  multivitamin/minerals 1 Tablet(s) Oral daily  ondansetron Injectable 4 milliGRAM(s) IV Push every 6 hours PRN  senna 2 Tablet(s) Oral at bedtime PRN      RADIOLOGY & ADDITIONAL TESTS:    case discussed with the resident  Available consult notes reviewed    Assessment and plan:       may d/c to group home  will follow as OP

## 2019-06-03 NOTE — PROGRESS NOTE ADULT - REASON FOR ADMISSION
Fall
RIGHT FEMORAL NECK FRACTURE
Fall
no

## 2019-06-25 ENCOUNTER — OUTPATIENT (OUTPATIENT)
Dept: OUTPATIENT SERVICES | Facility: HOSPITAL | Age: 79
LOS: 1 days | Discharge: HOME | End: 2019-06-25

## 2019-06-25 DIAGNOSIS — Z93.1 GASTROSTOMY STATUS: Chronic | ICD-10-CM

## 2019-07-09 DIAGNOSIS — Z01.20 ENCOUNTER FOR DENTAL EXAMINATION AND CLEANING WITHOUT ABNORMAL FINDINGS: ICD-10-CM

## 2019-07-16 ENCOUNTER — OUTPATIENT (OUTPATIENT)
Dept: OUTPATIENT SERVICES | Facility: HOSPITAL | Age: 79
LOS: 1 days | Discharge: HOME | End: 2019-07-16

## 2019-07-16 DIAGNOSIS — F31.2 BIPOLAR DISORDER, CURRENT EPISODE MANIC SEVERE WITH PSYCHOTIC FEATURES: ICD-10-CM

## 2019-07-16 DIAGNOSIS — Z93.1 GASTROSTOMY STATUS: Chronic | ICD-10-CM

## 2019-08-21 ENCOUNTER — OUTPATIENT (OUTPATIENT)
Dept: OUTPATIENT SERVICES | Facility: HOSPITAL | Age: 79
LOS: 1 days | Discharge: HOME | End: 2019-08-21

## 2019-08-21 DIAGNOSIS — Z98.818 OTHER DENTAL PROCEDURE STATUS: ICD-10-CM

## 2019-08-21 DIAGNOSIS — Z93.1 GASTROSTOMY STATUS: Chronic | ICD-10-CM

## 2019-10-15 ENCOUNTER — OUTPATIENT (OUTPATIENT)
Dept: OUTPATIENT SERVICES | Facility: HOSPITAL | Age: 79
LOS: 1 days | Discharge: HOME | End: 2019-10-15

## 2019-10-15 ENCOUNTER — FORM ENCOUNTER (OUTPATIENT)
Age: 79
End: 2019-10-15

## 2019-10-15 DIAGNOSIS — Z79.899 OTHER LONG TERM (CURRENT) DRUG THERAPY: ICD-10-CM

## 2019-10-15 DIAGNOSIS — Z93.1 GASTROSTOMY STATUS: Chronic | ICD-10-CM

## 2019-10-15 DIAGNOSIS — E55.9 VITAMIN D DEFICIENCY, UNSPECIFIED: ICD-10-CM

## 2019-10-15 DIAGNOSIS — Z13.29 ENCOUNTER FOR SCREENING FOR OTHER SUSPECTED ENDOCRINE DISORDER: ICD-10-CM

## 2019-10-16 ENCOUNTER — OUTPATIENT (OUTPATIENT)
Dept: OUTPATIENT SERVICES | Facility: HOSPITAL | Age: 79
LOS: 1 days | Discharge: HOME | End: 2019-10-16
Payer: MEDICARE

## 2019-10-16 DIAGNOSIS — R05 COUGH: ICD-10-CM

## 2019-10-16 DIAGNOSIS — Z93.1 GASTROSTOMY STATUS: Chronic | ICD-10-CM

## 2019-10-16 PROCEDURE — 71046 X-RAY EXAM CHEST 2 VIEWS: CPT | Mod: 26

## 2019-10-25 ENCOUNTER — EMERGENCY (EMERGENCY)
Facility: HOSPITAL | Age: 79
LOS: 0 days | Discharge: HOME | End: 2019-10-25
Attending: EMERGENCY MEDICINE | Admitting: EMERGENCY MEDICINE
Payer: MEDICARE

## 2019-10-25 VITALS
HEART RATE: 80 BPM | OXYGEN SATURATION: 98 % | TEMPERATURE: 96 F | SYSTOLIC BLOOD PRESSURE: 114 MMHG | DIASTOLIC BLOOD PRESSURE: 61 MMHG | RESPIRATION RATE: 18 BRPM

## 2019-10-25 DIAGNOSIS — Z93.1 GASTROSTOMY STATUS: Chronic | ICD-10-CM

## 2019-10-25 DIAGNOSIS — R47.81 SLURRED SPEECH: ICD-10-CM

## 2019-10-25 DIAGNOSIS — N39.0 URINARY TRACT INFECTION, SITE NOT SPECIFIED: ICD-10-CM

## 2019-10-25 DIAGNOSIS — Z91.011 ALLERGY TO MILK PRODUCTS: ICD-10-CM

## 2019-10-25 LAB
ALBUMIN SERPL ELPH-MCNC: 3.8 G/DL — SIGNIFICANT CHANGE UP (ref 3.5–5.2)
ALP SERPL-CCNC: 60 U/L — SIGNIFICANT CHANGE UP (ref 30–115)
ALT FLD-CCNC: 7 U/L — SIGNIFICANT CHANGE UP (ref 0–41)
ANION GAP SERPL CALC-SCNC: 13 MMOL/L — SIGNIFICANT CHANGE UP (ref 7–14)
APPEARANCE UR: ABNORMAL
APTT BLD: 30.1 SEC — SIGNIFICANT CHANGE UP (ref 27–39.2)
AST SERPL-CCNC: 21 U/L — SIGNIFICANT CHANGE UP (ref 0–41)
BACTERIA # UR AUTO: ABNORMAL
BASOPHILS # BLD AUTO: 0.02 K/UL — SIGNIFICANT CHANGE UP (ref 0–0.2)
BASOPHILS NFR BLD AUTO: 0.3 % — SIGNIFICANT CHANGE UP (ref 0–1)
BILIRUB SERPL-MCNC: <0.2 MG/DL — SIGNIFICANT CHANGE UP (ref 0.2–1.2)
BILIRUB UR-MCNC: NEGATIVE — SIGNIFICANT CHANGE UP
BUN SERPL-MCNC: 19 MG/DL — SIGNIFICANT CHANGE UP (ref 10–20)
CALCIUM SERPL-MCNC: 9.1 MG/DL — SIGNIFICANT CHANGE UP (ref 8.5–10.1)
CHLORIDE SERPL-SCNC: 106 MMOL/L — SIGNIFICANT CHANGE UP (ref 98–110)
CO2 SERPL-SCNC: 24 MMOL/L — SIGNIFICANT CHANGE UP (ref 17–32)
COLOR SPEC: YELLOW — SIGNIFICANT CHANGE UP
CREAT SERPL-MCNC: 0.5 MG/DL — LOW (ref 0.7–1.5)
DIFF PNL FLD: ABNORMAL
EOSINOPHIL # BLD AUTO: 0.11 K/UL — SIGNIFICANT CHANGE UP (ref 0–0.7)
EOSINOPHIL NFR BLD AUTO: 1.9 % — SIGNIFICANT CHANGE UP (ref 0–8)
EPI CELLS # UR: 1 /HPF — SIGNIFICANT CHANGE UP (ref 0–5)
GLUCOSE SERPL-MCNC: 77 MG/DL — SIGNIFICANT CHANGE UP (ref 70–99)
GLUCOSE UR QL: NEGATIVE — SIGNIFICANT CHANGE UP
HCT VFR BLD CALC: 39.9 % — SIGNIFICANT CHANGE UP (ref 37–47)
HGB BLD-MCNC: 13.1 G/DL — SIGNIFICANT CHANGE UP (ref 12–16)
HYALINE CASTS # UR AUTO: 20 /LPF — HIGH (ref 0–7)
IMM GRANULOCYTES NFR BLD AUTO: 0.3 % — SIGNIFICANT CHANGE UP (ref 0.1–0.3)
INR BLD: 1.01 RATIO — SIGNIFICANT CHANGE UP (ref 0.65–1.3)
KETONES UR-MCNC: NEGATIVE — SIGNIFICANT CHANGE UP
LEUKOCYTE ESTERASE UR-ACNC: ABNORMAL
LYMPHOCYTES # BLD AUTO: 2.12 K/UL — SIGNIFICANT CHANGE UP (ref 1.2–3.4)
LYMPHOCYTES # BLD AUTO: 35.9 % — SIGNIFICANT CHANGE UP (ref 20.5–51.1)
MCHC RBC-ENTMCNC: 31.4 PG — HIGH (ref 27–31)
MCHC RBC-ENTMCNC: 32.8 G/DL — SIGNIFICANT CHANGE UP (ref 32–37)
MCV RBC AUTO: 95.7 FL — SIGNIFICANT CHANGE UP (ref 81–99)
MONOCYTES # BLD AUTO: 0.6 K/UL — SIGNIFICANT CHANGE UP (ref 0.1–0.6)
MONOCYTES NFR BLD AUTO: 10.2 % — HIGH (ref 1.7–9.3)
NEUTROPHILS # BLD AUTO: 3.03 K/UL — SIGNIFICANT CHANGE UP (ref 1.4–6.5)
NEUTROPHILS NFR BLD AUTO: 51.4 % — SIGNIFICANT CHANGE UP (ref 42.2–75.2)
NITRITE UR-MCNC: POSITIVE
NRBC # BLD: 0 /100 WBCS — SIGNIFICANT CHANGE UP (ref 0–0)
PH UR: 6.5 — SIGNIFICANT CHANGE UP (ref 5–8)
PLATELET # BLD AUTO: 154 K/UL — SIGNIFICANT CHANGE UP (ref 130–400)
POTASSIUM SERPL-MCNC: 5 MMOL/L — SIGNIFICANT CHANGE UP (ref 3.5–5)
POTASSIUM SERPL-SCNC: 5 MMOL/L — SIGNIFICANT CHANGE UP (ref 3.5–5)
PROT SERPL-MCNC: 6.5 G/DL — SIGNIFICANT CHANGE UP (ref 6–8)
PROT UR-MCNC: SIGNIFICANT CHANGE UP
PROTHROM AB SERPL-ACNC: 11.6 SEC — SIGNIFICANT CHANGE UP (ref 9.95–12.87)
RBC # BLD: 4.17 M/UL — LOW (ref 4.2–5.4)
RBC # FLD: 12.5 % — SIGNIFICANT CHANGE UP (ref 11.5–14.5)
RBC CASTS # UR COMP ASSIST: 17 /HPF — HIGH (ref 0–4)
SODIUM SERPL-SCNC: 143 MMOL/L — SIGNIFICANT CHANGE UP (ref 135–146)
SP GR SPEC: 1.02 — SIGNIFICANT CHANGE UP (ref 1.01–1.02)
UROBILINOGEN FLD QL: SIGNIFICANT CHANGE UP
WBC # BLD: 5.9 K/UL — SIGNIFICANT CHANGE UP (ref 4.8–10.8)
WBC # FLD AUTO: 5.9 K/UL — SIGNIFICANT CHANGE UP (ref 4.8–10.8)
WBC UR QL: >720 /HPF — HIGH (ref 0–5)

## 2019-10-25 PROCEDURE — 93010 ELECTROCARDIOGRAM REPORT: CPT

## 2019-10-25 PROCEDURE — 70450 CT HEAD/BRAIN W/O DYE: CPT | Mod: 26

## 2019-10-25 PROCEDURE — 71045 X-RAY EXAM CHEST 1 VIEW: CPT | Mod: 26

## 2019-10-25 PROCEDURE — 99284 EMERGENCY DEPT VISIT MOD MDM: CPT | Mod: GC

## 2019-10-25 RX ORDER — CEFPODOXIME PROXETIL 100 MG
1 TABLET ORAL
Qty: 28 | Refills: 0
Start: 2019-10-25 | End: 2019-11-07

## 2019-10-25 NOTE — ED PROVIDER NOTE - NS ED ROS FT
General: Weakness. No fever, chills.  Eyes:  No visual changes, eye pain or discharge.  ENMT:  No hearing changes, pain, no sore throat or runny nose, no difficulty swallowing  Cardiac:  No chest pain, SOB or edema. No chest pain with exertion.  Respiratory:  No cough or respiratory distress. No hemoptysis. No history of asthma or RAD.  GI:  No nausea, vomiting, diarrhea or abdominal pain.  :  No dysuria, frequency or burning.  MS:  No myalgia, muscle weakness, joint pain or back pain.  Neuro:  No headache.  No LOC. No change in ambulation. No dizziness.  Skin:  No skin rash.

## 2019-10-25 NOTE — ED ADULT NURSE NOTE - PMH
Atypical bipolar disorder    Brito esophagus    Breast mass, right  s/p lumpectomy & radiation 2007  Hyperlipemia    Mild mental retardation

## 2019-10-25 NOTE — ED ADULT NURSE NOTE - OBJECTIVE STATEMENT
As per aide, pt has been having slurred speech that she began to notice this morning at approximately 6am and pt has been having "blank stare". Pt at baseline mental status and denies any complaints.

## 2019-10-25 NOTE — ED PROVIDER NOTE - OBJECTIVE STATEMENT
79F h/o bipolar, behavioral disorder, asthma, esophageal varices, s/p right femoral shaft fx '5/19 p/w AMS. Pt has not been acting herself, more lethargic, less active. Staff notes that her speech is different. No HA, LOC, CP, SOB, abd pain, n/v/d, back pain. Less po intake. No recent fever/chills.

## 2019-10-25 NOTE — ED ADULT NURSE NOTE - NSIMPLEMENTINTERV_GEN_ALL_ED
Implemented All Fall with Harm Risk Interventions:  [place] to call system. Call bell, personal items and telephone within reach. Instruct patient to call for assistance. Room bathroom lighting operational. Non-slip footwear when patient is off stretcher. Physically safe environment: no spills, clutter or unnecessary equipment. Stretcher in lowest position, wheels locked, appropriate side rails in place. Provide visual cue, wrist band, yellow gown, etc. Monitor gait and stability. Monitor for mental status changes and reorient to person, place, and time. Review medications for side effects contributing to fall risk. Reinforce activity limits and safety measures with patient and family. Provide visual clues: red socks.

## 2019-10-25 NOTE — ED PROVIDER NOTE - NSFOLLOWUPCLINICS_GEN_ALL_ED_FT
[facility]  Medicine  [address] [place], [place]   Phone: [phone]  Fax:   Follow Up Time: 7-10 Days    A Family Medicine Doctor  Family Medicine  .  [place]   Phone:   Fax:   Follow Up Time: 7-10 Days

## 2019-10-25 NOTE — ED PROVIDER NOTE - PHYSICAL EXAMINATION
Constitutional: Elderly. NAD.   Head: Atraumatic.  Eyes: PERRLA. EOMI without discomfort.   ENT: No nasal discharge. Mucous membranes moist.  Neck: Supple. Painless ROM.  Cardiovascular: Regular rhythm. Regular rate. Normal S1 and S2. No murmurs. 2+ pulses in all extremities.   Pulmonary: Normal respiratory rate and effort. Lungs clear to auscultation bilaterally. No wheezing, rales, or rhonchi. Bilateral, equal lung expansion.   Abdominal: Soft. Nondistended. Nontender. No rebound or guarding.   Extremities. Pelvis stable. No lower extremity edema. Symmetric calves.  Skin: No rashes.   Neuro: AAOx3. CN 2-12, 5/5 strength in all extremities, sensation equal and intact in all extremities, no dysmetria, no pronator drift.   Psych: Normal mood. Normal affect. not agitated.

## 2019-10-25 NOTE — ED PROVIDER NOTE - PROGRESS NOTE DETAILS
AA: Well appearing, normal neuro exam. Moving all extremities. Normal vitals, rectal temp. Pending urine AA: Sitting comfortably. Urine displaying UTI. Will dc w/ abx to pharmacy. Return for worsening s/s, lethargy, CP, SOB, inability tolerating po.

## 2019-10-25 NOTE — ED ADULT NURSE NOTE - CHIEF COMPLAINT QUOTE
pt biba for weakness and slurred speech starting at 9am this morning as per home companion. hx htn, stroke code not called as per dr gan

## 2019-10-25 NOTE — ED ADULT TRIAGE NOTE - CHIEF COMPLAINT QUOTE
pt biba for weakness and slurred speech this morning as per home companion. hx htn, pt biba for weakness and slurred speech starting at 9am this morning as per home companion. hx htn, stroke code not called as per dr gan

## 2019-10-25 NOTE — ED PROVIDER NOTE - PATIENT PORTAL LINK FT
You can access the FollowMyHealth Patient Portal offered by [facility] by registering at the following website: [email or web address]. By joining [employer]’s FollowMyHealth portal, you will also be able to view your health information using other applications (apps) compatible with our system.

## 2019-10-25 NOTE — ED PROVIDER NOTE - CLINICAL SUMMARY MEDICAL DECISION MAKING FREE TEXT BOX
80yo F brought in by group home for generalized weakness, fatigue, "slower" than normal. Found to have UTI. Labs and imaging otherwise unremarkable. Vitals stable. No leukocytosis. Afebrile. No evidence of severe sepsis/septic shock. Will d/c with ABx, group home aides given strict return precautions.

## 2019-10-25 NOTE — ED PROVIDER NOTE - ATTENDING CONTRIBUTION TO CARE
80yo F with PMHx bipolar disorder with psychotic features, Brito's esophagus, hiatal hernia, HLD, presents from group home for lethargy. Pt is a poor historian. Per group home aides, pt was well and in usual state of health yesterday. This morning woke up at 6 and was more sleepy/tired than normal, did not want to eat breakfast, noticed she was slurring her speech more than normal. Last known normal 12:30am today approx 14 hours PTA. No focal weakness. Denies numbness, tingling, vision changes. Denies fever, chills, headache, dizziness, CP, SOB, cough, nausea, vomiting, diarrhea, abd pain, dysuria, leg swelling.     Vital signs reviewed  GENERAL: Patient nontoxic appearing, NAD  HEAD: NCAT  EYES: Anicteric  ENT: MMM.  RESPIRATORY: Normal respiratory effort. CTA B/L. No wheezing, rales, rhonchi  CARDIOVASCULAR: Regular rate and rhythm  ABDOMEN: Soft. Nondistended. Nontender. No guarding or rebound.   MUSCULOSKELETAL/EXTREMITIES: Brisk cap refill. Equal radial pulses. No leg edema.  SKIN:  Warm and dry  NEURO: AAOx3. No gross FND. 78yo F with PMHx bipolar disorder with psychotic features, Brito's esophagus, hiatal hernia, HLD, presents from group home for generalized weakness/fatigue. Pt is a poor historian. Per group home aides, pt was well and in usual state of health yesterday. This morning woke up at 6 and was more sleepy/tired than normal, did not want to eat breakfast, noticed her speech was slower than normal, ?slurring. Last known normal 12:30am today approx 14 hours PTA. No focal weakness. Denies numbness, tingling, vision changes. Denies fever, chills, headache, dizziness, CP, SOB, cough, nausea, vomiting, diarrhea, abd pain, dysuria, leg swelling.     Vital signs reviewed  GENERAL: Patient nontoxic appearing, NAD  HEAD: NCAT  EYES: Anicteric  ENT: MMM.  RESPIRATORY: Normal respiratory effort. CTA B/L. No wheezing, rales, rhonchi  CARDIOVASCULAR: Regular rate and rhythm  ABDOMEN: Soft. Nondistended. Nontender. No guarding or rebound.   MUSCULOSKELETAL/EXTREMITIES: Brisk cap refill. Equal radial pulses. No leg edema.  SKIN:  Warm and dry  NEURO: AAOx3. No gross FND.

## 2019-10-30 LAB
CULTURE RESULTS: SIGNIFICANT CHANGE UP
SPECIMEN SOURCE: SIGNIFICANT CHANGE UP

## 2020-02-03 ENCOUNTER — FORM ENCOUNTER (OUTPATIENT)
Age: 80
End: 2020-02-03

## 2020-02-04 ENCOUNTER — OUTPATIENT (OUTPATIENT)
Dept: OUTPATIENT SERVICES | Facility: HOSPITAL | Age: 80
LOS: 1 days | Discharge: HOME | End: 2020-02-04

## 2020-02-04 DIAGNOSIS — Z93.1 GASTROSTOMY STATUS: Chronic | ICD-10-CM

## 2020-02-05 DIAGNOSIS — Z87.310 PERSONAL HISTORY OF (HEALED) OSTEOPOROSIS FRACTURE: ICD-10-CM

## 2020-02-05 DIAGNOSIS — Z78.0 ASYMPTOMATIC MENOPAUSAL STATE: ICD-10-CM

## 2020-02-05 DIAGNOSIS — Z13.820 ENCOUNTER FOR SCREENING FOR OSTEOPOROSIS: ICD-10-CM

## 2020-02-05 DIAGNOSIS — M81.0 AGE-RELATED OSTEOPOROSIS WITHOUT CURRENT PATHOLOGICAL FRACTURE: ICD-10-CM

## 2020-02-20 ENCOUNTER — OUTPATIENT (OUTPATIENT)
Dept: OUTPATIENT SERVICES | Facility: HOSPITAL | Age: 80
LOS: 1 days | Discharge: HOME | End: 2020-02-20

## 2020-02-20 DIAGNOSIS — Z93.1 GASTROSTOMY STATUS: Chronic | ICD-10-CM

## 2020-02-20 DIAGNOSIS — Z01.20 ENCOUNTER FOR DENTAL EXAMINATION AND CLEANING WITHOUT ABNORMAL FINDINGS: ICD-10-CM

## 2020-03-26 ENCOUNTER — INPATIENT (INPATIENT)
Facility: HOSPITAL | Age: 80
LOS: 6 days | Discharge: NURSING, REHAB OR LONG-TERM CARE | End: 2020-04-02
Attending: INTERNAL MEDICINE | Admitting: INTERNAL MEDICINE
Payer: MEDICARE

## 2020-03-26 VITALS
RESPIRATION RATE: 20 BRPM | SYSTOLIC BLOOD PRESSURE: 131 MMHG | TEMPERATURE: 100 F | OXYGEN SATURATION: 93 % | DIASTOLIC BLOOD PRESSURE: 68 MMHG | HEART RATE: 116 BPM

## 2020-03-26 DIAGNOSIS — S72.009A FRACTURE OF UNSPECIFIED PART OF NECK OF UNSPECIFIED FEMUR, INITIAL ENCOUNTER FOR CLOSED FRACTURE: Chronic | ICD-10-CM

## 2020-03-26 DIAGNOSIS — Z93.1 GASTROSTOMY STATUS: Chronic | ICD-10-CM

## 2020-03-26 LAB
ALBUMIN SERPL ELPH-MCNC: 3.6 G/DL — SIGNIFICANT CHANGE UP (ref 3.5–5.2)
ALP SERPL-CCNC: 55 U/L — SIGNIFICANT CHANGE UP (ref 30–115)
ALT FLD-CCNC: 9 U/L — SIGNIFICANT CHANGE UP (ref 0–41)
ANION GAP SERPL CALC-SCNC: 13 MMOL/L — SIGNIFICANT CHANGE UP (ref 7–14)
AST SERPL-CCNC: 29 U/L — SIGNIFICANT CHANGE UP (ref 0–41)
BASOPHILS # BLD AUTO: 0.01 K/UL — SIGNIFICANT CHANGE UP (ref 0–0.2)
BASOPHILS NFR BLD AUTO: 0.2 % — SIGNIFICANT CHANGE UP (ref 0–1)
BILIRUB DIRECT SERPL-MCNC: <0.2 MG/DL — SIGNIFICANT CHANGE UP (ref 0–0.2)
BILIRUB INDIRECT FLD-MCNC: >0 MG/DL — LOW (ref 0.2–1.2)
BILIRUB SERPL-MCNC: 0.2 MG/DL — SIGNIFICANT CHANGE UP (ref 0.2–1.2)
BUN SERPL-MCNC: 13 MG/DL — SIGNIFICANT CHANGE UP (ref 10–20)
CALCIUM SERPL-MCNC: 8.4 MG/DL — LOW (ref 8.5–10.1)
CHLORIDE SERPL-SCNC: 98 MMOL/L — SIGNIFICANT CHANGE UP (ref 98–110)
CO2 SERPL-SCNC: 24 MMOL/L — SIGNIFICANT CHANGE UP (ref 17–32)
CREAT SERPL-MCNC: 0.6 MG/DL — LOW (ref 0.7–1.5)
D DIMER BLD IA.RAPID-MCNC: 154 NG/ML DDU — SIGNIFICANT CHANGE UP (ref 0–230)
EOSINOPHIL # BLD AUTO: 0 K/UL — SIGNIFICANT CHANGE UP (ref 0–0.7)
EOSINOPHIL NFR BLD AUTO: 0 % — SIGNIFICANT CHANGE UP (ref 0–8)
FLU A RESULT: NEGATIVE — SIGNIFICANT CHANGE UP
FLU A RESULT: NEGATIVE — SIGNIFICANT CHANGE UP
FLUAV AG NPH QL: NEGATIVE — SIGNIFICANT CHANGE UP
FLUBV AG NPH QL: NEGATIVE — SIGNIFICANT CHANGE UP
GLUCOSE SERPL-MCNC: 111 MG/DL — HIGH (ref 70–99)
HCT VFR BLD CALC: 40 % — SIGNIFICANT CHANGE UP (ref 37–47)
HGB BLD-MCNC: 14.2 G/DL — SIGNIFICANT CHANGE UP (ref 12–16)
IMM GRANULOCYTES NFR BLD AUTO: 0.4 % — HIGH (ref 0.1–0.3)
LACTATE SERPL-SCNC: 0.7 MMOL/L — SIGNIFICANT CHANGE UP (ref 0.7–2)
LIDOCAIN IGE QN: 48 U/L — SIGNIFICANT CHANGE UP (ref 7–60)
LYMPHOCYTES # BLD AUTO: 1.34 K/UL — SIGNIFICANT CHANGE UP (ref 1.2–3.4)
LYMPHOCYTES # BLD AUTO: 25 % — SIGNIFICANT CHANGE UP (ref 20.5–51.1)
MAGNESIUM SERPL-MCNC: 1.8 MG/DL — SIGNIFICANT CHANGE UP (ref 1.8–2.4)
MCHC RBC-ENTMCNC: 33.3 PG — HIGH (ref 27–31)
MCHC RBC-ENTMCNC: 35.5 G/DL — SIGNIFICANT CHANGE UP (ref 32–37)
MCV RBC AUTO: 93.9 FL — SIGNIFICANT CHANGE UP (ref 81–99)
MONOCYTES # BLD AUTO: 0.66 K/UL — HIGH (ref 0.1–0.6)
MONOCYTES NFR BLD AUTO: 12.3 % — HIGH (ref 1.7–9.3)
NEUTROPHILS # BLD AUTO: 3.34 K/UL — SIGNIFICANT CHANGE UP (ref 1.4–6.5)
NEUTROPHILS NFR BLD AUTO: 62.1 % — SIGNIFICANT CHANGE UP (ref 42.2–75.2)
NRBC # BLD: 0 /100 WBCS — SIGNIFICANT CHANGE UP (ref 0–0)
PLATELET # BLD AUTO: 137 K/UL — SIGNIFICANT CHANGE UP (ref 130–400)
POTASSIUM SERPL-MCNC: 4.6 MMOL/L — SIGNIFICANT CHANGE UP (ref 3.5–5)
POTASSIUM SERPL-SCNC: 4.6 MMOL/L — SIGNIFICANT CHANGE UP (ref 3.5–5)
PROT SERPL-MCNC: 6.3 G/DL — SIGNIFICANT CHANGE UP (ref 6–8)
RBC # BLD: 4.26 M/UL — SIGNIFICANT CHANGE UP (ref 4.2–5.4)
RBC # FLD: 11.9 % — SIGNIFICANT CHANGE UP (ref 11.5–14.5)
RSV RESULT: NEGATIVE — SIGNIFICANT CHANGE UP
RSV RNA RESP QL NAA+PROBE: NEGATIVE — SIGNIFICANT CHANGE UP
SODIUM SERPL-SCNC: 135 MMOL/L — SIGNIFICANT CHANGE UP (ref 135–146)
WBC # BLD: 5.37 K/UL — SIGNIFICANT CHANGE UP (ref 4.8–10.8)
WBC # FLD AUTO: 5.37 K/UL — SIGNIFICANT CHANGE UP (ref 4.8–10.8)

## 2020-03-26 PROCEDURE — 99285 EMERGENCY DEPT VISIT HI MDM: CPT | Mod: GC

## 2020-03-26 PROCEDURE — 71045 X-RAY EXAM CHEST 1 VIEW: CPT | Mod: 26

## 2020-03-26 PROCEDURE — 93010 ELECTROCARDIOGRAM REPORT: CPT

## 2020-03-26 RX ORDER — BENZTROPINE MESYLATE 1 MG
1 TABLET ORAL
Refills: 0 | Status: DISCONTINUED | OUTPATIENT
Start: 2020-03-26 | End: 2020-04-02

## 2020-03-26 RX ORDER — DIVALPROEX SODIUM 500 MG/1
1500 TABLET, DELAYED RELEASE ORAL AT BEDTIME
Refills: 0 | Status: DISCONTINUED | OUTPATIENT
Start: 2020-03-26 | End: 2020-03-28

## 2020-03-26 RX ORDER — IBUPROFEN 200 MG
600 TABLET ORAL ONCE
Refills: 0 | Status: COMPLETED | OUTPATIENT
Start: 2020-03-26 | End: 2020-03-26

## 2020-03-26 RX ORDER — ALBUTEROL 90 UG/1
1 AEROSOL, METERED ORAL EVERY 6 HOURS
Refills: 0 | Status: DISCONTINUED | OUTPATIENT
Start: 2020-03-26 | End: 2020-04-02

## 2020-03-26 RX ORDER — HALOPERIDOL DECANOATE 100 MG/ML
1 INJECTION INTRAMUSCULAR AT BEDTIME
Refills: 0 | Status: DISCONTINUED | OUTPATIENT
Start: 2020-03-26 | End: 2020-04-02

## 2020-03-26 RX ORDER — SODIUM CHLORIDE 9 MG/ML
1000 INJECTION, SOLUTION INTRAVENOUS ONCE
Refills: 0 | Status: COMPLETED | OUTPATIENT
Start: 2020-03-26 | End: 2020-03-26

## 2020-03-26 RX ORDER — ALENDRONATE SODIUM 70 MG/1
1 TABLET ORAL
Qty: 0 | Refills: 0 | DISCHARGE

## 2020-03-26 RX ORDER — MULTIVIT-MIN/FERROUS GLUCONATE 9 MG/15 ML
1 LIQUID (ML) ORAL DAILY
Refills: 0 | Status: DISCONTINUED | OUTPATIENT
Start: 2020-03-26 | End: 2020-04-02

## 2020-03-26 RX ORDER — ENOXAPARIN SODIUM 100 MG/ML
40 INJECTION SUBCUTANEOUS DAILY
Refills: 0 | Status: DISCONTINUED | OUTPATIENT
Start: 2020-03-26 | End: 2020-04-02

## 2020-03-26 RX ORDER — CHLORHEXIDINE GLUCONATE 213 G/1000ML
1 SOLUTION TOPICAL
Refills: 0 | Status: DISCONTINUED | OUTPATIENT
Start: 2020-03-26 | End: 2020-04-02

## 2020-03-26 RX ORDER — ALBUTEROL 90 UG/1
2 AEROSOL, METERED ORAL EVERY 4 HOURS
Refills: 0 | Status: DISCONTINUED | OUTPATIENT
Start: 2020-03-26 | End: 2020-04-02

## 2020-03-26 RX ORDER — ACETAMINOPHEN 500 MG
650 TABLET ORAL EVERY 6 HOURS
Refills: 0 | Status: DISCONTINUED | OUTPATIENT
Start: 2020-03-26 | End: 2020-04-02

## 2020-03-26 RX ORDER — ACETAMINOPHEN 500 MG
650 TABLET ORAL ONCE
Refills: 0 | Status: COMPLETED | OUTPATIENT
Start: 2020-03-26 | End: 2020-03-26

## 2020-03-26 RX ADMIN — Medication 600 MILLIGRAM(S): at 18:23

## 2020-03-26 RX ADMIN — SODIUM CHLORIDE 1000 MILLILITER(S): 9 INJECTION, SOLUTION INTRAVENOUS at 19:17

## 2020-03-26 RX ADMIN — Medication 650 MILLIGRAM(S): at 18:22

## 2020-03-26 NOTE — H&P ADULT - HISTORY OF PRESENT ILLNESS
79F h/o bipolar, behavioral disorder, asthma, esophageal varices is being sent by group [place] [facility] for fever and cough since last night.  pt denies sob or cp. no leg swelling. no vomiting or diarrhea.  pt feeling body aches.  no leg swelling. 79F h/o bipolar, behavioral disorder, asthma, esophageal varices is being sent by group home [facility] for fever and cough since last night. History taken from aide, she said that the patient started having dry cough with fevers at group home since Monday. She also had myalgias and watery diarrhea. She denies sick contact but mentioned that 1 member of the group home tested positive for COVID-19 but the patient was not in contact with him. The aide herself does not have any symptoms and denies sick contacts. ROS is otherwise negative. No SOB.  In ED, mild fever was noted (100.8), patient initially put on 3 L O2 via NC, currently on 2 L NC, /min regular and normal BP. CXR negative for infiltrates. CBC significant for mild increase of monocytes, no lymphopenia, no thrombocytopenia. Flu A/B andRSV negative

## 2020-03-26 NOTE — ED ADULT NURSE NOTE - OBJECTIVE STATEMENT
Pt presents to ED c/o fever, and cough since last night.. Pt is AxOx1; from [facility] home. Pt is awake and not in any distress. Pt is on 3L NC sat 98%. Pt is febrile with temp 100.1; tylenol 650mg + ibuprofen 600mg stat given. Airborne+contact+droplet precautions maintained for r/o covid.

## 2020-03-26 NOTE — H&P ADULT - ATTENDING COMMENTS
**HX and physical limited due to pt being a poor historian. Supplemental information obtained from aide, house staff, and EMR.     78 YO F with a PMH of bipolar, behavioral disorder, intermittent asthma, and esophageal varices who was sent in from her group home for eval of productive cough for the past x 1 day. Associated with fevers. No recent travel. + COVID contact (another member of group home). In the ED, a Chest X-Ray was clear. Pt hypoxic and placed on supplemental O2. Flu/RSV negative. Isolated and COVID19 swab sent.     Physical exam shows pt in NAD. VSS, afebrile, not hypoxic on 3L NC. A&Ox3. Non-focal neuro exam. Muscle strength/sensation intact. Rhonchi and mild scattered wheezes. RRR, no M/G/R. ABD is soft and non-tender, normoactive BSs. LEs without swelling. No rashes. Labs and radiology as above.     Cough + Fevers likely due to viral respiratory syndrome + acute hypoxic respiratory failure with mild exacerbation of intermittent asthma, needs COVID19 rule out. - Recent travel. + COVID19 contact. Admit to COVID19 isolation unit. COVID19 swab sent. Isolate pt. Send CRP and procal. APAP PRN. Anti-tussives PRN. Supplemental O2 PRN. C/w statins, if LFTs are not > 75. No NSAIDs. PO steroids. Albuterol/ipratropium MDIs standing and PRN. LABA/ICS inhaler. Claritin. Monitor peak flows    HX of bipolar, behavioral disorder, and esophageal varices. Restart home meds. GI and DVT PPX. Inform PCP of pt's admission to hospital. Rest as per above note.

## 2020-03-26 NOTE — ED PROVIDER NOTE - CLINICAL SUMMARY MEDICAL DECISION MAKING FREE TEXT BOX
Labs ok, d-dimer negative. XR poor inspiratory effort, no clear infiltrate but crackles at bases. O2 sat at rest is 91-92%. Concerned that pt will not maintain sat as she does not follow commands. Spoke with PMD, will admit hospitalist.

## 2020-03-26 NOTE — ED PROVIDER NOTE - OBJECTIVE STATEMENT
79F h/o bipolar, behavioral disorder, asthma, esophageal varices is being sent by group [place] [facility] for fever and cough since last night.  pt denies sob or cp. no leg swelling. no vomiting or diarrhea.  pt feeling body aches.  no leg swelling.

## 2020-03-26 NOTE — H&P ADULT - NSHPLABSRESULTS_GEN_ALL_CORE
14.2   5.37  )-----------( 137      ( 26 Mar 2020 19:18 )             40.0       03-26    135  |  98  |  13  ----------------------------<  111<H>  4.6   |  24  |  0.6<L>    Ca    8.4<L>      26 Mar 2020 19:18  Mg     1.8     03-26    TPro  6.3  /  Alb  3.6  /  TBili  0.2  /  DBili  <0.2  /  AST  29  /  ALT  9   /  AlkPhos  55  03-26                      Lactate Trend  03-26 @ 19:18 Lactate:0.7             CAPILLARY BLOOD GLUCOSE

## 2020-03-26 NOTE — ED PROVIDER NOTE - PROGRESS NOTE DETAILS
Spoke with Dr Bishop, knows pt well; agrees sx concerning for COVID, agrees with plan for workup. BI: Pt endorsed to me by Dr. Milian. Pt sent in from group home for fever and cough. Pt otherwise non-contributory to ROS. Pt currently without complaints though. No active cough, tachy regular, CTABL, no abd pain, no rash. BI: Pt desats to 91% on room air. Placed back on NC. Called Dr. Awan who is not admitting pts and does not take care of this pt. Will admit to hospitalist.

## 2020-03-26 NOTE — H&P ADULT - ASSESSMENT
79F h/o bipolar, behavioral disorder, asthma, esophageal varices is being sent by group [place] [facility] for fever and cough since last night    # Fever, cough - R/O COVID-19:  - Possible Asthma exacerbation exacerbated by a URI  - On 2L O2 via NC, Keep Sao2 >92%  - COVID-19 PCR sent, f/up results 79F h/o bipolar, behavioral disorder, asthma, esophageal varices is being sent by group home [facility] for fever and cough since last night    # Fever, cough - R/O COVID-19:  - Possible Asthma exacerbation exacerbated by a URI  - On 2L O2 via NC, Keep Sao2 >92%. Try weaning off oxygen if possible  - COVID-19 PCR sent, f/up results  - C/w prednisone 40 mg PO qd as patient is wheezing. Will need at least 5 days  - No role for AB or plaquenil  - Proventil inhaler q6h standing and q4h PRN for SOB/wheezing  - Tylenol 650 mg PO q6h PRN for fever  - Airborne/COntact/droplet isolation  - Limit physical contact as much as possible  - F/up ID consult    # Bipolar disorder:  - C/w divalproex and haloperidol    # Asthma:  - C/w inhalers    # DVT ppx: Lovenox 40 mg Pqd  # GI ppx: None  # Activity: as tolerated  # Diet: Regular  # Dispo: From group home, pending COVID results  # CODE STATUS: FULL. Patient does not have family, has an aide, no documented health care proxy

## 2020-03-26 NOTE — ED PROVIDER NOTE - CADM POA CENTRAL LINE
Spoke with mom, [facility]. Mom states she has already scheduled an appointment for patient to be seen at main [place].    No

## 2020-03-26 NOTE — ED PROVIDER NOTE - PHYSICAL EXAMINATION
VITAL SIGNS: I have reviewed nursing notes and confirm.  CONSTITUTIONAL: Well-developed; well-nourished; in no acute distress. pt comfortable.  SKIN: skin exam is warm and dry, no acute rash.   HEAD: Normocephalic; atraumatic.  EYES:  EOM intact; conjunctiva and sclera clear.  ENT: No nasal discharge; airway clear. moist oral mucosa;   NECK: Supple; non tender.  CARD: S1, S2 normal; no murmurs, gallops, or rubs. Regular rate and rhythm. posterior tibial and radial pulses 2+  RESP: No wheezes, rales or rhonchi. cta b/l. no use of accessory muscles. no retractions  ABD: Normal bowel sounds; soft; non-distended; non-tender;   EXT: Normal ROM. No  cyanosis or edema.  BACK: No cva tenderness  LYMPH: No acute cervical adenopathy.  NEURO: Alert, oriented, grossly unremarkable.    PSYCH: Cooperative, appropriate.

## 2020-03-26 NOTE — ED PROVIDER NOTE - ATTENDING CONTRIBUTION TO CARE
78yo woman h/o bipolar d/o, behavior d/o, asthma, esophageal varices sent from group home due to fever, cough, decreased activity since last night with an episode of diarrhea today. Pt is a poor historian, most history is from aide; pt says she feels hungry and denies SOB. On exam VS as noted, pt alert, lungs with scattered ronchi, cough noted, abd soft, NT, no rash, no peripheral edema. Sx concerning for COVID-19, will check labs, swab, CXR, reassess.

## 2020-03-26 NOTE — H&P ADULT - NSHPREVIEWOFSYSTEMS_GEN_ALL_CORE
CONSTITUTIONAL: No weakness, fevers or chills  EYES/ENT: No visual changes;  No vertigo or throat pain   RESPIRATORY: No cough, wheezing, hemoptysis; No shortness of breath  CARDIOVASCULAR: No chest pain or palpitations  GASTROINTESTINAL: No abdominal or epigastric pain. No nausea, vomiting, or hematemesis; No diarrhea or constipation. No melena or hematochezia.  GENITOURINARY: No dysuria, frequency or hematuria  NEUROLOGICAL: No numbness or weakness  SKIN: No itching, rashes CONSTITUTIONAL: Fever, myalgias  EYES/ENT: No visual changes;  No vertigo or throat pain   RESPIRATORY: Dry cough, no SOB  CARDIOVASCULAR: No chest pain or palpitations  GASTROINTESTINAL: No abdominal or epigastric pain. No nausea, vomiting, or hematemesis; No diarrhea or constipation. No melena or hematochezia.  GENITOURINARY: No dysuria, frequency or hematuria  NEUROLOGICAL: No numbness or weakness  SKIN: No itching, rashes

## 2020-03-26 NOTE — ED ADULT NURSE REASSESSMENT NOTE - NS ED NURSE REASSESS COMMENT FT1
Received patient from day nurse. Awake, Alert, responsive. Cardiac monitor in place. Continuous pulse ox. Droplet precautions and safety maintained

## 2020-03-26 NOTE — H&P ADULT - NSHPPHYSICALEXAM_GEN_ALL_CORE
GEN: No acute distress  LUNGS: Clear to auscultation bilaterally   HEART: S1/S2 present. RRR.   ABD: Soft, non-tender, non-distended. Bowel sounds present  EXT: NC/NC/NE/2+PP/SCOTT/Skin Intact.   NEURO: AAOX3    Intravenous access: Peripheral access  NG tube: None  Mcqueen Catheter: None GEN: No acute distress, NC/T, anicteric, on O2 NC  LUNGS: Bilateral wheezes  HEART: S1/S2 present. RRR.   ABD: Soft, non-tender, non-distended. Bowel sounds present  EXT: NC/NC/NE/2+PP/SCOTT/Skin Intact.   NEURO: AAOX3, moves all extremities    Intravenous access: Peripheral access  NG tube: None  Mcqueen Catheter: None

## 2020-03-27 LAB
ANION GAP SERPL CALC-SCNC: 11 MMOL/L — SIGNIFICANT CHANGE UP (ref 7–14)
BASOPHILS # BLD AUTO: 0.01 K/UL — SIGNIFICANT CHANGE UP (ref 0–0.2)
BASOPHILS NFR BLD AUTO: 0.2 % — SIGNIFICANT CHANGE UP (ref 0–1)
BUN SERPL-MCNC: 12 MG/DL — SIGNIFICANT CHANGE UP (ref 10–20)
CALCIUM SERPL-MCNC: 8.1 MG/DL — LOW (ref 8.5–10.1)
CHLORIDE SERPL-SCNC: 99 MMOL/L — SIGNIFICANT CHANGE UP (ref 98–110)
CO2 SERPL-SCNC: 26 MMOL/L — SIGNIFICANT CHANGE UP (ref 17–32)
CREAT SERPL-MCNC: 0.5 MG/DL — LOW (ref 0.7–1.5)
CRP SERPL-MCNC: 6.22 MG/DL — HIGH (ref 0–0.4)
EOSINOPHIL # BLD AUTO: 0 K/UL — SIGNIFICANT CHANGE UP (ref 0–0.7)
EOSINOPHIL NFR BLD AUTO: 0 % — SIGNIFICANT CHANGE UP (ref 0–8)
GLUCOSE SERPL-MCNC: 90 MG/DL — SIGNIFICANT CHANGE UP (ref 70–99)
HCT VFR BLD CALC: 37.3 % — SIGNIFICANT CHANGE UP (ref 37–47)
HGB BLD-MCNC: 12.4 G/DL — SIGNIFICANT CHANGE UP (ref 12–16)
IMM GRANULOCYTES NFR BLD AUTO: 0.6 % — HIGH (ref 0.1–0.3)
LYMPHOCYTES # BLD AUTO: 0.83 K/UL — LOW (ref 1.2–3.4)
LYMPHOCYTES # BLD AUTO: 16 % — LOW (ref 20.5–51.1)
MAGNESIUM SERPL-MCNC: 1.9 MG/DL — SIGNIFICANT CHANGE UP (ref 1.8–2.4)
MCHC RBC-ENTMCNC: 31.5 PG — HIGH (ref 27–31)
MCHC RBC-ENTMCNC: 33.2 G/DL — SIGNIFICANT CHANGE UP (ref 32–37)
MCV RBC AUTO: 94.7 FL — SIGNIFICANT CHANGE UP (ref 81–99)
MONOCYTES # BLD AUTO: 0.39 K/UL — SIGNIFICANT CHANGE UP (ref 0.1–0.6)
MONOCYTES NFR BLD AUTO: 7.5 % — SIGNIFICANT CHANGE UP (ref 1.7–9.3)
NEUTROPHILS # BLD AUTO: 3.93 K/UL — SIGNIFICANT CHANGE UP (ref 1.4–6.5)
NEUTROPHILS NFR BLD AUTO: 75.7 % — HIGH (ref 42.2–75.2)
NRBC # BLD: 0 /100 WBCS — SIGNIFICANT CHANGE UP (ref 0–0)
PLATELET # BLD AUTO: 102 K/UL — LOW (ref 130–400)
POTASSIUM SERPL-MCNC: 4.2 MMOL/L — SIGNIFICANT CHANGE UP (ref 3.5–5)
POTASSIUM SERPL-SCNC: 4.2 MMOL/L — SIGNIFICANT CHANGE UP (ref 3.5–5)
PROCALCITONIN SERPL-MCNC: 0.06 NG/ML — SIGNIFICANT CHANGE UP (ref 0.02–0.1)
RBC # BLD: 3.94 M/UL — LOW (ref 4.2–5.4)
RBC # FLD: 12 % — SIGNIFICANT CHANGE UP (ref 11.5–14.5)
SODIUM SERPL-SCNC: 136 MMOL/L — SIGNIFICANT CHANGE UP (ref 135–146)
WBC # BLD: 5.19 K/UL — SIGNIFICANT CHANGE UP (ref 4.8–10.8)
WBC # FLD AUTO: 5.19 K/UL — SIGNIFICANT CHANGE UP (ref 4.8–10.8)

## 2020-03-27 PROCEDURE — 99223 1ST HOSP IP/OBS HIGH 75: CPT | Mod: AI

## 2020-03-27 RX ORDER — HYDROXYCHLOROQUINE SULFATE 200 MG
TABLET ORAL
Refills: 0 | Status: DISCONTINUED | OUTPATIENT
Start: 2020-03-27 | End: 2020-03-28

## 2020-03-27 RX ORDER — HYDROXYCHLOROQUINE SULFATE 200 MG
400 TABLET ORAL EVERY 12 HOURS
Refills: 0 | Status: COMPLETED | OUTPATIENT
Start: 2020-03-27 | End: 2020-03-28

## 2020-03-27 RX ORDER — HYDROXYCHLOROQUINE SULFATE 200 MG
200 TABLET ORAL EVERY 12 HOURS
Refills: 0 | Status: DISCONTINUED | OUTPATIENT
Start: 2020-03-28 | End: 2020-03-28

## 2020-03-27 RX ADMIN — Medication 650 MILLIGRAM(S): at 15:03

## 2020-03-27 RX ADMIN — CHLORHEXIDINE GLUCONATE 1 APPLICATION(S): 213 SOLUTION TOPICAL at 05:17

## 2020-03-27 RX ADMIN — Medication 650 MILLIGRAM(S): at 15:02

## 2020-03-27 RX ADMIN — Medication 40 MILLIGRAM(S): at 05:17

## 2020-03-27 RX ADMIN — ENOXAPARIN SODIUM 40 MILLIGRAM(S): 100 INJECTION SUBCUTANEOUS at 11:28

## 2020-03-27 RX ADMIN — Medication 1 MILLIGRAM(S): at 05:17

## 2020-03-27 RX ADMIN — Medication 40 MILLIGRAM(S): at 02:53

## 2020-03-27 RX ADMIN — Medication 400 MILLIGRAM(S): at 17:24

## 2020-03-27 RX ADMIN — HALOPERIDOL DECANOATE 1 MILLIGRAM(S): 100 INJECTION INTRAMUSCULAR at 22:01

## 2020-03-27 RX ADMIN — Medication 650 MILLIGRAM(S): at 16:36

## 2020-03-27 RX ADMIN — Medication 1 TABLET(S): at 11:28

## 2020-03-27 RX ADMIN — Medication 1 MILLIGRAM(S): at 17:24

## 2020-03-27 RX ADMIN — DIVALPROEX SODIUM 1500 MILLIGRAM(S): 500 TABLET, DELAYED RELEASE ORAL at 22:01

## 2020-03-27 NOTE — CONSULT NOTE ADULT - ASSESSMENT
ASSESSMENT  79F h/o bipolar, behavioral disorder, asthma, esophageal varices is being sent by group home s[facility] for fever and cough since last night.      IMPRESSION  # rule out COVID 19    CXR clear    hypoxemia to 93% on RA    WBC 5  #Sepsis ruled out on admission   #Obesity BMI (kg/m2): 30.3  #QTC Calculation(Bezet) 449 ms    RECOMMENDATIONS  - f/u SARS-CoV-2 pcr  - Given hypoxemia , Start PLAQUENIL 400mg PO q12h x 2 doses, then 200mg BID PO x 4 days (Pt is not to be discharged on plaquenil)  - Monitor QTC with EKG daily  - Supportive care   - on pred 40mg  - d/c home when no O2 requirement    Spectra [id number]

## 2020-03-27 NOTE — CONSULT NOTE ADULT - SUBJECTIVE AND OBJECTIVE BOX
BABAR ENRIQUEZ  79y, Female  Allergy: lactose (Stomach Upset)  No Known Allergies      CHIEF COMPLAINT: R/O COVID-19 (26 Mar 2020 22:09)      LOS  1d    HPI:  79F h/o bipolar, behavioral disorder, asthma, esophageal varices is being sent by group home s[facility] for fever and cough since last night. History taken from aide, she said that the patient started having dry cough with fevers at group home since Monday. She also had myalgias and watery diarrhea. She denies sick contact but mentioned that 1 member of the group home tested positive for COVID-19 but the patient was not in contact with him. The aide herself does not have any symptoms and denies sick contacts. ROS is otherwise negative. No SOB.  In ED, mild fever was noted (100.8), patient initially put on 3 L O2 via NC, currently on 2 L NC, /min regular and normal BP. CXR negative for infiltrates. CBC significant for mild increase of monocytes, no lymphopenia, no thrombocytopenia. Flu A/B andRSV negative (26 Mar 2020 22:09)      PAST MEDICAL & SURGICAL HISTORY:  Breast mass, right: s/p lumpectomy &amp; radiation 2007  Hyperlipemia  Brito esophagus  Mild mental retardation  Atypical bipolar disorder  Femoral neck fracture  Feeding by G-tube: s/p removal 2012      FAMILY HISTORY  No pertinent family history in first degree relatives      SOCIAL HISTORY  Social History:  - Lives in group home  - Non smoker, no alcohol use (26 Mar 2020 22:09)        ROS  General: Denies rigors, nightsweats  HEENT: Denies headache, rhinorrhea, sore throat, eye pain  CV: Denies CP, palpitations  PULM: as noted above   GI: Denies hematemesis, hematochezia, melena  : Denies discharge, hematuria  MSK: Denies arthralgias, myalgias  SKIN: Denies rash, lesions  NEURO: Denies paresthesias, weakness  PSYCH: Denies depression, anxiety    VITALS:  T(F): 97.2, Max: 100.1 (03-26-20 @ 17:02)  HR: 87  BP: 165/72  RR: 20Vital Signs Last 24 Hrs  T(C): 36.2 (27 Mar 2020 06:00), Max: 37.8 (26 Mar 2020 17:02)  T(F): 97.2 (27 Mar 2020 06:00), Max: 100.1 (26 Mar 2020 17:02)  HR: 87 (27 Mar 2020 06:00) (81 - 116)  BP: 165/72 (27 Mar 2020 06:00) (108/55 - 165/72)  BP(mean): --  RR: 20 (27 Mar 2020 06:00) (18 - 20)  SpO2: 98% (26 Mar 2020 23:50) (93% - 99%)    PHYSICAL EXAM:  Gen: on NC 3L  HEENT: NCAT  Resp: b/l chest expansion  Neuro: nonfocal     TESTS & MEASUREMENTS:                        14.2   5.37  )-----------( 137      ( 26 Mar 2020 19:18 )             40.0     03-26    135  |  98  |  13  ----------------------------<  111<H>  4.6   |  24  |  0.6<L>    Ca    8.4<L>      26 Mar 2020 19:18  Mg     1.8     03-26    TPro  6.3  /  Alb  3.6  /  TBili  0.2  /  DBili  <0.2  /  AST  29  /  ALT  9   /  AlkPhos  55  03-26    eGFR if Non African American: 87 mL/min/1.73M2 (03-26-20 @ 19:18)  eGFR if African American: 100 mL/min/1.73M2 (03-26-20 @ 19:18)    LIVER FUNCTIONS - ( 26 Mar 2020 19:18 )  Alb: 3.6 g/dL / Pro: 6.3 g/dL / ALK PHOS: 55 U/L / ALT: 9 U/L / AST: 29 U/L / GGT: x               Culture - Blood (collected 10-25-19 @ 15:55)  Source: .Blood Blood-Peripheral  Final Report (10-30-19 @ 23:01):    No growth at 5 days.    Culture - Urine (collected 10-25-19 @ 14:47)  Source: .Urine Clean Catch (Midstream)  Final Report (10-27-19 @ 16:03):    >100,000 CFU/ml Klebsiella pneumoniae  Organism: Klebsiella pneumoniae (10-27-19 @ 16:03)  Organism: Klebsiella pneumoniae (10-27-19 @ 16:03)      -  Amikacin: S <=16      -  Ampicillin: R >16 These ampicillin results predict results for amoxicillin      -  Ampicillin/Sulbactam: S <=8/4 Enterobacter, Citrobacter, and Serratia may develop resistance during prolonged therapy (3-4 days)      -  Aztreonam: S <=4      -  Cefazolin: S <=8 (MIC_CL_COM_ENTERIC_CEFAZU) For uncomplicated UTI with K. pneumoniae, E. coli, or P. mirablis: LOU <=16 is sensitive and LOU >=32 is resistant. This also predicts results for oral agents cefaclor, cefdinir, cefpodoxime, cefprozil, cefuroxime axetil, cephalexin and locarbef for uncomplicated UTI. Note that some isolates may be susceptible to these agents while testing resistant to cefazolin.      -  Cefepime: S <=4      -  Cefoxitin: S <=8      -  Ceftriaxone: S <=1 Enterobacter, Citrobacter, and Serratia may develop resistance during prolonged therapy      -  Ciprofloxacin: S <=1      -  Gentamicin: S <=4      -  Imipenem: S <=1      -  Levofloxacin: S <=2      -  Meropenem: S <=1      -  Nitrofurantoin: S <=32 Should not be used to treat pyelonephritis      -  Piperacillin/Tazobactam: S <=16      -  Tigecycline: S <=2      -  Tobramycin: S <=4      -  Trimethoprim/Sulfamethoxazole: S <=2/38      Method Type: LOU    Culture - Urine (collected 06-01-19 @ 06:25)  Source: .Urine Clean Catch (Midstream)  Final Report (06-03-19 @ 15:30):    10,000 - 49,000 CFU/mL Enterococcus faecium  Organism: Enterococcus faecium (06-03-19 @ 15:30)  Organism: Enterococcus faecium (06-03-19 @ 15:30)      -  Ampicillin: S <=2 Predicts results to ampicillin/sulbactam, amoxacillin-clavulanate and  piperacillin-tazobactam.      -  Ciprofloxacin: I 2      -  Levofloxacin: S 2      -  Nitrofurantoin: I 64 Should not be used to treat pyelonephritis.      -  Tetra/Doxy: I 8      -  Vancomycin: S 1      Method Type: LOU    Culture - Blood (collected 05-28-19 @ 18:35)  Source: .Blood None  Final Report (06-03-19 @ 01:00):    No growth at 5 days.    Culture - Blood (collected 05-27-19 @ 21:57)  Source: .Blood None  Final Report (06-02-19 @ 06:00):    No growth at 5 days.    Culture - Blood (collected 05-24-19 @ 11:19)  Source: .Blood None  Final Report (05-30-19 @ 01:01):    No growth at 5 days.    Culture - Urine (collected 05-24-19 @ 08:37)  Source: .Urine Clean Catch (Midstream)  Final Report (05-25-19 @ 11:39):    No growth    Culture - Urine (collected 04-26-19 @ 16:55)  Source: .Urine Catheterized  Final Report (04-28-19 @ 19:43):    >100,000 CFU/ml Klebsiella pneumoniae  Organism: Klebsiella pneumoniae (04-28-19 @ 19:43)  Organism: Klebsiella pneumoniae (04-28-19 @ 19:43)      -  Amikacin: S <=16      -  Ampicillin: R >16 These ampicillin results predict results for amoxicillin      -  Ampicillin/Sulbactam: S <=8/4      -  Aztreonam: S <=4      -  Cefazolin: S <=8 For uncomplicated UTI with K. pneumoniae, E. coli, or P. mirablis: LOU <=16 is sensitive and LOU >=32 is resistant. This also predicts results for oral agents cefaclor, cefdinir, cefpodoxime, cefprozil, cefuroxime axetil, cephalexin and locarbef for uncomplicated UTI. Note that some isolates may be susceptible to these agents while testing resistant to cefazolin.      -  Cefepime: S <=4      -  Cefoxitin: S <=8      -  Ceftriaxone: S <=1 Enterobacter, Citrobacter, and Serratia may develop resistance during prolonged therapy      -  Ciprofloxacin: S <=1      -  Ertapenem: S <=1      -  Gentamicin: S <=4      -  Imipenem: S <=1      -  Levofloxacin: S <=2      -  Meropenem: S <=1      -  Nitrofurantoin: S <=32 Should not be used to treat pyelonephritis      -  Piperacillin/Tazobactam: S <=16      -  Tigecycline: S <=2      -  Tobramycin: S <=4      -  Trimethoprim/Sulfamethoxazole: S <=2/38      Method Type: LOU        Lactate, Blood: 0.7 mmol/L (03-26-20 @ 19:18)      INFECTIOUS DISEASES TESTING      RADIOLOGY & ADDITIONAL TESTS:  I have personally reviewed the last Chest xray  CXR      CT      CARDIOLOGY TESTING  12 Lead ECG:   Ventricular Rate 99 BPM    Atrial Rate 99 BPM    P-R Interval 140 ms    QRS Duration 96 ms    Q-T Interval 350 ms    QTC Calculation(Bezet) 449 ms    P Axis 46 degrees    R Axis -58 degrees    T Axis 13 degrees    Diagnosis Line Normal sinus rhythm  Left anterior fascicular block  Voltage criteria for left ventricular hypertrophy  Possible Lateral infarct , age undetermined  Abnormal ECG    Confirmed by Kayode Lange ([id number]) on 3/26/2020 10:58:39 PM (03-26-20 @ 20:15)      MEDICATIONS  ALBUTerol    90 MICROgram(s) HFA Inhaler 1  benztropine 1  chlorhexidine 4% Liquid 1  diVALproex ER 1500  enoxaparin Injectable 40  haloperidol    Concentrate 1  multivitamin/minerals 1  predniSONE   Tablet 40      Weight  Weight (kg): 70.307 (03-27-20 @ 00:00)    ANTIBIOTICS:      ALLERGIES:  lactose (Stomach Upset)  No Known Allergies

## 2020-03-27 NOTE — PROGRESS NOTE ADULT - SUBJECTIVE AND OBJECTIVE BOX
SUBJECTIVE:    Patient is a 79y old Female who presents with a chief complaint of R/O COVID-19 (27 Mar 2020 07:40)    Overnight Events: Patient is stable, no acute events overnight.  she is afebrile, saturating well on 3 L NC.  Hemodynamically stable, she has no complaints.    PAST MEDICAL & SURGICAL HISTORY  Breast mass, right: s/p lumpectomy &amp; radiation 2007  Hyperlipemia  Brito esophagus  Mild mental retardation  Atypical bipolar disorder  Femoral neck fracture  Feeding by G-tube: s/p removal 2012    SOCIAL HISTORY:  Negative for smoking/alcohol/drug use.     ALLERGIES:  No Known Allergies    MEDICATIONS:  STANDING MEDICATIONS  ALBUTerol    90 MICROgram(s) HFA Inhaler 1 Puff(s) Inhalation every 6 hours  benztropine 1 milliGRAM(s) Oral two times a day  chlorhexidine 4% Liquid 1 Application(s) Topical <User Schedule>  diVALproex ER 1500 milliGRAM(s) Oral at bedtime  enoxaparin Injectable 40 milliGRAM(s) SubCutaneous daily  haloperidol    Concentrate 1 milliGRAM(s) Oral at bedtime  hydroxychloroquine   Oral   multivitamin/minerals 1 Tablet(s) Oral daily  predniSONE   Tablet 40 milliGRAM(s) Oral daily    PRN MEDICATIONS  acetaminophen   Tablet .. 650 milliGRAM(s) Oral every 6 hours PRN  ALBUTerol    90 MICROgram(s) HFA Inhaler 2 Puff(s) Inhalation every 4 hours PRN    VITALS:   T(F): 97.3, Max: 100.1 (03-26-20 @ 17:02)  HR: 87 (81 - 116)  BP: 165/72 (108/55 - 165/72)  RR: 18 (18 - 20)  SpO2: 96% (93% - 99%)    LABS:                        12.4   5.19  )-----------( 102      ( 27 Mar 2020 07:26 )             37.3     03-27    136  |  99  |  12  ----------------------------<  90  4.2   |  26  |  0.5<L>    Ca    8.1<L>      27 Mar 2020 07:26  Mg     1.9     03-27    TPro  6.3  /  Alb  3.6  /  TBili  0.2  /  DBili  <0.2  /  AST  29  /  ALT  9   /  AlkPhos  55  03-26          Lactate, Blood: 0.7 mmol/L (03-26-20 @ 19:18)                  IMAGING/EKG:  < from: Xray Chest 1 View-PORTABLE IMMEDIATE (03.26.20 @ 18:53) >  Impression:      Low lung volumes. Lungs are clear of infiltrate.      < end of copied text >      PHYSICAL EXAM:  GEN: NAD, comfortable  LUNGS: CTAB, no w/r/r  HEART: RRR, s1 and s2 appreciated, no m/r/g  ABD: soft, NT/ND, +BS  EXT: no edema, PP b/l  NEURO: AAOX3

## 2020-03-28 LAB
ALBUMIN SERPL ELPH-MCNC: 3.1 G/DL — LOW (ref 3.5–5.2)
ALP SERPL-CCNC: 45 U/L — SIGNIFICANT CHANGE UP (ref 30–115)
ALT FLD-CCNC: 9 U/L — SIGNIFICANT CHANGE UP (ref 0–41)
ANION GAP SERPL CALC-SCNC: 13 MMOL/L — SIGNIFICANT CHANGE UP (ref 7–14)
AST SERPL-CCNC: 22 U/L — SIGNIFICANT CHANGE UP (ref 0–41)
BASOPHILS # BLD AUTO: 0.01 K/UL — SIGNIFICANT CHANGE UP (ref 0–0.2)
BASOPHILS NFR BLD AUTO: 0.2 % — SIGNIFICANT CHANGE UP (ref 0–1)
BILIRUB SERPL-MCNC: <0.2 MG/DL — SIGNIFICANT CHANGE UP (ref 0.2–1.2)
BUN SERPL-MCNC: 18 MG/DL — SIGNIFICANT CHANGE UP (ref 10–20)
CALCIUM SERPL-MCNC: 8 MG/DL — LOW (ref 8.5–10.1)
CHLORIDE SERPL-SCNC: 102 MMOL/L — SIGNIFICANT CHANGE UP (ref 98–110)
CO2 SERPL-SCNC: 24 MMOL/L — SIGNIFICANT CHANGE UP (ref 17–32)
CREAT SERPL-MCNC: 0.7 MG/DL — SIGNIFICANT CHANGE UP (ref 0.7–1.5)
EOSINOPHIL # BLD AUTO: 0 K/UL — SIGNIFICANT CHANGE UP (ref 0–0.7)
EOSINOPHIL NFR BLD AUTO: 0 % — SIGNIFICANT CHANGE UP (ref 0–8)
GLUCOSE SERPL-MCNC: 168 MG/DL — HIGH (ref 70–99)
HCT VFR BLD CALC: 35.3 % — LOW (ref 37–47)
HGB BLD-MCNC: 12.2 G/DL — SIGNIFICANT CHANGE UP (ref 12–16)
IMM GRANULOCYTES NFR BLD AUTO: 0.4 % — HIGH (ref 0.1–0.3)
LYMPHOCYTES # BLD AUTO: 0.78 K/UL — LOW (ref 1.2–3.4)
LYMPHOCYTES # BLD AUTO: 14.7 % — LOW (ref 20.5–51.1)
MCHC RBC-ENTMCNC: 33 PG — HIGH (ref 27–31)
MCHC RBC-ENTMCNC: 34.6 G/DL — SIGNIFICANT CHANGE UP (ref 32–37)
MCV RBC AUTO: 95.4 FL — SIGNIFICANT CHANGE UP (ref 81–99)
MONOCYTES # BLD AUTO: 0.21 K/UL — SIGNIFICANT CHANGE UP (ref 0.1–0.6)
MONOCYTES NFR BLD AUTO: 4 % — SIGNIFICANT CHANGE UP (ref 1.7–9.3)
NEUTROPHILS # BLD AUTO: 4.29 K/UL — SIGNIFICANT CHANGE UP (ref 1.4–6.5)
NEUTROPHILS NFR BLD AUTO: 80.7 % — HIGH (ref 42.2–75.2)
NRBC # BLD: 0 /100 WBCS — SIGNIFICANT CHANGE UP (ref 0–0)
PLATELET # BLD AUTO: 98 K/UL — LOW (ref 130–400)
POTASSIUM SERPL-MCNC: 4 MMOL/L — SIGNIFICANT CHANGE UP (ref 3.5–5)
POTASSIUM SERPL-SCNC: 4 MMOL/L — SIGNIFICANT CHANGE UP (ref 3.5–5)
PROT SERPL-MCNC: 5.7 G/DL — LOW (ref 6–8)
RBC # BLD: 3.7 M/UL — LOW (ref 4.2–5.4)
RBC # FLD: 12 % — SIGNIFICANT CHANGE UP (ref 11.5–14.5)
SARS-COV-2 RNA SPEC QL NAA+PROBE: SIGNIFICANT CHANGE UP
SODIUM SERPL-SCNC: 139 MMOL/L — SIGNIFICANT CHANGE UP (ref 135–146)
WBC # BLD: 5.31 K/UL — SIGNIFICANT CHANGE UP (ref 4.8–10.8)
WBC # FLD AUTO: 5.31 K/UL — SIGNIFICANT CHANGE UP (ref 4.8–10.8)

## 2020-03-28 PROCEDURE — 99233 SBSQ HOSP IP/OBS HIGH 50: CPT

## 2020-03-28 RX ORDER — HYDROXYCHLOROQUINE SULFATE 200 MG
200 TABLET ORAL
Refills: 0 | Status: COMPLETED | OUTPATIENT
Start: 2020-03-29 | End: 2020-04-01

## 2020-03-28 RX ORDER — DIVALPROEX SODIUM 500 MG/1
1500 TABLET, DELAYED RELEASE ORAL AT BEDTIME
Refills: 0 | Status: DISCONTINUED | OUTPATIENT
Start: 2020-03-28 | End: 2020-03-28

## 2020-03-28 RX ORDER — DIVALPROEX SODIUM 500 MG/1
500 TABLET, DELAYED RELEASE ORAL THREE TIMES A DAY
Refills: 0 | Status: DISCONTINUED | OUTPATIENT
Start: 2020-03-28 | End: 2020-04-02

## 2020-03-28 RX ADMIN — ENOXAPARIN SODIUM 40 MILLIGRAM(S): 100 INJECTION SUBCUTANEOUS at 12:09

## 2020-03-28 RX ADMIN — Medication 400 MILLIGRAM(S): at 05:29

## 2020-03-28 RX ADMIN — Medication 650 MILLIGRAM(S): at 07:14

## 2020-03-28 RX ADMIN — Medication 40 MILLIGRAM(S): at 05:27

## 2020-03-28 RX ADMIN — CHLORHEXIDINE GLUCONATE 1 APPLICATION(S): 213 SOLUTION TOPICAL at 05:28

## 2020-03-28 RX ADMIN — Medication 650 MILLIGRAM(S): at 05:37

## 2020-03-28 RX ADMIN — Medication 1 MILLIGRAM(S): at 17:11

## 2020-03-28 RX ADMIN — Medication 1 TABLET(S): at 12:09

## 2020-03-28 RX ADMIN — Medication 1 MILLIGRAM(S): at 05:26

## 2020-03-28 RX ADMIN — HALOPERIDOL DECANOATE 1 MILLIGRAM(S): 100 INJECTION INTRAMUSCULAR at 21:41

## 2020-03-28 NOTE — PROGRESS NOTE ADULT - ASSESSMENT
79F h/o bipolar, behavioral disorder, asthma, esophageal varices is being sent by group home [facility] for fever and cough since last night    # Asthma exacerbation  - COVID 19 PCR not detected  - Pt febrile overnight, saturating 98 on 3 L NC  - CXR normal  - Labs normal, Flu/RSV negative  - Possible Asthma exacerbation exacerbated by a URI  - C/w prednisone 40 mg PO qd   - d/c plaquenil  - Proventil inhaler q6h standing and q4h PRN for SOB/wheezing  - Tylenol 650 mg PO q6h PRN for fever  - d/c isolation  - UA and urine culture ordered  - Limit physical contact as much as possible  - ID following    # Bipolar disorder:  - C/w divalproex and haloperidol    # Asthma:  - C/w inhalers    # DVT ppx: Lovenox 40 mg Pqd  # GI ppx: None  # Activity: as tolerated  # Diet: Regular  # Dispo: From group home  # CODE STATUS: FULL. Patient does not have family, has an aide, no documented health care 79F h/o bipolar, behavioral disorder, asthma, esophageal varices is being sent by group home [facility] for fever and cough since last night    # Asthma exacerbation  - COVID 19 PCR on 3/26 not detected  - Pt febrile overnight, saturating 98 on 3 L NC  - CXR normal  - Labs normal, Flu/RSV negative  - Repeat COVID PCR since patient is having fevers without an identifiable source  - UA and culture ordered  - Procalcitonin normal  - C/w prednisone 40 mg PO qd   - d/c plaquenil for now pending COVID results  - Proventil inhaler q6h standing and q4h PRN for SOB/wheezing  - Tylenol 650 mg PO q6h PRN for fever  - Contact/Airbrone isolation  - Limit physical contact as much as possible  - ID following    # Bipolar disorder:  - C/w divalproex and haloperidol    # Asthma:  - C/w inhalers    # DVT ppx: Lovenox 40 mg Pqd  # GI ppx: None  # Activity: as tolerated  # Diet: Regular  # Dispo: From group home  # CODE STATUS: FULL. Patient does not have family, has an aide, no documented health care

## 2020-03-28 NOTE — PROGRESS NOTE ADULT - SUBJECTIVE AND OBJECTIVE BOX
SUBJECTIVE:    Patient is a 79y old Female who presents with a chief complaint of R/O COVID-19 (27 Mar 2020 10:07)    Overnight Events: Patient is stable, no acute events overnight.  She was febrile overnight, saturating well on 3 l NC.  Hemodynamically stable.    PAST MEDICAL & SURGICAL HISTORY  Breast mass, right: s/p lumpectomy &amp; radiation 2007  Hyperlipemia  Brito esophagus  Mild mental retardation  Atypical bipolar disorder  Femoral neck fracture  Feeding by G-tube: s/p removal 2012    SOCIAL HISTORY:  Negative for smoking/alcohol/drug use.     ALLERGIES:  No Known Allergies    MEDICATIONS:  STANDING MEDICATIONS  ALBUTerol    90 MICROgram(s) HFA Inhaler 1 Puff(s) Inhalation every 6 hours  benztropine 1 milliGRAM(s) Oral two times a day  chlorhexidine 4% Liquid 1 Application(s) Topical <User Schedule>  diVALproex ER 1500 milliGRAM(s) Oral at bedtime  enoxaparin Injectable 40 milliGRAM(s) SubCutaneous daily  haloperidol    Concentrate 1 milliGRAM(s) Oral at bedtime  multivitamin/minerals 1 Tablet(s) Oral daily  predniSONE   Tablet 40 milliGRAM(s) Oral daily    PRN MEDICATIONS  acetaminophen   Tablet .. 650 milliGRAM(s) Oral every 6 hours PRN  ALBUTerol    90 MICROgram(s) HFA Inhaler 2 Puff(s) Inhalation every 4 hours PRN    VITALS:   T(F): 101.5, Max: 102.3 (03-27-20 @ 16:33)  HR: 102 (101 - 108)  BP: 118/59 (95/48 - 135/69)  RR: 16 (16 - 18)  SpO2: 96% (95% - 96%)    LABS:                        12.4   5.19  )-----------( 102      ( 27 Mar 2020 07:26 )             37.3     03-27    136  |  99  |  12  ----------------------------<  90  4.2   |  26  |  0.5<L>    Ca    8.1<L>      27 Mar 2020 07:26  Mg     1.9     03-27    TPro  6.3  /  Alb  3.6  /  TBili  0.2  /  DBili  <0.2  /  AST  29  /  ALT  9   /  AlkPhos  55  03-26              Culture - Blood (collected 26 Mar 2020 19:16)  Source: .Blood Blood  Preliminary Report (28 Mar 2020 03:01):    No growth to date.    Culture - Blood (collected 26 Mar 2020 19:16)  Source: .Blood Blood  Preliminary Report (28 Mar 2020 03:01):    No growth to date.              03-27-20 @ 07:01  -  03-28-20 @ 07:00  --------------------------------------------------------  IN: 100 mL / OUT: 0 mL / NET: 100 mL          IMAGING/EKG:  < from: Xray Chest 1 View-PORTABLE IMMEDIATE (03.26.20 @ 18:53) >  Impression:      Low lung volumes. Lungs are clear of infiltrate.    < end of copied text >    PHYSICAL EXAM:  GEN: NAD, comfortable  LUNGS: CTAB, no w/r/r  HEART: RRR, s1 and s2 appreciated, no m/r/g  ABD: soft, NT/ND, +BS  EXT: no edema, PP b/l  NEURO: AAOX3

## 2020-03-28 NOTE — PROGRESS NOTE ADULT - ATTENDING COMMENTS
79F h/o bipolar, behavioral disorder, asthma, esophageal varices is being sent by group home [facility] for fever and cough since 1day PTA.    # Asthma exacerbation  - COVID 19 PCR on 3/26 not detected  - But Pt febrile overnight, saturating 98 on 3 L NC  - CXR normal  - Labs normal, Flu/RSV negative  - Repeat COVID PCR since patient is having fevers without an identifiable source. Lymphopenia.  Will likely be positive. completed her loading dose 3/28. c/w Hydroxychloroquine 200mg   - UA and culture ordered  - Procalcitonin normal  - C/w prednisone 40 mg PO qd   - d/c plaquenil for now pending COVID results  - Proventil inhaler q6h standing and q4h PRN for SOB/wheezing  - Tylenol 650 mg PO q6h PRN for fever  - Contact/Airbrone isolation  - Limit physical contact as much as possible  - ID following    # Bipolar disorder:  - C/w divalproex and haloperidol    # Asthma:  - C/w inhalers    # DVT ppx: Lovenox 40 mg Pqd  # GI ppx: None  # Activity: as tolerated  # Diet: Regular  # Dispo: From group home  # CODE STATUS: FULL. Patient does not have family, has an aide, no documented health care 79F h/o bipolar, behavioral disorder, asthma, esophageal varices is being sent by group home [facility] for fever and cough since 1day PTA.    # Asthma exacerbation  - COVID 19 PCR on 3/26 not detected  - But Pt febrile overnight, saturating 98 on 3 L NC  - CXR normal  - Labs normal, Flu/RSV negative  - Repeat COVID PCR sent 3/28, since patient is having fevers without an identifiable source. Lymphopenia.  Will likely be positive. completed her loading dose 3/28. c/w Hydroxychloroquine 200mg BID x 8 doses starting 3/29.  May get a repeat CXR if and when sats drop.    - UA and culture ordered  - Procalcitonin normal  - C/w prednisone 40 mg PO qd     - Proventil inhaler q6h standing and q4h PRN for SOB/wheezing  - Tylenol 650 mg PO q6h PRN for fever  - Contact/Airbrone isolation    - ID following    # Bipolar disorder:  - C/w divalproex and haloperidol    # Asthma:  - C/w inhalers    # DVT ppx: Lovenox 40 mg Pqd  # GI ppx: None  # Activity: as tolerated  # Diet: Regular  # Dispo: From group home  # CODE STATUS: FULL. Patient does not have family, has an aide, no documented health care

## 2020-03-29 LAB
ALBUMIN SERPL ELPH-MCNC: 3.2 G/DL — LOW (ref 3.5–5.2)
ALP SERPL-CCNC: 42 U/L — SIGNIFICANT CHANGE UP (ref 30–115)
ALT FLD-CCNC: 26 U/L — SIGNIFICANT CHANGE UP (ref 0–41)
ANION GAP SERPL CALC-SCNC: 11 MMOL/L — SIGNIFICANT CHANGE UP (ref 7–14)
AST SERPL-CCNC: 35 U/L — SIGNIFICANT CHANGE UP (ref 0–41)
BASOPHILS # BLD AUTO: 0.01 K/UL — SIGNIFICANT CHANGE UP (ref 0–0.2)
BASOPHILS NFR BLD AUTO: 0.2 % — SIGNIFICANT CHANGE UP (ref 0–1)
BILIRUB SERPL-MCNC: <0.2 MG/DL — SIGNIFICANT CHANGE UP (ref 0.2–1.2)
BUN SERPL-MCNC: 17 MG/DL — SIGNIFICANT CHANGE UP (ref 10–20)
CALCIUM SERPL-MCNC: 8.2 MG/DL — LOW (ref 8.5–10.1)
CHLORIDE SERPL-SCNC: 104 MMOL/L — SIGNIFICANT CHANGE UP (ref 98–110)
CO2 SERPL-SCNC: 26 MMOL/L — SIGNIFICANT CHANGE UP (ref 17–32)
CREAT SERPL-MCNC: 0.6 MG/DL — LOW (ref 0.7–1.5)
EOSINOPHIL # BLD AUTO: 0 K/UL — SIGNIFICANT CHANGE UP (ref 0–0.7)
EOSINOPHIL NFR BLD AUTO: 0 % — SIGNIFICANT CHANGE UP (ref 0–8)
GLUCOSE SERPL-MCNC: 118 MG/DL — HIGH (ref 70–99)
HCT VFR BLD CALC: 34.5 % — LOW (ref 37–47)
HGB BLD-MCNC: 11.6 G/DL — LOW (ref 12–16)
IMM GRANULOCYTES NFR BLD AUTO: 0.5 % — HIGH (ref 0.1–0.3)
LYMPHOCYTES # BLD AUTO: 0.65 K/UL — LOW (ref 1.2–3.4)
LYMPHOCYTES # BLD AUTO: 10.4 % — LOW (ref 20.5–51.1)
MCHC RBC-ENTMCNC: 32 PG — HIGH (ref 27–31)
MCHC RBC-ENTMCNC: 33.6 G/DL — SIGNIFICANT CHANGE UP (ref 32–37)
MCV RBC AUTO: 95.3 FL — SIGNIFICANT CHANGE UP (ref 81–99)
MONOCYTES # BLD AUTO: 0.4 K/UL — SIGNIFICANT CHANGE UP (ref 0.1–0.6)
MONOCYTES NFR BLD AUTO: 6.4 % — SIGNIFICANT CHANGE UP (ref 1.7–9.3)
NEUTROPHILS # BLD AUTO: 5.16 K/UL — SIGNIFICANT CHANGE UP (ref 1.4–6.5)
NEUTROPHILS NFR BLD AUTO: 82.5 % — HIGH (ref 42.2–75.2)
NRBC # BLD: 0 /100 WBCS — SIGNIFICANT CHANGE UP (ref 0–0)
PLATELET # BLD AUTO: 112 K/UL — LOW (ref 130–400)
POTASSIUM SERPL-MCNC: 4.3 MMOL/L — SIGNIFICANT CHANGE UP (ref 3.5–5)
POTASSIUM SERPL-SCNC: 4.3 MMOL/L — SIGNIFICANT CHANGE UP (ref 3.5–5)
PROT SERPL-MCNC: 5.7 G/DL — LOW (ref 6–8)
RBC # BLD: 3.62 M/UL — LOW (ref 4.2–5.4)
RBC # FLD: 11.9 % — SIGNIFICANT CHANGE UP (ref 11.5–14.5)
SARS-COV-2 RNA SPEC QL NAA+PROBE: DETECTED
SODIUM SERPL-SCNC: 141 MMOL/L — SIGNIFICANT CHANGE UP (ref 135–146)
WBC # BLD: 6.25 K/UL — SIGNIFICANT CHANGE UP (ref 4.8–10.8)
WBC # FLD AUTO: 6.25 K/UL — SIGNIFICANT CHANGE UP (ref 4.8–10.8)

## 2020-03-29 PROCEDURE — 99233 SBSQ HOSP IP/OBS HIGH 50: CPT

## 2020-03-29 RX ADMIN — Medication 650 MILLIGRAM(S): at 05:26

## 2020-03-29 RX ADMIN — HALOPERIDOL DECANOATE 1 MILLIGRAM(S): 100 INJECTION INTRAMUSCULAR at 22:50

## 2020-03-29 RX ADMIN — Medication 1 MILLIGRAM(S): at 05:16

## 2020-03-29 RX ADMIN — DIVALPROEX SODIUM 500 MILLIGRAM(S): 500 TABLET, DELAYED RELEASE ORAL at 13:01

## 2020-03-29 RX ADMIN — ENOXAPARIN SODIUM 40 MILLIGRAM(S): 100 INJECTION SUBCUTANEOUS at 11:15

## 2020-03-29 RX ADMIN — CHLORHEXIDINE GLUCONATE 1 APPLICATION(S): 213 SOLUTION TOPICAL at 05:16

## 2020-03-29 RX ADMIN — Medication 200 MILLIGRAM(S): at 05:16

## 2020-03-29 RX ADMIN — DIVALPROEX SODIUM 500 MILLIGRAM(S): 500 TABLET, DELAYED RELEASE ORAL at 22:49

## 2020-03-29 RX ADMIN — Medication 200 MILLIGRAM(S): at 17:26

## 2020-03-29 RX ADMIN — Medication 40 MILLIGRAM(S): at 05:16

## 2020-03-29 RX ADMIN — Medication 1 MILLIGRAM(S): at 17:26

## 2020-03-29 RX ADMIN — DIVALPROEX SODIUM 500 MILLIGRAM(S): 500 TABLET, DELAYED RELEASE ORAL at 05:16

## 2020-03-29 RX ADMIN — Medication 1 TABLET(S): at 11:14

## 2020-03-29 NOTE — PROGRESS NOTE ADULT - SUBJECTIVE AND OBJECTIVE BOX
S: somewhat SOB.   eating and drinking fairly.      All other pertinent ROS negative.      Vital Signs Last 24 Hrs  T(C): 37.2 (29 Mar 2020 14:35), Max: 39.7 (29 Mar 2020 05:10)  T(F): 99 (29 Mar 2020 14:35), Max: 103.5 (29 Mar 2020 05:10)  HR: 95 (29 Mar 2020 14:35) (76 - 95)  BP: 114/73 (29 Mar 2020 14:35) (112/66 - 114/73)  BP(mean): --  RR: 18 (29 Mar 2020 14:35) (18 - 20)  SpO2: 93% (29 Mar 2020 05:27) (93% - 96%)  PHYSICAL EXAM:    Constitutional: NAD, awake and alert, well-developed  HEENT: PERR, EOMI, Normal Hearing, MMM  Neck: Soft and supple, No LAD, No JVD  Respiratory: mild rhonchi.  Cardiovascular: S1 and S2, regular rate and rhythm, no Murmurs, gallops or rubs  Gastrointestinal: Bowel Sounds present, soft, nontender, nondistended, no guarding, no rebound  Extremities: No peripheral edema      MEDICATIONS:  MEDICATIONS  (STANDING):  ALBUTerol    90 MICROgram(s) HFA Inhaler 1 Puff(s) Inhalation every 6 hours  benztropine 1 milliGRAM(s) Oral two times a day  chlorhexidine 4% Liquid 1 Application(s) Topical <User Schedule>  diVALproex Sprinkle 500 milliGRAM(s) Oral three times a day  enoxaparin Injectable 40 milliGRAM(s) SubCutaneous daily  haloperidol    Concentrate 1 milliGRAM(s) Oral at bedtime  hydroxychloroquine 200 milliGRAM(s) Oral two times a day  multivitamin/minerals 1 Tablet(s) Oral daily  predniSONE   Tablet 40 milliGRAM(s) Oral daily      LABS: All Labs Reviewed:                        11.6   6.25  )-----------( 112      ( 29 Mar 2020 07:55 )             34.5     03-29    141  |  104  |  17  ----------------------------<  118<H>  4.3   |  26  |  0.6<L>    Ca    8.2<L>      29 Mar 2020 07:55    TPro  5.7<L>  /  Alb  3.2<L>  /  TBili  <0.2  /  DBili  x   /  AST  35  /  ALT  26  /  AlkPhos  42  03-29          Blood Culture: 03-26 @ 19:16  Organism --  Gram Stain Blood -- Gram Stain --  Specimen Source .Blood Blood  Culture-Blood --        Radiology: reviewed

## 2020-03-29 NOTE — PROGRESS NOTE ADULT - ASSESSMENT
79F h/o bipolar, behavioral disorder, asthma, esophageal varices is being sent by group home [facility] for fever and cough since 1day PTA.    #Acute Covid Viral Pneumonia  #acute hypoxemic respi failure  # Asthma exacerbation  - COVID 19 PCR neg on 3/26, but positive on 3/28.   - febrile to 103.5  -saturating 95 on 3 L NC.   - CXR normal. repeat CXR if sats drop.  -lymphopenia noted    -On Plaquenil since 3/28.      Flu/RSV negative    - UA and culture ordered  - Procalcitonin normal  - On prednisone 40 mg PO qd (since 3/26).    - Proventil inhaler q6h standing and q4h PRN for SOB/wheezing  - Tylenol 650 mg PO q6h PRN for fever  - Contact/Airbrone isolation    - ID following    # Bipolar disorder:  - C/w divalproex and haloperidol    # Asthma:  - C/w inhalers    # DVT ppx: Lovenox 40 mg Pqd  # GI ppx: None  # Activity: as tolerated  # Diet: Regular  # Dispo: From group home  # CODE STATUS: FULL. Patient does not have family, has an aide, no documented health care

## 2020-03-30 LAB
ALBUMIN SERPL ELPH-MCNC: 3.4 G/DL — LOW (ref 3.5–5.2)
ALP SERPL-CCNC: 46 U/L — SIGNIFICANT CHANGE UP (ref 30–115)
ALT FLD-CCNC: 30 U/L — SIGNIFICANT CHANGE UP (ref 0–41)
ANION GAP SERPL CALC-SCNC: 13 MMOL/L — SIGNIFICANT CHANGE UP (ref 7–14)
AST SERPL-CCNC: 30 U/L — SIGNIFICANT CHANGE UP (ref 0–41)
BASOPHILS # BLD AUTO: 0.01 K/UL — SIGNIFICANT CHANGE UP (ref 0–0.2)
BASOPHILS NFR BLD AUTO: 0.1 % — SIGNIFICANT CHANGE UP (ref 0–1)
BILIRUB SERPL-MCNC: 0.2 MG/DL — SIGNIFICANT CHANGE UP (ref 0.2–1.2)
BUN SERPL-MCNC: 19 MG/DL — SIGNIFICANT CHANGE UP (ref 10–20)
CALCIUM SERPL-MCNC: 8.5 MG/DL — SIGNIFICANT CHANGE UP (ref 8.5–10.1)
CHLORIDE SERPL-SCNC: 101 MMOL/L — SIGNIFICANT CHANGE UP (ref 98–110)
CO2 SERPL-SCNC: 26 MMOL/L — SIGNIFICANT CHANGE UP (ref 17–32)
CREAT SERPL-MCNC: 0.6 MG/DL — LOW (ref 0.7–1.5)
EOSINOPHIL # BLD AUTO: 0 K/UL — SIGNIFICANT CHANGE UP (ref 0–0.7)
EOSINOPHIL NFR BLD AUTO: 0 % — SIGNIFICANT CHANGE UP (ref 0–8)
GLUCOSE SERPL-MCNC: 124 MG/DL — HIGH (ref 70–99)
HCT VFR BLD CALC: 35.9 % — LOW (ref 37–47)
HGB BLD-MCNC: 12 G/DL — SIGNIFICANT CHANGE UP (ref 12–16)
IMM GRANULOCYTES NFR BLD AUTO: 0.9 % — HIGH (ref 0.1–0.3)
LYMPHOCYTES # BLD AUTO: 0.7 K/UL — LOW (ref 1.2–3.4)
LYMPHOCYTES # BLD AUTO: 10.2 % — LOW (ref 20.5–51.1)
MCHC RBC-ENTMCNC: 31.7 PG — HIGH (ref 27–31)
MCHC RBC-ENTMCNC: 33.4 G/DL — SIGNIFICANT CHANGE UP (ref 32–37)
MCV RBC AUTO: 95 FL — SIGNIFICANT CHANGE UP (ref 81–99)
MONOCYTES # BLD AUTO: 0.42 K/UL — SIGNIFICANT CHANGE UP (ref 0.1–0.6)
MONOCYTES NFR BLD AUTO: 6.1 % — SIGNIFICANT CHANGE UP (ref 1.7–9.3)
NEUTROPHILS # BLD AUTO: 5.66 K/UL — SIGNIFICANT CHANGE UP (ref 1.4–6.5)
NEUTROPHILS NFR BLD AUTO: 82.7 % — HIGH (ref 42.2–75.2)
NRBC # BLD: 0 /100 WBCS — SIGNIFICANT CHANGE UP (ref 0–0)
PLATELET # BLD AUTO: 147 K/UL — SIGNIFICANT CHANGE UP (ref 130–400)
POTASSIUM SERPL-MCNC: 4.5 MMOL/L — SIGNIFICANT CHANGE UP (ref 3.5–5)
POTASSIUM SERPL-SCNC: 4.5 MMOL/L — SIGNIFICANT CHANGE UP (ref 3.5–5)
PROT SERPL-MCNC: 6.2 G/DL — SIGNIFICANT CHANGE UP (ref 6–8)
RBC # BLD: 3.78 M/UL — LOW (ref 4.2–5.4)
RBC # FLD: 12 % — SIGNIFICANT CHANGE UP (ref 11.5–14.5)
SODIUM SERPL-SCNC: 140 MMOL/L — SIGNIFICANT CHANGE UP (ref 135–146)
WBC # BLD: 6.85 K/UL — SIGNIFICANT CHANGE UP (ref 4.8–10.8)
WBC # FLD AUTO: 6.85 K/UL — SIGNIFICANT CHANGE UP (ref 4.8–10.8)

## 2020-03-30 PROCEDURE — 99233 SBSQ HOSP IP/OBS HIGH 50: CPT

## 2020-03-30 RX ADMIN — ENOXAPARIN SODIUM 40 MILLIGRAM(S): 100 INJECTION SUBCUTANEOUS at 11:25

## 2020-03-30 RX ADMIN — DIVALPROEX SODIUM 500 MILLIGRAM(S): 500 TABLET, DELAYED RELEASE ORAL at 14:52

## 2020-03-30 RX ADMIN — Medication 200 MILLIGRAM(S): at 05:11

## 2020-03-30 RX ADMIN — Medication 40 MILLIGRAM(S): at 05:09

## 2020-03-30 RX ADMIN — DIVALPROEX SODIUM 500 MILLIGRAM(S): 500 TABLET, DELAYED RELEASE ORAL at 05:11

## 2020-03-30 RX ADMIN — Medication 200 MILLIGRAM(S): at 17:54

## 2020-03-30 RX ADMIN — HALOPERIDOL DECANOATE 1 MILLIGRAM(S): 100 INJECTION INTRAMUSCULAR at 22:10

## 2020-03-30 RX ADMIN — CHLORHEXIDINE GLUCONATE 1 APPLICATION(S): 213 SOLUTION TOPICAL at 05:09

## 2020-03-30 RX ADMIN — Medication 1 TABLET(S): at 11:25

## 2020-03-30 RX ADMIN — Medication 1 MILLIGRAM(S): at 05:09

## 2020-03-30 RX ADMIN — DIVALPROEX SODIUM 500 MILLIGRAM(S): 500 TABLET, DELAYED RELEASE ORAL at 22:10

## 2020-03-30 RX ADMIN — Medication 1 MILLIGRAM(S): at 17:54

## 2020-03-30 NOTE — PROGRESS NOTE ADULT - SUBJECTIVE AND OBJECTIVE BOX
SUBJECTIVE:    Patient is a 79y old Female who presents with a chief complaint of R/O COVID-19 (29 Mar 2020 16:42)    Currently admitted to medicine with the primary diagnosis of Viral syndrome     Today is hospital day 4d. This morning she is resting comfortably in bed and reports no new issues or overnight events.     PAST MEDICAL & SURGICAL HISTORY  Breast mass, right: s/p lumpectomy &amp; radiation 2007  Hyperlipemia  Brito esophagus  Mild mental retardation  Atypical bipolar disorder  Femoral neck fracture  Feeding by G-tube: s/p removal 2012    SOCIAL HISTORY:  Negative for smoking/alcohol/drug use.     ALLERGIES:  No Known Allergies    MEDICATIONS:  STANDING MEDICATIONS  ALBUTerol    90 MICROgram(s) HFA Inhaler 1 Puff(s) Inhalation every 6 hours  benztropine 1 milliGRAM(s) Oral two times a day  chlorhexidine 4% Liquid 1 Application(s) Topical <User Schedule>  diVALproex Sprinkle 500 milliGRAM(s) Oral three times a day  enoxaparin Injectable 40 milliGRAM(s) SubCutaneous daily  haloperidol    Concentrate 1 milliGRAM(s) Oral at bedtime  hydroxychloroquine 200 milliGRAM(s) Oral two times a day  multivitamin/minerals 1 Tablet(s) Oral daily  predniSONE   Tablet 40 milliGRAM(s) Oral daily    PRN MEDICATIONS  acetaminophen   Tablet .. 650 milliGRAM(s) Oral every 6 hours PRN  ALBUTerol    90 MICROgram(s) HFA Inhaler 2 Puff(s) Inhalation every 4 hours PRN    VITALS:   T(F): 96.6  HR: 87  BP: 127/87  RR: 19  SpO2: 99%    LABS:                        11.6   6.25  )-----------( 112      ( 29 Mar 2020 07:55 )             34.5     03-29    141  |  104  |  17  ----------------------------<  118<H>  4.3   |  26  |  0.6<L>    Ca    8.2<L>      29 Mar 2020 07:55    TPro  5.7<L>  /  Alb  3.2<L>  /  TBili  <0.2  /  DBili  x   /  AST  35  /  ALT  26  /  AlkPhos  42  03-29                  RADIOLOGY:  < from: Xray Chest 1 View-PORTABLE IMMEDIATE (03.26.20 @ 18:53) >    Impression:      Low lung volumes. Lungs are clear of infiltrate.    < end of copied text >    PHYSICAL EXAM:  Constitutional: NAD, awake and alert, well-developed  HEENT: PERR, EOMI, Normal Hearing, MMM  Neck: Soft and supple, No LAD, No JVD  Respiratory: mild rhonchi.  Cardiovascular: S1 and S2, regular rate and rhythm, no Murmurs, gallops or rubs  Gastrointestinal: Bowel Sounds present, soft, nontender, nondistended, no guarding, no rebound  Extremities: No peripheral edema

## 2020-03-30 NOTE — PROGRESS NOTE ADULT - ATTENDING COMMENTS
79F h/o bipolar, behavioral disorder, asthma, esophageal varices is being sent by group home [facility] for fever and cough since 1day PTA.    #Acute Covid Viral Pneumonia  #acute hypoxemic respi failure  # Asthma exacerbation  - COVID 19 PCR neg on 3/26, but positive on 3/28.   - Tmax 100.2  -Yesterday, 95% on 3L NC --> Today 99 on 5 L NC. Wean O2 down as able.  - 3/26 CXR mild developing opacities on right. repeat CXR tomorrow.   -lymphopenia noted    -On Plaquenil since 3/28.      Flu/RSV negative    - UA and culture ordered  - Procalcitonin normal  - On prednisone 40 mg PO qd (since 3/26).    - Proventil inhaler q6h standing and q4h PRN for SOB/wheezing  - Tylenol 650 mg PO q6h PRN for fever  - Contact/Airbrone isolation    - ID following    # Bipolar disorder:  - C/w divalproex and haloperidol    # Asthma:  - C/w inhalers    # DVT ppx: Lovenox 40 mg Pqd  # GI ppx: None  # Activity: as tolerated  # Diet: Regular  # Dispo: From group home  # CODE STATUS: FULL. Patient does not have family, has an aide, no documented health care

## 2020-03-30 NOTE — PROGRESS NOTE ADULT - ASSESSMENT
79F h/o bipolar, behavioral disorder, asthma, esophageal varices is being sent by group home [facility] for fever and cough since 1day PTA.    # acute hypoxemic respiratory failure secondary to Asthma exacerbation and COVID -19 PNA  - COVID 19 PCR neg on 3/26, but positive on 3/28.   - CXR normal. repeat CXR if sats drop.  - febrile, lymphopenia   - Flu/RSV negative, Procalcitonin normal  - saturating 99 on 3 L NC.   - c/w Plaquenil ( started 3/28_   - UA and culture ordered  - c/w prednisone 40 mg PO qd (since 3/26).  - Proventil inhaler q6h standing and q4h PRN for SOB/wheezing  - Tylenol 650 mg PO q6h PRN for fever  - Contact / Airborne isolation  - ID following    # Bipolar disorder:  - C/w divalproex and haloperidol    # Asthma:  - C/w inhalers    # DVT ppx: Lovenox 40 mg Pqd  # GI ppx: None  # Activity: as tolerated  # Diet: Regular  # Dispo: From group home  # CODE STATUS: FULL. Patient does not have family, has an aide, no documented health care 79F h/o bipolar, behavioral disorder, asthma, esophageal varices is being sent by group home [facility] for fever and cough since 1day PTA.    # acute hypoxemic respiratory failure secondary to Asthma exacerbation and COVID -19 PNA  - COVID 19 PCR neg on 3/26, but positive on 3/28.   - CXR normal. repeat CXR if sats drop.  - febrile, lymphopenia   - Flu/RSV negative, Procalcitonin normal  - QTC 449ms, monitor daily EKGs  - saturating 99 on 3 L NC.   - c/w Plaquenil ( started 3/28)  - UA and culture ordered  - c/w prednisone 40 mg PO qd (since 3/26).  - Proventil inhaler q6h standing and q4h PRN for SOB/wheezing  - Tylenol 650 mg PO q6h PRN for fever  - Contact / Airborne isolation  - ID following    # Bipolar disorder:  - C/w divalproex and haloperidol    # Asthma:  - C/w inhalers    # DVT ppx: Lovenox 40 mg Pqd  # GI ppx: None  # Activity: as tolerated  # Diet: Regular  # Dispo: From group home  # CODE STATUS: FULL. Patient does not have family, has an aide, no documented health care 79F h/o bipolar, behavioral disorder, asthma, esophageal varices is being sent by group home [facility] for fever and cough since 1day PTA.    # acute hypoxemic respiratory failure secondary to Asthma exacerbation and COVID -19 PNA  - COVID 19 PCR neg on 3/26, but positive on 3/28.   - CXR normal. repeat CXR if sats drop.  - febrile, lymphopenia   - Flu/RSV negative, Procalcitonin normal  - QTC 449ms, monitor daily EKGs  - saturating 99 on 3 L NC.   - c/w Plaquenil ( started 3/28) till 4/01  - UA and culture ordered  - c/w prednisone 40 mg PO qd (since 3/26).  - Proventil inhaler q6h standing and q4h PRN for SOB/wheezing  - Tylenol 650 mg PO q6h PRN for fever  - Contact / Airborne isolation  - ID following    # Bipolar disorder:  - C/w divalproex and haloperidol    # Asthma:  - C/w inhalers    # DVT ppx: Lovenox 40 mg Pqd  # GI ppx: None  # Activity: as tolerated  # Diet: Regular  # Dispo: From group home  # CODE STATUS: FULL. Patient does not have family, has an aide, no documented health care

## 2020-03-31 PROCEDURE — 99233 SBSQ HOSP IP/OBS HIGH 50: CPT

## 2020-03-31 RX ADMIN — Medication 1 MILLIGRAM(S): at 05:09

## 2020-03-31 RX ADMIN — DIVALPROEX SODIUM 500 MILLIGRAM(S): 500 TABLET, DELAYED RELEASE ORAL at 12:12

## 2020-03-31 RX ADMIN — HALOPERIDOL DECANOATE 1 MILLIGRAM(S): 100 INJECTION INTRAMUSCULAR at 21:27

## 2020-03-31 RX ADMIN — DIVALPROEX SODIUM 500 MILLIGRAM(S): 500 TABLET, DELAYED RELEASE ORAL at 21:27

## 2020-03-31 RX ADMIN — Medication 650 MILLIGRAM(S): at 21:00

## 2020-03-31 RX ADMIN — Medication 1 MILLIGRAM(S): at 17:14

## 2020-03-31 RX ADMIN — Medication 200 MILLIGRAM(S): at 17:13

## 2020-03-31 RX ADMIN — Medication 200 MILLIGRAM(S): at 05:09

## 2020-03-31 RX ADMIN — DIVALPROEX SODIUM 500 MILLIGRAM(S): 500 TABLET, DELAYED RELEASE ORAL at 05:09

## 2020-03-31 RX ADMIN — ENOXAPARIN SODIUM 40 MILLIGRAM(S): 100 INJECTION SUBCUTANEOUS at 11:08

## 2020-03-31 RX ADMIN — CHLORHEXIDINE GLUCONATE 1 APPLICATION(S): 213 SOLUTION TOPICAL at 05:10

## 2020-03-31 RX ADMIN — Medication 40 MILLIGRAM(S): at 05:09

## 2020-03-31 RX ADMIN — Medication 1 TABLET(S): at 11:07

## 2020-03-31 RX ADMIN — Medication 650 MILLIGRAM(S): at 09:10

## 2020-03-31 NOTE — PROGRESS NOTE ADULT - SUBJECTIVE AND OBJECTIVE BOX
SUBJECTIVE:    Patient is a 79y old Female who presents with a chief complaint of R/O COVID-19 (30 Mar 2020 06:15)    Currently admitted to medicine with the primary diagnosis of Viral syndrome     Today is hospital day 5d. This morning she is resting comfortably in bed and reports no new issues or overnight events.     PAST MEDICAL & SURGICAL HISTORY  Breast mass, right: s/p lumpectomy &amp; radiation 2007  Hyperlipemia  Brito esophagus  Mild mental retardation  Atypical bipolar disorder  Femoral neck fracture  Feeding by G-tube: s/p removal 2012    SOCIAL HISTORY:  Negative for smoking/alcohol/drug use.     ALLERGIES:  No Known Allergies    MEDICATIONS:  STANDING MEDICATIONS  ALBUTerol    90 MICROgram(s) HFA Inhaler 1 Puff(s) Inhalation every 6 hours  benztropine 1 milliGRAM(s) Oral two times a day  chlorhexidine 4% Liquid 1 Application(s) Topical <User Schedule>  diVALproex Sprinkle 500 milliGRAM(s) Oral three times a day  enoxaparin Injectable 40 milliGRAM(s) SubCutaneous daily  haloperidol    Concentrate 1 milliGRAM(s) Oral at bedtime  hydroxychloroquine 200 milliGRAM(s) Oral two times a day  multivitamin/minerals 1 Tablet(s) Oral daily  predniSONE   Tablet 40 milliGRAM(s) Oral daily    PRN MEDICATIONS  acetaminophen   Tablet .. 650 milliGRAM(s) Oral every 6 hours PRN  ALBUTerol    90 MICROgram(s) HFA Inhaler 2 Puff(s) Inhalation every 4 hours PRN    VITALS:   T(F): 100.5  HR: 94  BP: 132/82  RR: 17  SpO2: 97%    LABS:                        12.0   6.85  )-----------( 147      ( 30 Mar 2020 08:32 )             35.9     03-30    140  |  101  |  19  ----------------------------<  124<H>  4.5   |  26  |  0.6<L>    Ca    8.5      30 Mar 2020 08:32    TPro  6.2  /  Alb  3.4<L>  /  TBili  0.2  /  DBili  x   /  AST  30  /  ALT  30  /  AlkPhos  46  03-30      < from: 12 Lead ECG (03.26.20 @ 20:15) >  Ventricular Rate 99 BPM    Atrial Rate 99 BPM    P-R Interval 140 ms    QRS Duration 96 ms    Q-T Interval 350 ms    QTC Calculation(Bezet) 449 ms    P Axis 46 degrees    R Axis -58 degrees    T Axis 13 degrees    Diagnosis Line Normal sinus rhythm  Left anterior fascicular block  Voltage criteria for left ventricular hypertrophy  Possible Lateral infarct , age undetermined  Abnormal ECG    < end of copied text >              RADIOLOGY:  < from: Xray Chest 1 View-PORTABLE IMMEDIATE (03.26.20 @ 18:53) >  Impression:      Low lung volumes. Lungs are clear of infiltrate.    < end of copied text >    PHYSICAL EXAM:  as per the attending SUBJECTIVE:    Patient is a 79y old Female who presents with a chief complaint of R/O COVID-19 (30 Mar 2020 06:15)    Currently admitted to medicine with the primary diagnosis of Viral syndrome     Today is hospital day 5d. This morning she is resting comfortably in bed and reports no new issues or overnight events. Pt breathing on 3L NC, 97% saturation. Pt spiked fever 38.1C overnight.    PAST MEDICAL & SURGICAL HISTORY  Breast mass, right: s/p lumpectomy &amp; radiation 2007  Hyperlipemia  Brito esophagus  Mild mental retardation  Atypical bipolar disorder  Femoral neck fracture  Feeding by G-tube: s/p removal 2012    SOCIAL HISTORY:  Negative for smoking/alcohol/drug use.     ALLERGIES:  No Known Allergies    MEDICATIONS:  STANDING MEDICATIONS  ALBUTerol    90 MICROgram(s) HFA Inhaler 1 Puff(s) Inhalation every 6 hours  benztropine 1 milliGRAM(s) Oral two times a day  chlorhexidine 4% Liquid 1 Application(s) Topical <User Schedule>  diVALproex Sprinkle 500 milliGRAM(s) Oral three times a day  enoxaparin Injectable 40 milliGRAM(s) SubCutaneous daily  haloperidol    Concentrate 1 milliGRAM(s) Oral at bedtime  hydroxychloroquine 200 milliGRAM(s) Oral two times a day  multivitamin/minerals 1 Tablet(s) Oral daily  predniSONE   Tablet 40 milliGRAM(s) Oral daily    PRN MEDICATIONS  acetaminophen   Tablet .. 650 milliGRAM(s) Oral every 6 hours PRN  ALBUTerol    90 MICROgram(s) HFA Inhaler 2 Puff(s) Inhalation every 4 hours PRN    VITALS:   T(F): 100.5  HR: 94  BP: 132/82  RR: 17  SpO2: 97%    LABS:                        12.0   6.85  )-----------( 147      ( 30 Mar 2020 08:32 )             35.9     03-30    140  |  101  |  19  ----------------------------<  124<H>  4.5   |  26  |  0.6<L>    Ca    8.5      30 Mar 2020 08:32    TPro  6.2  /  Alb  3.4<L>  /  TBili  0.2  /  DBili  x   /  AST  30  /  ALT  30  /  AlkPhos  46  03-30      < from: 12 Lead ECG (03.26.20 @ 20:15) >  Ventricular Rate 99 BPM    Atrial Rate 99 BPM    P-R Interval 140 ms    QRS Duration 96 ms    Q-T Interval 350 ms    QTC Calculation(Bezet) 449 ms    P Axis 46 degrees    R Axis -58 degrees    T Axis 13 degrees    Diagnosis Line Normal sinus rhythm  Left anterior fascicular block  Voltage criteria for left ventricular hypertrophy  Possible Lateral infarct , age undetermined  Abnormal ECG    < end of copied text >              RADIOLOGY:  < from: Xray Chest 1 View-PORTABLE IMMEDIATE (03.26.20 @ 18:53) >  Impression:      Low lung volumes. Lungs are clear of infiltrate.    < end of copied text >    PHYSICAL EXAM:  as per the attending

## 2020-03-31 NOTE — CHART NOTE - NSCHARTNOTEFT_GEN_A_CORE
Event monitor placed today and instructions provided.  Please re-call EP when monitoring is no longer indicated or patient is being discharged.    EP spectra [phone]

## 2020-03-31 NOTE — PROGRESS NOTE ADULT - ATTENDING COMMENTS
79F h/o bipolar, behavioral disorder, asthma, esophageal varices is being sent by group home [facility] for fever and cough since 1day PTA.    #Acute Covid Viral Pneumonia  #acute hypoxemic respi failure  # Asthma exacerbation  - COVID 19 PCR neg on 3/26, but positive on 3/28.   - Tmax 101 last 24 hours  -Yesterday 99 on 5 L NC --> Today 97% on 3L NC. Wean O2 down as able.  - 3/26 CXR mild developing opacities on right. repeat CXR if sats don't improve.  -lymphopenia noted    -On Plaquenil since 3/28.   - On prednisone 40 mg PO qd (since 3/26). Still has some wheezing. If sats continue to improve, may start tapering by 10mg every 3 days??    - Proventil inhaler q6h standing and q4h PRN for SOB/wheezing     Flu/RSV negative    - UA and culture ordered  - Procalcitonin normal    - Tylenol 650 mg PO q6h PRN for fever  - Contact/Airbrone isolation    - ID following    # Bipolar disorder:  - C/w divalproex and haloperidol    # Asthma:  - C/w inhalers    # DVT ppx: Lovenox 40 mg Pqd  # GI ppx: None  # Activity: as tolerated  # Diet: Regular  # Dispo: From group home  # CODE STATUS: FULL. Patient does not have family, has an aide, no documented health care .

## 2020-03-31 NOTE — PROGRESS NOTE ADULT - ASSESSMENT
79F h/o bipolar, behavioral disorder, asthma, esophageal varices is being sent by group home [facility] for fever and cough since 1day PTA.    # acute hypoxemic respiratory failure secondary to Asthma exacerbation and COVID -19 PNA  - COVID 19 PCR neg on 3/26, but positive on 3/28.   - CXR normal. repeat CXR if sats drop.  - febrile, lymphopenia   - Flu/RSV negative, Procalcitonin normal  - QTC 449ms, monitor daily EKGs  - saturating well on 3 L NC.   - c/w Plaquenil (started 3/28) till 4/01  - UA and culture ordered  - c/w prednisone 40 mg PO qd (since 3/26).  - Proventil inhaler q6h standing and q4h PRN for SOB/wheezing  - Tylenol 650 mg PO q6h PRN for fever  - Contact / Airborne isolation  - ID following    # Bipolar disorder:  - C/w divalproex and haloperidol    # Asthma:  - C/w inhalers    # DVT ppx: Lovenox 40 mg qd  # GI ppx: None  # Activity: as tolerated  # Diet: Regular  # Dispo: From group home  # CODE STATUS: FULL. Patient does not have family, has an aide, no documented health care 79F h/o bipolar, behavioral disorder, asthma, esophageal varices is being sent by group home [facility] for fever and cough since 1day PTA.    # acute hypoxemic respiratory failure secondary to Asthma exacerbation and COVID -19 PNA  - COVID 19 PCR neg on 3/26, but positive on 3/28.   - CXR normal. repeat CXR if sats drop.  - febrile, lymphopenia   - Flu/RSV negative, Procalcitonin normal  - QTC 449ms, monitor daily EKGs  - saturating well on 3 L NC.   - c/w Plaquenil (started 3/28) till 4/01  - UA and culture ordered  - c/w prednisone 40 mg PO qd (since 3/26).  - Proventil inhaler q6h standing and q4h PRN for SOB/wheezing  - Tylenol 650 mg PO q6h PRN for fever  - Contact / Airborne isolation  - ID following    # Bipolar disorder:  - C/w divalproex and haloperidol    # Asthma:  - C/w inhalers    # Diet: Dysphagia 2 mechanical soft  # DVT ppx: Lovenox 40 mg qd  # GI ppx: None  # Activity: as tolerated  # Dispo: From group home  # CODE STATUS: FULL. Patient does not have family, has an aide, no documented health care

## 2020-04-01 LAB
ALBUMIN SERPL ELPH-MCNC: 2.8 G/DL — LOW (ref 3.5–5.2)
ALP SERPL-CCNC: 35 U/L — SIGNIFICANT CHANGE UP (ref 30–115)
ALT FLD-CCNC: 33 U/L — SIGNIFICANT CHANGE UP (ref 0–41)
ANION GAP SERPL CALC-SCNC: 12 MMOL/L — SIGNIFICANT CHANGE UP (ref 7–14)
AST SERPL-CCNC: 31 U/L — SIGNIFICANT CHANGE UP (ref 0–41)
BASOPHILS # BLD AUTO: 0.02 K/UL — SIGNIFICANT CHANGE UP (ref 0–0.2)
BASOPHILS NFR BLD AUTO: 0.3 % — SIGNIFICANT CHANGE UP (ref 0–1)
BILIRUB SERPL-MCNC: <0.2 MG/DL — SIGNIFICANT CHANGE UP (ref 0.2–1.2)
BUN SERPL-MCNC: 20 MG/DL — SIGNIFICANT CHANGE UP (ref 10–20)
CALCIUM SERPL-MCNC: 8.4 MG/DL — LOW (ref 8.5–10.1)
CHLORIDE SERPL-SCNC: 104 MMOL/L — SIGNIFICANT CHANGE UP (ref 98–110)
CO2 SERPL-SCNC: 26 MMOL/L — SIGNIFICANT CHANGE UP (ref 17–32)
CREAT SERPL-MCNC: 0.5 MG/DL — LOW (ref 0.7–1.5)
EOSINOPHIL # BLD AUTO: 0 K/UL — SIGNIFICANT CHANGE UP (ref 0–0.7)
EOSINOPHIL NFR BLD AUTO: 0 % — SIGNIFICANT CHANGE UP (ref 0–8)
GLUCOSE SERPL-MCNC: 91 MG/DL — SIGNIFICANT CHANGE UP (ref 70–99)
HCT VFR BLD CALC: 34.9 % — LOW (ref 37–47)
HGB BLD-MCNC: 11.8 G/DL — LOW (ref 12–16)
IMM GRANULOCYTES NFR BLD AUTO: 1 % — HIGH (ref 0.1–0.3)
LYMPHOCYTES # BLD AUTO: 1.2 K/UL — SIGNIFICANT CHANGE UP (ref 1.2–3.4)
LYMPHOCYTES # BLD AUTO: 17.8 % — LOW (ref 20.5–51.1)
MCHC RBC-ENTMCNC: 32.7 PG — HIGH (ref 27–31)
MCHC RBC-ENTMCNC: 33.8 G/DL — SIGNIFICANT CHANGE UP (ref 32–37)
MCV RBC AUTO: 96.7 FL — SIGNIFICANT CHANGE UP (ref 81–99)
MONOCYTES # BLD AUTO: 0.72 K/UL — HIGH (ref 0.1–0.6)
MONOCYTES NFR BLD AUTO: 10.7 % — HIGH (ref 1.7–9.3)
NEUTROPHILS # BLD AUTO: 4.73 K/UL — SIGNIFICANT CHANGE UP (ref 1.4–6.5)
NEUTROPHILS NFR BLD AUTO: 70.2 % — SIGNIFICANT CHANGE UP (ref 42.2–75.2)
NRBC # BLD: 0 /100 WBCS — SIGNIFICANT CHANGE UP (ref 0–0)
PLATELET # BLD AUTO: 174 K/UL — SIGNIFICANT CHANGE UP (ref 130–400)
POTASSIUM SERPL-MCNC: 4.1 MMOL/L — SIGNIFICANT CHANGE UP (ref 3.5–5)
POTASSIUM SERPL-SCNC: 4.1 MMOL/L — SIGNIFICANT CHANGE UP (ref 3.5–5)
PROT SERPL-MCNC: 5.5 G/DL — LOW (ref 6–8)
RBC # BLD: 3.61 M/UL — LOW (ref 4.2–5.4)
RBC # FLD: 11.9 % — SIGNIFICANT CHANGE UP (ref 11.5–14.5)
SODIUM SERPL-SCNC: 142 MMOL/L — SIGNIFICANT CHANGE UP (ref 135–146)
WBC # BLD: 6.74 K/UL — SIGNIFICANT CHANGE UP (ref 4.8–10.8)
WBC # FLD AUTO: 6.74 K/UL — SIGNIFICANT CHANGE UP (ref 4.8–10.8)

## 2020-04-01 PROCEDURE — 99233 SBSQ HOSP IP/OBS HIGH 50: CPT

## 2020-04-01 RX ORDER — LORATADINE 10 MG/1
10 TABLET ORAL DAILY
Refills: 0 | Status: DISCONTINUED | OUTPATIENT
Start: 2020-04-01 | End: 2020-04-02

## 2020-04-01 RX ORDER — LORATADINE 10 MG/1
10 TABLET ORAL DAILY
Refills: 0 | Status: DISCONTINUED | OUTPATIENT
Start: 2020-04-01 | End: 2020-04-01

## 2020-04-01 RX ORDER — MONTELUKAST 4 MG/1
10 TABLET, CHEWABLE ORAL DAILY
Refills: 0 | Status: DISCONTINUED | OUTPATIENT
Start: 2020-04-01 | End: 2020-04-01

## 2020-04-01 RX ORDER — MONTELUKAST 4 MG/1
10 TABLET, CHEWABLE ORAL DAILY
Refills: 0 | Status: DISCONTINUED | OUTPATIENT
Start: 2020-04-01 | End: 2020-04-02

## 2020-04-01 RX ADMIN — Medication 40 MILLIGRAM(S): at 05:18

## 2020-04-01 RX ADMIN — ENOXAPARIN SODIUM 40 MILLIGRAM(S): 100 INJECTION SUBCUTANEOUS at 11:07

## 2020-04-01 RX ADMIN — HALOPERIDOL DECANOATE 1 MILLIGRAM(S): 100 INJECTION INTRAMUSCULAR at 23:03

## 2020-04-01 RX ADMIN — CHLORHEXIDINE GLUCONATE 1 APPLICATION(S): 213 SOLUTION TOPICAL at 05:18

## 2020-04-01 RX ADMIN — Medication 100 MILLIGRAM(S): at 18:27

## 2020-04-01 RX ADMIN — LORATADINE 10 MILLIGRAM(S): 10 TABLET ORAL at 11:07

## 2020-04-01 RX ADMIN — DIVALPROEX SODIUM 500 MILLIGRAM(S): 500 TABLET, DELAYED RELEASE ORAL at 13:00

## 2020-04-01 RX ADMIN — MONTELUKAST 10 MILLIGRAM(S): 4 TABLET, CHEWABLE ORAL at 11:06

## 2020-04-01 RX ADMIN — Medication 1 TABLET(S): at 11:07

## 2020-04-01 RX ADMIN — Medication 200 MILLIGRAM(S): at 05:18

## 2020-04-01 RX ADMIN — Medication 1 MILLIGRAM(S): at 17:19

## 2020-04-01 RX ADMIN — Medication 200 MILLIGRAM(S): at 17:19

## 2020-04-01 RX ADMIN — DIVALPROEX SODIUM 500 MILLIGRAM(S): 500 TABLET, DELAYED RELEASE ORAL at 05:18

## 2020-04-01 RX ADMIN — DIVALPROEX SODIUM 500 MILLIGRAM(S): 500 TABLET, DELAYED RELEASE ORAL at 23:03

## 2020-04-01 RX ADMIN — Medication 1 MILLIGRAM(S): at 05:18

## 2020-04-01 NOTE — PROGRESS NOTE ADULT - ASSESSMENT
79F h/o bipolar, behavioral disorder, asthma, esophageal varices is being sent by group home [facility] for fever and cough since 1day PTA.    # acute hypoxemic respiratory failure secondary to Asthma exacerbation and COVID -19 PNA  - COVID 19 PCR neg on 3/26, but positive on 3/28.   - CXR normal. repeat CXR if sats drop.  - febrile, lymphopenia   - Flu/RSV negative, Procalcitonin normal  - QTC 449ms, monitor daily EKGs  - saturating well on 3 L NC - titrate O2 as needed   - c/w Plaquenil (started 3/28) till 4/01  - UA and culture ordered  - c/w prednisone 40 mg PO qd (since 3/26).  - Proventil inhaler q6h standing and q4h PRN for SOB/wheezing  - Tylenol 650 mg PO q6h PRN for fever  - Contact / Airborne isolation  - ID following    # Bipolar disorder:  - C/w divalproex and haloperidol    # Asthma:  - C/w inhalers    # Diet: Dysphagia 2 mechanical soft  # DVT ppx: Lovenox 40 mg qd  # GI ppx: None  # Activity: as tolerated  # Dispo: From group home  # CODE STATUS: FULL. Patient does not have family, has an aide, no documented health care 79F h/o bipolar, behavioral disorder, asthma, esophageal varices is being sent by group home [facility] for fever and cough since 1day PTA.    # acute hypoxemic respiratory failure secondary to Asthma exacerbation and COVID -19 PNA  - COVID 19 PCR neg on 3/26, but positive on 3/28.   - CXR normal. repeat CXR if sats drop.  - febrile, lymphopenia   - Flu/RSV negative, Procalcitonin normal  - QTC 449ms, monitor daily EKGs  - saturating well on 3 L NC - titrate O2 as needed   - c/w Plaquenil (started 3/28) till 4/01  - UA and culture ordered  - c/w prednisone 40 mg PO qd (since 3/26).  - will start montelukast and Claritin daily.   - Proventil inhaler q6h standing and q4h PRN for SOB/wheezing  - Tylenol 650 mg PO q6h PRN for fever  - Contact / Airborne isolation  - ID following    # Bipolar disorder:  - C/w divalproex and haloperidol    # Asthma:  - C/w inhalers    # Diet: Dysphagia 2 mechanical soft  # DVT ppx: Lovenox 40 mg qd  # GI ppx: None  # Activity: as tolerated  # Dispo: From group home  # CODE STATUS: FULL. Patient does not have family, has an aide, no documented health care

## 2020-04-01 NOTE — PROGRESS NOTE ADULT - SUBJECTIVE AND OBJECTIVE BOX
SUBJECTIVE:    Patient is a 79y old Female who presents with a chief complaint of R/O COVID-19 (31 Mar 2020 06:36)    Currently admitted to medicine with the primary diagnosis of Viral syndrome     Today is hospital day 6d. This morning she is resting comfortably in bed and reports no new issues or overnight events.     PAST MEDICAL & SURGICAL HISTORY  Breast mass, right: s/p lumpectomy &amp; radiation 2007  Hyperlipemia  Brito esophagus  Mild mental retardation  Atypical bipolar disorder  Femoral neck fracture  Feeding by G-tube: s/p removal 2012    SOCIAL HISTORY:  Negative for smoking/alcohol/drug use.     ALLERGIES:  No Known Allergies    MEDICATIONS:  STANDING MEDICATIONS  ALBUTerol    90 MICROgram(s) HFA Inhaler 1 Puff(s) Inhalation every 6 hours  benztropine 1 milliGRAM(s) Oral two times a day  chlorhexidine 4% Liquid 1 Application(s) Topical <User Schedule>  diVALproex Sprinkle 500 milliGRAM(s) Oral three times a day  enoxaparin Injectable 40 milliGRAM(s) SubCutaneous daily  haloperidol    Concentrate 1 milliGRAM(s) Oral at bedtime  hydroxychloroquine 200 milliGRAM(s) Oral two times a day  multivitamin/minerals 1 Tablet(s) Oral daily  predniSONE   Tablet 40 milliGRAM(s) Oral daily    PRN MEDICATIONS  acetaminophen   Tablet .. 650 milliGRAM(s) Oral every 6 hours PRN  ALBUTerol    90 MICROgram(s) HFA Inhaler 2 Puff(s) Inhalation every 4 hours PRN    VITALS:   T(F): 100.1  HR: 78  BP: 123/71  RR: 17  SpO2: 95%    LABS:                        12.0   6.85  )-----------( 147      ( 30 Mar 2020 08:32 )             35.9     03-30    140  |  101  |  19  ----------------------------<  124<H>  4.5   |  26  |  0.6<L>    Ca    8.5      30 Mar 2020 08:32    TPro  6.2  /  Alb  3.4<L>  /  TBili  0.2  /  DBili  x   /  AST  30  /  ALT  30  /  AlkPhos  46  03-30    < from: 12 Lead ECG (03.26.20 @ 20:15) >  Ventricular Rate 99 BPM    Atrial Rate 99 BPM    P-R Interval 140 ms    QRS Duration 96 ms    Q-T Interval 350 ms    QTC Calculation(Bezet) 449 ms    P Axis 46 degrees    R Axis -58 degrees    T Axis 13 degrees    Diagnosis Line Normal sinus rhythm  Left anterior fascicular block  Voltage criteria for left ventricular hypertrophy  Possible Lateral infarct , age undetermined  Abnormal ECG    < end of copied text >                  RADIOLOGY:  < from: Xray Chest 1 View-PORTABLE IMMEDIATE (03.26.20 @ 18:53) >  Impression:      Low lung volumes. Lungs are clear of infiltrate.    < end of copied text >    PHYSICAL EXAM:  as per the attending.

## 2020-04-01 NOTE — PROGRESS NOTE ADULT - ATTENDING COMMENTS
I saw and evaluated patient  by bedside, no active complains over  night, no fever, pulse ox 94% on 3 liters today, no dyspnea, spiked low grade  fever in am today , tolerating diet well.    All labs, radiology studies, VS was reviewed  I have reviewed the resident's note and agree with documented findings and  plan of care.  #) Acute hypoxic respiratory failure due to COVID 19 viral pneumonia  -patient has completed Hydroxychloroquine tx.  -continue to taper down oxygen tx. as tolerated by pt. today pt. is on 3L saturating 94%.  -taper down po prednisone to 20 mg po once daily x 2 more days.    #) for chronic medical conditions , resumed on home regimen tx.    #Progress Note Handoff: continue to taper down oxygen tx. as tolerated by pt. than d/c to group home.   Family discussion: yes Disposition: d/c to group home possibly in 24 hours I saw and evaluated patient  by bedside, no active complains over  night, no fever, pulse ox 94% on 3 liters today, no dyspnea, spiked low grade  fever in am today , tolerating diet well.    All labs, radiology studies, VS was reviewed  GENERAL: NAD, Awake, confused, patient is laying comfortably in bed  HEENT: AT, NC, PERRLA, SUPPLE, NO JVD, NO CB  LUNG: CTA B/L  CVS: normal S1, S2, RRR, NO M/G/R  ABDOMEN: soft, bowel sounds present, normoactive in all 4 quadrants, non-tender, non-distended  EXT: no E/C/C, positive PP b/l extremities  NEURO: no acute focal neurological deficits, gait not tested  SKIN: no rash, no ecchymosis    I have reviewed the resident's note and agree with documented findings and  plan of care.  #) Acute hypoxic respiratory failure due to COVID 19 viral pneumonia  -patient has completed Hydroxychloroquine tx.  -continue to taper down oxygen tx. as tolerated by pt. today pt. is on 3L saturating 94%.  -taper down po prednisone to 20 mg po once daily x 2 more days.    #) for chronic medical conditions , resumed on home regimen tx.    #Progress Note Handoff: continue to taper down oxygen tx. as tolerated by pt. than d/c to group home.   Family discussion: yes Disposition: d/c to group home possibly in 24 hours

## 2020-04-02 ENCOUNTER — TRANSCRIPTION ENCOUNTER (OUTPATIENT)
Age: 80
End: 2020-04-02

## 2020-04-02 VITALS
RESPIRATION RATE: 18 BRPM | HEART RATE: 92 BPM | DIASTOLIC BLOOD PRESSURE: 62 MMHG | TEMPERATURE: 96 F | SYSTOLIC BLOOD PRESSURE: 110 MMHG

## 2020-04-02 PROCEDURE — 99239 HOSP IP/OBS DSCHRG MGMT >30: CPT

## 2020-04-02 RX ORDER — MONTELUKAST 4 MG/1
1 TABLET, CHEWABLE ORAL
Qty: 30 | Refills: 0
Start: 2020-04-02 | End: 2020-05-01

## 2020-04-02 RX ORDER — LORATADINE 10 MG/1
1 TABLET ORAL
Qty: 0 | Refills: 0 | DISCHARGE
Start: 2020-04-02

## 2020-04-02 RX ORDER — MONTELUKAST 4 MG/1
1 TABLET, CHEWABLE ORAL
Qty: 0 | Refills: 0 | DISCHARGE
Start: 2020-04-02

## 2020-04-02 RX ORDER — ALBUTEROL 90 UG/1
2 AEROSOL, METERED ORAL
Qty: 3 | Refills: 0
Start: 2020-04-02 | End: 2020-05-01

## 2020-04-02 RX ORDER — LORATADINE 10 MG/1
1 TABLET ORAL
Qty: 7 | Refills: 0
Start: 2020-04-02 | End: 2020-04-08

## 2020-04-02 RX ADMIN — Medication 1 MILLIGRAM(S): at 05:41

## 2020-04-02 RX ADMIN — DIVALPROEX SODIUM 500 MILLIGRAM(S): 500 TABLET, DELAYED RELEASE ORAL at 05:41

## 2020-04-02 RX ADMIN — Medication 20 MILLIGRAM(S): at 05:41

## 2020-04-02 RX ADMIN — LORATADINE 10 MILLIGRAM(S): 10 TABLET ORAL at 12:21

## 2020-04-02 RX ADMIN — Medication 100 MILLIGRAM(S): at 05:41

## 2020-04-02 RX ADMIN — CHLORHEXIDINE GLUCONATE 1 APPLICATION(S): 213 SOLUTION TOPICAL at 05:41

## 2020-04-02 RX ADMIN — DIVALPROEX SODIUM 500 MILLIGRAM(S): 500 TABLET, DELAYED RELEASE ORAL at 14:27

## 2020-04-02 RX ADMIN — Medication 1 TABLET(S): at 12:21

## 2020-04-02 RX ADMIN — ENOXAPARIN SODIUM 40 MILLIGRAM(S): 100 INJECTION SUBCUTANEOUS at 12:21

## 2020-04-02 RX ADMIN — MONTELUKAST 10 MILLIGRAM(S): 4 TABLET, CHEWABLE ORAL at 12:21

## 2020-04-02 NOTE — PROGRESS NOTE ADULT - PROVIDER SPECIALTY LIST ADULT
Hospitalist
Internal Medicine

## 2020-04-02 NOTE — PROGRESS NOTE ADULT - SUBJECTIVE AND OBJECTIVE BOX
Called pharmacy,spoke with Jessa. Message was also forwarded to Dr. Edmondson   SUBJECTIVE:    Patient is a 79y old Female who presents with a chief complaint of R/O COVID-19 (01 Apr 2020 07:11)    Currently admitted to medicine with the primary diagnosis of Viral syndrome     Today is hospital day 7d. This morning she is resting comfortably in bed and reports no new issues or overnight events.     PAST MEDICAL & SURGICAL HISTORY  Breast mass, right: s/p lumpectomy &amp; radiation 2007  Hyperlipemia  Brito esophagus  Mild mental retardation  Atypical bipolar disorder  Femoral neck fracture  Feeding by G-tube: s/p removal 2012    SOCIAL HISTORY:  Negative for smoking/alcohol/drug use.     ALLERGIES:  No Known Allergies    MEDICATIONS:  STANDING MEDICATIONS  ALBUTerol    90 MICROgram(s) HFA Inhaler 1 Puff(s) Inhalation every 6 hours  benztropine 1 milliGRAM(s) Oral two times a day  chlorhexidine 4% Liquid 1 Application(s) Topical <User Schedule>  diVALproex Sprinkle 500 milliGRAM(s) Oral three times a day  doxycycline hyclate Capsule 100 milliGRAM(s) Oral every 12 hours  enoxaparin Injectable 40 milliGRAM(s) SubCutaneous daily  haloperidol    Concentrate 1 milliGRAM(s) Oral at bedtime  loratadine 10 milliGRAM(s) Oral daily  montelukast 10 milliGRAM(s) Oral daily  multivitamin/minerals 1 Tablet(s) Oral daily  predniSONE   Tablet 20 milliGRAM(s) Oral daily    PRN MEDICATIONS  acetaminophen   Tablet .. 650 milliGRAM(s) Oral every 6 hours PRN  ALBUTerol    90 MICROgram(s) HFA Inhaler 2 Puff(s) Inhalation every 4 hours PRN    VITALS:   T(F): 95.6  HR: 77  BP: 125/89  RR: 17  SpO2: 95%    LABS:                        11.8   6.74  )-----------( 174      ( 01 Apr 2020 06:00 )             34.9     04-01    142  |  104  |  20  ----------------------------<  91  4.1   |  26  |  0.5<L>    Ca    8.4<L>      01 Apr 2020 06:00    TPro  5.5<L>  /  Alb  2.8<L>  /  TBili  <0.2  /  DBili  x   /  AST  31  /  ALT  33  /  AlkPhos  35  04-01    < from: 12 Lead ECG (03.26.20 @ 20:15) >  Ventricular Rate 99 BPM    Atrial Rate 99 BPM    P-R Interval 140 ms    QRS Duration 96 ms    Q-T Interval 350 ms    QTC Calculation(Bezet) 449 ms    P Axis 46 degrees    R Axis -58 degrees    T Axis 13 degrees    Diagnosis Line Normal sinus rhythm  Left anterior fascicular block  Voltage criteria for left ventricular hypertrophy  Possible Lateral infarct , age undetermined  Abnormal ECG    < end of copied text >                RADIOLOGY:  < from: Xray Chest 1 View-PORTABLE IMMEDIATE (03.26.20 @ 18:53) >  Impression:      Low lung volumes. Lungs are clear of infiltrate.    < end of copied text >    PHYSICAL EXAM:  as per the attending. SUBJECTIVE:    Patient is a 79y old Female who presents with a chief complaint of R/O COVID-19 (01 Apr 2020 07:11)    Currently admitted to medicine with the primary diagnosis of Viral syndrome     Today is hospital day 7d. This morning she is resting comfortably in bed and reports no new issues or overnight events.     PAST MEDICAL & SURGICAL HISTORY  Breast mass, right: s/p lumpectomy &amp; radiation 2007  Hyperlipemia  Brito esophagus  Mild mental retardation  Atypical bipolar disorder  Femoral neck fracture  Feeding by G-tube: s/p removal 2012    SOCIAL HISTORY:  Negative for smoking/alcohol/drug use.     ALLERGIES:  No Known Allergies    MEDICATIONS:  STANDING MEDICATIONS  ALBUTerol    90 MICROgram(s) HFA Inhaler 1 Puff(s) Inhalation every 6 hours  benztropine 1 milliGRAM(s) Oral two times a day  chlorhexidine 4% Liquid 1 Application(s) Topical <User Schedule>  diVALproex Sprinkle 500 milliGRAM(s) Oral three times a day  doxycycline hyclate Capsule 100 milliGRAM(s) Oral every 12 hours  enoxaparin Injectable 40 milliGRAM(s) SubCutaneous daily  haloperidol    Concentrate 1 milliGRAM(s) Oral at bedtime  loratadine 10 milliGRAM(s) Oral daily  montelukast 10 milliGRAM(s) Oral daily  multivitamin/minerals 1 Tablet(s) Oral daily  predniSONE   Tablet 20 milliGRAM(s) Oral daily    PRN MEDICATIONS  acetaminophen   Tablet .. 650 milliGRAM(s) Oral every 6 hours PRN  ALBUTerol    90 MICROgram(s) HFA Inhaler 2 Puff(s) Inhalation every 4 hours PRN    VITALS:   T(F): 95.6  HR: 77  BP: 125/89  RR: 17  SpO2: 95%    LABS:                        11.8   6.74  )-----------( 174      ( 01 Apr 2020 06:00 )             34.9     04-01    142  |  104  |  20  ----------------------------<  91  4.1   |  26  |  0.5<L>    Ca    8.4<L>      01 Apr 2020 06:00    TPro  5.5<L>  /  Alb  2.8<L>  /  TBili  <0.2  /  DBili  x   /  AST  31  /  ALT  33  /  AlkPhos  35  04-01    < from: 12 Lead ECG (03.26.20 @ 20:15) >  Ventricular Rate 99 BPM    Atrial Rate 99 BPM    P-R Interval 140 ms    QRS Duration 96 ms    Q-T Interval 350 ms    QTC Calculation(Bezet) 449 ms    P Axis 46 degrees    R Axis -58 degrees    T Axis 13 degrees    Diagnosis Line Normal sinus rhythm  Left anterior fascicular block  Voltage criteria for left ventricular hypertrophy  Possible Lateral infarct , age undetermined  Abnormal ECG    < end of copied text >                RADIOLOGY:  < from: Xray Chest 1 View-PORTABLE IMMEDIATE (03.26.20 @ 18:53) >  Impression:      Low lung volumes. Lungs are clear of infiltrate.    < end of copied text >    PHYSICAL EXAM:  Constitutional: NAD, awake and alert, well-developed  HEENT: PERR, EOMI, Normal Hearing, MMM  Neck: Soft and supple, No LAD, No JVD  Respiratory: mild rhonchi.  Cardiovascular: S1 and S2, regular rate and rhythm, no Murmurs, gallops or rubs  Gastrointestinal: Bowel Sounds present, soft, nontender, nondistended, no guarding, no rebound  Extremities: No peripheral edema

## 2020-04-02 NOTE — DISCHARGE NOTE NURSING/CASE MANAGEMENT/SOCIAL WORK - PATIENT PORTAL LINK FT
You can access the FollowMyHealth Patient Portal offered by [facility] by registering at the following website: [email or web address]. By joining [facility]’s FollowMyHealth portal, you will also be able to view your health information using other applications (apps) compatible with our system.

## 2020-04-02 NOTE — DISCHARGE NOTE PROVIDER - NSDCMRMEDTOKEN_GEN_ALL_CORE_FT
benztropine 0.5 mg oral tablet: 1 tab(s) orally 2 times a day  Depakote  mg oral tablet, extended release: 3 tab(s) orally once a day (at bedtime)  FiberCon 625 mg oral tablet: 1 tab(s) orally 2 times a day  guaiFENesin 100 mg/5 mL oral liquid: 10 milliliter(s) orally every 6 hours, As Needed  haloperidol 2 mg/mL oral concentrate: 0.5 milliliter(s) orally once a day (at bedtime)  loratadine 10 mg oral tablet: 1 tab(s) orally once a day  montelukast 10 mg oral tablet: 1 tab(s) orally once a day  Multiple Vitamins with Minerals oral tablet: 1 tab(s) orally once a day

## 2020-04-02 NOTE — DISCHARGE NOTE PROVIDER - CARE PROVIDER_API CALL
Sourav Amador (MD)  Gastroenterology  [address] [place] [address]  Phone: [phone]  Fax: [phone]  Follow Up Time:

## 2020-04-02 NOTE — PROGRESS NOTE ADULT - ATTENDING COMMENTS
I saw and evaluated patient  by bedside, no active complains, no fever, pulse ox 95% on room air, no dyspnea, no fever , tolerating diet well.    All labs, radiology studies, VS was reviewed  Vital Signs Last 24 Hrs  T(C): 35.3 (02 Apr 2020 13:00), Max: 36.8 (01 Apr 2020 14:30)  T(F): 95.6 (02 Apr 2020 13:00), Max: 98.2 (01 Apr 2020 14:30)  HR: 92 (02 Apr 2020 13:00) (75 - 92)  BP: 110/62 (02 Apr 2020 13:00) (110/62 - 139/76)  RR: 18 (02 Apr 2020 13:00) (17 - 18)  SpO2: 95% (02 Apr 2020 08:45) (93% - 95%)  GENERAL: NAD, Awake, confused, patient is laying comfortably in bed  HEENT: AT, NC, PERRLA, SUPPLE, NO JVD, NO CB  LUNG: CTA B/L  CVS: normal S1, S2, RRR, NO M/G/R  ABDOMEN: soft, bowel sounds present, normoactive in all 4 quadrants, non-tender, non-distended  EXT: no E/C/C, positive PP b/l extremities  NEURO: no acute focal neurological deficits, gait not tested  SKIN: no rash, no ecchymosis    I have reviewed the resident's note and agree with documented findings and  plan of care.  #) Acute hypoxic respiratory failure due to COVID 19 viral pneumonia  -patient has completed Hydroxychloroquine tx.  -hypoxia resolved, d/c oxygen tx.  -tapered down and d/c  prednisone tx.    #) for chronic medical conditions , resumed on home regimen tx.    #Progress Note Handoff: patient was optimized medically and stable for d/c to group home.   Family discussion: yes Disposition: d/c to group home today.

## 2020-04-02 NOTE — DISCHARGE NOTE PROVIDER - NSDCCPCAREPLAN_GEN_ALL_CORE_FT
PRINCIPAL DISCHARGE DIAGNOSIS  Diagnosis: Pneumonia  Assessment and Plan of Treatment: You came in here because of pnemonia becuase of COVID-19 virus infection. We treated you with supplemental Oxygen and hydroxychlorquine. You are not spiking fever anymore and you are saturating well on room air. Please follow u with your PMD.      SECONDARY DISCHARGE DIAGNOSES  Diagnosis: Asthma  Assessment and Plan of Treatment: You have underlying asthma, we readjsuted your medications. lease follow up with you PMD.

## 2020-04-02 NOTE — DISCHARGE NOTE PROVIDER - HOSPITAL COURSE
79F h/o bipolar, behavioral disorder, asthma, esophageal varices is being sent by group home [facility] for fever and cough since last night. History taken from aide, she said that the patient started having dry cough with fevers at group home since Monday. She also had myalgias and watery diarrhea. She denies sick contact but mentioned that 1 member of the group home tested positive for COVID-19 but the patient was not in contact with him. The aide herself does not have any symptoms and denies sick contacts. ROS is otherwise negative.     In ED, mild fever was noted (100.8), patient initially put on 3 L O2 via NC, currently on 2 L NC, /min regular and normal BP. CXR negative for infiltrates. CBC significant for mild increase of monocytes, no lymphopenia, no thrombocytopenia. Flu A/B andRSV negative    Pt was found to be positive for COVID on 3/28, treated with Plaquenil, and prednisone for underlying asthma.  Pt was also started on montelukast and Claritin daily. Pt ha sbeen afebrile nw for more than 24 hours and is saturating well on room air. t is ready to be discharged.

## 2020-04-02 NOTE — PROGRESS NOTE ADULT - ASSESSMENT
79F h/o bipolar, behavioral disorder, asthma, esophageal varices is being sent by group home [facility] for fever and cough since 1day PTA.    # acute hypoxemic respiratory failure secondary to Asthma exacerbation and COVID -19 PNA  - COVID 19 PCR neg on 3/26, but positive on 3/28.   - CXR normal. repeat CXR if sats drop.  - febrile, lymphopenia   - Flu/RSV negative, Procalcitonin normal  - QTC 449ms, monitor EKGs  - saturating well on 3 L NC - titrate O2 as needed   -s/p Plaquenil (started 3/28) till 4/01  - UA and culture ordered  - c/w prednisone 40 mg PO qd (since 3/26).  - c/w montelukast and Claritin daily.   - Proventil inhaler q6h standing and q4h PRN for SOB/wheezing  - Tylenol 650 mg PO q6h PRN for fever  - Contact / Airborne isolation  - ID following    # Bipolar disorder:  - C/w divalproex and haloperidol    # Asthma:  - C/w inhalers, montelukast and claritin    # Diet: Dysphagia 2 mechanical soft  # DVT ppx: Lovenox 40 mg qd  # GI ppx: None  # Activity: as tolerated  # Dispo: From group home  # CODE STATUS: FULL. Patient does not have family, has an aide, no documented health care 79F h/o bipolar, behavioral disorder, asthma, esophageal varices is being sent by group home [facility] for fever and cough since 1day PTA.    # acute hypoxemic respiratory failure secondary to Asthma exacerbation and COVID -19 PNA  - COVID 19 PCR neg on 3/26, but positive on 3/28.   - CXR normal. repeat CXR if sats drop.  - febrile, lymphopenia on admission   - Flu/RSV negative, Procalcitonin normal  - QTC 449ms, monitor EKGs  - saturating well on 3 L NC - titrate O2 as needed   - s/p Plaquenil (started 3/28) till 4/01  - c/w prednisone 20 mg PO qd   - c/w montelukast and Claritin daily.   - Proventil inhaler q6h standing and q4h PRN for SOB/wheezing  - Tylenol 650 mg PO q6h PRN for fever  - Contact / Airborne isolation  - pt has been afebrile for more than 24 hours. Will be discharged.     # Bipolar disorder:  - C/w divalproex and haloperidol    # Asthma:  - C/w inhalers, montelukast and claritin    # Diet: Dysphagia 2 mechanical soft  # DVT ppx: Lovenox 40 mg qd  # GI ppx: None  # Activity: as tolerated  # Dispo: From group home  # CODE STATUS: FULL. Patient does not have family, has an aide, no documented health care

## 2020-04-15 DIAGNOSIS — J45.21 MILD INTERMITTENT ASTHMA WITH (ACUTE) EXACERBATION: ICD-10-CM

## 2020-04-15 DIAGNOSIS — E78.5 HYPERLIPIDEMIA, UNSPECIFIED: ICD-10-CM

## 2020-04-15 DIAGNOSIS — E66.9 OBESITY, UNSPECIFIED: ICD-10-CM

## 2020-04-15 DIAGNOSIS — Z98.890 OTHER SPECIFIED POSTPROCEDURAL STATES: ICD-10-CM

## 2020-04-15 DIAGNOSIS — U07.1 COVID-19: ICD-10-CM

## 2020-04-15 DIAGNOSIS — F31.9 BIPOLAR DISORDER, UNSPECIFIED: ICD-10-CM

## 2020-04-15 DIAGNOSIS — E73.9 LACTOSE INTOLERANCE, UNSPECIFIED: ICD-10-CM

## 2020-04-15 DIAGNOSIS — F70 MILD INTELLECTUAL DISABILITIES: ICD-10-CM

## 2020-04-15 DIAGNOSIS — I85.00 ESOPHAGEAL VARICES WITHOUT BLEEDING: ICD-10-CM

## 2020-04-15 DIAGNOSIS — J12.89 OTHER VIRAL PNEUMONIA: ICD-10-CM

## 2020-04-15 DIAGNOSIS — J96.01 ACUTE RESPIRATORY FAILURE WITH HYPOXIA: ICD-10-CM

## 2020-04-15 DIAGNOSIS — K22.70 BARRETT'S ESOPHAGUS WITHOUT DYSPLASIA: ICD-10-CM

## 2020-04-15 DIAGNOSIS — Z92.3 PERSONAL HISTORY OF IRRADIATION: ICD-10-CM

## 2020-08-24 ENCOUNTER — OUTPATIENT (OUTPATIENT)
Dept: OUTPATIENT SERVICES | Facility: HOSPITAL | Age: 80
LOS: 1 days | Discharge: HOME | End: 2020-08-24

## 2020-08-24 DIAGNOSIS — S72.009A FRACTURE OF UNSPECIFIED PART OF NECK OF UNSPECIFIED FEMUR, INITIAL ENCOUNTER FOR CLOSED FRACTURE: Chronic | ICD-10-CM

## 2020-08-24 DIAGNOSIS — Z93.1 GASTROSTOMY STATUS: Chronic | ICD-10-CM

## 2020-08-25 DIAGNOSIS — Z01.20 ENCOUNTER FOR DENTAL EXAMINATION AND CLEANING WITHOUT ABNORMAL FINDINGS: ICD-10-CM

## 2021-08-17 ENCOUNTER — APPOINTMENT (OUTPATIENT)
Dept: OPHTHALMOLOGY | Facility: CLINIC | Age: 81
End: 2021-08-17

## 2021-09-01 ENCOUNTER — OUTPATIENT (OUTPATIENT)
Dept: OUTPATIENT SERVICES | Facility: HOSPITAL | Age: 81
LOS: 1 days | Discharge: HOME | End: 2021-09-01

## 2021-09-01 DIAGNOSIS — Z93.1 GASTROSTOMY STATUS: Chronic | ICD-10-CM

## 2021-09-01 DIAGNOSIS — S72.009A FRACTURE OF UNSPECIFIED PART OF NECK OF UNSPECIFIED FEMUR, INITIAL ENCOUNTER FOR CLOSED FRACTURE: Chronic | ICD-10-CM

## 2021-10-13 NOTE — PATIENT PROFILE ADULT - NSPROEDALEARNPREF_GEN_A_NUR
Medicare Wellness Visit  Plan for Preventive Care    A good way for you to stay healthy is to use preventive care.  Medicare covers many services that can help you stay healthy.* The goal of these services is to find any health problems as quickly as possible. Finding problems early can help make them easier to treat.  Your personal plan below lists the services you may need and when they are due.     Health Maintenance Summary     Shingles Vaccine (1 of 2)  Overdue - never done    DTaP/Tdap/Td Vaccine (1 - Tdap)  Overdue since 5/12/2010    Medicare Wellness Visit (Yearly)  Overdue - never done    Influenza Vaccine (1)  Due since 9/1/2021    Diabetes Eye Exam (Yearly)  Due soon on 11/6/2021    Diabetes A1C (Every 6 Months)  Next due on 2/3/2022    DM/CKD Microalbumin (Yearly)  Next due on 3/2/2022    Diabetes Foot Exam (Yearly)  Next due on 3/9/2022    DM/CKD GFR (Yearly)  Next due on 8/3/2022    Pneumococcal Vaccine 0-64 (2 of 2 - PPSV23)  Next due on 5/6/2024    Colorectal Cancer Screen- (Colonoscopy - Every 10 Years)  Next due on 7/15/2025    Hepatitis B Vaccine   Aged Out    Hepatitis C Screening   Completed    COVID-19 Vaccine   Completed    Meningococcal Vaccine   Aged Out    HPV Vaccine   Aged Out           Preventive Care for Women and Men    Heart Screenings (Cardiovascular):  · Blood tests are used to check your cholesterol, lipid and triglyceride levels. High levels can increase your risk for heart disease and stroke. High levels can be treated with medications, diet and exercise. Lowering your levels can help keep your heart and blood vessels healthy.  Your provider will order these tests if they are needed.    · An ultrasound is done to see if you have an abdominal aortic aneurysm (AAA).  This is an enlargement of one of the main blood vessels that delivers blood to the body.   In the [place], 9,000 deaths are caused by AAA.  You may not even know you have this problem and as many as 1 in 3  people will have a serious problem if it is not treated.  Early diagnosis allows for more effective treatment and cure.  If you have a family history of AAA or are a male age 65-75 who has smoked, you are at higher risk of an AAA.  Your provider can order this test, if needed.    Colorectal Screening:  · There are many tests that are used to check for cancer of your colon and rectum. You and your provider should discuss what test is best for you and when to have it done.  Options include:  · Screening Colonoscopy: exam of the entire colon, seen through a flexible lighted tube.  · Flexible Sigmoidoscopy: exam of the last third (sigmoid portion) of the colon and rectum, seen through a flexible lighted tube.  · Cologuard DNA stool test: a sample of your stool is used to screen for cancer and unseen blood in your stool.  · Fecal Occult Blood Test: a sample of your stool is studied to find any unseen blood    Flu Shot:  · An immunization that helps to prevent influenza (the flu). You should get this every year. The best time to get the shot is in the fall.    Pneumococcal Shot:  • Vaccines are available that can help prevent pneumococcal disease, which is any type of infection caused by Streptococcus pneumoniae bacteria.   Their use can prevent some cases of pneumonia, meningitis, and sepsis. There are two types of pneumococcal vaccines:   o Conjugate vaccines (PCV-13 or Prevnar 13®) - helps protect against the 13 types of pneumococcal bacteria that are the most common causes of serious infections in children and adults.    o Polysaccharide vaccine (PPSV23 or Udwsmjmue08®) - helps protect against 23 types of pneumococcal bacteria for patients who are recommended to get it.  These vaccines should be given at least 12 months apart.  A booster is usually not needed.     Hepatitis B Shot:  · An immunization that helps to protect people from getting Hepatitis B. Hepatitis B is a virus that spreads through contact with  infected blood or body fluids. Many people with the virus do not have symptoms.  The virus can lead to serious problems, such as liver disease. Some people are at higher risk than others. Your doctor will tell you if you need this shot.     Diabetes Screening:  · A test to measure sugar (glucose) in your blood is called a fasting blood sugar. Fasting means you cannot have food or drink for at least 8 hours before the test. This test can detect diabetes long before you may notice symptoms.    Glaucoma Screening:  · Glaucoma screening is performed by your eye doctor. The test measures the fluid pressure inside your eyes to determine if you have glaucoma.     Hepatitis C Screening:  · A blood test to see if you have the hepatitis C virus.  Hepatitis C attacks the liver and is a major cause of chronic liver disease.  Medicare will cover a single screening for all adults born between 1945 & 1965, or high risk patients (people who have injected illegal drugs or people who have had blood transfusions).  High risk patients who continue to inject illegal drugs can be screened for Hepatitis C every year.    Smoking and Tobacco-Use Cessation Counseling:  · Tobacco is the single greatest cause of disease and early death in our country today. Medication and counseling together can increase a person’s chance of quitting for good.   · Medicare covers two quitting attempts per year, with four counseling sessions per attempt (eight sessions in a 12 month period)    Preventive Screening tests for Women    Screening Mammograms and Breast Exams:  · An x-ray of your breasts to check for breast cancer before you or your doctor may be able to feel it.  If breast cancer is found early it can usually be treated with success.    Pelvic Exams and Pap Tests:  · An exam to check for cervical and vaginal cancer. A Pap test is a lab test in which cells are taken from your cervix and sent to the lab to look for signs of cervical cancer. If cancer  of the cervix is found early, chances for a cure are good. Testing can generally end at age 65, or if a woman has a hysterectomy for a benign condition. Your provider may recommend more frequent testing if certain abnormal results are found.    Bone Mass Measurements:  · A painless x-ray of your bone density to see if you are at risk for a broken bone. Bone density refers to the thickness of bones or how tightly the bone tissue is packed.    Preventive Screening tests for Men    Prostate Screening:  · Should you have a prostate cancer test (PSA)?  It is up to you to decide if you want a prostate cancer test. Talk to your clinician to find out if the test is right for you.  Things for you to consider and talk about should include:  · Benefits and harms of the test  · Your family history  · How your race/ethnicity may influence the test  · If the test may impact other medical conditions you have  · Your values on screenings and treatments    *Medicare pays for many preventive services to keep you healthy. For some of these services, you might have to pay a deductible, coinsurance, and / or copayment.  The amounts vary depending on the type of services you need and the kind of Medicare health plan you have.    For further details on screenings offered by Medicare please visit: https://www.medicare.gov/coverage/preventive-screening-services   Medicare Wellness Visit  Plan for Preventive Care    A good way for you to stay healthy is to use preventive care.  Medicare covers many services that can help you stay healthy.* The goal of these services is to find any health problems as quickly as possible. Finding problems early can help make them easier to treat.  Your personal plan below lists the services you may need and when they are due.     Health Maintenance Summary     Shingles Vaccine (1 of 2)  Overdue - never done    DTaP/Tdap/Td Vaccine (1 - Tdap)  Overdue since 5/12/2010    Medicare Wellness Visit (Yearly)  Overdue  - never done    Influenza Vaccine (1)  Overdue - never done    Diabetes Eye Exam (Yearly)  Due since 11/6/2021    COVID-19 Vaccine (3 - Booster for Pfizer series)  Next due on 12/11/2021    Diabetes A1C (Every 6 Months)  Ordered on 10/13/2021    DM/CKD Microalbumin (Yearly)  Next due on 3/2/2022    Diabetes Foot Exam (Yearly)  Next due on 3/9/2022    DM/CKD GFR (Yearly)  Ordered on 10/13/2021    Pneumococcal Vaccine 0-64 (2 of 2 - PPSV23)  Next due on 5/6/2024    Colorectal Cancer Screen- (Colonoscopy - Every 10 Years)  Next due on 7/15/2025    Hepatitis B Vaccine   Aged Out    Hepatitis C Screening   Completed    Meningococcal Vaccine   Aged Out    HPV Vaccine   Aged Out           Preventive Care for Women and Men    Heart Screenings (Cardiovascular):  · Blood tests are used to check your cholesterol, lipid and triglyceride levels. High levels can increase your risk for heart disease and stroke. High levels can be treated with medications, diet and exercise. Lowering your levels can help keep your heart and blood vessels healthy.  Your provider will order these tests if they are needed.    · An ultrasound is done to see if you have an abdominal aortic aneurysm (AAA).  This is an enlargement of one of the main blood vessels that delivers blood to the body.   In the [place], 9,000 deaths are caused by AAA.  You may not even know you have this problem and as many as 1 in 3 people will have a serious problem if it is not treated.  Early diagnosis allows for more effective treatment and cure.  If you have a family history of AAA or are a male age 65-75 who has smoked, you are at higher risk of an AAA.  Your provider can order this test, if needed.    Colorectal Screening:  · There are many tests that are used to check for cancer of your colon and rectum. You and your provider should discuss what test is best for you and when to have it done.  Options include:  · Screening Colonoscopy: exam of the entire colon,  seen through a flexible lighted tube.  · Flexible Sigmoidoscopy: exam of the last third (sigmoid portion) of the colon and rectum, seen through a flexible lighted tube.  · Cologuard DNA stool test: a sample of your stool is used to screen for cancer and unseen blood in your stool.  · Fecal Occult Blood Test: a sample of your stool is studied to find any unseen blood    Flu Shot:  · An immunization that helps to prevent influenza (the flu). You should get this every year. The best time to get the shot is in the fall.    Pneumococcal Shot:  • Vaccines are available that can help prevent pneumococcal disease, which is any type of infection caused by Streptococcus pneumoniae bacteria.   Their use can prevent some cases of pneumonia, meningitis, and sepsis. There are two types of pneumococcal vaccines:   o Conjugate vaccines (PCV-13 or Prevnar 13®) - helps protect against the 13 types of pneumococcal bacteria that are the most common causes of serious infections in children and adults.    o Polysaccharide vaccine (PPSV23 or Bmdacbvdy39®) - helps protect against 23 types of pneumococcal bacteria for patients who are recommended to get it.  These vaccines should be given at least 12 months apart.  A booster is usually not needed.     Hepatitis B Shot:  · An immunization that helps to protect people from getting Hepatitis B. Hepatitis B is a virus that spreads through contact with infected blood or body fluids. Many people with the virus do not have symptoms.  The virus can lead to serious problems, such as liver disease. Some people are at higher risk than others. Your doctor will tell you if you need this shot.     Diabetes Screening:  · A test to measure sugar (glucose) in your blood is called a fasting blood sugar. Fasting means you cannot have food or drink for at least 8 hours before the test. This test can detect diabetes long before you may notice symptoms.    Glaucoma Screening:  · Glaucoma screening is performed  by your eye doctor. The test measures the fluid pressure inside your eyes to determine if you have glaucoma.     Hepatitis C Screening:  · A blood test to see if you have the hepatitis C virus.  Hepatitis C attacks the liver and is a major cause of chronic liver disease.  Medicare will cover a single screening for all adults born between 1945 & 1965, or high risk patients (people who have injected illegal drugs or people who have had blood transfusions).  High risk patients who continue to inject illegal drugs can be screened for Hepatitis C every year.    Smoking and Tobacco-Use Cessation Counseling:  · Tobacco is the single greatest cause of disease and early death in our country today. Medication and counseling together can increase a person’s chance of quitting for good.   · Medicare covers two quitting attempts per year, with four counseling sessions per attempt (eight sessions in a 12 month period)    Preventive Screening tests for Women    Screening Mammograms and Breast Exams:  · An x-ray of your breasts to check for breast cancer before you or your doctor may be able to feel it.  If breast cancer is found early it can usually be treated with success.    Pelvic Exams and Pap Tests:  · An exam to check for cervical and vaginal cancer. A Pap test is a lab test in which cells are taken from your cervix and sent to the lab to look for signs of cervical cancer. If cancer of the cervix is found early, chances for a cure are good. Testing can generally end at age 65, or if a woman has a hysterectomy for a benign condition. Your provider may recommend more frequent testing if certain abnormal results are found.    Bone Mass Measurements:  · A painless x-ray of your bone density to see if you are at risk for a broken bone. Bone density refers to the thickness of bones or how tightly the bone tissue is packed.    Preventive Screening tests for Men    Prostate Screening:  · Should you have a prostate cancer test (PSA)?   It is up to you to decide if you want a prostate cancer test. Talk to your clinician to find out if the test is right for you.  Things for you to consider and talk about should include:  · Benefits and harms of the test  · Your family history  · How your race/ethnicity may influence the test  · If the test may impact other medical conditions you have  · Your values on screenings and treatments    *Medicare pays for many preventive services to keep you healthy. For some of these services, you might have to pay a deductible, coinsurance, and / or copayment.  The amounts vary depending on the type of services you need and the kind of Medicare health plan you have.    For further details on screenings offered by Medicare please visit: https://www.medicare.gov/coverage/preventive-screening-services    verbal instruction

## 2022-03-03 ENCOUNTER — OUTPATIENT (OUTPATIENT)
Dept: OUTPATIENT SERVICES | Facility: HOSPITAL | Age: 82
LOS: 1 days | Discharge: HOME | End: 2022-03-03
Payer: MEDICARE

## 2022-03-03 DIAGNOSIS — Z12.31 ENCOUNTER FOR SCREENING MAMMOGRAM FOR MALIGNANT NEOPLASM OF BREAST: ICD-10-CM

## 2022-03-03 DIAGNOSIS — Z93.1 GASTROSTOMY STATUS: Chronic | ICD-10-CM

## 2022-03-03 DIAGNOSIS — S72.009A FRACTURE OF UNSPECIFIED PART OF NECK OF UNSPECIFIED FEMUR, INITIAL ENCOUNTER FOR CLOSED FRACTURE: Chronic | ICD-10-CM

## 2022-03-03 PROCEDURE — 76641 ULTRASOUND BREAST COMPLETE: CPT | Mod: 26,50

## 2022-03-15 DIAGNOSIS — R92.2 INCONCLUSIVE MAMMOGRAM: ICD-10-CM

## 2022-04-18 NOTE — ED ADULT NURSE NOTE - NSHISCREENINGQ1_ED_A_ED
Eucrisa Counseling: Patient may experience a mild burning sensation during topical application. Eucrisa is not approved in children less than 2 years of age. No

## 2022-10-13 ENCOUNTER — OUTPATIENT (OUTPATIENT)
Dept: OUTPATIENT SERVICES | Facility: HOSPITAL | Age: 82
LOS: 1 days | Discharge: HOME | End: 2022-10-13

## 2022-10-13 DIAGNOSIS — S72.009A FRACTURE OF UNSPECIFIED PART OF NECK OF UNSPECIFIED FEMUR, INITIAL ENCOUNTER FOR CLOSED FRACTURE: Chronic | ICD-10-CM

## 2022-10-13 DIAGNOSIS — Z93.1 GASTROSTOMY STATUS: Chronic | ICD-10-CM

## 2022-10-22 NOTE — PROGRESS NOTE ADULT - ASSESSMENT
79F h/o bipolar, behavioral disorder, asthma, esophageal varices is being sent by group home [facility] for fever and cough since last night    # Fever, cough - R/O COVID-19:  - Pt afebrile, saturating 98 on 3 L NC  - CXR normal  - Labs normal, Flu/RSV negative  - Possible Asthma exacerbation exacerbated by a URI  - COVID-19 PCR sent, f/up results  - C/w prednisone 40 mg PO qd   - started on plaquenil due to hypoxemia  -   - ECG daily  - Proventil inhaler q6h standing and q4h PRN for SOB/wheezing  - Tylenol 650 mg PO q6h PRN for fever  - Airborne/COntact/droplet isolation  - Limit physical contact as much as possible  - ID following    # Bipolar disorder:  - C/w divalproex and haloperidol    # Asthma:  - C/w inhalers    # DVT ppx: Lovenox 40 mg Pqd  # GI ppx: None  # Activity: as tolerated  # Diet: Regular  # Dispo: From group home, pending COVID results  # CODE STATUS: FULL. Patient does not have family, has an aide, no documented health care proxy Non family member

## 2022-12-10 ENCOUNTER — EMERGENCY (EMERGENCY)
Facility: HOSPITAL | Age: 82
LOS: 0 days | Discharge: HOME | End: 2022-12-10
Attending: EMERGENCY MEDICINE | Admitting: EMERGENCY MEDICINE
Payer: MEDICARE

## 2022-12-10 VITALS
HEART RATE: 78 BPM | RESPIRATION RATE: 18 BRPM | DIASTOLIC BLOOD PRESSURE: 86 MMHG | SYSTOLIC BLOOD PRESSURE: 143 MMHG | TEMPERATURE: 96 F | OXYGEN SATURATION: 98 %

## 2022-12-10 DIAGNOSIS — K22.70 BARRETT'S ESOPHAGUS WITHOUT DYSPLASIA: ICD-10-CM

## 2022-12-10 DIAGNOSIS — R51.9 HEADACHE, UNSPECIFIED: ICD-10-CM

## 2022-12-10 DIAGNOSIS — F03.90 UNSPECIFIED DEMENTIA WITHOUT BEHAVIORAL DISTURBANCE: ICD-10-CM

## 2022-12-10 DIAGNOSIS — M85.88 OTHER SPECIFIED DISORDERS OF BONE DENSITY AND STRUCTURE, OTHER SITE: ICD-10-CM

## 2022-12-10 DIAGNOSIS — S00.81XA ABRASION OF OTHER PART OF HEAD, INITIAL ENCOUNTER: ICD-10-CM
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
## 2022-12-10 DIAGNOSIS — Z91.041 RADIOGRAPHIC DYE ALLERGY STATUS: ICD-10-CM

## 2022-12-10 DIAGNOSIS — Z86.59 PERSONAL HISTORY OF OTHER MENTAL AND BEHAVIORAL DISORDERS: ICD-10-CM

## 2022-12-10 DIAGNOSIS — Z86.79 PERSONAL HISTORY OF OTHER DISEASES OF THE CIRCULATORY SYSTEM: ICD-10-CM

## 2022-12-10 DIAGNOSIS — Z92.3 PERSONAL HISTORY OF IRRADIATION: ICD-10-CM

## 2022-12-10 DIAGNOSIS — Z93.1 GASTROSTOMY STATUS: Chronic | ICD-10-CM

## 2022-12-10 DIAGNOSIS — E78.5 HYPERLIPIDEMIA, UNSPECIFIED: ICD-10-CM

## 2022-12-10 DIAGNOSIS — Z96.641 PRESENCE OF RIGHT ARTIFICIAL HIP JOINT: ICD-10-CM

## 2022-12-10 DIAGNOSIS — Z23 ENCOUNTER FOR IMMUNIZATION: ICD-10-CM

## 2022-12-10 DIAGNOSIS — I67.82 CEREBRAL ISCHEMIA: ICD-10-CM

## 2022-12-10 DIAGNOSIS — N39.0 URINARY TRACT INFECTION, SITE NOT SPECIFIED: ICD-10-CM

## 2022-12-10 DIAGNOSIS — R91.1 SOLITARY PULMONARY NODULE: ICD-10-CM

## 2022-12-10 DIAGNOSIS — Z85.3 PERSONAL HISTORY OF MALIGNANT NEOPLASM OF BREAST: ICD-10-CM

## 2022-12-10 DIAGNOSIS — S72.009A FRACTURE OF UNSPECIFIED PART OF NECK OF UNSPECIFIED FEMUR, INITIAL ENCOUNTER FOR CLOSED FRACTURE: Chronic | ICD-10-CM

## 2022-12-10 DIAGNOSIS — F31.9 BIPOLAR DISORDER, UNSPECIFIED: ICD-10-CM
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
## 2022-12-10 LAB
ALBUMIN SERPL ELPH-MCNC: 4.3 G/DL — SIGNIFICANT CHANGE UP (ref 3.5–5.2)
ALP SERPL-CCNC: 66 U/L — SIGNIFICANT CHANGE UP (ref 30–115)
ALT FLD-CCNC: 8 U/L — SIGNIFICANT CHANGE UP (ref 0–41)
ANION GAP SERPL CALC-SCNC: 13 MMOL/L — SIGNIFICANT CHANGE UP (ref 7–14)
APPEARANCE UR: CLEAR — SIGNIFICANT CHANGE UP
APTT BLD: 36.7 SEC — SIGNIFICANT CHANGE UP (ref 27–39.2)
AST SERPL-CCNC: 14 U/L — SIGNIFICANT CHANGE UP (ref 0–41)
BACTERIA # UR AUTO: ABNORMAL
BASOPHILS # BLD AUTO: 0.02 K/UL — SIGNIFICANT CHANGE UP (ref 0–0.2)
BASOPHILS NFR BLD AUTO: 0.4 % — SIGNIFICANT CHANGE UP (ref 0–1)
BILIRUB SERPL-MCNC: 0.4 MG/DL — SIGNIFICANT CHANGE UP (ref 0.2–1.2)
BILIRUB UR-MCNC: NEGATIVE — SIGNIFICANT CHANGE UP
BUN SERPL-MCNC: 16 MG/DL — SIGNIFICANT CHANGE UP (ref 10–20)
CALCIUM SERPL-MCNC: 9.3 MG/DL — SIGNIFICANT CHANGE UP (ref 8.4–10.5)
CHLORIDE SERPL-SCNC: 104 MMOL/L — SIGNIFICANT CHANGE UP (ref 98–110)
CK SERPL-CCNC: 41 U/L — SIGNIFICANT CHANGE UP (ref 0–225)
CO2 SERPL-SCNC: 27 MMOL/L — SIGNIFICANT CHANGE UP (ref 17–32)
COLOR SPEC: SIGNIFICANT CHANGE UP
CREAT SERPL-MCNC: 0.5 MG/DL — LOW (ref 0.7–1.5)
DIFF PNL FLD: NEGATIVE — SIGNIFICANT CHANGE UP
EGFR: 94 ML/MIN/1.73M2 — SIGNIFICANT CHANGE UP
EOSINOPHIL # BLD AUTO: 0.09 K/UL — SIGNIFICANT CHANGE UP (ref 0–0.7)
EOSINOPHIL NFR BLD AUTO: 1.6 % — SIGNIFICANT CHANGE UP (ref 0–8)
EPI CELLS # UR: 0 /HPF — SIGNIFICANT CHANGE UP (ref 0–5)
ETHANOL SERPL-MCNC: <10 MG/DL — SIGNIFICANT CHANGE UP
GLUCOSE SERPL-MCNC: 82 MG/DL — SIGNIFICANT CHANGE UP (ref 70–99)
GLUCOSE UR QL: NEGATIVE — SIGNIFICANT CHANGE UP
HCT VFR BLD CALC: 41.5 % — SIGNIFICANT CHANGE UP (ref 37–47)
HGB BLD-MCNC: 13.5 G/DL — SIGNIFICANT CHANGE UP (ref 12–16)
HYALINE CASTS # UR AUTO: 0 /LPF — SIGNIFICANT CHANGE UP (ref 0–7)
IMM GRANULOCYTES NFR BLD AUTO: 0.4 % — HIGH (ref 0.1–0.3)
INR BLD: 0.97 RATIO — SIGNIFICANT CHANGE UP (ref 0.65–1.3)
KETONES UR-MCNC: NEGATIVE — SIGNIFICANT CHANGE UP
LACTATE SERPL-SCNC: 0.9 MMOL/L — SIGNIFICANT CHANGE UP (ref 0.7–2)
LEUKOCYTE ESTERASE UR-ACNC: ABNORMAL
LIDOCAIN IGE QN: 47 U/L — SIGNIFICANT CHANGE UP (ref 7–60)
LYMPHOCYTES # BLD AUTO: 2.15 K/UL — SIGNIFICANT CHANGE UP (ref 1.2–3.4)
LYMPHOCYTES # BLD AUTO: 38.9 % — SIGNIFICANT CHANGE UP (ref 20.5–51.1)
MCHC RBC-ENTMCNC: 30.5 PG — SIGNIFICANT CHANGE UP (ref 27–31)
MCHC RBC-ENTMCNC: 32.5 G/DL — SIGNIFICANT CHANGE UP (ref 32–37)
MCV RBC AUTO: 93.7 FL — SIGNIFICANT CHANGE UP (ref 81–99)
MONOCYTES # BLD AUTO: 0.49 K/UL — SIGNIFICANT CHANGE UP (ref 0.1–0.6)
MONOCYTES NFR BLD AUTO: 8.9 % — SIGNIFICANT CHANGE UP (ref 1.7–9.3)
NEUTROPHILS # BLD AUTO: 2.76 K/UL — SIGNIFICANT CHANGE UP (ref 1.4–6.5)
NEUTROPHILS NFR BLD AUTO: 49.8 % — SIGNIFICANT CHANGE UP (ref 42.2–75.2)
NITRITE UR-MCNC: POSITIVE
NRBC # BLD: 0 /100 WBCS — SIGNIFICANT CHANGE UP (ref 0–0)
PH UR: 8 — SIGNIFICANT CHANGE UP (ref 5–8)
PLATELET # BLD AUTO: 160 K/UL — SIGNIFICANT CHANGE UP (ref 130–400)
POTASSIUM SERPL-MCNC: 4.3 MMOL/L — SIGNIFICANT CHANGE UP (ref 3.5–5)
POTASSIUM SERPL-SCNC: 4.3 MMOL/L — SIGNIFICANT CHANGE UP (ref 3.5–5)
PROT SERPL-MCNC: 6.7 G/DL — SIGNIFICANT CHANGE UP (ref 6–8)
PROT UR-MCNC: SIGNIFICANT CHANGE UP
PROTHROM AB SERPL-ACNC: 11 SEC — SIGNIFICANT CHANGE UP (ref 9.95–12.87)
RBC # BLD: 4.43 M/UL — SIGNIFICANT CHANGE UP (ref 4.2–5.4)
RBC # FLD: 12.1 % — SIGNIFICANT CHANGE UP (ref 11.5–14.5)
RBC CASTS # UR COMP ASSIST: 2 /HPF — SIGNIFICANT CHANGE UP (ref 0–4)
SODIUM SERPL-SCNC: 144 MMOL/L — SIGNIFICANT CHANGE UP (ref 135–146)
SP GR SPEC: 1.04 — HIGH (ref 1.01–1.03)
TROPONIN T SERPL-MCNC: <0.01 NG/ML — SIGNIFICANT CHANGE UP
UROBILINOGEN FLD QL: SIGNIFICANT CHANGE UP
WBC # BLD: 5.53 K/UL — SIGNIFICANT CHANGE UP (ref 4.8–10.8)
WBC # FLD AUTO: 5.53 K/UL — SIGNIFICANT CHANGE UP (ref 4.8–10.8)
WBC UR QL: 34 /HPF — HIGH (ref 0–5)

## 2022-12-10 PROCEDURE — 72125 CT NECK SPINE W/O DYE: CPT | Mod: 26,MA

## 2022-12-10 PROCEDURE — 99285 EMERGENCY DEPT VISIT HI MDM: CPT | Mod: GC

## 2022-12-10 PROCEDURE — 93010 ELECTROCARDIOGRAM REPORT: CPT

## 2022-12-10 PROCEDURE — 74177 CT ABD & PELVIS W/CONTRAST: CPT | Mod: 26,MA

## 2022-12-10 PROCEDURE — 70450 CT HEAD/BRAIN W/O DYE: CPT | Mod: 26,MA

## 2022-12-10 PROCEDURE — 72170 X-RAY EXAM OF PELVIS: CPT | Mod: 26

## 2022-12-10 PROCEDURE — 71260 CT THORAX DX C+: CPT | Mod: 26,MA

## 2022-12-10 PROCEDURE — 71045 X-RAY EXAM CHEST 1 VIEW: CPT | Mod: 26

## 2022-12-10 RX ORDER — CEPHALEXIN 500 MG
1 CAPSULE ORAL
Qty: 28 | Refills: 0
Start: 2022-12-10 | End: 2022-12-16

## 2022-12-10 RX ORDER — TETANUS TOXOID, REDUCED DIPHTHERIA TOXOID AND ACELLULAR PERTUSSIS VACCINE, ADSORBED 5; 2.5; 8; 8; 2.5 [IU]/.5ML; [IU]/.5ML; UG/.5ML; UG/.5ML; UG/.5ML
0.5 SUSPENSION INTRAMUSCULAR ONCE
Refills: 0 | Status: COMPLETED | OUTPATIENT
Start: 2022-12-10 | End: 2022-12-10

## 2022-12-10 RX ORDER — CEFTRIAXONE 500 MG/1
1000 INJECTION, POWDER, FOR SOLUTION INTRAMUSCULAR; INTRAVENOUS ONCE
Refills: 0 | Status: COMPLETED | OUTPATIENT
Start: 2022-12-10 | End: 2022-12-10

## 2022-12-10 RX ADMIN — TETANUS TOXOID, REDUCED DIPHTHERIA TOXOID AND ACELLULAR PERTUSSIS VACCINE, ADSORBED 0.5 MILLILITER(S): 5; 2.5; 8; 8; 2.5 SUSPENSION INTRAMUSCULAR at 08:36

## 2022-12-10 RX ADMIN — CEFTRIAXONE 100 MILLIGRAM(S): 500 INJECTION, POWDER, FOR SOLUTION INTRAMUSCULAR; INTRAVENOUS at 10:16

## 2022-12-10 NOTE — ED ADULT TRIAGE NOTE - CHIEF COMPLAINT QUOTE
lisha cruz from group home for s/p fall. the home aids were trying to put the patient from the bed in the wheelchair and she fell and hit her head on the corner of a furniture. no loc. no blood thinners.

## 2022-12-10 NOTE — ED PROVIDER NOTE - OBJECTIVE STATEMENT
82F with pmhx MR, bipolar, balta esophagus, HLD biba from group home after unwitnessed fall 1 hr pta. pt is poor historian. health aide accompanying her stated that she fell prior shift off the bed, no loc or ac use.

## 2022-12-10 NOTE — ED PROVIDER NOTE - PROGRESS NOTE DETAILS
RK: discussed with patient and her health aide about results, will print results for patient. pt nontender ribs. RK: patient ambulates with walker at baseline, currently able to ambulate with assistance.

## 2022-12-10 NOTE — ED PROVIDER NOTE - CLINICAL SUMMARY MEDICAL DECISION MAKING FREE TEXT BOX
Patient presented status post unwitnessed fall 1 hour prior to arrival.  Patient is very poor historian and unable to provide further history or give areas of pain.  On arrival to ED, patient afebrile, hemodynamically stable, grossly neurovascularly intact, no discernible signs of trauma on exam and no tenderness.  EKG obtained and nonischemic.  Obtained labs which were grossly unremarkable including no significant leukocytosis, anemia, signs of dehydration/DEMETRIO, transaminitis or significant electrolyte abnormalities.  UA positive for infection.  Pan scan for trauma negative for acute traumatic injury.  Patient able to ambulate with walker in ED, tolerates p.o.  Given the above, will discharge home with outpatient follow up. Aide at bedside agreeable with plan. Agrees to return to ED for any new or worsening symptoms.

## 2022-12-10 NOTE — ED PROVIDER NOTE - ST/T WAVE
Spoke with pt and she meant to request refill for FLONASE, not furosemide.  She states she needs a nasal spray for nasal drip and cold she is having.      Pt requests FLONASE to be sent to [facility]   no stemi

## 2022-12-10 NOTE — ED PROVIDER NOTE - PATIENT PORTAL LINK FT
You can access the FollowMyHealth Patient Portal offered by [facility] by registering at the following website: [email or web address]. By joining [employer]’s FollowMyHealth portal, you will also be able to view your health information using other applications (apps) compatible with our system.

## 2022-12-10 NOTE — ED PROVIDER NOTE - PHYSICAL EXAMINATION
CONSTITUTIONAL: in no acute distress  SKIN: warm, dry  HEAD: Normocephalic right forehead temple has superficial skin avulsion approx 1cm  EYES: no conjunctival erythema eomi peerla 3mm b/l  ENT: no nasal discharge, airway clear  NECK: full ROM, non-tender  CARD: regular rate and rhythm  RESP: normal respiratory effort, no wheezes, rales or rhonchi  ABD: soft, non-distended, non-tender  back: nontender  EXT: moving all extremities spontaneously nontender extremities

## 2022-12-13 LAB
-  AMIKACIN: SIGNIFICANT CHANGE UP
-  AMOXICILLIN/CLAVULANIC ACID: SIGNIFICANT CHANGE UP
-  AMPICILLIN/SULBACTAM: SIGNIFICANT CHANGE UP
-  AMPICILLIN: SIGNIFICANT CHANGE UP
-  AZTREONAM: SIGNIFICANT CHANGE UP
-  CEFAZOLIN: SIGNIFICANT CHANGE UP
-  CEFEPIME: SIGNIFICANT CHANGE UP
-  CEFTRIAXONE: SIGNIFICANT CHANGE UP
-  CIPROFLOXACIN: SIGNIFICANT CHANGE UP
-  ERTAPENEM: SIGNIFICANT CHANGE UP
-  GENTAMICIN: SIGNIFICANT CHANGE UP
-  IMIPENEM: SIGNIFICANT CHANGE UP
-  LEVOFLOXACIN: SIGNIFICANT CHANGE UP
-  MEROPENEM: SIGNIFICANT CHANGE UP
-  NITROFURANTOIN: SIGNIFICANT CHANGE UP
-  PIPERACILLIN/TAZOBACTAM: SIGNIFICANT CHANGE UP
-  TOBRAMYCIN: SIGNIFICANT CHANGE UP
-  TRIMETHOPRIM/SULFAMETHOXAZOLE: SIGNIFICANT CHANGE UP
CULTURE RESULTS: SIGNIFICANT CHANGE UP
METHOD TYPE: SIGNIFICANT CHANGE UP
ORGANISM # SPEC MICROSCOPIC CNT: SIGNIFICANT CHANGE UP
ORGANISM # SPEC MICROSCOPIC CNT: SIGNIFICANT CHANGE UP
SPECIMEN SOURCE: SIGNIFICANT CHANGE UP

## 2022-12-13 NOTE — ED POST DISCHARGE NOTE - RESULT SUMMARY
+ [facility]. HAD [facility] REPORT FAXED TO DR. MEJIA BY LAB. SHE WILL TAKE OVER CARE AT FACILITY.

## 2023-03-10 ENCOUNTER — EMERGENCY (EMERGENCY)
Facility: HOSPITAL | Age: 83
LOS: 0 days | Discharge: NURSING, REHAB OR LONG-TERM CARE | End: 2023-03-11
Attending: EMERGENCY MEDICINE
Payer: MEDICARE

## 2023-03-10 VITALS
TEMPERATURE: 98 F | OXYGEN SATURATION: 96 % | WEIGHT: 293 LBS | SYSTOLIC BLOOD PRESSURE: 134 MMHG | HEART RATE: 79 BPM | DIASTOLIC BLOOD PRESSURE: 87 MMHG | HEIGHT: 58 IN | RESPIRATION RATE: 20 BRPM

## 2023-03-10 DIAGNOSIS — F79 UNSPECIFIED INTELLECTUAL DISABILITIES: ICD-10-CM

## 2023-03-10 DIAGNOSIS — R21 RASH AND OTHER NONSPECIFIC SKIN ERUPTION: ICD-10-CM

## 2023-03-10 DIAGNOSIS — F31.9 BIPOLAR DISORDER, UNSPECIFIED: ICD-10-CM

## 2023-03-10 DIAGNOSIS — E73.9 LACTOSE INTOLERANCE, UNSPECIFIED: ICD-10-CM

## 2023-03-10 DIAGNOSIS — E78.5 HYPERLIPIDEMIA, UNSPECIFIED: ICD-10-CM

## 2023-03-10 DIAGNOSIS — S72.009A FRACTURE OF UNSPECIFIED PART OF NECK OF UNSPECIFIED FEMUR, INITIAL ENCOUNTER FOR CLOSED FRACTURE: Chronic | ICD-10-CM

## 2023-03-10 DIAGNOSIS — Z90.11 ACQUIRED ABSENCE OF RIGHT BREAST AND NIPPLE: ICD-10-CM

## 2023-03-10 DIAGNOSIS — Z85.3 PERSONAL HISTORY OF MALIGNANT NEOPLASM OF BREAST: ICD-10-CM

## 2023-03-10 DIAGNOSIS — Z93.1 GASTROSTOMY STATUS: Chronic | ICD-10-CM

## 2023-03-10 PROCEDURE — 99282 EMERGENCY DEPT VISIT SF MDM: CPT

## 2023-03-10 PROCEDURE — 99283 EMERGENCY DEPT VISIT LOW MDM: CPT | Mod: FS

## 2023-03-10 NOTE — ED ADULT TRIAGE NOTE - CHIEF COMPLAINT QUOTE
She has some redness/rash under her left armpit today as per aide. Sent from [facility] home

## 2023-03-11 NOTE — ED PROVIDER NOTE - PROGRESS NOTE DETAILS
D/W pt exact etiology of rash not known.  Discuss allergy vs infxn vs other inflam process.  D/W pt plan to tx as fungal and monitor closely.  All lesions blanching, pt looks well, agrees return to ER if worse.  Agrees f/u derm if persist

## 2023-03-11 NOTE — ED PROVIDER NOTE - PATIENT PORTAL LINK FT
You can access the FollowMyHealth Patient Portal offered by [facility] by registering at the following website: [email or web address]. By joining [employer]’s FollowMyHealth portal, you will also be able to view your health information using other applications (apps) compatible with our system.

## 2023-03-11 NOTE — ED PROVIDER NOTE - ATTENDING APP SHARED VISIT CONTRIBUTION OF CARE
I personally evaluated the patient. I reviewed the Resident’s or Physician Assistant’s note (as assigned above), and agree with the findings and plan except as documented in my note.  83-year-old female with history of MR, bipolar disorder, HLD, Brito's esophagus, was brought in from group home for evaluation of rash in the left axilla.  Onset of symptoms unknown, no other associated symptoms.  Date of visit denies any fever.  VSS, non toxic appearing, NAD, Head NCAT, MMM, neck supple, normal ROM, normal s1s2, lungs ctab, abd s/nt/nd, no guarding or rebound, extremities wnl, AAO x 3, GCS 15, neuro grossly normal.  Fungal appearing rash in the left axilla. Plan is treatment with antifungal and discharge outpatient follow-up.

## 2023-03-11 NOTE — ED ADULT NURSE NOTE - CHIEF COMPLAINT QUOTE
She has some redness/rash under her left armpit today as per aide. Sent from [facility] home

## 2023-03-11 NOTE — ED PROVIDER NOTE - PHYSICAL EXAMINATION
CONSTITUTIONAL: no apparent distress.   NECK: Supple; non-tender; no cervical lymphadenopathy. No JVD.   CARDIOVASCULAR: Normal S1, S2; no murmurs, rubs, or gallops.   RESPIRATORY: Normal chest excursion with respiration  GI/: Non-distended; non-tender; no palpable organomegaly.   MS: No evidence of trauma or deformity.   SKIN: eryrhematous patchy rash to L axilla; otherwise warm; dry; good turgor; no apparent lesions or exudate.   NEURO/PSYCH: baseline. mood and manner are appropriate. G

## 2023-05-04 ENCOUNTER — OUTPATIENT (OUTPATIENT)
Dept: OUTPATIENT SERVICES | Facility: HOSPITAL | Age: 83
LOS: 1 days | End: 2023-05-04
Payer: MEDICARE

## 2023-05-04 DIAGNOSIS — Z93.1 GASTROSTOMY STATUS: Chronic | ICD-10-CM

## 2023-05-04 DIAGNOSIS — S72.009A FRACTURE OF UNSPECIFIED PART OF NECK OF UNSPECIFIED FEMUR, INITIAL ENCOUNTER FOR CLOSED FRACTURE: Chronic | ICD-10-CM

## 2023-05-04 DIAGNOSIS — Z00.8 ENCOUNTER FOR OTHER GENERAL EXAMINATION: ICD-10-CM

## 2023-05-04 PROCEDURE — 76641 ULTRASOUND BREAST COMPLETE: CPT | Mod: 50

## 2023-05-04 PROCEDURE — 77080 DXA BONE DENSITY AXIAL: CPT

## 2023-05-04 PROCEDURE — 76641 ULTRASOUND BREAST COMPLETE: CPT | Mod: 26,50,GZ

## 2023-05-05 DIAGNOSIS — Z00.8 ENCOUNTER FOR OTHER GENERAL EXAMINATION: ICD-10-CM

## 2023-06-06 NOTE — PATIENT PROFILE ADULT - PRO INTERPRETER NEED 2
For information on Fall & Injury Prevention, visit: [email or web address] OR  [email or web address] OR  https://www.cdc.gov/steadi/patient.html English

## 2023-09-26 NOTE — ED ADULT NURSE NOTE - NSSUHOSCREENINGYN_ED_ALL_ED
-- DO NOT REPLY / DO NOT REPLY ALL --  -- Message is from [facility] [employer] ([facility]) --    General Patient Message: patient is calling to see if doctor has any available appts for today or tomorrow patient said her blood pressure has been going up and down and the last 2 days her sugar has been spiking in the 200's. Please give patient a call.     Caller Information       Type Contact Phone/Fax    09/26/2023 12:37 PM CDT Phone (Incoming) Eloy King (Self) [phone] (M)        Alternative phone number: none    Can a detailed message be left? Yes    Message Turnaround:     Is it Working Hours? Yes - Working Hours     [place]:    Please give this turnaround time to the caller:   \"This message will be sent to [state Provider's name]. The clinical team will fulfill your request as soon as they review your message.\"                 Yes - the patient is able to be screened

## 2023-10-27 NOTE — H&P ADULT - REASON FOR ADMISSION
Patient was scheduled with Andrzej Kamara MD for EGD  on 11.3.23 at [facility]. EGD instructions [facility]. Prep medications sent to NONE.    Patient advised to contact insurance company to verify coverage verbally/in instructions.     R/O COVID-19

## 2024-01-01 NOTE — ED ADULT NURSE NOTE - NS_NURSE_DISC_TEACHING_YN_ED_ALL_ED
Patient ID: Danna is a 9 month old female.  MRN: [id number]  Chief Complaint   Patient presents with    Office Visit    Well Child     Mom think she is allergic to eggs        HISTORY OF PRESENT ILLNESS    Well child 9 month    WELL CHILD EXAM: 9 MONTH  HPI: 9 month old female presenting for well-child exam.    Egg allergy   - gave egg and mouth was breaking out   - re-introduced in other forms and no issues   - tried direct egg     Parental concerns: rolling over frequency, and head shape   Follow-up on previous concerns: NA  Changes since last visit: NA  Recent hospitalizations/ER visits: No     SOCIAL/FAMILY HISTORY:  Household Members: Mom, Dad and Dog   Adjustment to Child: Good   Work/ Plans: mom and dad back to work      Nutrition:   breastfeedin-12 feedings in 24 hours; 4oz pumped breast milk   continue prenatal vitamin; avoid alcohol.     formula feeding: Prepare/store formula safely; feed every 2-3 hours; hold baby semi-upright; don’t prop bottle  Stools:3-4 per day  Wets: 6-8 per day   Sleep: 14-17 hours including naps   Behavior/Activity: doing well active  -Discussed sleeping only on back.  Tummy time: after diaper changes     SAFETY:   -Smoking in house: no.  -Car seat: rear-facing  car seat.  Weapons: Reports none in the home, discussed to have all weapons locked and out of reach of children  Smoke detectors: Functional in home including CO detectors   Car seat: Rear facing, discussed recommendations for keeping rear facing until child meets weight and height requirement on car seat    Age Percentiles   Weight 78% (Z= 0.78)   Length 94% (Z= 1.54)   HC 88% (Z= 1.18)   BMI 46% (Z= -0.10)       Review of Systems   Constitutional:  Negative for activity change, fever and irritability.   HENT:  Negative for congestion and rhinorrhea.    Respiratory:  Negative for cough and wheezing.    Gastrointestinal:  Negative for constipation and diarrhea.   Skin:  Negative for rash.   Neurological:   Negative for seizures and facial asymmetry.       Patient's medications, allergies, problem list, past medical, surgical, social and family histories were reviewed and updated as appropriate.    ALLERGIES  ALLERGIES:  No Known Allergies    MEDICAL HISTORY  History reviewed. No pertinent past medical history.  Patient Active Problem List   Diagnosis    Meconium in amniotic fluid noted in labor/delivery, liveborn infant    LGA (large for gestational age) infant (CMD)    Well child visit    Need for vaccination     SURGICAL HISTORY  History reviewed. No pertinent surgical history.  FAMILY HISTORY  Family History   Problem Relation Age of Onset    Patient is unaware of any medical problems Father      SOCIAL  Social History     Socioeconomic History    Marital status: Single     Spouse name: Not on file    Number of children: Not on file    Years of education: Not on file    Highest education level: Not on file   Occupational History    Not on file   Tobacco Use    Smoking status: Not on file    Smokeless tobacco: Not on file   Substance and Sexual Activity    Alcohol use: Not on file    Drug use: Not on file    Sexual activity: Not on file   Other Topics Concern    Not on file   Social History Narrative    Not on file     Social Determinants of Health     Financial Resource Strain: Not on file   Food Insecurity: Not on file   Transportation Needs: Not on file   Physical Activity: Not on file   Stress: Not on file   Social Connections: Not on file   Interpersonal Safety: Not on file     CURRENT MEDICATIONS  No current outpatient medications on file.     No current facility-administered medications for this visit.       OBJECTIVE  Vitals:    10/31/24 1445   Pulse: 129   Resp: 33   Temp: 97.4 °F (36.3 °C)   TempSrc: Temporal   SpO2: 94%   Weight: 9.265 kg (20 lb 6.8 oz)   Height: 29\" (73.7 cm)   HC: 45.7 cm (18\")       Physical Exam  Constitutional:       General: She is active. She has a strong cry. She is not in acute  distress.     Appearance: Normal appearance. She is well-developed and normal weight. She is not ill-appearing, toxic-appearing or diaphoretic.   HENT:      Head: Normocephalic and atraumatic. Anterior fontanelle is flat.      Nose: Nose normal. No congestion or rhinorrhea.      Mouth/Throat:      Lips: Pink. No lesions.      Mouth: Mucous membranes are moist.      Neck: Full passive range of motion without pain, normal range of motion and neck supple.   Eyes:      General: Red reflex is present bilaterally.      Extraocular Movements: Extraocular movements intact.      Conjunctiva/sclera: Conjunctivae normal.      Pupils: Pupils are equal, round, and reactive to light.   Cardiovascular:      Rate and Rhythm: Normal rate and regular rhythm.      Pulses: Normal pulses.      Heart sounds: Normal heart sounds. No murmur heard.     No gallop.   Pulmonary:      Effort: Pulmonary effort is normal.      Breath sounds: Normal breath sounds and air entry. No stridor. No decreased breath sounds, wheezing, rhonchi or rales.   Chest:      Chest wall: No injury, deformity or swelling.   Abdominal:      General: Bowel sounds are normal. There is no distension or abnormal umbilicus. There are no signs of injury.      Palpations: Abdomen is soft. There is no hepatomegaly, splenomegaly or mass.      Tenderness: There is no abdominal tenderness. There is no guarding.      Hernia: No hernia is present.   Genitourinary:     General: Normal vulva.      Labia: No labial fusion.       Rectum: Normal.   Musculoskeletal:         General: No swelling, tenderness, deformity or signs of injury. Normal range of motion.      Right hip: Negative right Ortolani and negative right Carrillo.      Left hip: Negative left Ortolani and negative left Carrillo.      Comments: Lorena Ortalani negative    Lymphadenopathy:      Cervical: No cervical adenopathy.   Skin:     General: Skin is warm and dry.      Turgor: Normal.      Coloration: Skin is not  cyanotic, jaundiced or pale.      Findings: No petechiae or wound.             Comments: Slate gray nevi as drawn   Neurological:      General: No focal deficit present.      Mental Status: She is alert and easily aroused.      Sensory: No sensory deficit.        ASQ-3 Screen      Results: Some Concerns/Monitor   Communication: Reassuring Score: 40   Gross Motor: Monitoring Score: 25   Fine Motor: Reassuring Score: 50   Problem Solving: Reassuring Score: 60   Personal-Social: Reassuring Score: 60   Other Concerns:               Disposition:             ASSESSMENT AND PLAN  Problem List Items Addressed This Visit          Health Encounters    Well child visit - Primary     Well appearing on examination   Discussed anticipatory guidance   Follow up 3mo or sooner if concerns  Gaining weight and length appropriately   Follow up 12 mo C                     Health Maintenance Summary       COVID-19 Vaccine (1)  Never done    Influenza Vaccine (1 of 2)  Never done    Lead Blood/Venous (View Topic Details)  Due soon on 1/8/2025    MMR Vaccine (1 of 2 - Standard series)  Next due on 1/8/2025    Varicella Vaccine (1 of 2 - 2-dose childhood series)  Next due on 1/8/2025    HIB Vaccine (3 of 3 - PRP-OMP Series)  Next due on 1/8/2025    Hepatitis A Vaccine (1 of 2 - 2-dose series)  Next due on 1/8/2025    Pneumococcal Vaccine 0-64 (4 of 4 - PCV)  Next due on 1/8/2025    DTaP/Tdap/Td Vaccine (4 - DTaP)  Next due on 4/8/2025    Polio (IPV) Vaccine (4 of 4 - 4-dose series)  Next due on 1/8/2028    Meningococcal Vaccine (1 - 2-dose series)  Next due on 1/8/2035    HPV Vaccine (1 - 2-dose series)  Next due on 1/8/2035    Hepatitis B Vaccine   Completed    Rotavirus Vaccine   Completed    Well Child Exam 9 Months   Completed    Respiratory Syncytial Virus (RSV) Immunization   Completed            Prior to discharge all questions were answered and patient expressed understanding of the plan of care.  Medical compliance with plan  discussed and risk of noncompliance reviewed.  Patient education completed on disease process, etiology and prognosis.  Return to clinic as clinically indicated was discussed with patient who verbalized understanding of and agreement with the plan.  Proper usage and all side effects of medications reviewed with patient who expressed understanding.         Schedule follow up: Return in about 3 months (around 1/31/2025) for 12 mo [facility].    Leila Griffin,    Family Medicine    Yes

## 2024-01-03 ENCOUNTER — OUTPATIENT (OUTPATIENT)
Dept: OUTPATIENT SERVICES | Facility: HOSPITAL | Age: 84
LOS: 1 days | End: 2024-01-03
Payer: MEDICAID

## 2024-01-03 DIAGNOSIS — Z01.20 ENCOUNTER FOR DENTAL EXAMINATION AND CLEANING WITHOUT ABNORMAL FINDINGS: ICD-10-CM

## 2024-01-03 DIAGNOSIS — S72.009A FRACTURE OF UNSPECIFIED PART OF NECK OF UNSPECIFIED FEMUR, INITIAL ENCOUNTER FOR CLOSED FRACTURE: Chronic | ICD-10-CM

## 2024-01-03 DIAGNOSIS — Z93.1 GASTROSTOMY STATUS: Chronic | ICD-10-CM

## 2024-01-03 PROCEDURE — D1110: CPT

## 2024-01-03 PROCEDURE — D0120: CPT

## 2024-01-03 PROCEDURE — D9997: CPT

## 2024-01-03 PROCEDURE — D0220: CPT

## 2024-01-03 PROCEDURE — D0230: CPT

## 2024-01-05 DIAGNOSIS — Z01.20 ENCOUNTER FOR DENTAL EXAMINATION AND CLEANING WITHOUT ABNORMAL FINDINGS: ICD-10-CM

## 2024-03-14 NOTE — PATIENT PROFILE ADULT - ARE SIGNIFICANT INDICATORS COMPLETE.
FMLA paperwork brought in by patient on 3/12/2024.    Call attempted to patient to discuss, phone still not working.  Call placed to patient's s/o, Ed, and he handed the phone to patient. Patient continues to work when able and is requesting intermittent FMLA. Forms can be faxed to employer when complete.    Forms prepared and placed on Dr. Jules's desk for signature.   Yes

## 2024-04-01 NOTE — DISCHARGE NOTE NURSING/CASE MANAGEMENT/SOCIAL WORK - NSDCDPATPORTLINK_GEN_ALL_CORE
Detail Level: Zone You can access the [facility] Portal, offered by [facility], by registering with the following website: [email or web address]

## 2024-05-08 NOTE — DISCHARGE NOTE NURSING/CASE MANAGEMENT/SOCIAL WORK - MODE OF TRANSPORTATION
[Other: _____] : [unfilled] Ambulance [FreeTextEntry1] : DM, diabetic neuropathy, renal insuf, hld, ischemiac Cm, CHF, s/p PPM/ICD, GERD/? gastroparesis, PAD, hypercalcemia, hyperparathyroid [de-identified] : Pt is a 79 y/o female with a hx of DM, diabetic neuropathy, renal insuf, hld, htn, ischemiac Cm, CHF, s/p PPM/ICD, GERD/? gastroparesis, PAD, hypercalcemia, hyperparathyroid Here for f/u Following with cardiology and endocrinology.  notes reviewed.   Has been following with GI.  s/p EGD - chronic inactive gastritis.  n/v has been intermittent. Has seen neurology.  labs - with borderline VLADIMIR and + syphilis screen - FTA +, RPR neg.  CT with old lacunar infarcts - saw stroke neurology.   Was seen at the spine center.  Referred to PT and to rehab med.   Reports that she saw pod - Was started on herbal supplement for the neuropathic sx.  Was referred to vascular for the circulation, Has been taking meds w/o issues.  Getting DM meds adjusted with endocrinology - had jardiance addede   diet - reported as healthy.  Walking for exercise FS reported as controlled.   Denies any chest pains or palpitations.  + occ dyspnea when walking or when talking.  + some fatigue after walking.  Sleeps on 3 pillows.     Reports some nausea all the time.  no vomiting.  + some epigastric pains.  no diarrhea.  + constipation.  some GERD sx.  Taking the PPi and PM famotidine + some urinary freq and nocturia.   + numbness at the feet.  no noted focal weakness.  + gen weakness.   no rash. + occ dec hearing.   + occ knee pains and hip pains.   Saw neurology for the forgetfuness as noted.   s/p fall in march - container for candle shattered and she jumped back.    echo 5/23 - EF 59%, diastolic dysfunction, LAE, borderline pulm HTN dexa 8/22 - for f/u 8/24 ophtho - due   Vaccines - pneumo '18 and '19 gets flu vaccine

## 2024-06-06 NOTE — ED ADULT NURSE NOTE - NSSUHOSCREENINGYN_ED_ALL_ED
PROCEDURES:  Laparoscopic cholecystectomy 06-Jun-2024 14:23:52  Dileep Rosario   Yes - the patient is able to be screened

## 2024-07-02 ENCOUNTER — OUTPATIENT (OUTPATIENT)
Dept: OUTPATIENT SERVICES | Facility: HOSPITAL | Age: 84
LOS: 1 days | End: 2024-07-02
Payer: MEDICARE

## 2024-07-02 DIAGNOSIS — R92.2 INCONCLUSIVE MAMMOGRAM: ICD-10-CM

## 2024-07-02 DIAGNOSIS — S72.009A FRACTURE OF UNSPECIFIED PART OF NECK OF UNSPECIFIED FEMUR, INITIAL ENCOUNTER FOR CLOSED FRACTURE: Chronic | ICD-10-CM

## 2024-07-02 DIAGNOSIS — Z93.1 GASTROSTOMY STATUS: Chronic | ICD-10-CM

## 2024-07-02 PROCEDURE — 76641 ULTRASOUND BREAST COMPLETE: CPT | Mod: 26,50

## 2024-07-02 PROCEDURE — 76641 ULTRASOUND BREAST COMPLETE: CPT | Mod: 50

## 2024-07-03 DIAGNOSIS — R92.2 INCONCLUSIVE MAMMOGRAM: ICD-10-CM

## 2024-07-15 ENCOUNTER — OUTPATIENT (OUTPATIENT)
Dept: OUTPATIENT SERVICES | Facility: HOSPITAL | Age: 84
LOS: 1 days | End: 2024-07-15
Payer: MEDICAID

## 2024-07-15 DIAGNOSIS — Z01.20 ENCOUNTER FOR DENTAL EXAMINATION AND CLEANING WITHOUT ABNORMAL FINDINGS: ICD-10-CM

## 2024-07-15 DIAGNOSIS — Z93.1 GASTROSTOMY STATUS: Chronic | ICD-10-CM

## 2024-07-15 DIAGNOSIS — S72.009A FRACTURE OF UNSPECIFIED PART OF NECK OF UNSPECIFIED FEMUR, INITIAL ENCOUNTER FOR CLOSED FRACTURE: Chronic | ICD-10-CM

## 2024-07-15 PROCEDURE — D1110: CPT

## 2024-07-15 PROCEDURE — D0230: CPT

## 2024-07-15 PROCEDURE — D9997: CPT

## 2024-07-15 PROCEDURE — D0120: CPT

## 2024-07-18 DIAGNOSIS — Z01.20 ENCOUNTER FOR DENTAL EXAMINATION AND CLEANING WITHOUT ABNORMAL FINDINGS: ICD-10-CM

## 2024-08-20 NOTE — PATIENT PROFILE ADULT - PATIENT REPRESENTATIVE: ( YOU CAN CHOOSE ANY PERSON THAT CAN ASSIST YOU WITH YOUR HEALTH CARE PREFERENCES, DOES NOT HAVE TO BE A SPOUSE, IMMEDIATE FAMILY OR SIGNIFICANT OTHER/PARTNER)
Incentive Spirometer        Using the incentive spirometer helps expand the small air sacs of your lungs, helps you breathe deeply, and helps improve your lung function.  Use your incentive spirometer twice a day (10 breaths each time) prior to surgery.      How to Use Your Incentive Spirometer:  Hold the incentive spirometer in an upright position.   Breathe out as usual.   Place the mouthpiece in your mouth and seal your lips tightly around it.   Take a deep breath.  Breathe in slowly and as deeply as possible. Keep the blue flow rate guide between the arrows.   Hold your breath as long as possible. Then exhale slowly and allow the piston to fall to the bottom of the column.   Rest for a few seconds and repeat steps one through five at least 10 times.     PAT Tidal Volume_____1500_____________  x______2__________  Date_______8/20/24________________    BRING THE INCENTIVE SPIROMETER WITH YOU TO THE HOSPITAL ON THE DAY OF YOUR SURGERY.  Opportunity given to ask and answer questions as well as to observe return demonstration.    Patient signature_____________________________    Witness____________________________   [facility]  Preoperative Instructions        Surgery Date August 23          Time of Arrival to be called  Contact#[phone]     On the day of your surgery, please report to Surgical Services Registration Desk and sign in at your designated time.  The Surgery Center is located to the right of the Emergency Room.     2. You must have someone with you to drive you home. You should not drive a car for 24 hours following surgery. Please make arrangements for a friend or family member to stay with you for the first 24 hours after your surgery.    3. Do not have anything to eat or drink (including water, gum, mints, coffee, juice) after midnight ?August 22?      .?This may not apply to medications prescribed by your physician. ?(Please note below the special instructions with medications to take the morning of your procedure.)    4. We recommend you do not drink any alcoholic beverages for 24 hours before and after your surgery.    5. Contact your surgeon's office for instructions on the following medications: non-steroidal anti-inflammatory drugs (i.e. Advil, Aleve), vitamins, and supplements. (Some surgeon's will want you to stop these medications prior to surgery and others may allow you to take them)  **If you are currently taking Plavix, Coumadin, Aspirin and/or other blood-thinning agents, contact your surgeon for instructions.** Your surgeon will partner with the physician prescribing these medications to determine if it is safe to stop or if you need to continue taking.  Please do not stop taking these medications without instructions from your surgeon    6. Wear comfortable clothes.  Wear glasses instead of contacts.  Do not bring any money or jewelry. Please bring picture ID, insurance card, and any prearranged co-payment or hospital payment.  Do not wear make-up, particularly mascara the morning of your surgery.  Do not wear nail polish, particularly if you are having foot /hand  healing and prevent you from healing completely.    If you lose your Hibiclens/chlorhexidine, please call the Surgery Center, or it is available for purchase at your pharmacy.               ___________________      ___________________      ________________  (Signature of Patient)          (Witness)                   (Date and Time)   information could not be obtained

## 2024-10-31 ENCOUNTER — INPATIENT (INPATIENT)
Facility: HOSPITAL | Age: 84
LOS: 17 days | Discharge: HOME | DRG: 536 | End: 2024-11-18
Attending: STUDENT IN AN ORGANIZED HEALTH CARE EDUCATION/TRAINING PROGRAM | Admitting: INTERNAL MEDICINE
Payer: MEDICARE

## 2024-10-31 VITALS
DIASTOLIC BLOOD PRESSURE: 92 MMHG | RESPIRATION RATE: 18 BRPM | OXYGEN SATURATION: 94 % | SYSTOLIC BLOOD PRESSURE: 122 MMHG | HEART RATE: 95 BPM | TEMPERATURE: 98 F

## 2024-10-31 DIAGNOSIS — I08.0 RHEUMATIC DISORDERS OF BOTH MITRAL AND AORTIC VALVES: ICD-10-CM

## 2024-10-31 DIAGNOSIS — N63.10 UNSPECIFIED LUMP IN THE RIGHT BREAST, UNSPECIFIED QUADRANT: ICD-10-CM

## 2024-10-31 DIAGNOSIS — S72.002A FRACTURE OF UNSPECIFIED PART OF NECK OF LEFT FEMUR, INITIAL ENCOUNTER FOR CLOSED FRACTURE: ICD-10-CM

## 2024-10-31 DIAGNOSIS — I85.00 ESOPHAGEAL VARICES WITHOUT BLEEDING: ICD-10-CM

## 2024-10-31 DIAGNOSIS — S72.009A FRACTURE OF UNSPECIFIED PART OF NECK OF UNSPECIFIED FEMUR, INITIAL ENCOUNTER FOR CLOSED FRACTURE: ICD-10-CM

## 2024-10-31 DIAGNOSIS — D84.9 IMMUNODEFICIENCY, UNSPECIFIED: ICD-10-CM

## 2024-10-31 DIAGNOSIS — E86.0 DEHYDRATION: ICD-10-CM

## 2024-10-31 DIAGNOSIS — Z93.1 GASTROSTOMY STATUS: Chronic | ICD-10-CM

## 2024-10-31 DIAGNOSIS — K59.00 CONSTIPATION, UNSPECIFIED: ICD-10-CM

## 2024-10-31 DIAGNOSIS — A41.9 SEPSIS, UNSPECIFIED ORGANISM: ICD-10-CM

## 2024-10-31 DIAGNOSIS — F70 MILD INTELLECTUAL DISABILITIES: ICD-10-CM

## 2024-10-31 DIAGNOSIS — I80.8 PHLEBITIS AND THROMBOPHLEBITIS OF OTHER SITES: ICD-10-CM

## 2024-10-31 DIAGNOSIS — J45.909 UNSPECIFIED ASTHMA, UNCOMPLICATED: ICD-10-CM

## 2024-10-31 DIAGNOSIS — F31.9 BIPOLAR DISORDER, UNSPECIFIED: ICD-10-CM

## 2024-10-31 DIAGNOSIS — K80.20 CALCULUS OF GALLBLADDER WITHOUT CHOLECYSTITIS WITHOUT OBSTRUCTION: ICD-10-CM

## 2024-10-31 DIAGNOSIS — I37.9 NONRHEUMATIC PULMONARY VALVE DISORDER, UNSPECIFIED: ICD-10-CM

## 2024-10-31 DIAGNOSIS — S72.009A FRACTURE OF UNSPECIFIED PART OF NECK OF UNSPECIFIED FEMUR, INITIAL ENCOUNTER FOR CLOSED FRACTURE: Chronic | ICD-10-CM

## 2024-10-31 DIAGNOSIS — K44.9 DIAPHRAGMATIC HERNIA WITHOUT OBSTRUCTION OR GANGRENE: ICD-10-CM
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
## 2024-10-31 DIAGNOSIS — E78.5 HYPERLIPIDEMIA, UNSPECIFIED: ICD-10-CM

## 2024-10-31 DIAGNOSIS — N39.0 URINARY TRACT INFECTION, SITE NOT SPECIFIED: ICD-10-CM
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
## 2024-10-31 DIAGNOSIS — R71.0 PRECIPITOUS DROP IN HEMATOCRIT: ICD-10-CM

## 2024-10-31 LAB
ALBUMIN SERPL ELPH-MCNC: 4 G/DL — SIGNIFICANT CHANGE UP (ref 3.5–5.2)
ALP SERPL-CCNC: 58 U/L — SIGNIFICANT CHANGE UP (ref 30–115)
ALT FLD-CCNC: 9 U/L — SIGNIFICANT CHANGE UP (ref 0–41)
ANION GAP SERPL CALC-SCNC: 13 MMOL/L — SIGNIFICANT CHANGE UP (ref 7–14)
APTT BLD: 30.6 SEC — SIGNIFICANT CHANGE UP (ref 27–39.2)
AST SERPL-CCNC: 19 U/L — SIGNIFICANT CHANGE UP (ref 0–41)
BASOPHILS # BLD AUTO: 0.03 K/UL — SIGNIFICANT CHANGE UP (ref 0–0.2)
BASOPHILS NFR BLD AUTO: 0.2 % — SIGNIFICANT CHANGE UP (ref 0–1)
BILIRUB SERPL-MCNC: 0.5 MG/DL — SIGNIFICANT CHANGE UP (ref 0.2–1.2)
BLD GP AB SCN SERPL QL: SIGNIFICANT CHANGE UP
BUN SERPL-MCNC: 19 MG/DL — SIGNIFICANT CHANGE UP (ref 10–20)
CALCIUM SERPL-MCNC: 9 MG/DL — SIGNIFICANT CHANGE UP (ref 8.4–10.5)
CHLORIDE SERPL-SCNC: 104 MMOL/L — SIGNIFICANT CHANGE UP (ref 98–110)
CO2 SERPL-SCNC: 23 MMOL/L — SIGNIFICANT CHANGE UP (ref 17–32)
CREAT SERPL-MCNC: 0.6 MG/DL — LOW (ref 0.7–1.5)
EGFR: 88 ML/MIN/1.73M2 — SIGNIFICANT CHANGE UP
EOSINOPHIL # BLD AUTO: 0.03 K/UL — SIGNIFICANT CHANGE UP (ref 0–0.7)
EOSINOPHIL NFR BLD AUTO: 0.2 % — SIGNIFICANT CHANGE UP (ref 0–8)
GLUCOSE SERPL-MCNC: 111 MG/DL — HIGH (ref 70–99)
HCT VFR BLD CALC: 42.2 % — SIGNIFICANT CHANGE UP (ref 37–47)
HGB BLD-MCNC: 14.3 G/DL — SIGNIFICANT CHANGE UP (ref 12–16)
IMM GRANULOCYTES NFR BLD AUTO: 0.5 % — HIGH (ref 0.1–0.3)
INR BLD: 1.08 RATIO — SIGNIFICANT CHANGE UP (ref 0.65–1.3)
LACTATE SERPL-SCNC: 1.3 MMOL/L — SIGNIFICANT CHANGE UP (ref 0.7–2)
LIDOCAIN IGE QN: 40 U/L — SIGNIFICANT CHANGE UP (ref 7–60)
LYMPHOCYTES # BLD AUTO: 1.76 K/UL — SIGNIFICANT CHANGE UP (ref 1.2–3.4)
LYMPHOCYTES # BLD AUTO: 13.5 % — LOW (ref 20.5–51.1)
MCHC RBC-ENTMCNC: 31.7 PG — HIGH (ref 27–31)
MCHC RBC-ENTMCNC: 33.9 G/DL — SIGNIFICANT CHANGE UP (ref 32–37)
MCV RBC AUTO: 93.6 FL — SIGNIFICANT CHANGE UP (ref 81–99)
MONOCYTES # BLD AUTO: 1.01 K/UL — HIGH (ref 0.1–0.6)
MONOCYTES NFR BLD AUTO: 7.7 % — SIGNIFICANT CHANGE UP (ref 1.7–9.3)
NEUTROPHILS # BLD AUTO: 10.15 K/UL — HIGH (ref 1.4–6.5)
NEUTROPHILS NFR BLD AUTO: 77.9 % — HIGH (ref 42.2–75.2)
NRBC # BLD: 0 /100 WBCS — SIGNIFICANT CHANGE UP (ref 0–0)
PLATELET # BLD AUTO: 171 K/UL — SIGNIFICANT CHANGE UP (ref 130–400)
PMV BLD: 10.1 FL — SIGNIFICANT CHANGE UP (ref 7.4–10.4)
POTASSIUM SERPL-MCNC: 4.6 MMOL/L — SIGNIFICANT CHANGE UP (ref 3.5–5)
POTASSIUM SERPL-SCNC: 4.6 MMOL/L — SIGNIFICANT CHANGE UP (ref 3.5–5)
PROT SERPL-MCNC: 6.5 G/DL — SIGNIFICANT CHANGE UP (ref 6–8)
PROTHROM AB SERPL-ACNC: 12.8 SEC — SIGNIFICANT CHANGE UP (ref 9.95–12.87)
RBC # BLD: 4.51 M/UL — SIGNIFICANT CHANGE UP (ref 4.2–5.4)
RBC # FLD: 12.2 % — SIGNIFICANT CHANGE UP (ref 11.5–14.5)
SODIUM SERPL-SCNC: 140 MMOL/L — SIGNIFICANT CHANGE UP (ref 135–146)
TROPONIN T, HIGH SENSITIVITY RESULT: 13 NG/L — SIGNIFICANT CHANGE UP (ref 6–13)
WBC # BLD: 13.04 K/UL — HIGH (ref 4.8–10.8)
WBC # FLD AUTO: 13.04 K/UL — HIGH (ref 4.8–10.8)

## 2024-10-31 PROCEDURE — 83605 ASSAY OF LACTIC ACID: CPT

## 2024-10-31 PROCEDURE — 36415 COLL VENOUS BLD VENIPUNCTURE: CPT

## 2024-10-31 PROCEDURE — 72125 CT NECK SPINE W/O DYE: CPT | Mod: 26,MC

## 2024-10-31 PROCEDURE — C1889: CPT

## 2024-10-31 PROCEDURE — 85610 PROTHROMBIN TIME: CPT

## 2024-10-31 PROCEDURE — 73562 X-RAY EXAM OF KNEE 3: CPT | Mod: 26,50

## 2024-10-31 PROCEDURE — 93970 EXTREMITY STUDY: CPT

## 2024-10-31 PROCEDURE — C9399: CPT

## 2024-10-31 PROCEDURE — 93005 ELECTROCARDIOGRAM TRACING: CPT

## 2024-10-31 PROCEDURE — 73110 X-RAY EXAM OF WRIST: CPT | Mod: 50

## 2024-10-31 PROCEDURE — 71045 X-RAY EXAM CHEST 1 VIEW: CPT

## 2024-10-31 PROCEDURE — 73502 X-RAY EXAM HIP UNI 2-3 VIEWS: CPT | Mod: 26,LT

## 2024-10-31 PROCEDURE — 93306 TTE W/DOPPLER COMPLETE: CPT

## 2024-10-31 PROCEDURE — 85025 COMPLETE CBC W/AUTO DIFF WBC: CPT

## 2024-10-31 PROCEDURE — 93970 EXTREMITY STUDY: CPT | Mod: 26

## 2024-10-31 PROCEDURE — 73110 X-RAY EXAM OF WRIST: CPT | Mod: 26,50

## 2024-10-31 PROCEDURE — 87040 BLOOD CULTURE FOR BACTERIA: CPT

## 2024-10-31 PROCEDURE — 0225U NFCT DS DNA&RNA 21 SARSCOV2: CPT

## 2024-10-31 PROCEDURE — 97110 THERAPEUTIC EXERCISES: CPT | Mod: GP

## 2024-10-31 PROCEDURE — 86900 BLOOD TYPING SEROLOGIC ABO: CPT

## 2024-10-31 PROCEDURE — 74177 CT ABD & PELVIS W/CONTRAST: CPT | Mod: 26,MC

## 2024-10-31 PROCEDURE — 87086 URINE CULTURE/COLONY COUNT: CPT

## 2024-10-31 PROCEDURE — 94760 N-INVAS EAR/PLS OXIMETRY 1: CPT

## 2024-10-31 PROCEDURE — 88305 TISSUE EXAM BY PATHOLOGIST: CPT

## 2024-10-31 PROCEDURE — 92610 EVALUATE SWALLOWING FUNCTION: CPT | Mod: GN

## 2024-10-31 PROCEDURE — 81001 URINALYSIS AUTO W/SCOPE: CPT

## 2024-10-31 PROCEDURE — 82550 ASSAY OF CK (CPK): CPT

## 2024-10-31 PROCEDURE — 97112 NEUROMUSCULAR REEDUCATION: CPT | Mod: GP

## 2024-10-31 PROCEDURE — 97166 OT EVAL MOD COMPLEX 45 MIN: CPT | Mod: GO

## 2024-10-31 PROCEDURE — 70450 CT HEAD/BRAIN W/O DYE: CPT | Mod: 26,MC

## 2024-10-31 PROCEDURE — 73552 X-RAY EXAM OF FEMUR 2/>: CPT | Mod: 26,LT

## 2024-10-31 PROCEDURE — 99222 1ST HOSP IP/OBS MODERATE 55: CPT | Mod: GC

## 2024-10-31 PROCEDURE — 85027 COMPLETE CBC AUTOMATED: CPT

## 2024-10-31 PROCEDURE — 84100 ASSAY OF PHOSPHORUS: CPT

## 2024-10-31 PROCEDURE — 97162 PT EVAL MOD COMPLEX 30 MIN: CPT | Mod: GP

## 2024-10-31 PROCEDURE — 99285 EMERGENCY DEPT VISIT HI MDM: CPT | Mod: GC

## 2024-10-31 PROCEDURE — 86850 RBC ANTIBODY SCREEN: CPT

## 2024-10-31 PROCEDURE — 82962 GLUCOSE BLOOD TEST: CPT

## 2024-10-31 PROCEDURE — 80164 ASSAY DIPROPYLACETIC ACD TOT: CPT

## 2024-10-31 PROCEDURE — 97530 THERAPEUTIC ACTIVITIES: CPT | Mod: GP

## 2024-10-31 PROCEDURE — 88311 DECALCIFY TISSUE: CPT

## 2024-10-31 PROCEDURE — 72170 X-RAY EXAM OF PELVIS: CPT

## 2024-10-31 PROCEDURE — 93971 EXTREMITY STUDY: CPT | Mod: LT

## 2024-10-31 PROCEDURE — C1713: CPT

## 2024-10-31 PROCEDURE — 71275 CT ANGIOGRAPHY CHEST: CPT | Mod: MC

## 2024-10-31 PROCEDURE — 83735 ASSAY OF MAGNESIUM: CPT

## 2024-10-31 PROCEDURE — 84484 ASSAY OF TROPONIN QUANT: CPT

## 2024-10-31 PROCEDURE — 99223 1ST HOSP IP/OBS HIGH 75: CPT

## 2024-10-31 PROCEDURE — 97535 SELF CARE MNGMENT TRAINING: CPT | Mod: GO

## 2024-10-31 PROCEDURE — 80053 COMPREHEN METABOLIC PANEL: CPT

## 2024-10-31 PROCEDURE — 93010 ELECTROCARDIOGRAM REPORT: CPT

## 2024-10-31 PROCEDURE — C1776: CPT

## 2024-10-31 PROCEDURE — 74018 RADEX ABDOMEN 1 VIEW: CPT

## 2024-10-31 PROCEDURE — 71045 X-RAY EXAM CHEST 1 VIEW: CPT | Mod: 26

## 2024-10-31 PROCEDURE — 86901 BLOOD TYPING SEROLOGIC RH(D): CPT

## 2024-10-31 PROCEDURE — 71260 CT THORAX DX C+: CPT | Mod: 26,MC

## 2024-10-31 RX ORDER — ACETAMINOPHEN 500MG 500 MG/1
650 TABLET, COATED ORAL EVERY 6 HOURS
Refills: 0 | Status: DISCONTINUED | OUTPATIENT
Start: 2024-10-31 | End: 2024-11-02

## 2024-10-31 RX ORDER — OXYCODONE HYDROCHLORIDE 30 MG/1
2.5 TABLET ORAL EVERY 6 HOURS
Refills: 0 | Status: DISCONTINUED | OUTPATIENT
Start: 2024-10-31 | End: 2024-11-04

## 2024-10-31 RX ORDER — CHOLECALCIFEROL (VITAMIN D3) 10MCG/0.25
1000 DROPS ORAL DAILY
Refills: 0 | Status: DISCONTINUED | OUTPATIENT
Start: 2024-10-31 | End: 2024-11-04

## 2024-10-31 RX ORDER — VALPROIC ACID 250 MG/5ML
500 SOLUTION ORAL
Refills: 0 | Status: DISCONTINUED | OUTPATIENT
Start: 2024-10-31 | End: 2024-11-04

## 2024-10-31 RX ORDER — 0.9 % SODIUM CHLORIDE 0.9 %
1000 INTRAVENOUS SOLUTION INTRAVENOUS
Refills: 0 | Status: DISCONTINUED | OUTPATIENT
Start: 2024-10-31 | End: 2024-11-01

## 2024-10-31 RX ORDER — VALPROIC ACID 250 MG/5ML
1000 SOLUTION ORAL
Refills: 0 | Status: DISCONTINUED | OUTPATIENT
Start: 2024-10-31 | End: 2024-11-04

## 2024-10-31 RX ORDER — HEPARIN SODIUM,PORCINE 1000/ML
5000 VIAL (ML) INJECTION EVERY 8 HOURS
Refills: 0 | Status: DISCONTINUED | OUTPATIENT
Start: 2024-10-31 | End: 2024-11-03

## 2024-10-31 RX ORDER — SENNOSIDES 8.6 MG
2 TABLET ORAL AT BEDTIME
Refills: 0 | Status: DISCONTINUED | OUTPATIENT
Start: 2024-10-31 | End: 2024-11-04

## 2024-10-31 RX ORDER — CALCIUM CARBONATE 500(1250)
1 TABLET ORAL
Refills: 0 | Status: DISCONTINUED | OUTPATIENT
Start: 2024-10-31 | End: 2024-11-04

## 2024-10-31 RX ADMIN — Medication 1 TABLET(S): at 19:43

## 2024-10-31 RX ADMIN — Medication 5000 UNIT(S): at 19:43

## 2024-10-31 RX ADMIN — Medication 400 MILLIGRAM(S): at 19:43

## 2024-10-31 RX ADMIN — VALPROIC ACID 500 MILLIGRAM(S): 250 SOLUTION ORAL at 19:43

## 2024-10-31 NOTE — ED PROVIDER NOTE - CARE PLAN
1 Principal Discharge DX:	Femoral neck fracture  Secondary Diagnosis:	History of developmental delay  Secondary Diagnosis:	Bipolar disorder

## 2024-10-31 NOTE — ED PROVIDER NOTE - OBJECTIVE STATEMENT
Applied 84-year-old female PMH HLD, Brito's esophagus, bipolar disorder,, history of right femoral fracture status post hemiarthroplasty in 2019 and intellectual disability presents to the ED for evaluation of of right wrist swelling and left knee pain.  Patient states her right hip hurts.  She had a mechanical fall in the bathroom this morning.  Patient has no other complaints.  Denies head trauma, LOC or anticoagulation.  Denies headache, dizziness, syncope/near syncope, pain, shortness of breath, abdominal pain, nausea, vomiting or any urinary symptoms.  No numbness/tingling in the extremities.

## 2024-10-31 NOTE — H&P ADULT - HISTORY OF PRESENT ILLNESS
84yoF hx intellectual disability, bipolar disorder, Breast mass (unknown side s/p lumpectomy and RT '07), asthma, hoover's, esophageal varices, s/p R cataract surgery, R hip and L humeral fx, hld here following fall. Pt wheelchair bound at baseline, max assist. Story per pt and SIDDSO aid unclear, appears that pt got up from wheelchair, felt dizzy and possibly blacked out, and fell in the bathroom. Aid states someone else was with her and denies any falls. After this, pt c/o L/R wrist, L knee, and L/R hip pain prompting ED eval. Difficulty to obtain ROS of pt but denies chest pains, palpitations, dizziness or lightheadedness at baseline.    Labs significant for WBC 13k    Imaging includes - XR of hip L femur, knees, chest. CTh/n and CAP. Findings include acute L femoral neck fx, R breast nodule with calcifications, cholelithiasis w/o CBD dilation, small hiatal hernia, large stool burden    EKG: N/A    No meds ordered per ED    Vitals  T(C): 36.8 (10-31-24 @ 11:09), Max: 36.8 (10-31-24 @ 11:09)  T(F): 98.2 (10-31-24 @ 11:09), Max: 98.2 (10-31-24 @ 11:09)  HR: 95 (10-31-24 @ 11:09) (95 - 95)  BP: 122/92 (10-31-24 @ 11:09) (122/92 - 122/92)  RR: 18 (10-31-24 @ 11:09) (18 - 18)  SpO2: 94% (10-31-24 @ 11:09) (94% - 94%)  Wt(kg): --    Physical Exam  GENERAL: Awake, alert. Overweight, in bed appearing in no acute distress  HEENT: poor dentition, normocephalic  LUNGS: Clear to auscultation bilaterally  HEART: harsh systolic murmur, normal rate  ABD: Distended but soft  EXT/NEURO: arthritis changes of fingers,  DP/radial pulse intact bilaterally. moving all extremities  SKIN: R wrist with posterior ecchymosis, no large L hip hematoma/ecchymosis.

## 2024-10-31 NOTE — ED PROVIDER NOTE - PHYSICAL EXAMINATION
VITAL SIGNS: I have reviewed nursing notes and confirm.  CONSTITUTIONAL: well-appearing, non-toxic, NAD  SKIN: Warm dry. Superficial abrasion to dorsum of R hand. Bruising to b/l knees.  HEAD: NCAT  EYES: EOMI, PERRL  ENT: Moist mucous membranes.  NECK: Supple  CARD: RRR, no murmurs, rubs or gallops  RESP: clear to ausculation b/l.   ABD: soft, non-tender, non-distended.  EXT: Full ROM, no pedal edema, no calf tenderness. No bony tenderness. LLE shortened and externally rotated.   NEURO: Grossly intact.  PSYCH: Cooperative, appropriate.

## 2024-10-31 NOTE — ED PROVIDER NOTE - PROGRESS NOTE DETAILS
Faroese: Ortho consulted via telephone–recommended to admit to medicine.  Will operate tomorrow morning on left hip.

## 2024-10-31 NOTE — ED PROVIDER NOTE - QTC
MRN:[id number]                      After Visit Summary   4/19/2018    Arian Pena    MRN: [id number]           Visit Information        Department      4/19/2018  6:42 AM [address] [facility]          Review of your medicines      UNREVIEWED medicines. Ask your doctor about these medicines        Dose / Directions    albuterol 108 (90 Base) MCG/ACT Inhaler   Commonly known as:  PROAIR HFA/PROVENTIL HFA/VENTOLIN HFA        Dose:  2 puff   Inhale 2 puffs into the lungs every 6 hours as needed for shortness of breath / dyspnea or wheezing   Refills:  0       buPROPion 300 MG 24 hr tablet   Commonly known as:  WELLBUTRIN XL        Dose:  300 mg   Take 300 mg by mouth daily   Refills:  0       finasteride 5 MG tablet   Commonly known as:  PROSCAR        TAKE 1 TABLET BY MOUTH EVERY MORNING.   Refills:  3       fluticasone 220 MCG/ACT Inhaler   Commonly known as:  FLOVENT HFA        Dose:  1 puff   Inhale 1 puff into the lungs 2 times daily   Refills:  0       gabapentin 300 MG capsule   Commonly known as:  NEURONTIN        TAKE 1 CAPSULE AT BEDTIME FOR 3 DAYS, THEN 1 CAP TWICE DAILY FOR 3 DAYS, THEN 1 CAP 3 TIMES DAILY   Refills:  2       hydrocortisone 2.5 % cream   Commonly known as:  ANUSOL-HC        Refills:  0       hydrOXYzine 25 MG capsule   Commonly known as:  VISTARIL        Refills:  0       Lidocaine 4 % Patch   Commonly known as:  LIDOCARE        Apply  on dry, clean, hairless skin every 24 hours. Apply to intact skin for upto 12 hrs within 24-hr period.   Refills:  0       MULTI-VITAMINS Tabs        Dose:  1 tablet   Take 1 tablet by mouth   Refills:  0       MULTIVITAMIN PO        Take by mouth daily   Refills:  0       oxyCODONE-acetaminophen 5-325 MG per tablet   Commonly known as:  PERCOCET        Refills:  0       predniSONE 20 MG tablet   Commonly known as:  DELTASONE        TAKE 3 TABS BY MOUTH DAILY X3 DAYS, THEN 2 TABS DAILY X3 DAYS, THEN 1 TAB DAILY X3 DAYS   Refills:   0       PROCTOSOL RE        3 times daily to affected area   Refills:  0       sildenafil 50 MG tablet   Commonly known as:  VIAGRA        Dose:  50 mg   Take 50 mg by mouth daily as needed   Refills:  0       sulindac 200 MG tablet   Commonly known as:  CLINORIL        Dose:  200 mg   Take 1 tablet (200 mg) by mouth 2 times daily (with meals)   Quantity:  20 tablet   Refills:  0       * tobramycin-dexamethasone 0.3-0.1 % ophthalmic susp   Commonly known as:  TOBRADEX        INSTILL 1 DROP INTO BOTH EYES FOUR TIMES A DAY AS DIRECTED   Refills:  1       * TOBRADEX ophthalmic ointment   Generic drug:  tobramycin-dexamethasone        APPLY 1/4INCH TO LEFT EYE TWICE DAILY AS DIRECTED.   Refills:  1       TYLENOL PO        Dose:  500 mg   Take 500 mg by mouth 2 times daily as needed for mild pain or fever   Refills:  0       * Notice:  This list has 2 medication(s) that are the same as other medications prescribed for you. Read the directions carefully, and ask your doctor or other care provider to review them with you.             Protect others around you: Learn how to safely use, store and throw away your medicines at www.disposemymeds.org.         Follow-ups after your visit         Care Instructions        Further instructions from your care team       [facility]         Interventional Radiology  Discharge Instructions Post Angiogram (NEURO)    AFTER YOU GO HOME  ? DO NOT drive or operate machinery at home or at work for at least 24 hours  ? If you were given sedation; we recommend that an adult stay with you for the first 24 hours  ? DO relax and take it easy for 24 hours  ? DO drink plenty of fluids  ? DO resume your regular diet, unless otherwise instructed by your Primary Physician  ? DO NOT SMOKE FOR AT LEAST 24 HOURS, if you have been given any medications that were to help you relax or sedate you during your procedure  ? DO NOT drink alcoholic beverages the day of your procedure  ? DO NOT  do any strenuous exercise or lifting greater than 10 pounds for at least 5 days following your procedure  ? DO NOT take a bath or shower for at least 12 hours following your procedure  ? DO NOT scrub the procedure site vigorously for 5 days  ? DO NOT make any important or legal decisions for 24 hours following your procedure if you were given sedation    CALL THE PHYSICIAN IF:  ? You start bleeding from the procedure site.  If you do start to bleed from that site, lie down flat and hold pressure on the site.  Your physician will tell you if you need to return to the hospital.  It is common to have a small bruise or lump at the site  ? You have numbness, coolness or change in color of the arm of the puncture site  ? You experience weakness in the extremity  ? You experience pain or redness at the puncture site  ? You develop nausea or vomiting  ? You develop hives or a rash or unexplained itching  ? You develop a temperature of 101 degrees F or greater    ADDITIONAL INSTRUCTIONS  ? Support the puncture site for coughing, sneezing, or moving your bowels for the first 48 hours  ? No tub bath, hot tubs, or swimming for 5 days  ? No lotion or powder to the puncture site for 3 days    [facility] INTERVENTIONAL RADIOLOGY DEPARTMENT  Procedure Physician:  Sg Gilbert, & Raya  Date of procedure: April 19, 2018  Telephone Numbers: [phone] Monday-Friday 8:00 am to 4:30 pm  [phone] After 4:30 pm Monday-Friday, Weekends & Holidays.   Ask for the Neuro-Radiologist on call.  Someone is on call 24 hrs/day  [facility] toll free number: [phone] Monday-Friday 8:00 am to 4:30 pm  [facility] Emergency Dept: [phone]         Additional Information About Your Visit        MyChart Information     [employer] gives you secure access to your electronic health record. If you see a primary care provider, you can also send messages to your care team and make appointments. If you have questions, please call your primary care  clinic.  If you do not have a primary care provider, please call [phone] and they will assist you.        Care EveryWhere ID     This is your Care EveryWhere ID. This could be used by other organizations to access your [facility] medical records  [id number]        Your Vitals Were     Blood Pressure Pulse Temperature Respirations Pulse Oximetry       104/65 76 97.8  F (36.6  C) (Oral) 16 97%        Primary Care Provider Office Phone # Fax #    Rodrick Pacheco [phone]      Equal Access to Services     [facility]: Hadii aad ku hadasho Soomaali, waaxda luqadaha, qaybta kaalmada adeegyada, [place] . [facility] [phone].    ATENCIÓN: Si habla español, tiene a garcia disposición servicios gratuitos de asistencia lingüística. [facility] [phone].    We comply with applicable federal civil rights laws and [place] laws. We do not discriminate on the basis of race, color, national origin, age, disability, sex, sexual orientation, or gender identity.            Thank you!     Thank you for choosing [facility] for your care. Our goal is always to provide you with excellent care. Hearing back from our patients is one way we can continue to improve our services. Please take a few minutes to complete the written survey that you may receive in the mail after you visit with us. Thank you!             Medication List: This is a list of all your medications and when to take them. Check marks below indicate your daily home schedule. Keep this list as a reference.      Medications           Morning Afternoon Evening Bedtime As Needed    albuterol 108 (90 Base) MCG/ACT Inhaler   Commonly known as:  PROAIR HFA/PROVENTIL HFA/VENTOLIN HFA   Inhale 2 puffs into the lungs every 6 hours as needed for shortness of breath / dyspnea or wheezing                                buPROPion 300 MG 24 hr tablet   Commonly known as:  WELLBUTRIN XL   Take 300 mg by mouth daily                                 finasteride 5 MG tablet   Commonly known as:  PROSCAR   TAKE 1 TABLET BY MOUTH EVERY MORNING.                                fluticasone 220 MCG/ACT Inhaler   Commonly known as:  FLOVENT HFA   Inhale 1 puff into the lungs 2 times daily                                gabapentin 300 MG capsule   Commonly known as:  NEURONTIN   TAKE 1 CAPSULE AT BEDTIME FOR 3 DAYS, THEN 1 CAP TWICE DAILY FOR 3 DAYS, THEN 1 CAP 3 TIMES DAILY                                hydrocortisone 2.5 % cream   Commonly known as:  ANUSOL-HC                                hydrOXYzine 25 MG capsule   Commonly known as:  VISTARIL                                Lidocaine 4 % Patch   Commonly known as:  LIDOCARE   Apply  on dry, clean, hairless skin every 24 hours. Apply to intact skin for upto 12 hrs within 24-hr period.                                MULTI-VITAMINS Tabs   Take 1 tablet by mouth                                MULTIVITAMIN PO   Take by mouth daily                                oxyCODONE-acetaminophen 5-325 MG per tablet   Commonly known as:  PERCOCET                                predniSONE 20 MG tablet   Commonly known as:  DELTASONE   TAKE 3 TABS BY MOUTH DAILY X3 DAYS, THEN 2 TABS DAILY X3 DAYS, THEN 1 TAB DAILY X3 DAYS                                PROCTOSOL RE   3 times daily to affected area                                sildenafil 50 MG tablet   Commonly known as:  VIAGRA   Take 50 mg by mouth daily as needed                                sulindac 200 MG tablet   Commonly known as:  CLINORIL   Take 1 tablet (200 mg) by mouth 2 times daily (with meals)                                * tobramycin-dexamethasone 0.3-0.1 % ophthalmic susp   Commonly known as:  TOBRADEX   INSTILL 1 DROP INTO BOTH EYES FOUR TIMES A DAY AS DIRECTED                                * TOBRADEX ophthalmic ointment   APPLY 1/4INCH TO LEFT EYE TWICE DAILY AS DIRECTED.   Generic drug:  tobramycin-dexamethasone                                 TYLENOL PO   Take 500 mg by mouth 2 times daily as needed for mild pain or fever   Last time this was given:  500 mg on 4/19/2018 12:44 PM                                * Notice:  This list has 2 medication(s) that are the same as other medications prescribed for you. Read the directions carefully, and ask your doctor or other care provider to review them with you.       065

## 2024-10-31 NOTE — ED ADULT NURSE NOTE - OBJECTIVE STATEMENT
Pt presents to the ED from Group home c/o right wrist pain and upon exam left leg is shorter and externally rotated. Aid denies any fall.

## 2024-10-31 NOTE — PRE PROCEDURE NOTE - PRE PROCEDURE EVALUATION
ORTHOPEDIC SURGERY PRE OP NOTE    Diagnosis: Left femoral neck fracture     Planned Procedure: Left hip hemiarthroplasty     Consent: TO BE OBTAINED BY ATTENDING                   Risks/benefits/alternatives were discussed with the patient/family and they wish to proceed with surgery.       ANTICIPATED DATE OF PROCEDURE : 11/1/24  SCHEDULED CASE OR ADD-ON CASE: Add on       Consent:     Clearances:   [ ] Medicine: Pend    T(C): 36.6 (10-31-24 @ 18:37), Max: 36.8 (10-31-24 @ 11:09)  HR: 98 (10-31-24 @ 18:37) (95 - 98)  BP: 153/77 (10-31-24 @ 18:37) (122/92 - 153/77)  RR: 18 (10-31-24 @ 18:37) (18 - 18)  SpO2: 92% (10-31-24 @ 18:37) (92% - 94%)    Labs:                        14.3   13.04 )-----------( 171      ( 31 Oct 2024 11:25 )             42.2     10-31    140  |  104  |  19  ----------------------------<  111[H]  4.6   |  23  |  0.6[L]    Ca    9.0      31 Oct 2024 11:25    TPro  6.5  /  Alb  4.0  /  TBili  0.5  /  DBili  x   /  AST  19  /  ALT  9   /  AlkPhos  58  10-31    PT/INR - ( 31 Oct 2024 11:25 )   PT: 12.80 sec;   INR: 1.08 ratio         PTT - ( 31 Oct 2024 11:25 )  PTT:30.6 sec  Type and Screen X 2:       [x]EKG:   [x]CXR:       DIET: NPO after midnight  IVF: per primary team      ANTICOAGULATION STATUS ( include name of anticoagulant) :  Continue DVT ppx, do not need to hold for OR                                           A/P: Patient is a 84y y/o Female Pending left hip hemiarthroplasty tomorrow    [ ] -NPO and IVF @ midnight  [ ]pain control/analgesia prn per primary team   [ ]Incentive Spirometry   [ ]F/U Clearance  [ ]F/U Pending Labs  [ ]Notify Ortho with any questions- spectra [phone]    [ ]DISCUSSED WITH PRIMARY TEAM MEMBER (name of team member): Maliha  [ ]Date and Time DISCUSSED WITH PRIMARY TEAM MEMBER: 10/30/24 7:30pm

## 2024-10-31 NOTE — ED PROVIDER NOTE - ATTENDING CONTRIBUTION TO CARE
84 year old female with a pmh of MR ,  HLD Barrets Esophagus, Bipolar h.o right hip fracture  from [facility] for right arm swelling and left knee pain.As per aid states patient has had no falls although patient does state she fell today in the bathroom.   CONSTITUTIONAL: WA / WN / NAD  HEAD: NCAT  EYES: PERRL; EOMI;   ENT: Normal pharynx; mucous membranes pink/moist, no erythema.  NECK: Supple; no meningeal signs  CARD: RRR; nl S1/S2; no M/R/G.   RESP: Respiratory rate and effort are normal; breath sounds clear and equal bilaterally.  ABD: Soft, NT ND   MSK/EXT:left lower extremity shortened. b/l ecchymosis to knee + right hand abrasion.   SKIN: Warm and dry;   NEURO:at baseline   PSYCH: calm

## 2024-10-31 NOTE — H&P ADULT - ASSESSMENT
84yoF hx intellectual disability, bipolar disorder, Breast mass (unknown side s/p lumpectomy and RT '07), asthma, hoover's, esophageal varices, s/p R cataract surgery, R hip and L humeral fx, hld here following fall.     #Acute L femoral neck fracture  #Unwitnessed fall, r/o syncope  -unclear story, sounds likely syncopal episode possibly orthostasis induced but has large murmur on exam  -remote tele x24hrs  -METS<4, wheelchair dependent at baseline  -RCRI 0  -EKG/TTE, if normal pt mod risk for low-mod risk procedure  -L/R wrist XR r/o fractures  -multimodal pain therapy  -NPO @midnight possible surgical fixation tm    #Incidental R breast nodule  #Hx breast mass s/p lumpectomy and radiation  -outpt f/u and w/u    #Incidental cholelithasis  -asymptomatic at this time, no LFT elevation  -consider CCY as outpt    #Intellectual Disability  #Bipolar disorder  -SIDDSO pt, unable to contact family (pt neice) for GOC/code status, no documentation with pt aid at bedside but reports pt not a felix of the [facility]  -[facility], [facility] will have to come from group home    #Asthma, not in acute exacerbation  #Esophageal varices  #Hiatal hernia    #DVT ppx  -HSQ  -[facility] duplx then SCDs    #GI ppx  -N/A    #Constipation  #Diet  -dash/tlc | GERD | Lactose free   -hold beneprotein/fiber supplementation for now  -puree | mod thick liquids    #Activity  -bedrest 84yoF hx intellectual disability, bipolar disorder, Breast mass (unknown side s/p lumpectomy and RT '07), asthma, hoover's, esophageal varices, s/p R cataract surgery, R hip and L humeral fx, hld here following fall.     #Acute L femoral neck fracture  #Unwitnessed fall, r/o syncope  -unclear story, sounds likely syncopal episode possibly orthostasis induced but has large murmur on exam  -remote tele x24hrs  -METS<4, wheelchair dependent at baseline  -RCRI 0  -EKG/TTE, if normal pt mod risk for low-mod risk procedure  -L/R wrist XR r/o fractures  -multimodal pain therapy  -bed orthostatics  -NPO @midnight possible surgical fixation tm    #Incidental R breast nodule  #Hx breast mass s/p lumpectomy and radiation  -outpt f/u and w/u    #Incidental cholelithasis  -asymptomatic at this time, no LFT elevation  -consider CCY as outpt    #Intellectual Disability  #Bipolar disorder  -SIDDSO pt, unable to contact family (pt neice) for GOC/code status, no documentation with pt aid at bedside but reports pt not a felix of the [facility]  -[facility]w [facility], austedo will have to come from group home    #Asthma, not in acute exacerbation  #Esophageal varices  #Hiatal hernia    #DVT ppx  -HSQ  -[facility] duplx then SCDs    #GI ppx  -N/A    #Constipation  #Diet  -dash/tlc | GERD | Lactose free   -hold beneprotein/fiber supplementation for now  -puree | mod thick liquids    #Activity  -bedrest

## 2024-10-31 NOTE — CONSULT NOTE ADULT - ATTENDING COMMENTS
Orthopedic Trauma Attending  Patient seen and examined.  PMHx, Psurg Hx, Medications and Allergies reviewed.  X-rays and CT reviewed.  Agree with findings, assessment and plan.  impression:  Displaced left femoral neck fracture.  Recommend hemiarthroplasty once medically optimized.  Discussed diagnosis, recommendations and prognosis with patient and family.    Risks, benefits and alternative to surgical management of the patient's fracture were again reviewed with patient, her questions answered to her satisfaction and she and her family wish to proceed with proposed surgery today.

## 2024-10-31 NOTE — H&P ADULT - NSHPLABSRESULTS_GEN_ALL_CORE
CBC Full  -  ( 31 Oct 2024 11:25 )  WBC Count : 13.04 K/uL  RBC Count : 4.51 M/uL  Hemoglobin : 14.3 g/dL  Hematocrit : 42.2 %  Platelet Count - Automated : 171 K/uL  Mean Cell Volume : 93.6 fL  Mean Cell Hemoglobin : 31.7 pg  Mean Cell Hemoglobin Concentration : 33.9 g/dL  Auto Neutrophil # : 10.15 K/uL  Auto Lymphocyte # : 1.76 K/uL  Auto Monocyte # : 1.01 K/uL  Auto Eosinophil # : 0.03 K/uL  Auto Basophil # : 0.03 K/uL  Auto Neutrophil % : 77.9 %  Auto Lymphocyte % : 13.5 %  Auto Monocyte % : 7.7 %  Auto Eosinophil % : 0.2 %  Auto Basophil % : 0.2 %    10-31    140  |  104  |  19  ----------------------------<  111[H]  4.6   |  23  |  0.6[L]    Ca    9.0      31 Oct 2024 11:25    TPro  6.5  /  Alb  4.0  /  TBili  0.5  /  DBili  x   /  AST  19  /  ALT  9   /  AlkPhos  58  10-31    LIVER FUNCTIONS - ( 31 Oct 2024 11:25 )  Alb: 4.0 g/dL / Pro: 6.5 g/dL / ALK PHOS: 58 U/L / ALT: 9 U/L / AST: 19 U/L / GGT: x           Urinalysis Basic - ( 31 Oct 2024 11:25 )    Color: x / Appearance: x / SG: x / pH: x  Gluc: 111 mg/dL / Ketone: x  / Bili: x / Urobili: x   Blood: x / Protein: x / Nitrite: x   Leuk Esterase: x / RBC: x / WBC x   Sq Epi: x / Non Sq Epi: x / Bacteria: x

## 2024-10-31 NOTE — ED PROVIDER NOTE - EKG/XRAY ADDITIONAL INFORMATION
ED MSK prelim, my independent interpretation - Dr. Goldy Lindsey: [+ hip fracture] ED MSK prelim, my independent interpretation - Dr. Goldy Lindsey: [+ hip fracture] ED EKG: my independent interpretation - Dr. Goldy Lindsey : [NSR, nl axis, nl intervals, no ST elevation]

## 2024-10-31 NOTE — ED PROVIDER NOTE - CLINICAL SUMMARY MEDICAL DECISION MAKING FREE TEXT BOX
84 year old female with a pmh of MR ,  HLD Barrets Esophagus, Bipolar h.o right hip fracture  from [facility] for right arm swelling and left knee pain.As per aid states patient has had no falls although patient does state she fell today in the bathroom.  vs reviewed labs imaging obtained and reviewed found to have a left hip fracture, ortho consulted and patient admitted to medicine

## 2024-10-31 NOTE — ED PROVIDER NOTE - IV ALTEPLASE INCLUSION HIDDEN
Arrived to the [facility]. Assessment completed. Labs reviewed. Patient tolerated Phenergan injection well. Any issues or concerns during appointment: none. Patient aware of next infusion appointment on 10/3/19 (date) at [address] [place] [address] (time) with IV infusion center. Discharged ambulatory, with father. Patient instructed to call her doctor's office immediately for any problems or concerns. She verbalizes understanding.
show

## 2024-10-31 NOTE — H&P ADULT - NS ATTEST RISK PROBLEM GEN_ALL_CORE FT
the following   --------------------------------Complexity of data reviewed ----------------------------------------------  The Patients complexity of data reviewed  is Low [ ] Moderate [ ] High [x ] due to the following:   Reviewed prior external records [x ]  Considering/ Ordered a unique test:  Labs [x ] Imaging [x ] Stress Test  [ ] Other: Specify [ ]  Reviewed each unique test result [x ]   Assessment requiring an independent historian [ ]  Independent interpretation of:   X-Ray [x ] EKG [ ]  Other: Specify  [ ]  Discussion of management of tests with clinician outside of my group [x ortho]  \  Decision regarding elective or emergent: minor surgery [ ] major surgery [ x]  Decision regarding hospitalization or escalation of hospital-level care [x ]

## 2024-10-31 NOTE — ED ADULT TRIAGE NOTE - CHIEF COMPLAINT QUOTE
BIBA from group home c/o right wrist swollen and left knee pain. Left leg is short and externally rotated. As per caregiver, denies of fall

## 2024-10-31 NOTE — H&P ADULT - ATTENDING COMMENTS
84yoF hx intellectual disability, bipolar disorder, Breast mass (unknown side s/p lumpectomy and RT '07), asthma, hoover's, esophageal varices, s/p R cataract surgery, R hip and L humeral fx, hld here following fall.     #Acute L femoral neck fracture  #Unwitnessed fall, r/o syncope  -unclear story, sounds likely syncopal episode possibly orthostasis induced but has large murmur on exam  -remote tele x24hrs- currently sinus tach  -METS<4, wheelchair dependent at baseline  -RCRI 0  -EKG/TTE clearance once ekg and echo completed   -L/R wrist XR r/o fractures  -multimodal pain therapy  -bed orthostatics  -NPO @midnight possible surgical fixation tm    #Incidental R breast nodule  #Hx breast mass s/p lumpectomy and radiation  -outpt f/u and w/u    #Incidental cholelithasis  -asymptomatic at this time, no LFT elevation  -consider CCY as outpt    #Intellectual Disability  #Bipolar disorder  -SIDDSO pt, unable to contact family (pt neice) for GOC/code status, no documentation with pt aid at bedside but reports pt not a felix of the [facility]  -[facility]w [facility], austedo will have to come from group home    #Asthma, not in acute exacerbation  #Esophageal varices  #Hiatal hernia    #DVT ppx  -HSQ  -[facility] duplx then SCDs    #GI ppx  -N/A    #Constipation  #Diet  -dash/tlc | GERD | Lactose free   -hold beneprotein/fiber supplementation for now  -puree | mod thick liquids    #Activity  -bedrest

## 2024-10-31 NOTE — CONSULT NOTE ADULT - SUBJECTIVE AND OBJECTIVE BOX
ORTHOPAEDIC SURGERY CONSULT NOTE    Reason for Consult: L femoral neck fracture     HPI: 84yFemaljoce presents with pain in her left hip after an unwitnessed fall. Patient was brought in by aide at group home because she is complaining of left hip pain. She reportedly told her aide that she fell in the bathroom but per the patient she states she only remembers falling about a year ago.   Patient denies head trauma or LOC. Denies pain elsewhere. Denies paresthesias.    Ambulatory status: Wheelchair, weight bearing during transfers     PAST MEDICAL & SURGICAL HISTORY:  Atypical bipolar disorder  Mild mental retardation  Brito esophagus  Hyperlipemia  Breast mass, right  s/p lumpectomy & radiation 2007  Feeding by G-tube  s/p removal 2012  Femoral neck fracture    Allergies: No Known Allergies  lactose (Stomach Upset)    Medications: acetaminophen   Oral Liquid .. 650 milliGRAM(s) Oral every 6 hours PRN  calcium carbonate   1250 mG (OsCal) 1 Tablet(s) Oral two times a day  cholecalciferol 1000 Unit(s) Oral daily  heparin   Injectable 5000 Unit(s) SubCutaneous every 8 hours  magnesium oxide 400 milliGRAM(s) Oral two times a day with meals  oxyCODONE    IR 2.5 milliGRAM(s) Oral every 6 hours PRN  valproic  acid Syrup 1000 milliGRAM(s) Oral <User Schedule>  valproic  acid Syrup 500 milliGRAM(s) Oral <User Schedule>      PHYSICAL EXAM:  Vital Signs Last 24 Hrs  T(C): 36.6 (31 Oct 2024 18:37), Max: 36.8 (31 Oct 2024 11:09)  T(F): 97.9 (31 Oct 2024 18:37), Max: 98.2 (31 Oct 2024 11:09)  HR: 98 (31 Oct 2024 18:37) (95 - 98)  BP: 153/77 (31 Oct 2024 18:37) (122/92 - 153/77)  BP(mean): --  RR: 18 (31 Oct 2024 18:37) (18 - 18)  SpO2: 92% (31 Oct 2024 18:37) (92% - 94%)    Parameters below as of 31 Oct 2024 18:37  Patient On (Oxygen Delivery Method): room air    Physical Exam:  Minimally interactive, NAD  Resp: NLB on RA.    PELVIS: No open skin or wounds    BUE:  Skin intact  No deformities or evidence of trauma  Able to raise arms above head without pain  NTTP throughout  NVID    RLE:  Skin intact  NTTP hip, thigh, knee, leg, ankle or foot.   No pain with log-roll or axial compression  Able to actively SLR.  SILT DP/SP/T/Ragland/Sa.   EHL/FHL/TA/Gs motor intact.  Toes wwp, cap refill < 2 sec    LLE:  No open skin or wounds  Extremity shortened and externally rotated  TTP over the hip  NTTP knee, leg, ankle or foot.   ROM limited by pain  SILT DP/SP/T/Ragland/Sa.   EHL/FHL/TA/Gs motor intact.  Toes wwp, cap refill < 2 sec  Compartments soft and compressible. No pain on passive stretch.    Labs:                        14.3   13.04 )-----------( 171      ( 31 Oct 2024 11:25 )             42.2     10-31    140  |  104  |  19  ----------------------------<  111[H]  4.6   |  23  |  0.6[L]    Ca    9.0      31 Oct 2024 11:25    TPro  6.5  /  Alb  4.0  /  TBili  0.5  /  DBili  x   /  AST  19  /  ALT  9   /  AlkPhos  58  10-31    PT/INR - ( 31 Oct 2024 11:25 )   PT: 12.80 sec;   INR: 1.08 ratio         PTT - ( 31 Oct 2024 11:25 )  PTT:30.6 sec    Imaging:  XR L hip, femur, pelvis, knee  - Displaced femoral neck fracture     A/P: 84y Female with displaced left femoral neck fracture     Added on for left hip hemiarthroplasty   NPO MN  Please obtain preop labs: CBC, BMP, PT/INR, PTT, T&S x2, CXR, EKG, COVID test  DVT ppx: patient may receive prophyhlactic LVX or HSQ prior to OR  Medicine clearance note with risk stratification signed by attending    NWB LLE  Pain control  PT postoperatively  Call ortho with any questions [phone]

## 2024-10-31 NOTE — ED PROVIDER NOTE - CONSIDERATION OF ADMISSION OBSERVATION
Patients external records reviewed. Additonal history was obtained from ems and aid. Escalation to admission/observation was considered. At this time patient requires inpatient hospitalization. Consideration of Admission/Observation

## 2024-11-01 ENCOUNTER — RESULT REVIEW (OUTPATIENT)
Age: 84
End: 2024-11-01

## 2024-11-01 DIAGNOSIS — Z51.5 ENCOUNTER FOR PALLIATIVE CARE: ICD-10-CM

## 2024-11-01 DIAGNOSIS — F79 UNSPECIFIED INTELLECTUAL DISABILITIES: ICD-10-CM

## 2024-11-01 DIAGNOSIS — S72.002A FRACTURE OF UNSPECIFIED PART OF NECK OF LEFT FEMUR, INITIAL ENCOUNTER FOR CLOSED FRACTURE: ICD-10-CM

## 2024-11-01 LAB
ALBUMIN SERPL ELPH-MCNC: 3.5 G/DL — SIGNIFICANT CHANGE UP (ref 3.5–5.2)
ALP SERPL-CCNC: 55 U/L — SIGNIFICANT CHANGE UP (ref 30–115)
ALT FLD-CCNC: 8 U/L — SIGNIFICANT CHANGE UP (ref 0–41)
ANION GAP SERPL CALC-SCNC: 11 MMOL/L — SIGNIFICANT CHANGE UP (ref 7–14)
AST SERPL-CCNC: 14 U/L — SIGNIFICANT CHANGE UP (ref 0–41)
BILIRUB SERPL-MCNC: 0.5 MG/DL — SIGNIFICANT CHANGE UP (ref 0.2–1.2)
BUN SERPL-MCNC: 16 MG/DL — SIGNIFICANT CHANGE UP (ref 10–20)
CALCIUM SERPL-MCNC: 8.9 MG/DL — SIGNIFICANT CHANGE UP (ref 8.4–10.5)
CHLORIDE SERPL-SCNC: 104 MMOL/L — SIGNIFICANT CHANGE UP (ref 98–110)
CK SERPL-CCNC: 171 U/L — SIGNIFICANT CHANGE UP (ref 0–225)
CO2 SERPL-SCNC: 26 MMOL/L — SIGNIFICANT CHANGE UP (ref 17–32)
CREAT SERPL-MCNC: 0.5 MG/DL — LOW (ref 0.7–1.5)
EGFR: 92 ML/MIN/1.73M2 — SIGNIFICANT CHANGE UP
GLUCOSE SERPL-MCNC: 92 MG/DL — SIGNIFICANT CHANGE UP (ref 70–99)
HCT VFR BLD CALC: 37.1 % — SIGNIFICANT CHANGE UP (ref 37–47)
HGB BLD-MCNC: 12.8 G/DL — SIGNIFICANT CHANGE UP (ref 12–16)
MAGNESIUM SERPL-MCNC: 1.9 MG/DL — SIGNIFICANT CHANGE UP (ref 1.8–2.4)
MCHC RBC-ENTMCNC: 32.1 PG — HIGH (ref 27–31)
MCHC RBC-ENTMCNC: 34.5 G/DL — SIGNIFICANT CHANGE UP (ref 32–37)
MCV RBC AUTO: 93 FL — SIGNIFICANT CHANGE UP (ref 81–99)
NRBC # BLD: 0 /100 WBCS — SIGNIFICANT CHANGE UP (ref 0–0)
PHOSPHATE SERPL-MCNC: 3 MG/DL — SIGNIFICANT CHANGE UP (ref 2.1–4.9)
PLATELET # BLD AUTO: 146 K/UL — SIGNIFICANT CHANGE UP (ref 130–400)
PMV BLD: 10.6 FL — HIGH (ref 7.4–10.4)
POTASSIUM SERPL-MCNC: 4 MMOL/L — SIGNIFICANT CHANGE UP (ref 3.5–5)
POTASSIUM SERPL-SCNC: 4 MMOL/L — SIGNIFICANT CHANGE UP (ref 3.5–5)
PROT SERPL-MCNC: 5.7 G/DL — LOW (ref 6–8)
RBC # BLD: 3.99 M/UL — LOW (ref 4.2–5.4)
RBC # FLD: 12.4 % — SIGNIFICANT CHANGE UP (ref 11.5–14.5)
SODIUM SERPL-SCNC: 141 MMOL/L — SIGNIFICANT CHANGE UP (ref 135–146)
WBC # BLD: 9.44 K/UL — SIGNIFICANT CHANGE UP (ref 4.8–10.8)
WBC # FLD AUTO: 9.44 K/UL — SIGNIFICANT CHANGE UP (ref 4.8–10.8)

## 2024-11-01 PROCEDURE — 99223 1ST HOSP IP/OBS HIGH 75: CPT

## 2024-11-01 PROCEDURE — 99232 SBSQ HOSP IP/OBS MODERATE 35: CPT

## 2024-11-01 PROCEDURE — 93306 TTE W/DOPPLER COMPLETE: CPT | Mod: 26

## 2024-11-01 RX ORDER — 0.9 % SODIUM CHLORIDE 0.9 %
1000 INTRAVENOUS SOLUTION INTRAVENOUS
Refills: 0 | Status: DISCONTINUED | OUTPATIENT
Start: 2024-11-01 | End: 2024-11-18

## 2024-11-01 RX ORDER — POLYETHYLENE GLYCOL 3350 17 G/17G
17 POWDER, FOR SOLUTION ORAL
Refills: 0 | Status: DISCONTINUED | OUTPATIENT
Start: 2024-11-01 | End: 2024-11-04

## 2024-11-01 RX ORDER — CHLORHEXIDINE GLUCONATE 1.2 MG/ML
1 RINSE ORAL
Refills: 0 | Status: DISCONTINUED | OUTPATIENT
Start: 2024-11-01 | End: 2024-11-04

## 2024-11-01 RX ADMIN — VALPROIC ACID 1000 MILLIGRAM(S): 250 SOLUTION ORAL at 21:48

## 2024-11-01 RX ADMIN — Medication 2 TABLET(S): at 21:48

## 2024-11-01 RX ADMIN — OXYCODONE HYDROCHLORIDE 2.5 MILLIGRAM(S): 30 TABLET ORAL at 12:49

## 2024-11-01 RX ADMIN — POLYETHYLENE GLYCOL 3350 17 GRAM(S): 17 POWDER, FOR SOLUTION ORAL at 17:33

## 2024-11-01 RX ADMIN — CHLORHEXIDINE GLUCONATE 1 APPLICATION(S): 1.2 RINSE ORAL at 21:54

## 2024-11-01 RX ADMIN — Medication 400 MILLIGRAM(S): at 17:33

## 2024-11-01 RX ADMIN — Medication 1 TABLET(S): at 19:33

## 2024-11-01 RX ADMIN — Medication 400 MILLIGRAM(S): at 12:48

## 2024-11-01 RX ADMIN — Medication 5000 UNIT(S): at 05:05

## 2024-11-01 RX ADMIN — Medication 75 MILLILITER(S): at 01:00

## 2024-11-01 RX ADMIN — Medication 5000 UNIT(S): at 21:48

## 2024-11-01 RX ADMIN — Medication 1000 UNIT(S): at 12:48

## 2024-11-01 NOTE — PROGRESS NOTE ADULT - ASSESSMENT
Discussed in details with the patient second cousin Ms. Sutherland Tico [phone]    84yoF hx intellectual disability, bipolar disorder, Breast mass (unknown side s/p lumpectomy and RT '07), asthma, hoover's, esophageal varices, s/p R cataract surgery, R hip and L humeral fx, hld here following fall.     []Acute L femoral neck fracture  []Unwitnessed fall, r/o syncope  [] SIRS POA ( wbc and HR)   -unclear story, sounds likely syncopal episode possibly orthostasis induced   -remote tele x24hrs- periods of currently sinus tach  -METS<4, wheelchair dependent at baseline  -RCRI 0  -NSQIP  SCORE WITH HIGH RISK ( 74%)  OF POST OPERATIVE Delirium   Pt is medically optimized for intermediate risk surgery   -EKG no acute ischemia ,  no active chest pain or SOB , trop and ck negative   no reported cardiac issues   lactate 1.3   TTE EF of 66 %. Grade I diastolic dysfunction.. Mild thickening of the anterior and posterior mitral valve leaflets.   Moderate mitral annular calcification. . Mild aortic regurgitation.. Mild pulmonic valve regurgitation.  - f/u L/R wrist XR r/o fractures  -multimodal pain therapy  -check bed orthostatics  -NPO   wbc improved 13k -- 9 K could be stress/pain related and dehydration   monitor off abcx for now    c/w gentle hydration   CT head/neck negative for acute   get valporic level   PT/OT after surgery   spoke with orth - plan for today , they spoke with the family     #Incidental R breast nodule  #Hx breast mass s/p lumpectomy and radiation  -outpt f/u and w/u    #Incidental cholelithasis  -asymptomatic at this time, no LFT elevation  -consider CCY as outpt    #Intellectual Disability  #Bipolar disorder  -SIDDSO pt, unable to contact family (pt neice) for GOC/code status, no documentation with pt aid at bedside but reports pt not a felix of the state  -[facility]w [facility], austedo will have to come from group home    #Asthma, not in acute exacerbation  #Esophageal varices  #Hiatal hernia  monitor , resume home meds     Constipation   CTAP with Moderate tolarge rectal stool burden. No bowel obstruction.  bowel regimen and monitor         #DVT ppx -HSQ/ scd   -[facility] duplx negative for dvt ppx     #GI ppx      Family discussion ; i called Ms. Byers [phone] and i was told to call and discuss with the patient second cousin Ms. Koko Doshi [phone]   i discussed with her , i explained to her the high risk of post operative delirium , she stated understanding,   she is note sure about what happened , she stated that she is the one the facility usually call     Palliative team consulted for GOC given her mental hx.  steady

## 2024-11-01 NOTE — CONSULT NOTE ADULT - SUBJECTIVE AND OBJECTIVE BOX
CC:  fall    HPI:  84yoF hx intellectual disability, bipolar disorder, Breast mass (unknown side s/p lumpectomy and RT '07), asthma, hoover's, esophageal varices, s/p R cataract surgery, R hip and L humeral fx, hld here following fall. Pt wheelchair bound at baseline, max assist. Story per pt and SIDDSO aid unclear, appears that pt got up from wheelchair, felt dizzy and possibly blacked out, and fell in the bathroom. Aid states someone else was with her and denies any falls. After this, pt c/o L/R wrist, L knee, and L/R hip pain prompting ED eval. Difficulty to obtain ROS of pt but denies chest pains, palpitations, dizziness or lightheadedness at baseline.    Labs significant for WBC 13k    Imaging includes - XR of hip L femur, knees, chest. CTh/n and CAP. Findings include acute L femoral neck fx, R breast nodule with calcifications, cholelithiasis w/o CBD dilation, small hiatal hernia, large stool burden    EKG: N/A    No meds ordered per ED    Vitals  T(C): 36.8 (10-31-24 @ 11:09), Max: 36.8 (10-31-24 @ 11:09)  T(F): 98.2 (10-31-24 @ 11:09), Max: 98.2 (10-31-24 @ 11:09)  HR: 95 (10-31-24 @ 11:09) (95 - 95)  BP: 122/92 (10-31-24 @ 11:09) (122/92 - 122/92)  RR: 18 (10-31-24 @ 11:09) (18 - 18)  SpO2: 94% (10-31-24 @ 11:09) (94% - 94%)  Wt(kg): --    Physical Exam  GENERAL: Awake, alert. Overweight, in bed appearing in no acute distress  HEENT: poor dentition, normocephalic  LUNGS: Clear to auscultation bilaterally  HEART: harsh systolic murmur, normal rate  ABD: Distended but soft  EXT/NEURO: arthritis changes of fingers,  DP/radial pulse intact bilaterally. moving all extremities  SKIN: R wrist with posterior ecchymosis, no large L hip hematoma/ecchymosis.   (31 Oct 2024 16:00)    PERTINENT PM/SXH:   Atypical bipolar disorder    Mild mental retardation    Hoover esophagus    Hyperlipemia    Breast mass, right      Feeding by G-tube    Femoral neck fracture      FAMILY HISTORY:    None pertinent    ITEMS NOT CHECKED ARE NOT PRESENT    SOCIAL HISTORY:   Significant other/partner[ ]  Children[ ]  Yazidism/Spirituality:  Substance hx:  [ ]   Tobacco hx:  [ ]   Alcohol hx: [ ]   Living Situation: [ ]Home  [ ]Long term care  [ ]Rehab [x ]Other - group home  Home Services: [ ] HHA [ ] Katarina RN [ ] Hospice  Occupation:  Home Opioid hx:  [ ] Y [ ] N [ ] I-Stop Reference No:     ADVANCE DIRECTIVES:     [x ] Full Code [ ] DNR  MOLST  [ ]  Living Will  [ ]   DECISION MAKER(s):  [ ] Health Care Proxy(s)  [ x] Surrogate(s)  [ ] Guardian           Name(s): Phone Number(s): Any de-escalation of care must be approved by MHLS in setting of intellectual disability      BASELINE (I)ADL(s) (prior to admission):    [place]: [ ]Total  [ ] Moderate [ ]Dependent  Palliative Performance Status Version 2:         %    http://npcrc.org/files/news/palliative_performance_scale_ppsv2.pdf    Allergies    No Known Allergies    Intolerances    lactose (Stomach Upset)  MEDICATIONS  (STANDING):  calcium carbonate   1250 mG (OsCal) 1 Tablet(s) Oral two times a day  chlorhexidine 2% Cloths 1 Application(s) Topical <User Schedule>  cholecalciferol 1000 Unit(s) Oral daily  heparin   Injectable 5000 Unit(s) SubCutaneous every 8 hours  lactated ringers. 1000 milliLiter(s) (75 mL/Hr) IV Continuous <Continuous>  magnesium oxide 400 milliGRAM(s) Oral two times a day with meals  polyethylene glycol 3350 17 Gram(s) Oral two times a day  senna 2 Tablet(s) Oral at bedtime  valproic  acid Syrup 1000 milliGRAM(s) Oral <User Schedule>  valproic  acid Syrup 500 milliGRAM(s) Oral <User Schedule>    MEDICATIONS  (PRN):  acetaminophen   Oral Liquid .. 650 milliGRAM(s) Oral every 6 hours PRN Temp greater or equal to 38C (100.4F), Mild Pain (1 - 3)  oxyCODONE    IR 2.5 milliGRAM(s) Oral every 6 hours PRN Moderate Pain (4 - 6)    PRESENT SYMPTOMS: [ ]Unable to obtain due to poor mentation   Source if other than patient:  [ ]Family   [ ]Team     Pain: [ ]yes [ x]no  ALL PAIN ASSESSMENT COMPONENTS WERE ASKED ABOUT UNLESS OTHERWISE NOTED  QOL impact -   Location -                    Aggravating factors -  Quality -  Radiation -  Timing-  Severity (0-10 scale):  Minimal acceptable level (0-10 scale):     CPOT:    https://www.[facility].org/getattachment/[email or web address]-6s1g-5a8u-6233r6321m3t/Critical-Care-Pain-Observation-Tool-(CPOT)    PAIN AD Score:   http://geriatrictoolkit.[facility]/cog/painad.pdf (press ctrl +  left click to view)    Dyspnea:                        [x] YES   [ ]None[ ]Mild [ ]Moderate [ ]Severe     Respiratory Distress Observation Scale (RDOS):   A score of 0 to 2 signifies little or no respiratory distress, 3 signifies mild distress, scores 4 to 6 indicate moderate distress, and scores greater than 7 signify severe distress  https://[email or web address].ca/sites/default/files/PDFS/069774-lbfnfpmvltt-kkynsrbs-yssbrehmgzn-pnoyn.pdf    Anxiety:                             [x ]None[ ]Mild [ ]Moderate [ ]Severe   Fatigue:                             [x ]None[ ]Mild [ ]Moderate [ ]Severe   Nausea:                             [x ]None[ ]Mild [ ]Moderate [ ]Severe   Loss of appetite:              [x ]None[ ]Mild [ ]Moderate [ ]Severe   Constipation:                    [x ]None[ ]Mild [ ]Moderate [ ]Severe    Other Symptoms:  [x ]All other review of systems negative     Palliative Performance Status Version 2:         30%    http://npcrc.org/files/news/palliative_performance_scale_ppsv2.pdf    PHYSICAL EXAM:  Vital Signs Last 24 Hrs  T(C): 37.2 (01 Nov 2024 08:00), Max: 37.3 (31 Oct 2024 23:28)  T(F): 99 (01 Nov 2024 08:00), Max: 99.1 (31 Oct 2024 23:28)  HR: 105 (01 Nov 2024 08:00) (98 - 112)  BP: 125/76 (01 Nov 2024 08:00) (125/76 - 153/77)  BP(mean): --  RR: 18 (01 Nov 2024 00:52) (18 - 18)  SpO2: 97% (01 Nov 2024 00:52) (92% - 97%)    Parameters below as of 31 Oct 2024 23:28  Patient On (Oxygen Delivery Method): room air     I&O's Summary    31 Oct 2024 07:01  -  01 Nov 2024 07:00  --------------------------------------------------------  IN: 525 mL / OUT: 900 mL / NET: -375 mL        GENERAL:  [ ] No acute distress [ ]Lethargic  [ ]Unarousable  [x ]Verbal  [ ]Non-Verbal [ ]Cachexia    BEHAVIORAL/PSYCH:  [x ]Alert and Oriented x1-2  [ ] Anxiety [ ] Delirium [ ] Agitation [ ] Calm   EYES: [x ] No scleral icterus [ ] Scleral icterus [ ] Closed  ENMT:  [ ]Dry mouth  [ x]No external oral lesions [ ] No external ear or nose lesions  CARDIOVASCULAR:  [ ]Regular [ ]Irregular [ ]Tachy [ ]Not Tachy  [ ]Wang [ ] Edema [ ] No edema  PULMONARY:  [ ]Tachypnea  [ ]Audible excessive secretions [ x] No labored breathing [ ] labored breathing  GASTROINTESTINAL: [ ]Soft  [ ]Distended  [ ]Not distended [ ]Non tender [ ]Tender  MUSCULOSKELETAL: [ ]No clubbing [ ] clubbing  [ ] No cyanosis [ ] cyanosis  NEUROLOGIC: [ ]No focal deficits  [x ]Follows commands  [ ]Does not follow commands  [ x]Cognitive impairment  [ ]Dysphagia  [ ]Dysarthria  [ ]Paresis   SKIN: [ ] Jaundiced [ ] Non-jaundiced [ ]Rash [ ]No Rash [ ] Warm [ ] Dry  MISC/LINES: [ ] ET tube [ ] Trach [ ]NGT/OGT [ ]PEG [ ][place]queen    LABS: reviewed by me                        12.8   9.44  )-----------( 146      ( 01 Nov 2024 06:24 )             37.1   11-01    141  |  104  |  16  ----------------------------<  92  4.0   |  26  |  0.5[L]    Ca    8.9      01 Nov 2024 06:24  Phos  3.0     11-01  Mg     1.9     11-01    TPro  5.7[L]  /  Alb  3.5  /  TBili  0.5  /  DBili  x   /  AST  14  /  ALT  8   /  AlkPhos  55  11-01  PT/INR - ( 31 Oct 2024 11:25 )   PT: 12.80 sec;   INR: 1.08 ratio         PTT - ( 31 Oct 2024 11:25 )  PTT:30.6 sec    Urinalysis Basic - ( 01 Nov 2024 06:24 )    Color: x / Appearance: x / SG: x / pH: x  Gluc: 92 mg/dL / Ketone: x  / Bili: x / Urobili: x   Blood: x / Protein: x / Nitrite: x   Leuk Esterase: x / RBC: x / WBC x   Sq Epi: x / Non Sq Epi: x / Bacteria: x      RADIOLOGY & ADDITIONAL STUDIES: reviewed by me    < from: [facility] Duplex Lower Ext Vein Scan, Bilat (10.31.24 @ 18:34) >    IMPRESSION:  No evidence of deep venous thrombosis in either lower extremity.        < end of copied text >      EKG: reviewed by me    < from: 12 Lead ECG (10.31.24 @ 16:13) >    Ventricular Rate 91 BPM    Atrial Rate 91 BPM    P-R Interval 154 ms    QRS Duration 106 ms    Q-T Interval 368 ms    QTC Calculation(Bazett) 452 ms    P Axis 69 degrees    R Axis -53 degrees    T Axis 51 degrees    Diagnosis Line Normal sinus rhythm  Left anterior fascicular block  Voltage criteria for left ventricular hypertrophy  Possible Lateral infarct , age undetermined  Abnormal ECG    < end of copied text >      PROTEIN CALORIE MALNUTRITION PRESENT: [ ]mild [ ]moderate [ ]severe [ ]underweight [ ]morbid obesity  https://www.andeal.org/vault/7010/web/files/ONC/Table_Clinical%20Characteristics%20to%20Document%20Malnutrition-White%20JV%20et%20al%202012.pdf      [ ]PPSV2 < or = to 30% [ ]significant weight loss  [ ]poor nutritional intake  [ ]anasarca      [ ]Artificial Nutrition      Palliative Care Spiritual/Emotional Screening Tool Question  Severity (0-4):                    OR                    [ x] Unable to determine. Will assess at later time if appropriate.  Score of 2 or greater indicates recommendation of Chaplaincy and/or SW referral  Chaplaincy Referral: [ ] Yes [ ] Refused [ ] Following     Caregiver Medicine [place]:  [ ] Yes [ ] No    OR    [x ] Unable to determine. Will assess at later time if appropriate.  Social Work Referral [ ]  Patient and Family Centered Care Referral [ ]    Anticipatory Grief Present: [ ] Yes [ ] No    OR     [ x] Unable to determine. Will assess at later time if appropriate.  Social Work Referral [ ]  Patient and Family Centered Care Referral [ ]    Patient discussed with primary medical team MD  Palliative care education provided to patient and/or family

## 2024-11-01 NOTE — CONSULT NOTE ADULT - ASSESSMENT
84yFemale with history of intellectual disability, BPD, breast mass, asthma, Brito's esophagus presents with acute L femoral neck fracture following an unwitnessed fall. Patient with plan for surgery likely today. Palliative care consulted for GOC.    Patient seen at bedside. She was awake and able to answer simple questions, but did not have capacity.  Called and spoke with Koko Doshi at [phone].  Palliative care introduced.  She noted that her mother Modesta used to the patient's surrogate, but she has parkinson's and can no longer act as surrogate, so Koko as been acting as surrogate for several years.  She confirmed wish for surgery and ongoing aggressive medical managmenet. Discussed the role of MHLS in approval if there was any wish for de-escalation of care moving forward. She expressed understanding. All questions answered.    Patient has a history of developmental disability and lives in a group home. Given patient's history of developmental disability and seeming lack of previous HCP, any deescalation of care including DNR/DNI requires evidence of a irreversible medical condition and approval by MHLS.  The patient does not currently have an irreversible medical condition, so any de-escalation of care including DNR/DNI would not be approved at this time. However, if decisions about GOC are requested by family in the future, a more formal request of MHLS can be done.    If the patient is found to have a previous HCP filled out, the person named in the HCP can make decisions without MHLS approval.  However, no HCP is evident at this time, and the patient does not have capacity to fill out a new HCP.    Recommendations  -Full code  -ongoing medical management  -plan for surgery for femoral neck fracture  -patient's surrogate can give consent for procedures and aggressive management  -any joie-escalation of care, including DNR/DNI, must be approved by MHLS  -will sign off for now - call back [phone] PRN      MEDD (morphine equivalent daily dose):    Education about palliative care provided to patient/family.  See Recs below.    Please call [phone] with questions or concerns 24/7.

## 2024-11-01 NOTE — PROGRESS NOTE ADULT - SUBJECTIVE AND OBJECTIVE BOX
T H I S   I S    N O  T   A    F I N A L I Z E D   N O T BABAR HIGGINS  84y, Female  Allergy: No Known Allergies  lactose (Stomach Upset)    Hospital Day: 1d    Patient seen and examined earlier today.     PMH/PSH:  PAST MEDICAL & SURGICAL HISTORY:  Atypical bipolar disorder      Mild mental retardation      Brito esophagus      Hyperlipemia      Breast mass, right  s/p lumpectomy & radiation 2007      Feeding by G-tube  s/p removal 2012      Femoral neck fracture          LAST 24-Hr EVENTS:    VITALS:  T(F): 99 (11-01-24 @ 08:00), Max: 99.1 (10-31-24 @ 23:28)  HR: 105 (11-01-24 @ 08:00)  BP: 125/76 (11-01-24 @ 08:00) (125/76 - 153/77)  RR: 18 (11-01-24 @ 00:52)  SpO2: 97% (11-01-24 @ 00:52)          TESTS & MEASUREMENTS:  Weight/BMI      10-31-24 @ 07:01  -  11-01-24 @ 07:00  --------------------------------------------------------  IN: 525 mL / OUT: 900 mL / NET: -375 mL                            12.8   9.44  )-----------( 146      ( 01 Nov 2024 06:24 )             37.1     PT/INR - ( 31 Oct 2024 11:25 )   PT: 12.80 sec;   INR: 1.08 ratio         PTT - ( 31 Oct 2024 11:25 )  PTT:30.6 sec  INR: 1.08 ratio (10-31-24 @ 11:25)    11-01    141  |  104  |  16  ----------------------------<  92  4.0   |  26  |  0.5[L]    Ca    8.9      01 Nov 2024 06:24  Phos  3.0     11-01  Mg     1.9     11-01    TPro  5.7[L]  /  Alb  3.5  /  TBili  0.5  /  DBili  x   /  AST  14  /  ALT  8   /  AlkPhos  55  11-01    LIVER FUNCTIONS - ( 01 Nov 2024 06:24 )  Alb: 3.5 g/dL / Pro: 5.7 g/dL / ALK PHOS: 55 U/L / ALT: 8 U/L / AST: 14 U/L / GGT: x                 Urinalysis Basic - ( 01 Nov 2024 06:24 )    Color: x / Appearance: x / SG: x / pH: x  Gluc: 92 mg/dL / Ketone: x  / Bili: x / Urobili: x   Blood: x / Protein: x / Nitrite: x   Leuk Esterase: x / RBC: x / WBC x   Sq Epi: x / Non Sq Epi: x / Bacteria: x                            RADIOLOGY, ECG, & ADDITIONAL TESTS:  12 Lead ECG:   Ventricular Rate 91 BPM    Atrial Rate 91 BPM    P-R Interval 154 ms    QRS Duration 106 ms    Q-T Interval 368 ms    QTC Calculation(Bazett) 452 ms    P Axis 69 degrees    R Axis -53 degrees    T Axis 51 degrees    Diagnosis Line Normal sinus rhythm  Left anterior fascicular block  Voltage criteria for left ventricular hypertrophy  Possible Lateral infarct , age undetermined  Abnormal ECG    Confirmed by Kayode Lange ([id number]) on 10/31/2024 7:26:42 PM (10-31-24 @ 16:13)    CT Head No Cont:   ACC: [id number] EXAM:  CT CERVICAL SPINE   ORDERED BY: YUSRA VIGIL     ACC: [id number] EXAM:  CT BRAIN   ORDERED BY: YUSRA VIGIL     PROCEDURE DATE:  10/31/2024          INTERPRETATION:  CLINICAL INDICATION: Trauma.    TECHNIQUE: CT of the head and cervical spine was performed without the   administration of intravenous contrast.    COMPARISON: CT head dated 12/10/2022.    FINDINGS:    CT HEAD:    There is stable prominence of the sulci, sylvian fissures, and   ventricles, reflecting mild diffuse parenchymal volume loss.    There are increased scattered patchy low attenuations in bilateral   periventricular and subcortical cerebral white matter consistent with   mild-moderate chronic microvascular ischemic changes.    No evidence of acuteterritorial infarct, intracranial hemorrhage or mass   lesion.    No hydrocephalus. There are no extra-axial fluid collections.    Status post right cataract surgery, stable. The imaged portions of the   paranasal sinuses are clear. The mastoid air cells are clear. The   visualized soft tissues and osseous structures appear normal.      CT CERVICAL SPINE:    The alignment is normal.    There is no evidence of acute fracture, compression deformity or facet   subluxation of the cervical spine.    The atlantoaxial relationships are maintained. The posterior elements are   intact. There is no significant prevertebral soft tissue swelling. The   visualized paraspinal soft tissues are unremarkable.    Stable severe multilevel endplate degenerative changes with marginal   osteophyte formation, uncovertebral spurring, loss of normal disc space   height as well as facet joint space compartment narrowing.    There is no high-grade spinal canal stenosis. Please note spinal canal   contents are suboptimally evaluated inherent to CT technique.    The visualized lung apices are within normal limits.      IMPRESSION:    CT HEAD:  No acute intracranial findings.    Slightly increased mild-moderate chronic microvascular ischemic changes.    CT CERVICAL SPINE:  No acute cervical fracture or facet subluxation.    --- End of Report ---            MARCELA BLANC MD; Attending Radiologist  This document has been electronically signed. Oct 31 2024 12:39PM (10-31-24 @ 12:11)    RECENT DIAGNOSTIC ORDERS:  Magnesium: AM Sched. Collection: 02-Nov-2024 04:30 (11-01-24 @ 10:36)  Comprehensive Metabolic Panel: AM Sched. Collection: 02-Nov-2024 04:30 (11-01-24 @ 10:36)  Complete Blood Count + Automated Diff: AM Sched. Collection: 02-Nov-2024 04:30 (11-01-24 @ 10:36)  Diet, NPO after Midnight:      NPO Start Date: 31-Oct-2024,   NPO Start Time: 23:59  Except Medications (10-31-24 @ 23:03)  Diet, NPO after Midnight:      NPO Start Date: 31-Oct-2024,   NPO Start Time: 23:59  Except Medications (10-31-24 @ 23:02)  Packed Red Cells Order:  1 Unit  Indication: Other: 2 units on hold for OR  Infuse Unit : 1 Hour --- Hold for OR (10-31-24 @ 19:27)  Packed Red Cells Order:  1 Unit  Indication: Other: 2 units on hold for OR  Infuse Unit : 1 Hour --- Hold for OR (10-31-24 @ 19:27)  Diet, DASH/TLC:   Sodium & Cholesterol Restricted  Gastroesophageal Reflux Disease (GERD)  Pureed (PUREED)  Moderately Thick Liquids (MODTHICKLIQS)  Lactose Restricted (Milk Sugar Intoler.) (10-31-24 @ 16:44)  Diet, NPO after Midnight:      NPO Start Date: 31-Oct-2024,   NPO Start Time: 23:59  Except Medications  With Ice Chips/Sips of Water (10-31-24 @ 16:44)  Xray Wrist 3 Views, Bilateral: Routine   Indication: s/p fall  Transport: Stretcher-Crib  Exam Completed (10-31-24 @ 16:44)  ABO Rh Confirmatory Specimen: 16:00  Addl Info: Conditional: ABO Rh Confirmatory Specimen (10-31-24 @ 15:27)      MEDICATIONS:  MEDICATIONS  (STANDING):  calcium carbonate   1250 mG (OsCal) 1 Tablet(s) Oral two times a day  chlorhexidine 2% Cloths 1 Application(s) Topical <User Schedule>  cholecalciferol 1000 Unit(s) Oral daily  heparin   Injectable 5000 Unit(s) SubCutaneous every 8 hours  lactated ringers. 1000 milliLiter(s) (75 mL/Hr) IV Continuous <Continuous>  magnesium oxide 400 milliGRAM(s) Oral two times a day with meals  senna 2 Tablet(s) Oral at bedtime  valproic  acid Syrup 1000 milliGRAM(s) Oral <User Schedule>  valproic  acid Syrup 500 milliGRAM(s) Oral <User Schedule>    MEDICATIONS  (PRN):  acetaminophen   Oral Liquid .. 650 milliGRAM(s) Oral every 6 hours PRN Temp greater or equal to 38C (100.4F), Mild Pain (1 - 3)  oxyCODONE    IR 2.5 milliGRAM(s) Oral every 6 hours PRN Moderate Pain (4 - 6)      HOME MEDICATIONS:  Austedo XR 24 mg oral tablet, extended release (10-31)  cholecalciferol 25 mcg (1000 intl units) oral capsule (10-31)  FiberCon 625 mg oral tablet (10-31)  magnesium gluconate 250 mg oral tablet (10-31)  Oysco 500 (1250 mg calcium carbonate) oral tablet (10-31)  Tylenol 325 mg oral capsule (10-31)  valproic acid 250 mg/5 mL oral liquid (10-31)      PHYSICAL EXAM:  GENERAL:   CHEST/LUNG:   HEART:   ABDOMEN:   EXTREMITIES:           MINA BABAR  84y, Female  Allergy: No Known Allergies  lactose (Stomach Upset)    Hospital Day: 1d    Patient seen and examined earlier today.  facility Staff at the bedside - the patient poor historian doesnt talk a lot but indicated that she has pain in her left hip     PMH/PSH:  PAST MEDICAL & SURGICAL HISTORY:  Atypical bipolar disorder      Mild mental retardation      Brito esophagus      Hyperlipemia      Breast mass, right  s/p lumpectomy & radiation 2007      Feeding by G-tube  s/p removal 2012      Femoral neck fracture          LAST 24-Hr EVENTS:    VITALS:  T(F): 99 (11-01-24 @ 08:00), Max: 99.1 (10-31-24 @ 23:28)  HR: 105 (11-01-24 @ 08:00)  BP: 125/76 (11-01-24 @ 08:00) (125/76 - 153/77)  RR: 18 (11-01-24 @ 00:52)  SpO2: 97% (11-01-24 @ 00:52)          TESTS & MEASUREMENTS:  Weight/BMI      10-31-24 @ 07:01  -  11-01-24 @ 07:00  --------------------------------------------------------  IN: 525 mL / OUT: 900 mL / NET: -375 mL                            12.8   9.44  )-----------( 146      ( 01 Nov 2024 06:24 )             37.1     PT/INR - ( 31 Oct 2024 11:25 )   PT: 12.80 sec;   INR: 1.08 ratio         PTT - ( 31 Oct 2024 11:25 )  PTT:30.6 sec  INR: 1.08 ratio (10-31-24 @ 11:25)    11-01    141  |  104  |  16  ----------------------------<  92  4.0   |  26  |  0.5[L]    Ca    8.9      01 Nov 2024 06:24  Phos  3.0     11-01  Mg     1.9     11-01    TPro  5.7[L]  /  Alb  3.5  /  TBili  0.5  /  DBili  x   /  AST  14  /  ALT  8   /  AlkPhos  55  11-01    LIVER FUNCTIONS - ( 01 Nov 2024 06:24 )  Alb: 3.5 g/dL / Pro: 5.7 g/dL / ALK PHOS: 55 U/L / ALT: 8 U/L / AST: 14 U/L / GGT: x                 Urinalysis Basic - ( 01 Nov 2024 06:24 )    Color: x / Appearance: x / SG: x / pH: x  Gluc: 92 mg/dL / Ketone: x  / Bili: x / Urobili: x   Blood: x / Protein: x / Nitrite: x   Leuk Esterase: x / RBC: x / WBC x   Sq Epi: x / Non Sq Epi: x / Bacteria: x                            RADIOLOGY, ECG, & ADDITIONAL TESTS:  12 Lead ECG:   Ventricular Rate 91 BPM    Atrial Rate 91 BPM    P-R Interval 154 ms    QRS Duration 106 ms    Q-T Interval 368 ms    QTC Calculation(Bazett) 452 ms    P Axis 69 degrees    R Axis -53 degrees    T Axis 51 degrees    Diagnosis Line Normal sinus rhythm  Left anterior fascicular block  Voltage criteria for left ventricular hypertrophy  Possible Lateral infarct , age undetermined  Abnormal ECG    Confirmed by Kayode Lange ([id number]) on 10/31/2024 7:26:42 PM (10-31-24 @ 16:13)    CT Head No Cont:   ACC: [id number] EXAM:  CT CERVICAL SPINE   ORDERED BY: YUSRA VIGIL     ACC: [id number] EXAM:  CT BRAIN   ORDERED BY: YUSRA VIGIL     PROCEDURE DATE:  10/31/2024          INTERPRETATION:  CLINICAL INDICATION: Trauma.    TECHNIQUE: CT of the head and cervical spine was performed without the   administration of intravenous contrast.    COMPARISON: CT head dated 12/10/2022.    FINDINGS:    CT HEAD:    There is stable prominence of the sulci, sylvian fissures, and   ventricles, reflecting mild diffuse parenchymal volume loss.    There are increased scattered patchy low attenuations in bilateral   periventricular and subcortical cerebral white matter consistent with   mild-moderate chronic microvascular ischemic changes.    No evidence of acuteterritorial infarct, intracranial hemorrhage or mass   lesion.    No hydrocephalus. There are no extra-axial fluid collections.    Status post right cataract surgery, stable. The imaged portions of the   paranasal sinuses are clear. The mastoid air cells are clear. The   visualized soft tissues and osseous structures appear normal.      CT CERVICAL SPINE:    The alignment is normal.    There is no evidence of acute fracture, compression deformity or facet   subluxation of the cervical spine.    The atlantoaxial relationships are maintained. The posterior elements are   intact. There is no significant prevertebral soft tissue swelling. The   visualized paraspinal soft tissues are unremarkable.    Stable severe multilevel endplate degenerative changes with marginal   osteophyte formation, uncovertebral spurring, loss of normal disc space   height as well as facet joint space compartment narrowing.    There is no high-grade spinal canal stenosis. Please note spinal canal   contents are suboptimally evaluated inherent to CT technique.    The visualized lung apices are within normal limits.      IMPRESSION:    CT HEAD:  No acute intracranial findings.    Slightly increased mild-moderate chronic microvascular ischemic changes.    CT CERVICAL SPINE:  No acute cervical fracture or facet subluxation.    --- End of Report ---            MARCELA BLANC MD; Attending Radiologist  This document has been electronically signed. Oct 31 2024 12:39PM (10-31-24 @ 12:11)    RECENT DIAGNOSTIC ORDERS:  Magnesium: AM Sched. Collection: 02-Nov-2024 04:30 (11-01-24 @ 10:36)  Comprehensive Metabolic Panel: AM Sched. Collection: 02-Nov-2024 04:30 (11-01-24 @ 10:36)  Complete Blood Count + Automated Diff: AM Sched. Collection: 02-Nov-2024 04:30 (11-01-24 @ 10:36)  Diet, NPO after Midnight:      NPO Start Date: 31-Oct-2024,   NPO Start Time: 23:59  Except Medications (10-31-24 @ 23:03)  Diet, NPO after Midnight:      NPO Start Date: 31-Oct-2024,   NPO Start Time: 23:59  Except Medications (10-31-24 @ 23:02)  Packed Red Cells Order:  1 Unit  Indication: Other: 2 units on hold for OR  Infuse Unit : 1 Hour --- Hold for OR (10-31-24 @ 19:27)  Packed Red Cells Order:  1 Unit  Indication: Other: 2 units on hold for OR  Infuse Unit : 1 Hour --- Hold for OR (10-31-24 @ 19:27)  Diet, DASH/TLC:   Sodium & Cholesterol Restricted  Gastroesophageal Reflux Disease (GERD)  Pureed (PUREED)  Moderately Thick Liquids (MODTHICKLIQS)  Lactose Restricted (Milk Sugar Intoler.) (10-31-24 @ 16:44)  Diet, NPO after Midnight:      NPO Start Date: 31-Oct-2024,   NPO Start Time: 23:59  Except Medications  With Ice Chips/Sips of Water (10-31-24 @ 16:44)  Xray Wrist 3 Views, Bilateral: Routine   Indication: s/p fall  Transport: Stretcher-Crib  Exam Completed (10-31-24 @ 16:44)  ABO Rh Confirmatory Specimen: 16:00  Addl Info: Conditional: ABO Rh Confirmatory Specimen (10-31-24 @ 15:27)      MEDICATIONS:  MEDICATIONS  (STANDING):  calcium carbonate   1250 mG (OsCal) 1 Tablet(s) Oral two times a day  chlorhexidine 2% Cloths 1 Application(s) Topical <User Schedule>  cholecalciferol 1000 Unit(s) Oral daily  heparin   Injectable 5000 Unit(s) SubCutaneous every 8 hours  lactated ringers. 1000 milliLiter(s) (75 mL/Hr) IV Continuous <Continuous>  magnesium oxide 400 milliGRAM(s) Oral two times a day with meals  senna 2 Tablet(s) Oral at bedtime  valproic  acid Syrup 1000 milliGRAM(s) Oral <User Schedule>  valproic  acid Syrup 500 milliGRAM(s) Oral <User Schedule>    MEDICATIONS  (PRN):  acetaminophen   Oral Liquid .. 650 milliGRAM(s) Oral every 6 hours PRN Temp greater or equal to 38C (100.4F), Mild Pain (1 - 3)  oxyCODONE    IR 2.5 milliGRAM(s) Oral every 6 hours PRN Moderate Pain (4 - 6)      HOME MEDICATIONS:  Austedo XR 24 mg oral tablet, extended release (10-31)  cholecalciferol 25 mcg (1000 intl units) oral capsule (10-31)  FiberCon 625 mg oral tablet (10-31)  magnesium gluconate 250 mg oral tablet (10-31)  Oysco 500 (1250 mg calcium carbonate) oral tablet (10-31)  Tylenol 325 mg oral capsule (10-31)  valproic acid 250 mg/5 mL oral liquid (10-31)      PHYSICAL EXAM:  On exam  General: awake, alert, NAD, chronic ill appearance  Lungs:  clear to ausculations b/l, normal resp effort  Heart: regular ryhthm   Abdomen: soft, non tender non distended  Ext: trace edemab/l , left LE short, ext rotated,tender on left hip area , no focal tender on the wrists ,          MINA BABAR  84y, Female  Allergy: No Known Allergies  lactose (Stomach Upset)    Hospital Day: 1d    Patient seen and examined earlier today.  facility Staff at the bedside - the patient poor historian doesnt talk a lot but indicated that she has pain in her left hip     PMH/PSH:  PAST MEDICAL & SURGICAL HISTORY:  Atypical bipolar disorder      Mild mental retardation      Brito esophagus      Hyperlipemia      Breast mass, right  s/p lumpectomy & radiation 2007      Feeding by G-tube  s/p removal 2012      Femoral neck fracture          LAST 24-Hr EVENTS:    VITALS:  T(F): 99 (11-01-24 @ 08:00), Max: 99.1 (10-31-24 @ 23:28)  HR: 105 (11-01-24 @ 08:00)  BP: 125/76 (11-01-24 @ 08:00) (125/76 - 153/77)  RR: 18 (11-01-24 @ 00:52)  SpO2: 97% (11-01-24 @ 00:52)          TESTS & MEASUREMENTS:  Weight/BMI      10-31-24 @ 07:01  -  11-01-24 @ 07:00  --------------------------------------------------------  IN: 525 mL / OUT: 900 mL / NET: -375 mL                            12.8   9.44  )-----------( 146      ( 01 Nov 2024 06:24 )             37.1     PT/INR - ( 31 Oct 2024 11:25 )   PT: 12.80 sec;   INR: 1.08 ratio         PTT - ( 31 Oct 2024 11:25 )  PTT:30.6 sec  INR: 1.08 ratio (10-31-24 @ 11:25)    11-01    141  |  104  |  16  ----------------------------<  92  4.0   |  26  |  0.5[L]    Ca    8.9      01 Nov 2024 06:24  Phos  3.0     11-01  Mg     1.9     11-01    TPro  5.7[L]  /  Alb  3.5  /  TBili  0.5  /  DBili  x   /  AST  14  /  ALT  8   /  AlkPhos  55  11-01    LIVER FUNCTIONS - ( 01 Nov 2024 06:24 )  Alb: 3.5 g/dL / Pro: 5.7 g/dL / ALK PHOS: 55 U/L / ALT: 8 U/L / AST: 14 U/L / GGT: x                 Urinalysis Basic - ( 01 Nov 2024 06:24 )    Color: x / Appearance: x / SG: x / pH: x  Gluc: 92 mg/dL / Ketone: x  / Bili: x / Urobili: x   Blood: x / Protein: x / Nitrite: x   Leuk Esterase: x / RBC: x / WBC x   Sq Epi: x / Non Sq Epi: x / Bacteria: x                            RADIOLOGY, ECG, & ADDITIONAL TESTS:  12 Lead ECG:   Ventricular Rate 91 BPM    Atrial Rate 91 BPM    P-R Interval 154 ms    QRS Duration 106 ms    Q-T Interval 368 ms    QTC Calculation(Bazett) 452 ms    P Axis 69 degrees    R Axis -53 degrees    T Axis 51 degrees    Diagnosis Line Normal sinus rhythm  Left anterior fascicular block  Voltage criteria for left ventricular hypertrophy  Possible Lateral infarct , age undetermined  Abnormal ECG    Confirmed by Kayode Lange ([id number]) on 10/31/2024 7:26:42 PM (10-31-24 @ 16:13)    CT Head No Cont:   ACC: [id number] EXAM:  CT CERVICAL SPINE   ORDERED BY: YUSRA VIGIL     ACC: [id number] EXAM:  CT BRAIN   ORDERED BY: YUSRA VIGIL     PROCEDURE DATE:  10/31/2024          INTERPRETATION:  CLINICAL INDICATION: Trauma.    TECHNIQUE: CT of the head and cervical spine was performed without the   administration of intravenous contrast.    COMPARISON: CT head dated 12/10/2022.    FINDINGS:    CT HEAD:    There is stable prominence of the sulci, sylvian fissures, and   ventricles, reflecting mild diffuse parenchymal volume loss.    There are increased scattered patchy low attenuations in bilateral   periventricular and subcortical cerebral white matter consistent with   mild-moderate chronic microvascular ischemic changes.    No evidence of acuteterritorial infarct, intracranial hemorrhage or mass   lesion.    No hydrocephalus. There are no extra-axial fluid collections.    Status post right cataract surgery, stable. The imaged portions of the   paranasal sinuses are clear. The mastoid air cells are clear. The   visualized soft tissues and osseous structures appear normal.      CT CERVICAL SPINE:    The alignment is normal.    There is no evidence of acute fracture, compression deformity or facet   subluxation of the cervical spine.    The atlantoaxial relationships are maintained. The posterior elements are   intact. There is no significant prevertebral soft tissue swelling. The   visualized paraspinal soft tissues are unremarkable.    Stable severe multilevel endplate degenerative changes with marginal   osteophyte formation, uncovertebral spurring, loss of normal disc space   height as well as facet joint space compartment narrowing.    There is no high-grade spinal canal stenosis. Please note spinal canal   contents are suboptimally evaluated inherent to CT technique.    The visualized lung apices are within normal limits.      IMPRESSION:    CT HEAD:  No acute intracranial findings.    Slightly increased mild-moderate chronic microvascular ischemic changes.    CT CERVICAL SPINE:  No acute cervical fracture or facet subluxation.    --- End of Report ---            MARCELA BLANC MD; Attending Radiologist  This document has been electronically signed. Oct 31 2024 12:39PM (10-31-24 @ 12:11)    RECENT DIAGNOSTIC ORDERS:  Magnesium: AM Sched. Collection: 02-Nov-2024 04:30 (11-01-24 @ 10:36)  Comprehensive Metabolic Panel: AM Sched. Collection: 02-Nov-2024 04:30 (11-01-24 @ 10:36)  Complete Blood Count + Automated Diff: AM Sched. Collection: 02-Nov-2024 04:30 (11-01-24 @ 10:36)  Diet, NPO after Midnight:      NPO Start Date: 31-Oct-2024,   NPO Start Time: 23:59  Except Medications (10-31-24 @ 23:03)  Diet, NPO after Midnight:      NPO Start Date: 31-Oct-2024,   NPO Start Time: 23:59  Except Medications (10-31-24 @ 23:02)  Packed Red Cells Order:  1 Unit  Indication: Other: 2 units on hold for OR  Infuse Unit : 1 Hour --- Hold for OR (10-31-24 @ 19:27)  Packed Red Cells Order:  1 Unit  Indication: Other: 2 units on hold for OR  Infuse Unit : 1 Hour --- Hold for OR (10-31-24 @ 19:27)  Diet, DASH/TLC:   Sodium & Cholesterol Restricted  Gastroesophageal Reflux Disease (GERD)  Pureed (PUREED)  Moderately Thick Liquids (MODTHICKLIQS)  Lactose Restricted (Milk Sugar Intoler.) (10-31-24 @ 16:44)  Diet, NPO after Midnight:      NPO Start Date: 31-Oct-2024,   NPO Start Time: 23:59  Except Medications  With Ice Chips/Sips of Water (10-31-24 @ 16:44)  Xray Wrist 3 Views, Bilateral: Routine   Indication: s/p fall  Transport: Stretcher-Crib  Exam Completed (10-31-24 @ 16:44)  ABO Rh Confirmatory Specimen: 16:00  Addl Info: Conditional: ABO Rh Confirmatory Specimen (10-31-24 @ 15:27)      MEDICATIONS:  MEDICATIONS  (STANDING):  calcium carbonate   1250 mG (OsCal) 1 Tablet(s) Oral two times a day  chlorhexidine 2% Cloths 1 Application(s) Topical <User Schedule>  cholecalciferol 1000 Unit(s) Oral daily  heparin   Injectable 5000 Unit(s) SubCutaneous every 8 hours  lactated ringers. 1000 milliLiter(s) (75 mL/Hr) IV Continuous <Continuous>  magnesium oxide 400 milliGRAM(s) Oral two times a day with meals  senna 2 Tablet(s) Oral at bedtime  valproic  acid Syrup 1000 milliGRAM(s) Oral <User Schedule>  valproic  acid Syrup 500 milliGRAM(s) Oral <User Schedule>    MEDICATIONS  (PRN):  acetaminophen   Oral Liquid .. 650 milliGRAM(s) Oral every 6 hours PRN Temp greater or equal to 38C (100.4F), Mild Pain (1 - 3)  oxyCODONE    IR 2.5 milliGRAM(s) Oral every 6 hours PRN Moderate Pain (4 - 6)      HOME MEDICATIONS:  Austedo XR 24 mg oral tablet, extended release (10-31)  cholecalciferol 25 mcg (1000 intl units) oral capsule (10-31)  FiberCon 625 mg oral tablet (10-31)  magnesium gluconate 250 mg oral tablet (10-31)  Oysco 500 (1250 mg calcium carbonate) oral tablet (10-31)  Tylenol 325 mg oral capsule (10-31)  valproic acid 250 mg/5 mL oral liquid (10-31)      PHYSICAL EXAM:  On exam  General: awake, alert, NAD, chronic ill appearance  Lungs:  clear to ausculations b/l, normal resp effort  Heart: regular ryhthm , did not appreciated obvious murmur   Abdomen: soft, non tender non distended  Ext: Nn edema b/l , left LE short, ext rotated,tender on left hip area , no focal tender on the wrists ,

## 2024-11-01 NOTE — CONSULT NOTE ADULT - CONVERSATION DETAILS
Patient seen at bedside. She was awake and able to answer simple questions, but did not have capacity.  Called and spoke with Koko Doshi at [phone].  Palliative care introduced.  She noted that her mother Modesta used to the patient's surrogate, but she has parkinson's and can no longer act as surrogate, so Koko as been acting as surrogate for several years.  She confirmed wish for surgery and ongoing aggressive medical managmenet. Discussed the role of MHLS in approval if there was any wish for de-escalation of care moving forward. She expressed understanding. All questions answered.

## 2024-11-02 LAB
ALBUMIN SERPL ELPH-MCNC: 3.3 G/DL — LOW (ref 3.5–5.2)
ALP SERPL-CCNC: 61 U/L — SIGNIFICANT CHANGE UP (ref 30–115)
ALT FLD-CCNC: 11 U/L — SIGNIFICANT CHANGE UP (ref 0–41)
ANION GAP SERPL CALC-SCNC: 12 MMOL/L — SIGNIFICANT CHANGE UP (ref 7–14)
APPEARANCE UR: ABNORMAL
AST SERPL-CCNC: 17 U/L — SIGNIFICANT CHANGE UP (ref 0–41)
BASOPHILS # BLD AUTO: 0.02 K/UL — SIGNIFICANT CHANGE UP (ref 0–0.2)
BASOPHILS NFR BLD AUTO: 0.3 % — SIGNIFICANT CHANGE UP (ref 0–1)
BILIRUB SERPL-MCNC: 0.3 MG/DL — SIGNIFICANT CHANGE UP (ref 0.2–1.2)
BILIRUB UR-MCNC: ABNORMAL
BUN SERPL-MCNC: 17 MG/DL — SIGNIFICANT CHANGE UP (ref 10–20)
CALCIUM SERPL-MCNC: 8.9 MG/DL — SIGNIFICANT CHANGE UP (ref 8.4–10.5)
CHLORIDE SERPL-SCNC: 105 MMOL/L — SIGNIFICANT CHANGE UP (ref 98–110)
CO2 SERPL-SCNC: 26 MMOL/L — SIGNIFICANT CHANGE UP (ref 17–32)
COLOR SPEC: SIGNIFICANT CHANGE UP
CREAT SERPL-MCNC: 0.5 MG/DL — LOW (ref 0.7–1.5)
DIFF PNL FLD: ABNORMAL
EGFR: 92 ML/MIN/1.73M2 — SIGNIFICANT CHANGE UP
EOSINOPHIL # BLD AUTO: 0.25 K/UL — SIGNIFICANT CHANGE UP (ref 0–0.7)
EOSINOPHIL NFR BLD AUTO: 3.3 % — SIGNIFICANT CHANGE UP (ref 0–8)
GLUCOSE SERPL-MCNC: 95 MG/DL — SIGNIFICANT CHANGE UP (ref 70–99)
GLUCOSE UR QL: NEGATIVE MG/DL — SIGNIFICANT CHANGE UP
HCT VFR BLD CALC: 37.7 % — SIGNIFICANT CHANGE UP (ref 37–47)
HGB BLD-MCNC: 12.7 G/DL — SIGNIFICANT CHANGE UP (ref 12–16)
IMM GRANULOCYTES NFR BLD AUTO: 0.4 % — HIGH (ref 0.1–0.3)
KETONES UR-MCNC: 15 MG/DL
LEUKOCYTE ESTERASE UR-ACNC: ABNORMAL
LYMPHOCYTES # BLD AUTO: 1.62 K/UL — SIGNIFICANT CHANGE UP (ref 1.2–3.4)
LYMPHOCYTES # BLD AUTO: 21.4 % — SIGNIFICANT CHANGE UP (ref 20.5–51.1)
MAGNESIUM SERPL-MCNC: 2.3 MG/DL — SIGNIFICANT CHANGE UP (ref 1.8–2.4)
MCHC RBC-ENTMCNC: 32.2 PG — HIGH (ref 27–31)
MCHC RBC-ENTMCNC: 33.7 G/DL — SIGNIFICANT CHANGE UP (ref 32–37)
MCV RBC AUTO: 95.4 FL — SIGNIFICANT CHANGE UP (ref 81–99)
MONOCYTES # BLD AUTO: 0.69 K/UL — HIGH (ref 0.1–0.6)
MONOCYTES NFR BLD AUTO: 9.1 % — SIGNIFICANT CHANGE UP (ref 1.7–9.3)
NEUTROPHILS # BLD AUTO: 4.95 K/UL — SIGNIFICANT CHANGE UP (ref 1.4–6.5)
NEUTROPHILS NFR BLD AUTO: 65.5 % — SIGNIFICANT CHANGE UP (ref 42.2–75.2)
NITRITE UR-MCNC: POSITIVE
NRBC # BLD: 0 /100 WBCS — SIGNIFICANT CHANGE UP (ref 0–0)
PH UR: 6.5 — SIGNIFICANT CHANGE UP (ref 5–8)
PLATELET # BLD AUTO: 144 K/UL — SIGNIFICANT CHANGE UP (ref 130–400)
PMV BLD: 10.7 FL — HIGH (ref 7.4–10.4)
POTASSIUM SERPL-MCNC: 4.1 MMOL/L — SIGNIFICANT CHANGE UP (ref 3.5–5)
POTASSIUM SERPL-SCNC: 4.1 MMOL/L — SIGNIFICANT CHANGE UP (ref 3.5–5)
PROT SERPL-MCNC: 5.6 G/DL — LOW (ref 6–8)
PROT UR-MCNC: 30 MG/DL
RBC # BLD: 3.95 M/UL — LOW (ref 4.2–5.4)
RBC # FLD: 12.2 % — SIGNIFICANT CHANGE UP (ref 11.5–14.5)
SODIUM SERPL-SCNC: 143 MMOL/L — SIGNIFICANT CHANGE UP (ref 135–146)
SP GR SPEC: >1.03 — HIGH (ref 1–1.03)
UROBILINOGEN FLD QL: 1 MG/DL — SIGNIFICANT CHANGE UP (ref 0.2–1)
WBC # BLD: 7.56 K/UL — SIGNIFICANT CHANGE UP (ref 4.8–10.8)
WBC # FLD AUTO: 7.56 K/UL — SIGNIFICANT CHANGE UP (ref 4.8–10.8)

## 2024-11-02 PROCEDURE — 99232 SBSQ HOSP IP/OBS MODERATE 35: CPT

## 2024-11-02 RX ORDER — CEFTRIAXONE SODIUM 1 G
1000 VIAL (EA) INJECTION EVERY 24 HOURS
Refills: 0 | Status: COMPLETED | OUTPATIENT
Start: 2024-11-02 | End: 2024-11-04

## 2024-11-02 RX ORDER — ACETAMINOPHEN 500MG 500 MG/1
650 TABLET, COATED ORAL EVERY 6 HOURS
Refills: 0 | Status: DISCONTINUED | OUTPATIENT
Start: 2024-11-02 | End: 2024-11-04

## 2024-11-02 RX ADMIN — CHLORHEXIDINE GLUCONATE 1 APPLICATION(S): 1.2 RINSE ORAL at 05:17

## 2024-11-02 RX ADMIN — VALPROIC ACID 1000 MILLIGRAM(S): 250 SOLUTION ORAL at 21:45

## 2024-11-02 RX ADMIN — Medication 5000 UNIT(S): at 13:37

## 2024-11-02 RX ADMIN — POLYETHYLENE GLYCOL 3350 17 GRAM(S): 17 POWDER, FOR SOLUTION ORAL at 05:10

## 2024-11-02 RX ADMIN — Medication 1 TABLET(S): at 05:10

## 2024-11-02 RX ADMIN — ACETAMINOPHEN 500MG 650 MILLIGRAM(S): 500 TABLET, COATED ORAL at 12:33

## 2024-11-02 RX ADMIN — Medication 400 MILLIGRAM(S): at 17:49

## 2024-11-02 RX ADMIN — ACETAMINOPHEN 500MG 650 MILLIGRAM(S): 500 TABLET, COATED ORAL at 13:40

## 2024-11-02 RX ADMIN — Medication 5000 UNIT(S): at 05:10

## 2024-11-02 RX ADMIN — Medication 100 MILLIGRAM(S): at 13:37

## 2024-11-02 RX ADMIN — Medication 2 TABLET(S): at 21:46

## 2024-11-02 RX ADMIN — VALPROIC ACID 500 MILLIGRAM(S): 250 SOLUTION ORAL at 17:49

## 2024-11-02 RX ADMIN — Medication 5000 UNIT(S): at 21:52

## 2024-11-02 RX ADMIN — Medication 1 TABLET(S): at 17:49

## 2024-11-02 RX ADMIN — POLYETHYLENE GLYCOL 3350 17 GRAM(S): 17 POWDER, FOR SOLUTION ORAL at 17:55

## 2024-11-02 NOTE — PROGRESS NOTE ADULT - ASSESSMENT
84yoF hx intellectual disability, bipolar disorder, Breast mass (unknown side s/p lumpectomy and RT '07), asthma, hoover's, esophageal varices, s/p R cataract surgery, R hip and L humeral fx, hld here following fall.     []Acute L femoral neck fracture  []Unwitnessed fall, r/o syncope  [] SIRS POA ( wbc and HR)   -unclear story, sounds likely syncopal episode possibly orthostasis induced   -remote tele x24hrs- periods of currently sinus tach  -METS<4, wheelchair dependent at baseline  -RCRI 0  -NSQIP  SCORE WITH HIGH RISK ( 74%)  OF POST OPERATIVE Delirium   Pt is medically optimized for intermediate risk surgery   -EKG no acute ischemia ,  no active chest pain or SOB , trop and ck negative   no reported cardiac issues   lactate 1.3   TTE EF of 66 %. Grade I diastolic dysfunction.. Mild thickening of the anterior and posterior mitral valve leaflets.   Moderate mitral annular calcification. . Mild aortic regurgitation.. Mild pulmonic valve regurgitation.  - f/u L/R wrist XR r/o fractures  -multimodal pain therapy  -check bed orthostatics  -NPO sunday midnioght   SLP eval   wbc improved  could be stress/pain related and dehydration   monitor off abcx for now    c/w gentle hydration   CT head/neck negative for acute   get valporic level   PT/OT after surgery   spoke with orth today they postponed for Monday for surgeon availability issue , they updated Ms. Sutherland     - Possible UTI / SIRS ON admission   UA strong  positive , unreliable hx   would get urine culture and blood cultures and start ceftriaxone for now     #Incidental R breast nodule  #Hx breast mass s/p lumpectomy and radiation  -outpt f/u and w/u    #Incidental cholelithasis  -asymptomatic at this time, no LFT elevation  -consider CCY as outpt    #Intellectual Disability  #Bipolar disorder  -SIDDSO pt, unable to contact family (pt neice) for GOC/code status, no documentation with pt aid at bedside but reports pt not a [place] of [place]  -[facility]w [facility], [facility] will have to come from group home    #Asthma, not in acute exacerbation  #Esophageal varices  #Hiatal hernia  monitor , resume home meds     Constipation   CTAP with Moderate tolarge rectal stool burden. No bowel obstruction.  bowel regimen and monitor         #DVT ppx -HSQ/ scd   -[facility] duplx negative for dvt ppx     #GI ppx      Family -Ms. Koko Doshi [phone]  acting as surrogate for several years    updated by ortho about monday procedure     Palliative team consult appreciated - FULL CODE . - REFER TO Palliative note for details , can not be dnr/dni with out approval by [facility] given her developmental disability

## 2024-11-02 NOTE — PROGRESS NOTE ADULT - SUBJECTIVE AND OBJECTIVE BOX
T H I S   I S    N O  T   A    F I N A L I Z E D   N O T BABAR HIGGINS  84y, Female  Allergy: No Known Allergies  lactose (Stomach Upset)    Hospital Day: 2d    Patient seen and examined earlier today.     PMH/PSH:  PAST MEDICAL & SURGICAL HISTORY:  Atypical bipolar disorder      Mild mental retardation      Brito esophagus      Hyperlipemia      Breast mass, right  s/p lumpectomy & radiation 2007      Feeding by G-tube  s/p removal 2012      Femoral neck fracture          LAST 24-Hr EVENTS:    VITALS:  T(F): 98 (11-02-24 @ 08:36), Max: 99.6 (11-01-24 @ 16:00)  HR: 89 (11-02-24 @ 08:36)  BP: 124/72 (11-02-24 @ 08:36) (119/76 - 131/75)  RR: 18 (11-02-24 @ 08:36)  SpO2: 90% (11-02-24 @ 06:28)          TESTS & MEASUREMENTS:  Weight/BMI  70 (11-02-24 @ 06:28)    10-31-24 @ 07:01  -  11-01-24 @ 07:00  --------------------------------------------------------  IN: 525 mL / OUT: 900 mL / NET: -375 mL    11-01-24 @ 07:01  -  11-02-24 @ 07:00  --------------------------------------------------------  IN: 300 mL / OUT: 300 mL / NET: 0 mL                            12.7   7.56  )-----------( 144      ( 02 Nov 2024 06:49 )             37.7       INR: 1.08 ratio (10-31-24 @ 11:25)    11-02    143  |  105  |  17  ----------------------------<  95  4.1   |  26  |  0.5[L]    Ca    8.9      02 Nov 2024 06:49  Phos  3.0     11-01  Mg     2.3     11-02    TPro  5.6[L]  /  Alb  3.3[L]  /  TBili  0.3  /  DBili  x   /  AST  17  /  ALT  11  /  AlkPhos  61  11-02    LIVER FUNCTIONS - ( 02 Nov 2024 06:49 )  Alb: 3.3 g/dL / Pro: 5.6 g/dL / ALK PHOS: 61 U/L / ALT: 11 U/L / AST: 17 U/L / GGT: x                 Urinalysis Basic - ( 02 Nov 2024 06:49 )    Color: x / Appearance: x / SG: x / pH: x  Gluc: 95 mg/dL / Ketone: x  / Bili: x / Urobili: x   Blood: x / Protein: x / Nitrite: x   Leuk Esterase: x / RBC: x / WBC x   Sq Epi: x / Non Sq Epi: x / Bacteria: x                            RADIOLOGY, ECG, & ADDITIONAL TESTS:  12 Lead ECG:   Ventricular Rate 91 BPM    Atrial Rate 91 BPM    P-R Interval 154 ms    QRS Duration 106 ms    Q-T Interval 368 ms    QTC Calculation(Bazett) 452 ms    P Axis 69 degrees    R Axis -53 degrees    T Axis 51 degrees    Diagnosis Line Normal sinus rhythm  Left anterior fascicular block  Voltage criteria for left ventricular hypertrophy  Possible Lateral infarct , age undetermined  Abnormal ECG    Confirmed by Kayode Lange ([id number]) on 10/31/2024 7:26:42 PM (10-31-24 @ 16:13)    CT Head No Cont:   ACC: [id number] EXAM:  CT CERVICAL SPINE   ORDERED BY: YUSRA VIGIL     ACC: [id number] EXAM:  CT BRAIN   ORDERED BY: YUSRA VIGIL     PROCEDURE DATE:  10/31/2024          INTERPRETATION:  CLINICAL INDICATION: Trauma.    TECHNIQUE: CT of the head and cervical spine was performed without the   administration of intravenous contrast.    COMPARISON: CT head dated 12/10/2022.    FINDINGS:    CT HEAD:    There is stable prominence of the sulci, sylvian fissures, and   ventricles, reflecting mild diffuse parenchymal volume loss.    There are increased scattered patchy low attenuations in bilateral   periventricular and subcortical cerebral white matter consistent with   mild-moderate chronic microvascular ischemic changes.    No evidence of acuteterritorial infarct, intracranial hemorrhage or mass   lesion.    No hydrocephalus. There are no extra-axial fluid collections.    Status post right cataract surgery, stable. The imaged portions of the   paranasal sinuses are clear. The mastoid air cells are clear. The   visualized soft tissues and osseous structures appear normal.      CT CERVICAL SPINE:    The alignment is normal.    There is no evidence of acute fracture, compression deformity or facet   subluxation of the cervical spine.    The atlantoaxial relationships are maintained. The posterior elements are   intact. There is no significant prevertebral soft tissue swelling. The   visualized paraspinal soft tissues are unremarkable.    Stable severe multilevel endplate degenerative changes with marginal   osteophyte formation, uncovertebral spurring, loss of normal disc space   height as well as facet joint space compartment narrowing.    There is no high-grade spinal canal stenosis. Please note spinal canal   contents are suboptimally evaluated inherent to CT technique.    The visualized lung apices are within normal limits.      IMPRESSION:    CT HEAD:  No acute intracranial findings.    Slightly increased mild-moderate chronic microvascular ischemic changes.    CT CERVICAL SPINE:  No acute cervical fracture or facet subluxation.    --- End of Report ---            MARCELA BLANC MD; Attending Radiologist  This document has been electronically signed. Oct 31 2024 12:39PM (10-31-24 @ 12:11)    RECENT DIAGNOSTIC ORDERS:  Culture - Blood: 11:00  Specimen Source: Blood (11-02-24 @ 08:42)  Culture - Urine: Routine  Specimen Source: Clean Catch (Midstream)  Addl Info: If indwelling urinary catheter > 14 days, obtain an order to remove and replace prior to c (11-02-24 @ 08:42)  Diet, NPO after Midnight:      NPO Start Date: 01-Nov-2024,   NPO Start Time: 23:59 (11-01-24 @ 20:15)  Magnesium: AM Sched. Collection: 04-Nov-2024 04:30 (11-01-24 @ 13:34)  Comprehensive Metabolic Panel: AM Sched. Collection: 04-Nov-2024 04:30 (11-01-24 @ 13:34)  Complete Blood Count + Automated Diff: AM Sched. Collection: 04-Nov-2024 04:30 (11-01-24 @ 13:34)  Magnesium: AM Sched. Collection: 03-Nov-2024 04:30 (11-01-24 @ 13:34)  Comprehensive Metabolic Panel: AM Sched. Collection: 03-Nov-2024 04:30 (11-01-24 @ 13:34)  Complete Blood Count + Automated Diff: AM Sched. Collection: 03-Nov-2024 04:30 (11-01-24 @ 13:34)      MEDICATIONS:  MEDICATIONS  (STANDING):  Austedo XR 24 milliGRAM(s) 24 milliGRAM(s) Oral daily  calcium carbonate   1250 mG (OsCal) 1 Tablet(s) Oral two times a day  chlorhexidine 2% Cloths 1 Application(s) Topical <User Schedule>  cholecalciferol 1000 Unit(s) Oral daily  heparin   Injectable 5000 Unit(s) SubCutaneous every 8 hours  lactated ringers. 1000 milliLiter(s) (75 mL/Hr) IV Continuous <Continuous>  magnesium oxide 400 milliGRAM(s) Oral two times a day with meals  polyethylene glycol 3350 17 Gram(s) Oral two times a day  senna 2 Tablet(s) Oral at bedtime  valproic  acid Syrup 1000 milliGRAM(s) Oral <User Schedule>  valproic  acid Syrup 500 milliGRAM(s) Oral <User Schedule>    MEDICATIONS  (PRN):  acetaminophen   Oral Liquid .. 650 milliGRAM(s) Oral every 6 hours PRN Temp greater or equal to 38C (100.4F), Mild Pain (1 - 3)  oxyCODONE    IR 2.5 milliGRAM(s) Oral every 6 hours PRN Moderate Pain (4 - 6)      HOME MEDICATIONS:  Austedo XR 24 mg oral tablet, extended release (10-31)  cholecalciferol 25 mcg (1000 intl units) oral capsule (10-31)  FiberCon 625 mg oral tablet (10-31)  magnesium gluconate 250 mg oral tablet (10-31)  Oysco 500 (1250 mg calcium carbonate) oral tablet (10-31)  Tylenol 325 mg oral capsule (10-31)  valproic acid 250 mg/5 mL oral liquid (10-31)      PHYSICAL EXAM:  GENERAL:   CHEST/LUNG:   HEART:   ABDOMEN:   EXTREMITIES:               MINA BABAR  84y, Female  Allergy: No Known Allergies  lactose (Stomach Upset)    Hospital Day: 2d    Patient seen and examined earlier today. she stated that she is ok, having left hip pain , satff at bedside     PMH/PSH:  PAST MEDICAL & SURGICAL HISTORY:  Atypical bipolar disorder      Mild mental retardation      Brito esophagus      Hyperlipemia      Breast mass, right  s/p lumpectomy & radiation 2007      Feeding by G-tube  s/p removal 2012      Femoral neck fracture          LAST 24-Hr EVENTS:    VITALS:  T(F): 98 (11-02-24 @ 08:36), Max: 99.6 (11-01-24 @ 16:00)  HR: 89 (11-02-24 @ 08:36)  BP: 124/72 (11-02-24 @ 08:36) (119/76 - 131/75)  RR: 18 (11-02-24 @ 08:36)  SpO2: 90% (11-02-24 @ 06:28)          TESTS & MEASUREMENTS:  Weight/BMI  70 (11-02-24 @ 06:28)    10-31-24 @ 07:01  -  11-01-24 @ 07:00  --------------------------------------------------------  IN: 525 mL / OUT: 900 mL / NET: -375 mL    11-01-24 @ 07:01  -  11-02-24 @ 07:00  --------------------------------------------------------  IN: 300 mL / OUT: 300 mL / NET: 0 mL                            12.7   7.56  )-----------( 144      ( 02 Nov 2024 06:49 )             37.7       INR: 1.08 ratio (10-31-24 @ 11:25)    11-02    143  |  105  |  17  ----------------------------<  95  4.1   |  26  |  0.5[L]    Ca    8.9      02 Nov 2024 06:49  Phos  3.0     11-01  Mg     2.3     11-02    TPro  5.6[L]  /  Alb  3.3[L]  /  TBili  0.3  /  DBili  x   /  AST  17  /  ALT  11  /  AlkPhos  61  11-02    LIVER FUNCTIONS - ( 02 Nov 2024 06:49 )  Alb: 3.3 g/dL / Pro: 5.6 g/dL / ALK PHOS: 61 U/L / ALT: 11 U/L / AST: 17 U/L / GGT: x                 Urinalysis Basic - ( 02 Nov 2024 06:49 )    Color: x / Appearance: x / SG: x / pH: x  Gluc: 95 mg/dL / Ketone: x  / Bili: x / Urobili: x   Blood: x / Protein: x / Nitrite: x   Leuk Esterase: x / RBC: x / WBC x   Sq Epi: x / Non Sq Epi: x / Bacteria: x                            RADIOLOGY, ECG, & ADDITIONAL TESTS:  12 Lead ECG:   Ventricular Rate 91 BPM    Atrial Rate 91 BPM    P-R Interval 154 ms    QRS Duration 106 ms    Q-T Interval 368 ms    QTC Calculation(Bazett) 452 ms    P Axis 69 degrees    R Axis -53 degrees    T Axis 51 degrees    Diagnosis Line Normal sinus rhythm  Left anterior fascicular block  Voltage criteria for left ventricular hypertrophy  Possible Lateral infarct , age undetermined  Abnormal ECG    Confirmed by Kayode Lange ([id number]) on 10/31/2024 7:26:42 PM (10-31-24 @ 16:13)    CT Head No Cont:   ACC: [id number] EXAM:  CT CERVICAL SPINE   ORDERED BY: YUSRA VIGIL     ACC: [id number] EXAM:  CT BRAIN   ORDERED BY: YUSRA VIGIL     PROCEDURE DATE:  10/31/2024          INTERPRETATION:  CLINICAL INDICATION: Trauma.    TECHNIQUE: CT of the head and cervical spine was performed without the   administration of intravenous contrast.    COMPARISON: CT head dated 12/10/2022.    FINDINGS:    CT HEAD:    There is stable prominence of the sulci, sylvian fissures, and   ventricles, reflecting mild diffuse parenchymal volume loss.    There are increased scattered patchy low attenuations in bilateral   periventricular and subcortical cerebral white matter consistent with   mild-moderate chronic microvascular ischemic changes.    No evidence of acuteterritorial infarct, intracranial hemorrhage or mass   lesion.    No hydrocephalus. There are no extra-axial fluid collections.    Status post right cataract surgery, stable. The imaged portions of the   paranasal sinuses are clear. The mastoid air cells are clear. The   visualized soft tissues and osseous structures appear normal.      CT CERVICAL SPINE:    The alignment is normal.    There is no evidence of acute fracture, compression deformity or facet   subluxation of the cervical spine.    The atlantoaxial relationships are maintained. The posterior elements are   intact. There is no significant prevertebral soft tissue swelling. The   visualized paraspinal soft tissues are unremarkable.    Stable severe multilevel endplate degenerative changes with marginal   osteophyte formation, uncovertebral spurring, loss of normal disc space   height as well as facet joint space compartment narrowing.    There is no high-grade spinal canal stenosis. Please note spinal canal   contents are suboptimally evaluated inherent to CT technique.    The visualized lung apices are within normal limits.      IMPRESSION:    CT HEAD:  No acute intracranial findings.    Slightly increased mild-moderate chronic microvascular ischemic changes.    CT CERVICAL SPINE:  No acute cervical fracture or facet subluxation.    --- End of Report ---            MARCELA BLANC MD; Attending Radiologist  This document has been electronically signed. Oct 31 2024 12:39PM (10-31-24 @ 12:11)    RECENT DIAGNOSTIC ORDERS:  Culture - Blood: 11:00  Specimen Source: Blood (11-02-24 @ 08:42)  Culture - Urine: Routine  Specimen Source: Clean Catch (Midstream)  Addl Info: If indwelling urinary catheter > 14 days, obtain an order to remove and replace prior to c (11-02-24 @ 08:42)  Diet, NPO after Midnight:      NPO Start Date: 01-Nov-2024,   NPO Start Time: 23:59 (11-01-24 @ 20:15)  Magnesium: AM Sched. Collection: 04-Nov-2024 04:30 (11-01-24 @ 13:34)  Comprehensive Metabolic Panel: AM Sched. Collection: 04-Nov-2024 04:30 (11-01-24 @ 13:34)  Complete Blood Count + Automated Diff: AM Sched. Collection: 04-Nov-2024 04:30 (11-01-24 @ 13:34)  Magnesium: AM Sched. Collection: 03-Nov-2024 04:30 (11-01-24 @ 13:34)  Comprehensive Metabolic Panel: AM Sched. Collection: 03-Nov-2024 04:30 (11-01-24 @ 13:34)  Complete Blood Count + Automated Diff: AM Sched. Collection: 03-Nov-2024 04:30 (11-01-24 @ 13:34)      MEDICATIONS:  MEDICATIONS  (STANDING):  Austedo XR 24 milliGRAM(s) 24 milliGRAM(s) Oral daily  calcium carbonate   1250 mG (OsCal) 1 Tablet(s) Oral two times a day  chlorhexidine 2% Cloths 1 Application(s) Topical <User Schedule>  cholecalciferol 1000 Unit(s) Oral daily  heparin   Injectable 5000 Unit(s) SubCutaneous every 8 hours  lactated ringers. 1000 milliLiter(s) (75 mL/Hr) IV Continuous <Continuous>  magnesium oxide 400 milliGRAM(s) Oral two times a day with meals  polyethylene glycol 3350 17 Gram(s) Oral two times a day  senna 2 Tablet(s) Oral at bedtime  valproic  acid Syrup 1000 milliGRAM(s) Oral <User Schedule>  valproic  acid Syrup 500 milliGRAM(s) Oral <User Schedule>    MEDICATIONS  (PRN):  acetaminophen   Oral Liquid .. 650 milliGRAM(s) Oral every 6 hours PRN Temp greater or equal to 38C (100.4F), Mild Pain (1 - 3)  oxyCODONE    IR 2.5 milliGRAM(s) Oral every 6 hours PRN Moderate Pain (4 - 6)      HOME MEDICATIONS:  Austedo XR 24 mg oral tablet, extended release (10-31)  cholecalciferol 25 mcg (1000 intl units) oral capsule (10-31)  FiberCon 625 mg oral tablet (10-31)  magnesium gluconate 250 mg oral tablet (10-31)  Oysco 500 (1250 mg calcium carbonate) oral tablet (10-31)  Tylenol 325 mg oral capsule (10-31)  valproic acid 250 mg/5 mL oral liquid (10-31)      PHYSICAL EXAM:    On exam  General: awake, alert, oriented to self NAD, chronic ill appearance, edentulous   Lungs:  clear to ausculations b/l, normal resp effort  Heart: regular ryhthm , did not appreciated obvious murmur   Abdomen: soft, non tender non distended  Ext: Nn edema b/l , left LE short, ext rotated,tender on left hip area , no focal tender on the wrists ,          MINA BABAR  84y, Female  Allergy: No Known Allergies  lactose (Stomach Upset)    Hospital Day: 2d    Patient seen and examined earlier today. she stated that she is ok, having left hip pain , group home staff at bedside     PMH/PSH:  PAST MEDICAL & SURGICAL HISTORY:  Atypical bipolar disorder      Mild mental retardation      Brito esophagus      Hyperlipemia      Breast mass, right  s/p lumpectomy & radiation 2007      Feeding by G-tube  s/p removal 2012      Femoral neck fracture          LAST 24-Hr EVENTS:    VITALS:  T(F): 98 (11-02-24 @ 08:36), Max: 99.6 (11-01-24 @ 16:00)  HR: 89 (11-02-24 @ 08:36)  BP: 124/72 (11-02-24 @ 08:36) (119/76 - 131/75)  RR: 18 (11-02-24 @ 08:36)  SpO2: 90% (11-02-24 @ 06:28)          TESTS & MEASUREMENTS:  Weight/BMI  70 (11-02-24 @ 06:28)    10-31-24 @ 07:01  -  11-01-24 @ 07:00  --------------------------------------------------------  IN: 525 mL / OUT: 900 mL / NET: -375 mL    11-01-24 @ 07:01  -  11-02-24 @ 07:00  --------------------------------------------------------  IN: 300 mL / OUT: 300 mL / NET: 0 mL                            12.7   7.56  )-----------( 144      ( 02 Nov 2024 06:49 )             37.7       INR: 1.08 ratio (10-31-24 @ 11:25)    11-02    143  |  105  |  17  ----------------------------<  95  4.1   |  26  |  0.5[L]    Ca    8.9      02 Nov 2024 06:49  Phos  3.0     11-01  Mg     2.3     11-02    TPro  5.6[L]  /  Alb  3.3[L]  /  TBili  0.3  /  DBili  x   /  AST  17  /  ALT  11  /  AlkPhos  61  11-02    LIVER FUNCTIONS - ( 02 Nov 2024 06:49 )  Alb: 3.3 g/dL / Pro: 5.6 g/dL / ALK PHOS: 61 U/L / ALT: 11 U/L / AST: 17 U/L / GGT: x                 Urinalysis Basic - ( 02 Nov 2024 06:49 )    Color: x / Appearance: x / SG: x / pH: x  Gluc: 95 mg/dL / Ketone: x  / Bili: x / Urobili: x   Blood: x / Protein: x / Nitrite: x   Leuk Esterase: x / RBC: x / WBC x   Sq Epi: x / Non Sq Epi: x / Bacteria: x                            RADIOLOGY, ECG, & ADDITIONAL TESTS:  12 Lead ECG:   Ventricular Rate 91 BPM    Atrial Rate 91 BPM    P-R Interval 154 ms    QRS Duration 106 ms    Q-T Interval 368 ms    QTC Calculation(Bazett) 452 ms    P Axis 69 degrees    R Axis -53 degrees    T Axis 51 degrees    Diagnosis Line Normal sinus rhythm  Left anterior fascicular block  Voltage criteria for left ventricular hypertrophy  Possible Lateral infarct , age undetermined  Abnormal ECG    Confirmed by Kayode Lange ([id number]) on 10/31/2024 7:26:42 PM (10-31-24 @ 16:13)    CT Head No Cont:   ACC: [id number] EXAM:  CT CERVICAL SPINE   ORDERED BY: YUSRA VIGIL     ACC: [id number] EXAM:  CT BRAIN   ORDERED BY: YUSRA VIGIL     PROCEDURE DATE:  10/31/2024          INTERPRETATION:  CLINICAL INDICATION: Trauma.    TECHNIQUE: CT of the head and cervical spine was performed without the   administration of intravenous contrast.    COMPARISON: CT head dated 12/10/2022.    FINDINGS:    CT HEAD:    There is stable prominence of the sulci, sylvian fissures, and   ventricles, reflecting mild diffuse parenchymal volume loss.    There are increased scattered patchy low attenuations in bilateral   periventricular and subcortical cerebral white matter consistent with   mild-moderate chronic microvascular ischemic changes.    No evidence of acuteterritorial infarct, intracranial hemorrhage or mass   lesion.    No hydrocephalus. There are no extra-axial fluid collections.    Status post right cataract surgery, stable. The imaged portions of the   paranasal sinuses are clear. The mastoid air cells are clear. The   visualized soft tissues and osseous structures appear normal.      CT CERVICAL SPINE:    The alignment is normal.    There is no evidence of acute fracture, compression deformity or facet   subluxation of the cervical spine.    The atlantoaxial relationships are maintained. The posterior elements are   intact. There is no significant prevertebral soft tissue swelling. The   visualized paraspinal soft tissues are unremarkable.    Stable severe multilevel endplate degenerative changes with marginal   osteophyte formation, uncovertebral spurring, loss of normal disc space   height as well as facet joint space compartment narrowing.    There is no high-grade spinal canal stenosis. Please note spinal canal   contents are suboptimally evaluated inherent to CT technique.    The visualized lung apices are within normal limits.      IMPRESSION:    CT HEAD:  No acute intracranial findings.    Slightly increased mild-moderate chronic microvascular ischemic changes.    CT CERVICAL SPINE:  No acute cervical fracture or facet subluxation.    --- End of Report ---            MARCELA BLANC MD; Attending Radiologist  This document has been electronically signed. Oct 31 2024 12:39PM (10-31-24 @ 12:11)    RECENT DIAGNOSTIC ORDERS:  Culture - Blood: 11:00  Specimen Source: Blood (11-02-24 @ 08:42)  Culture - Urine: Routine  Specimen Source: Clean Catch (Midstream)  Addl Info: If indwelling urinary catheter > 14 days, obtain an order to remove and replace prior to c (11-02-24 @ 08:42)  Diet, NPO after Midnight:      NPO Start Date: 01-Nov-2024,   NPO Start Time: 23:59 (11-01-24 @ 20:15)  Magnesium: AM Sched. Collection: 04-Nov-2024 04:30 (11-01-24 @ 13:34)  Comprehensive Metabolic Panel: AM Sched. Collection: 04-Nov-2024 04:30 (11-01-24 @ 13:34)  Complete Blood Count + Automated Diff: AM Sched. Collection: 04-Nov-2024 04:30 (11-01-24 @ 13:34)  Magnesium: AM Sched. Collection: 03-Nov-2024 04:30 (11-01-24 @ 13:34)  Comprehensive Metabolic Panel: AM Sched. Collection: 03-Nov-2024 04:30 (11-01-24 @ 13:34)  Complete Blood Count + Automated Diff: AM Sched. Collection: 03-Nov-2024 04:30 (11-01-24 @ 13:34)      MEDICATIONS:  MEDICATIONS  (STANDING):  Austedo XR 24 milliGRAM(s) 24 milliGRAM(s) Oral daily  calcium carbonate   1250 mG (OsCal) 1 Tablet(s) Oral two times a day  chlorhexidine 2% Cloths 1 Application(s) Topical <User Schedule>  cholecalciferol 1000 Unit(s) Oral daily  heparin   Injectable 5000 Unit(s) SubCutaneous every 8 hours  lactated ringers. 1000 milliLiter(s) (75 mL/Hr) IV Continuous <Continuous>  magnesium oxide 400 milliGRAM(s) Oral two times a day with meals  polyethylene glycol 3350 17 Gram(s) Oral two times a day  senna 2 Tablet(s) Oral at bedtime  valproic  acid Syrup 1000 milliGRAM(s) Oral <User Schedule>  valproic  acid Syrup 500 milliGRAM(s) Oral <User Schedule>    MEDICATIONS  (PRN):  acetaminophen   Oral Liquid .. 650 milliGRAM(s) Oral every 6 hours PRN Temp greater or equal to 38C (100.4F), Mild Pain (1 - 3)  oxyCODONE    IR 2.5 milliGRAM(s) Oral every 6 hours PRN Moderate Pain (4 - 6)      HOME MEDICATIONS:  Austedo XR 24 mg oral tablet, extended release (10-31)  cholecalciferol 25 mcg (1000 intl units) oral capsule (10-31)  FiberCon 625 mg oral tablet (10-31)  magnesium gluconate 250 mg oral tablet (10-31)  Oysco 500 (1250 mg calcium carbonate) oral tablet (10-31)  Tylenol 325 mg oral capsule (10-31)  valproic acid 250 mg/5 mL oral liquid (10-31)      PHYSICAL EXAM:    On exam  General: awake, alert, oriented to self NAD, chronic ill appearance, edentulous   Lungs:  clear to ausculations b/l, normal resp effort  Heart: regular ryhthm , did not appreciated obvious murmur   Abdomen: soft, non tender non distended  Ext: Nn edema b/l , left LE short, ext rotated,tender on left hip area , no focal tender on the wrists ,

## 2024-11-02 NOTE — CHART NOTE - NSCHARTNOTEFT_GEN_A_CORE
this hemiarthroplasty surgery has been postponed until 11/2 as there was no reasonable time to perform this surgery on 11/1  npo   cont deep vein thrombosis prophylaxis

## 2024-11-02 NOTE — CHART NOTE - NSCHARTNOTEFT_GEN_A_CORE
CASE CANCELLED DUE TO NO SURGEON AVAILABILITY   CALLED AND DISCUSSED WITH LETY   PT IS ADDED ON FOR SURGERY THIS MONDAY   PLEASE PREOP FOR MONDAY   DISCUSSED WITH MEDICAL TEAM

## 2024-11-03 LAB
ALBUMIN SERPL ELPH-MCNC: 3.2 G/DL — LOW (ref 3.5–5.2)
ALP SERPL-CCNC: 59 U/L — SIGNIFICANT CHANGE UP (ref 30–115)
ALT FLD-CCNC: 11 U/L — SIGNIFICANT CHANGE UP (ref 0–41)
ANION GAP SERPL CALC-SCNC: 10 MMOL/L — SIGNIFICANT CHANGE UP (ref 7–14)
AST SERPL-CCNC: 15 U/L — SIGNIFICANT CHANGE UP (ref 0–41)
BASOPHILS # BLD AUTO: 0.01 K/UL — SIGNIFICANT CHANGE UP (ref 0–0.2)
BASOPHILS NFR BLD AUTO: 0.1 % — SIGNIFICANT CHANGE UP (ref 0–1)
BILIRUB SERPL-MCNC: 0.3 MG/DL — SIGNIFICANT CHANGE UP (ref 0.2–1.2)
BLD GP AB SCN SERPL QL: SIGNIFICANT CHANGE UP
BUN SERPL-MCNC: 17 MG/DL — SIGNIFICANT CHANGE UP (ref 10–20)
CALCIUM SERPL-MCNC: 8.8 MG/DL — SIGNIFICANT CHANGE UP (ref 8.4–10.5)
CHLORIDE SERPL-SCNC: 107 MMOL/L — SIGNIFICANT CHANGE UP (ref 98–110)
CO2 SERPL-SCNC: 26 MMOL/L — SIGNIFICANT CHANGE UP (ref 17–32)
CREAT SERPL-MCNC: <0.5 MG/DL — LOW (ref 0.7–1.5)
EGFR: 98 ML/MIN/1.73M2 — SIGNIFICANT CHANGE UP
EOSINOPHIL # BLD AUTO: 0.14 K/UL — SIGNIFICANT CHANGE UP (ref 0–0.7)
EOSINOPHIL NFR BLD AUTO: 2 % — SIGNIFICANT CHANGE UP (ref 0–8)
GLUCOSE SERPL-MCNC: 125 MG/DL — HIGH (ref 70–99)
HCT VFR BLD CALC: 35.8 % — LOW (ref 37–47)
HGB BLD-MCNC: 12 G/DL — SIGNIFICANT CHANGE UP (ref 12–16)
IMM GRANULOCYTES NFR BLD AUTO: 0.4 % — HIGH (ref 0.1–0.3)
LYMPHOCYTES # BLD AUTO: 1.58 K/UL — SIGNIFICANT CHANGE UP (ref 1.2–3.4)
LYMPHOCYTES # BLD AUTO: 22.5 % — SIGNIFICANT CHANGE UP (ref 20.5–51.1)
MAGNESIUM SERPL-MCNC: 1.9 MG/DL — SIGNIFICANT CHANGE UP (ref 1.8–2.4)
MCHC RBC-ENTMCNC: 31.8 PG — HIGH (ref 27–31)
MCHC RBC-ENTMCNC: 33.5 G/DL — SIGNIFICANT CHANGE UP (ref 32–37)
MCV RBC AUTO: 95 FL — SIGNIFICANT CHANGE UP (ref 81–99)
MONOCYTES # BLD AUTO: 0.71 K/UL — HIGH (ref 0.1–0.6)
MONOCYTES NFR BLD AUTO: 10.1 % — HIGH (ref 1.7–9.3)
NEUTROPHILS # BLD AUTO: 4.56 K/UL — SIGNIFICANT CHANGE UP (ref 1.4–6.5)
NEUTROPHILS NFR BLD AUTO: 64.9 % — SIGNIFICANT CHANGE UP (ref 42.2–75.2)
NRBC # BLD: 0 /100 WBCS — SIGNIFICANT CHANGE UP (ref 0–0)
PLATELET # BLD AUTO: 147 K/UL — SIGNIFICANT CHANGE UP (ref 130–400)
PMV BLD: 10.8 FL — HIGH (ref 7.4–10.4)
POTASSIUM SERPL-MCNC: 3.8 MMOL/L — SIGNIFICANT CHANGE UP (ref 3.5–5)
POTASSIUM SERPL-SCNC: 3.8 MMOL/L — SIGNIFICANT CHANGE UP (ref 3.5–5)
PROT SERPL-MCNC: 5.4 G/DL — LOW (ref 6–8)
RBC # BLD: 3.77 M/UL — LOW (ref 4.2–5.4)
RBC # FLD: 12.1 % — SIGNIFICANT CHANGE UP (ref 11.5–14.5)
SODIUM SERPL-SCNC: 143 MMOL/L — SIGNIFICANT CHANGE UP (ref 135–146)
WBC # BLD: 7.03 K/UL — SIGNIFICANT CHANGE UP (ref 4.8–10.8)
WBC # FLD AUTO: 7.03 K/UL — SIGNIFICANT CHANGE UP (ref 4.8–10.8)

## 2024-11-03 PROCEDURE — 99232 SBSQ HOSP IP/OBS MODERATE 35: CPT

## 2024-11-03 RX ORDER — HEPARIN SODIUM,PORCINE 1000/ML
5000 VIAL (ML) INJECTION EVERY 8 HOURS
Refills: 0 | Status: COMPLETED | OUTPATIENT
Start: 2024-11-03 | End: 2024-11-03

## 2024-11-03 RX ADMIN — Medication 5000 UNIT(S): at 06:12

## 2024-11-03 RX ADMIN — Medication 400 MILLIGRAM(S): at 17:36

## 2024-11-03 RX ADMIN — Medication 5000 UNIT(S): at 21:21

## 2024-11-03 RX ADMIN — ACETAMINOPHEN 500MG 650 MILLIGRAM(S): 500 TABLET, COATED ORAL at 11:22

## 2024-11-03 RX ADMIN — Medication 5000 UNIT(S): at 14:24

## 2024-11-03 RX ADMIN — Medication 1000 UNIT(S): at 11:22

## 2024-11-03 RX ADMIN — VALPROIC ACID 1000 MILLIGRAM(S): 250 SOLUTION ORAL at 21:15

## 2024-11-03 RX ADMIN — CHLORHEXIDINE GLUCONATE 1 APPLICATION(S): 1.2 RINSE ORAL at 06:17

## 2024-11-03 RX ADMIN — Medication 400 MILLIGRAM(S): at 09:07

## 2024-11-03 RX ADMIN — Medication 2 TABLET(S): at 21:16

## 2024-11-03 RX ADMIN — ACETAMINOPHEN 500MG 650 MILLIGRAM(S): 500 TABLET, COATED ORAL at 11:52

## 2024-11-03 RX ADMIN — Medication 100 MILLIGRAM(S): at 14:23

## 2024-11-03 RX ADMIN — POLYETHYLENE GLYCOL 3350 17 GRAM(S): 17 POWDER, FOR SOLUTION ORAL at 06:11

## 2024-11-03 RX ADMIN — Medication 1 TABLET(S): at 06:09

## 2024-11-03 RX ADMIN — Medication 1 TABLET(S): at 17:36

## 2024-11-03 RX ADMIN — VALPROIC ACID 500 MILLIGRAM(S): 250 SOLUTION ORAL at 17:36

## 2024-11-03 NOTE — SWALLOW BEDSIDE ASSESSMENT ADULT - SWALLOW EVAL: FUNCTIONAL LEVEL AT TIME OF EVAL
Awake and alert +verbal +group home aidjoce Ruelas at bedside reports pt's baseline diet is puree and mod thick liquids

## 2024-11-03 NOTE — SWALLOW BEDSIDE ASSESSMENT ADULT - SLP GENERAL OBSERVATIONS
Pt found laying in bed Awake and alert +verbal +group home aide Jessenia at bedside reports pt's baseline diet is puree and mod thick liquids

## 2024-11-03 NOTE — PROGRESS NOTE ADULT - ASSESSMENT
84yoF hx intellectual disability, bipolar disorder, Breast mass (unknown side s/p lumpectomy and RT '07), asthma, hoover's, esophageal varices, s/p R cataract surgery, R hip and L humeral fx, hld here following fall.     []Acute L femoral neck fracture  []Unwitnessed fall, r/o syncope  [] SIRS POA ( wbc and HR)   -unclear story, sounds likely syncopal episode possibly orthostasis induced   -remote tele x24hrs- periods of currently sinus tach  -METS<4, wheelchair dependent at baseline  -RCRI 0  -NSQIP  SCORE WITH HIGH RISK ( 74%)  OF POST OPERATIVE Delirium   Pt is medically optimized for intermediate risk surgery   -EKG no acute ischemia ,  no active chest pain or SOB , trop and ck negative   no reported cardiac issues   lactate 1.3   TTE EF of 66 %. Grade I diastolic dysfunction.. Mild thickening of the anterior and posterior mitral valve leaflets.   Moderate mitral annular calcification. . Mild aortic regurgitation.. Mild pulmonic valve regurgitation.  -L/R wrist XR np acute  fractures  -multimodal pain therapy  -check bed orthostatics  -NPO sunday midnioght   SLP eval appreciated   wbc improved  could be stress/pain related and dehydration   monitor off abcx for now    c/w gentle hydration   CT head/neck negative for acute   get valporic level   PT/OT after surgery   spoke with ortho today they postponed for Monday for surgeon availability issue , they updated Ms. Sutherland     - Possible UTI / SIRS ON admission   UA strong  positive , unreliable hx   get urine culture and f/u blood cultures and c/w ceftriaxone for now     #Incidental R breast nodule  #Hx breast mass s/p lumpectomy and radiation  -outpt f/u and w/u    #Incidental cholelithasis  -asymptomatic at this time, no LFT elevation  -consider CCY as outpt    #Intellectual Disability  #Bipolar disorder  -c/w VA, austedo    #Asthma, not in acute exacerbation  #Esophageal varices  #Hiatal hernia  monitor , resume home meds     Constipation   CTAP with Moderate tolarge rectal stool burden. No bowel obstruction.  bowel regimen and monitor   moved her bowel yesterday as per group home staff bedside         #DVT ppx -HSQ/ scd   -VA duplx negative for dvt ppx     #GI ppx    Pending : ORIF tomorrow -  as per Ortho NPO midnight , type and screen , hold morning dose of dvt ppx     Family -Ms. Koko Doshi [phone]  acting as surrogate for several years   i SPOKE and discussed with her today 11/3      Palliative team consult appreciated - FULL CODE . - REFER TO Palliative note for details , can not be dnr/dni with out approval by MHLS given her developmental disability

## 2024-11-03 NOTE — PROGRESS NOTE ADULT - SUBJECTIVE AND OBJECTIVE BOX
Ortho Preop Note    Patient is a 84y old  Female who presents with a chief complaint of L hip fx ; ?syncope (02 Nov 2024 11:35)    Diagnosis: Left femoral neck fracture  Procedure: Left hip hemiarthroplasty  Surgeon: Dr. Garcia                          12.0   7.03  )-----------( 147      ( 03 Nov 2024 06:49 )             35.8     11-03    143  |  107  |  17  ----------------------------<  125[H]  3.8   |  26  |  <0.5[L]    Ca    8.8      03 Nov 2024 06:49  Mg     1.9     11-03    TPro  5.4[L]  /  Alb  3.2[L]  /  TBili  0.3  /  DBili  x   /  AST  15  /  ALT  11  /  AlkPhos  59  11-03      Urinalysis Basic - ( 03 Nov 2024 06:49 )    Color: x / Appearance: x / SG: x / pH: x  Gluc: 125 mg/dL / Ketone: x  / Bili: x / Urobili: x   Blood: x / Protein: x / Nitrite: x   Leuk Esterase: x / RBC: x / WBC x   Sq Epi: x / Non Sq Epi: x / Bacteria: x        [X] Type & Screen  [X] 2u RBC on hold  [X] CBC  [X] BMP  [X] PT/PTT/INR  [X] Urinalysis - pos - started on CFTX  [X] Chest X-ray  [X] EKG + echo  [X] Duplex BLE - neg  [ ] NPO/IVF - at midnight  [ ] Consent - to be obtained from patient's NOK   [X] Clearance  [X] Added on to OR Schedule  [ ] Anti-coagulation held - hold AM heparin dose  [X] D/W medical team primary (Dr. Shelton)  [ ] MRSA/MSSA Nasal Screen     Assessment & Plan:  84yFemale with left femoral neck fracture  -For OR 11/4/24    Reinier Jade MD PGY3

## 2024-11-03 NOTE — SWALLOW BEDSIDE ASSESSMENT ADULT - SLP PERTINENT HISTORY OF CURRENT PROBLEM
84yoF hx intellectual disability, bipolar disorder, Breast mass (unknown side s/p lumpectomy and RT '07), asthma, hoover's, esophageal varices, s/p R cataract surgery, R hip and L humeral fx, hld here following fall. Pt wheelchair bound at baseline, max assist. Story per pt and SIDDSO aid unclear, appears that pt got up from wheelchair, felt dizzy and possibly blacked out, and fell in the bathroom. Aid states someone else was with her and denies any falls. After this, pt c/o L/R wrist, L knee, and L/R hip pain prompting ED eval. Difficulty to obtain ROS of pt but denies chest pains, palpitations, dizziness or lightheadedness at baseline.

## 2024-11-03 NOTE — SWALLOW BEDSIDE ASSESSMENT ADULT - ADDITIONAL RECOMMENDATIONS
Pt tolerating current baseline diet puree and mod thick liquids as per JORI Ruelas and documentation. ST services are no longer needed. Recommend continue current diet puree and mod thick liquids. SLP D/C. reconsult PRN

## 2024-11-03 NOTE — PROGRESS NOTE ADULT - SUBJECTIVE AND OBJECTIVE BOX
T H I S   I S    N O  T   A    F I N A L I Z E D   N O T BABAR HIGGINS  84y, Female  Allergy: No Known Allergies  lactose (Stomach Upset)    Hospital Day: 3d    Patient seen and examined earlier today.     PMH/PSH:  PAST MEDICAL & SURGICAL HISTORY:  Atypical bipolar disorder      Mild mental retardation      Brito esophagus      Hyperlipemia      Breast mass, right  s/p lumpectomy & radiation 2007      Feeding by G-tube  s/p removal 2012      Femoral neck fracture          LAST 24-Hr EVENTS:    VITALS:  T(F): 98.6 (11-03-24 @ 07:30), Max: 99.1 (11-02-24 @ 23:55)  HR: 84 (11-03-24 @ 07:30)  BP: 120/69 (11-03-24 @ 07:30) (115/64 - 140/70)  RR: 18 (11-03-24 @ 07:30)  SpO2: 91% (11-03-24 @ 07:30)          TESTS & MEASUREMENTS:  Weight/BMI  70 (11-02-24 @ 06:28)    11-01-24 @ 07:01  -  11-02-24 @ 07:00  --------------------------------------------------------  IN: 300 mL / OUT: 300 mL / NET: 0 mL    11-02-24 @ 08:01  -  11-03-24 @ 07:00  --------------------------------------------------------  IN: 0 mL / OUT: 475 mL / NET: -475 mL                            12.0   7.03  )-----------( 147      ( 03 Nov 2024 06:49 )             35.8       INR: 1.08 ratio (10-31-24 @ 11:25)    11-03    143  |  107  |  17  ----------------------------<  125[H]  3.8   |  26  |  <0.5[L]    Ca    8.8      03 Nov 2024 06:49  Mg     1.9     11-03    TPro  5.4[L]  /  Alb  3.2[L]  /  TBili  0.3  /  DBili  x   /  AST  15  /  ALT  11  /  AlkPhos  59  11-03    LIVER FUNCTIONS - ( 03 Nov 2024 06:49 )  Alb: 3.2 g/dL / Pro: 5.4 g/dL / ALK PHOS: 59 U/L / ALT: 11 U/L / AST: 15 U/L / GGT: x                 Urinalysis Basic - ( 03 Nov 2024 06:49 )    Color: x / Appearance: x / SG: x / pH: x  Gluc: 125 mg/dL / Ketone: x  / Bili: x / Urobili: x   Blood: x / Protein: x / Nitrite: x   Leuk Esterase: x / RBC: x / WBC x   Sq Epi: x / Non Sq Epi: x / Bacteria: x                            RADIOLOGY, ECG, & ADDITIONAL TESTS:  12 Lead ECG:   Ventricular Rate 91 BPM    Atrial Rate 91 BPM    P-R Interval 154 ms    QRS Duration 106 ms    Q-T Interval 368 ms    QTC Calculation(Bazett) 452 ms    P Axis 69 degrees    R Axis -53 degrees    T Axis 51 degrees    Diagnosis Line Normal sinus rhythm  Left anterior fascicular block  Voltage criteria for left ventricular hypertrophy  Possible Lateral infarct , age undetermined  Abnormal ECG    Confirmed by Kayode Lange ([id number]) on 10/31/2024 7:26:42 PM (10-31-24 @ 16:13)    CT Head No Cont:   ACC: [id number] EXAM:  CT CERVICAL SPINE   ORDERED BY: YUSRA VIGIL     ACC: [id number] EXAM:  CT BRAIN   ORDERED BY: YUSRA VIGIL     PROCEDURE DATE:  10/31/2024          INTERPRETATION:  CLINICAL INDICATION: Trauma.    TECHNIQUE: CT of the head and cervical spine was performed without the   administration of intravenous contrast.    COMPARISON: CT head dated 12/10/2022.    FINDINGS:    CT HEAD:    There is stable prominence of the sulci, sylvian fissures, and   ventricles, reflecting mild diffuse parenchymal volume loss.    There are increased scattered patchy low attenuations in bilateral   periventricular and subcortical cerebral white matter consistent with   mild-moderate chronic microvascular ischemic changes.    No evidence of acuteterritorial infarct, intracranial hemorrhage or mass   lesion.    No hydrocephalus. There are no extra-axial fluid collections.    Status post right cataract surgery, stable. The imaged portions of the   paranasal sinuses are clear. The mastoid air cells are clear. The   visualized soft tissues and osseous structures appear normal.      CT CERVICAL SPINE:    The alignment is normal.    There is no evidence of acute fracture, compression deformity or facet   subluxation of the cervical spine.    The atlantoaxial relationships are maintained. The posterior elements are   intact. There is no significant prevertebral soft tissue swelling. The   visualized paraspinal soft tissues are unremarkable.    Stable severe multilevel endplate degenerative changes with marginal   osteophyte formation, uncovertebral spurring, loss of normal disc space   height as well as facet joint space compartment narrowing.    There is no high-grade spinal canal stenosis. Please note spinal canal   contents are suboptimally evaluated inherent to CT technique.    The visualized lung apices are within normal limits.      IMPRESSION:    CT HEAD:  No acute intracranial findings.    Slightly increased mild-moderate chronic microvascular ischemic changes.    CT CERVICAL SPINE:  No acute cervical fracture or facet subluxation.    --- End of Report ---            MARCELA BLANC MD; Attending Radiologist  This document has been electronically signed. Oct 31 2024 12:39PM (10-31-24 @ 12:11)    RECENT DIAGNOSTIC ORDERS:  Diet, NPO after Midnight:      NPO Start Date: 03-Nov-2024,   NPO Start Time: 23:59 (11-03-24 @ 14:05)  Type + Screen: 16:00 (11-03-24 @ 14:05)  Packed Red Cells Order:  1 Unit  Indication: Anemia due to Active Hemorrhage - Operative, Obstetric,  Infuse Unit : As Fast As Possible --- ON HOLD FOR OR (11-03-24 @ 11:11)  Packed Red Cells Order:  1 Unit  Indication: Anemia due to Active Hemorrhage - Operative, Obstetric,  Infuse Unit : As Fast As Possible --- ON HOLD FOR OR (11-03-24 @ 11:11)      MEDICATIONS:  MEDICATIONS  (STANDING):  Austedo XR 24 milliGRAM(s) 24 milliGRAM(s) Oral daily  calcium carbonate   1250 mG (OsCal) 1 Tablet(s) Oral two times a day  cefTRIAXone   IVPB 1000 milliGRAM(s) IV Intermittent every 24 hours  chlorhexidine 2% Cloths 1 Application(s) Topical <User Schedule>  cholecalciferol 1000 Unit(s) Oral daily  heparin   Injectable 5000 Unit(s) SubCutaneous every 8 hours  lactated ringers. 1000 milliLiter(s) (75 mL/Hr) IV Continuous <Continuous>  magnesium oxide 400 milliGRAM(s) Oral two times a day with meals  polyethylene glycol 3350 17 Gram(s) Oral two times a day  senna 2 Tablet(s) Oral at bedtime  valproic  acid Syrup 500 milliGRAM(s) Oral <User Schedule>  valproic  acid Syrup 1000 milliGRAM(s) Oral <User Schedule>    MEDICATIONS  (PRN):  acetaminophen     Tablet .. 650 milliGRAM(s) Oral every 6 hours PRN Mild Pain (1 - 3)  oxyCODONE    IR 2.5 milliGRAM(s) Oral every 6 hours PRN Moderate Pain (4 - 6)      HOME MEDICATIONS:  Austedo XR 24 mg oral tablet, extended release (10-31)  cholecalciferol 25 mcg (1000 intl units) oral capsule (10-31)  FiberCon 625 mg oral tablet (10-31)  magnesium gluconate 250 mg oral tablet (10-31)  Oysco 500 (1250 mg calcium carbonate) oral tablet (10-31)  Tylenol 325 mg oral capsule (10-31)  valproic acid 250 mg/5 mL oral liquid (10-31)      PHYSICAL EXAM:  GENERAL:   CHEST/LUNG:   HEART:   ABDOMEN:   EXTREMITIES:               ENRIQUEZBABAR  84y, Female  Allergy: No Known Allergies  lactose (Stomach Upset)    Hospital Day: 3d    Patient seen and examined earlier today.     PMH/PSH:  PAST MEDICAL & SURGICAL HISTORY:  Atypical bipolar disorder      Mild mental retardation      Brito esophagus      Hyperlipemia      Breast mass, right  s/p lumpectomy & radiation 2007      Feeding by G-tube  s/p removal 2012      Femoral neck fracture          LAST 24-Hr EVENTS:    VITALS:  T(F): 98.6 (11-03-24 @ 07:30), Max: 99.1 (11-02-24 @ 23:55)  HR: 84 (11-03-24 @ 07:30)  BP: 120/69 (11-03-24 @ 07:30) (115/64 - 140/70)  RR: 18 (11-03-24 @ 07:30)  SpO2: 91% (11-03-24 @ 07:30)          TESTS & MEASUREMENTS:  Weight/BMI  70 (11-02-24 @ 06:28)    11-01-24 @ 07:01  -  11-02-24 @ 07:00  --------------------------------------------------------  IN: 300 mL / OUT: 300 mL / NET: 0 mL    11-02-24 @ 08:01  -  11-03-24 @ 07:00  --------------------------------------------------------  IN: 0 mL / OUT: 475 mL / NET: -475 mL                            12.0   7.03  )-----------( 147      ( 03 Nov 2024 06:49 )             35.8       INR: 1.08 ratio (10-31-24 @ 11:25)    11-03    143  |  107  |  17  ----------------------------<  125[H]  3.8   |  26  |  <0.5[L]    Ca    8.8      03 Nov 2024 06:49  Mg     1.9     11-03    TPro  5.4[L]  /  Alb  3.2[L]  /  TBili  0.3  /  DBili  x   /  AST  15  /  ALT  11  /  AlkPhos  59  11-03    LIVER FUNCTIONS - ( 03 Nov 2024 06:49 )  Alb: 3.2 g/dL / Pro: 5.4 g/dL / ALK PHOS: 59 U/L / ALT: 11 U/L / AST: 15 U/L / GGT: x                 Urinalysis Basic - ( 03 Nov 2024 06:49 )    Color: x / Appearance: x / SG: x / pH: x  Gluc: 125 mg/dL / Ketone: x  / Bili: x / Urobili: x   Blood: x / Protein: x / Nitrite: x   Leuk Esterase: x / RBC: x / WBC x   Sq Epi: x / Non Sq Epi: x / Bacteria: x                            RADIOLOGY, ECG, & ADDITIONAL TESTS:  12 Lead ECG:   Ventricular Rate 91 BPM    Atrial Rate 91 BPM    P-R Interval 154 ms    QRS Duration 106 ms    Q-T Interval 368 ms    QTC Calculation(Bazett) 452 ms    P Axis 69 degrees    R Axis -53 degrees    T Axis 51 degrees    Diagnosis Line Normal sinus rhythm  Left anterior fascicular block  Voltage criteria for left ventricular hypertrophy  Possible Lateral infarct , age undetermined  Abnormal ECG    Confirmed by Kayode Lange ([id number]) on 10/31/2024 7:26:42 PM (10-31-24 @ 16:13)    CT Head No Cont:   ACC: [id number] EXAM:  CT CERVICAL SPINE   ORDERED BY: YUSRA VIGIL     ACC: [id number] EXAM:  CT BRAIN   ORDERED BY: YUSRA VIGIL     PROCEDURE DATE:  10/31/2024          INTERPRETATION:  CLINICAL INDICATION: Trauma.    TECHNIQUE: CT of the head and cervical spine was performed without the   administration of intravenous contrast.    COMPARISON: CT head dated 12/10/2022.    FINDINGS:    CT HEAD:    There is stable prominence of the sulci, sylvian fissures, and   ventricles, reflecting mild diffuse parenchymal volume loss.    There are increased scattered patchy low attenuations in bilateral   periventricular and subcortical cerebral white matter consistent with   mild-moderate chronic microvascular ischemic changes.    No evidence of acuteterritorial infarct, intracranial hemorrhage or mass   lesion.    No hydrocephalus. There are no extra-axial fluid collections.    Status post right cataract surgery, stable. The imaged portions of the   paranasal sinuses are clear. The mastoid air cells are clear. The   visualized soft tissues and osseous structures appear normal.      CT CERVICAL SPINE:    The alignment is normal.    There is no evidence of acute fracture, compression deformity or facet   subluxation of the cervical spine.    The atlantoaxial relationships are maintained. The posterior elements are   intact. There is no significant prevertebral soft tissue swelling. The   visualized paraspinal soft tissues are unremarkable.    Stable severe multilevel endplate degenerative changes with marginal   osteophyte formation, uncovertebral spurring, loss of normal disc space   height as well as facet joint space compartment narrowing.    There is no high-grade spinal canal stenosis. Please note spinal canal   contents are suboptimally evaluated inherent to CT technique.    The visualized lung apices are within normal limits.      IMPRESSION:    CT HEAD:  No acute intracranial findings.    Slightly increased mild-moderate chronic microvascular ischemic changes.    CT CERVICAL SPINE:  No acute cervical fracture or facet subluxation.    --- End of Report ---            MARCELA BLANC MD; Attending Radiologist  This document has been electronically signed. Oct 31 2024 12:39PM (10-31-24 @ 12:11)    RECENT DIAGNOSTIC ORDERS:  Diet, NPO after Midnight:      NPO Start Date: 03-Nov-2024,   NPO Start Time: 23:59 (11-03-24 @ 14:05)  Type + Screen: 16:00 (11-03-24 @ 14:05)  Packed Red Cells Order:  1 Unit  Indication: Anemia due to Active Hemorrhage - Operative, Obstetric,  Infuse Unit : As Fast As Possible --- ON HOLD FOR OR (11-03-24 @ 11:11)  Packed Red Cells Order:  1 Unit  Indication: Anemia due to Active Hemorrhage - Operative, Obstetric,  Infuse Unit : As Fast As Possible --- ON HOLD FOR OR (11-03-24 @ 11:11)      MEDICATIONS:  MEDICATIONS  (STANDING):  Austedo XR 24 milliGRAM(s) 24 milliGRAM(s) Oral daily  calcium carbonate   1250 mG (OsCal) 1 Tablet(s) Oral two times a day  cefTRIAXone   IVPB 1000 milliGRAM(s) IV Intermittent every 24 hours  chlorhexidine 2% Cloths 1 Application(s) Topical <User Schedule>  cholecalciferol 1000 Unit(s) Oral daily  heparin   Injectable 5000 Unit(s) SubCutaneous every 8 hours  lactated ringers. 1000 milliLiter(s) (75 mL/Hr) IV Continuous <Continuous>  magnesium oxide 400 milliGRAM(s) Oral two times a day with meals  polyethylene glycol 3350 17 Gram(s) Oral two times a day  senna 2 Tablet(s) Oral at bedtime  valproic  acid Syrup 500 milliGRAM(s) Oral <User Schedule>  valproic  acid Syrup 1000 milliGRAM(s) Oral <User Schedule>    MEDICATIONS  (PRN):  acetaminophen     Tablet .. 650 milliGRAM(s) Oral every 6 hours PRN Mild Pain (1 - 3)  oxyCODONE    IR 2.5 milliGRAM(s) Oral every 6 hours PRN Moderate Pain (4 - 6)      HOME MEDICATIONS:  Austedo XR 24 mg oral tablet, extended release (10-31)  cholecalciferol 25 mcg (1000 intl units) oral capsule (10-31)  FiberCon 625 mg oral tablet (10-31)  magnesium gluconate 250 mg oral tablet (10-31)  Oysco 500 (1250 mg calcium carbonate) oral tablet (10-31)  Tylenol 325 mg oral capsule (10-31)  valproic acid 250 mg/5 mL oral liquid (10-31)      PHYSICAL EXAM:  done 11/3/2024  late entry   On exam  General: awake, alert, oriented to self NAD, chronic ill appearance, edentulous   Lungs:  clear to ausculations b/l, normal resp effort  Heart: regular ryhthm , did not appreciated obvious murmur   Abdomen: soft, non tender non distended  Ext: Nn edema b/l , left LE short, ext rotated,tender on left hip area , no focal tender on the wrists ,

## 2024-11-04 ENCOUNTER — RESULT REVIEW (OUTPATIENT)
Age: 84
End: 2024-11-04

## 2024-11-04 LAB
ALBUMIN SERPL ELPH-MCNC: 3.2 G/DL — LOW (ref 3.5–5.2)
ALP SERPL-CCNC: 64 U/L — SIGNIFICANT CHANGE UP (ref 30–115)
ALT FLD-CCNC: 13 U/L — SIGNIFICANT CHANGE UP (ref 0–41)
ANION GAP SERPL CALC-SCNC: 12 MMOL/L — SIGNIFICANT CHANGE UP (ref 7–14)
AST SERPL-CCNC: 17 U/L — SIGNIFICANT CHANGE UP (ref 0–41)
BASOPHILS # BLD AUTO: 0.04 K/UL — SIGNIFICANT CHANGE UP (ref 0–0.2)
BASOPHILS NFR BLD AUTO: 0.6 % — SIGNIFICANT CHANGE UP (ref 0–1)
BILIRUB SERPL-MCNC: 0.3 MG/DL — SIGNIFICANT CHANGE UP (ref 0.2–1.2)
BUN SERPL-MCNC: 13 MG/DL — SIGNIFICANT CHANGE UP (ref 10–20)
CALCIUM SERPL-MCNC: 8.4 MG/DL — SIGNIFICANT CHANGE UP (ref 8.4–10.5)
CHLORIDE SERPL-SCNC: 105 MMOL/L — SIGNIFICANT CHANGE UP (ref 98–110)
CO2 SERPL-SCNC: 25 MMOL/L — SIGNIFICANT CHANGE UP (ref 17–32)
CREAT SERPL-MCNC: <0.5 MG/DL — LOW (ref 0.7–1.5)
EGFR: 98 ML/MIN/1.73M2 — SIGNIFICANT CHANGE UP
EOSINOPHIL # BLD AUTO: 0.16 K/UL — SIGNIFICANT CHANGE UP (ref 0–0.7)
EOSINOPHIL NFR BLD AUTO: 2.5 % — SIGNIFICANT CHANGE UP (ref 0–8)
GLUCOSE SERPL-MCNC: 87 MG/DL — SIGNIFICANT CHANGE UP (ref 70–99)
HCT VFR BLD CALC: 36.1 % — LOW (ref 37–47)
HGB BLD-MCNC: 12 G/DL — SIGNIFICANT CHANGE UP (ref 12–16)
IMM GRANULOCYTES NFR BLD AUTO: 0.6 % — HIGH (ref 0.1–0.3)
LYMPHOCYTES # BLD AUTO: 2.08 K/UL — SIGNIFICANT CHANGE UP (ref 1.2–3.4)
LYMPHOCYTES # BLD AUTO: 32 % — SIGNIFICANT CHANGE UP (ref 20.5–51.1)
MAGNESIUM SERPL-MCNC: 2 MG/DL — SIGNIFICANT CHANGE UP (ref 1.8–2.4)
MCHC RBC-ENTMCNC: 31.5 PG — HIGH (ref 27–31)
MCHC RBC-ENTMCNC: 33.2 G/DL — SIGNIFICANT CHANGE UP (ref 32–37)
MCV RBC AUTO: 94.8 FL — SIGNIFICANT CHANGE UP (ref 81–99)
MONOCYTES # BLD AUTO: 0.68 K/UL — HIGH (ref 0.1–0.6)
MONOCYTES NFR BLD AUTO: 10.4 % — HIGH (ref 1.7–9.3)
NEUTROPHILS # BLD AUTO: 3.51 K/UL — SIGNIFICANT CHANGE UP (ref 1.4–6.5)
NEUTROPHILS NFR BLD AUTO: 53.9 % — SIGNIFICANT CHANGE UP (ref 42.2–75.2)
NRBC # BLD: 0 /100 WBCS — SIGNIFICANT CHANGE UP (ref 0–0)
PLATELET # BLD AUTO: 152 K/UL — SIGNIFICANT CHANGE UP (ref 130–400)
PMV BLD: 10.5 FL — HIGH (ref 7.4–10.4)
POTASSIUM SERPL-MCNC: 4.3 MMOL/L — SIGNIFICANT CHANGE UP (ref 3.5–5)
POTASSIUM SERPL-SCNC: 4.3 MMOL/L — SIGNIFICANT CHANGE UP (ref 3.5–5)
PROT SERPL-MCNC: 5.5 G/DL — LOW (ref 6–8)
RBC # BLD: 3.81 M/UL — LOW (ref 4.2–5.4)
RBC # FLD: 12.1 % — SIGNIFICANT CHANGE UP (ref 11.5–14.5)
SODIUM SERPL-SCNC: 142 MMOL/L — SIGNIFICANT CHANGE UP (ref 135–146)
WBC # BLD: 6.51 K/UL — SIGNIFICANT CHANGE UP (ref 4.8–10.8)
WBC # FLD AUTO: 6.51 K/UL — SIGNIFICANT CHANGE UP (ref 4.8–10.8)

## 2024-11-04 PROCEDURE — 72170 X-RAY EXAM OF PELVIS: CPT | Mod: 26

## 2024-11-04 PROCEDURE — 99232 SBSQ HOSP IP/OBS MODERATE 35: CPT

## 2024-11-04 PROCEDURE — 88305 TISSUE EXAM BY PATHOLOGIST: CPT | Mod: 26

## 2024-11-04 PROCEDURE — 88311 DECALCIFY TISSUE: CPT | Mod: 26

## 2024-11-04 PROCEDURE — 27236 TREAT THIGH FRACTURE: CPT | Mod: LT

## 2024-11-04 RX ORDER — CALCIUM CARBONATE 500(1250)
1 TABLET ORAL
Refills: 0 | Status: DISCONTINUED | OUTPATIENT
Start: 2024-11-04 | End: 2024-11-18

## 2024-11-04 RX ORDER — VALPROIC ACID 250 MG/5ML
1000 SOLUTION ORAL
Refills: 0 | Status: DISCONTINUED | OUTPATIENT
Start: 2024-11-04 | End: 2024-11-18

## 2024-11-04 RX ORDER — VALPROIC ACID 250 MG/5ML
500 SOLUTION ORAL
Refills: 0 | Status: DISCONTINUED | OUTPATIENT
Start: 2024-11-04 | End: 2024-11-18

## 2024-11-04 RX ORDER — OXYCODONE HYDROCHLORIDE 30 MG/1
2.5 TABLET ORAL EVERY 6 HOURS
Refills: 0 | Status: DISCONTINUED | OUTPATIENT
Start: 2024-11-04 | End: 2024-11-06

## 2024-11-04 RX ORDER — 0.9 % SODIUM CHLORIDE 0.9 %
1000 INTRAVENOUS SOLUTION INTRAVENOUS
Refills: 0 | Status: DISCONTINUED | OUTPATIENT
Start: 2024-11-04 | End: 2024-11-09

## 2024-11-04 RX ORDER — SENNOSIDES 8.6 MG
2 TABLET ORAL AT BEDTIME
Refills: 0 | Status: DISCONTINUED | OUTPATIENT
Start: 2024-11-04 | End: 2024-11-13

## 2024-11-04 RX ORDER — ONDANSETRON HYDROCHLORIDE 4 MG/1
4 TABLET, FILM COATED ORAL ONCE
Refills: 0 | Status: DISCONTINUED | OUTPATIENT
Start: 2024-11-04 | End: 2024-11-13

## 2024-11-04 RX ORDER — HYDROMORPHONE HYDROCHLORIDE 2 MG/1
0.5 TABLET ORAL
Refills: 0 | Status: DISCONTINUED | OUTPATIENT
Start: 2024-11-04 | End: 2024-11-05

## 2024-11-04 RX ORDER — HYDROMORPHONE HYDROCHLORIDE 2 MG/1
1 TABLET ORAL
Refills: 0 | Status: DISCONTINUED | OUTPATIENT
Start: 2024-11-04 | End: 2024-11-05

## 2024-11-04 RX ORDER — CHOLECALCIFEROL (VITAMIN D3) 10MCG/0.25
1000 DROPS ORAL DAILY
Refills: 0 | Status: DISCONTINUED | OUTPATIENT
Start: 2024-11-04 | End: 2024-11-18

## 2024-11-04 RX ORDER — CHLORHEXIDINE GLUCONATE 1.2 MG/ML
1 RINSE ORAL
Refills: 0 | Status: DISCONTINUED | OUTPATIENT
Start: 2024-11-04 | End: 2024-11-18

## 2024-11-04 RX ORDER — POLYETHYLENE GLYCOL 3350 17 G/17G
17 POWDER, FOR SOLUTION ORAL
Refills: 0 | Status: DISCONTINUED | OUTPATIENT
Start: 2024-11-04 | End: 2024-11-13

## 2024-11-04 RX ORDER — CEFAZOLIN SODIUM 10 G
2000 VIAL (EA) INJECTION EVERY 8 HOURS
Refills: 0 | Status: COMPLETED | OUTPATIENT
Start: 2024-11-04 | End: 2024-11-05

## 2024-11-04 RX ORDER — HEPARIN SODIUM,PORCINE 1000/ML
5000 VIAL (ML) INJECTION EVERY 8 HOURS
Refills: 0 | Status: DISCONTINUED | OUTPATIENT
Start: 2024-11-05 | End: 2024-11-18

## 2024-11-04 RX ORDER — ACETAMINOPHEN 500MG 500 MG/1
650 TABLET, COATED ORAL EVERY 6 HOURS
Refills: 0 | Status: DISCONTINUED | OUTPATIENT
Start: 2024-11-04 | End: 2024-11-18

## 2024-11-04 RX ADMIN — Medication 2 TABLET(S): at 23:37

## 2024-11-04 RX ADMIN — Medication 1 TABLET(S): at 05:04

## 2024-11-04 RX ADMIN — Medication 100 MILLILITER(S): at 20:00

## 2024-11-04 RX ADMIN — Medication 100 MILLIGRAM(S): at 22:15

## 2024-11-04 RX ADMIN — CHLORHEXIDINE GLUCONATE 1 APPLICATION(S): 1.2 RINSE ORAL at 05:14

## 2024-11-04 RX ADMIN — POLYETHYLENE GLYCOL 3350 17 GRAM(S): 17 POWDER, FOR SOLUTION ORAL at 05:04

## 2024-11-04 RX ADMIN — VALPROIC ACID 1000 MILLIGRAM(S): 250 SOLUTION ORAL at 23:37

## 2024-11-04 NOTE — PROGRESS NOTE ADULT - ASSESSMENT
84yoF hx intellectual disability, bipolar disorder, Breast mass (unknown side s/p lumpectomy and RT '07), asthma, hoover's, esophageal varices, s/p R cataract surgery, R hip and L humeral fx, hld here following fall.     #Acute L femoral neck fracture  #Unwitnessed fall, r/o syncope  #SIRS POA (wbc and HR) -- improved  - unclear story, sounds likely syncopal episode possibly orthostasis induced   - trauma workup negative  - wbc improved -- could be stress/pain related and dehydration   - remote tele x24hrs- periods of currently sinus tach  - EKG no acute ischemia ,  no active chest pain or SOB , trop and ck negative   - no reported cardiac issues   - METS<4, wheelchair dependent at baseline  - RCRI 0  - NSQIP  SCORE WITH HIGH RISK ( 74%)  OF POST OPERATIVE Delirium   - Pt is medically optimized for intermediate risk surgery   - 11/1 TTE - EF of 66 %. Grade I diastolic dysfunction.. Mild thickening of the anterior and posterior mitral valve leaflets. Moderate mitral annular calcification. . Mild aortic regurgitation.. Mild pulmonic valve regurgitation.  - multimodal pain therapy  - SLP eval appreciated   - c/w gentle hydration   - PT/OT after surgery   - patient is scheduled for surgery today 11/4    #Possible UTI / SIRS ON admission   - UA strong  positive , unreliable hx   - Blood Cx (-)  - get urine culture, c/w ceftriaxone for now     #Incidental R breast nodule  #Hx breast mass s/p lumpectomy and radiation  - outpt f/u and w/u    #Incidental cholelithasis  - asymptomatic at this time, no LFT elevation  - consider CCY as outpt    #Intellectual Disability  #Bipolar disorder  -c/w Valproic acid, austedo    #Asthma, not in acute exacerbation  #Esophageal varices  #Hiatal hernia  - monitor , resume home meds     #Constipation   - CTAP with Moderate tolarge rectal stool burden. No bowel obstruction.  - bowel regimen and monitor     MISC  #DVT prophylaxis: held prior to ORIF, resume Lovenox after procedure; duplex negative for DVTs  #GI prophylaxis: not indicated  #Diet: NPO prior to procedure, otherwise pureed, DASH/TLC, with moderately thick liquids  #Activity: Bedrest  #Code status: FULL CODE - REFER TO Palliative note for details , can not be dnr/dni with out approval by MHLS given her developmental disability   #Disposition: Acute  Pending: ORIF today 11/4  Contact: Ms. Koko Doshi [phone]  acting as surrogate for several years

## 2024-11-04 NOTE — PROGRESS NOTE ADULT - ASSESSMENT
84yoF hx intellectual disability, bipolar disorder, Breast mass (unknown side s/p lumpectomy and RT '07), asthma, hoover's, esophageal varices, s/p R cataract surgery, R hip and L humeral fx, hld here following fall.     []Acute L femoral neck fracture  []Unwitnessed fall, r/o syncope  [] SIRS POA ( wbc and HR)   -unclear story, sounds likely syncopal episode possibly orthostasis induced   -remote tele x24hrs- periods of currently sinus tach  -METS<4, wheelchair dependent at baseline  -RCRI 0  -NSQIP  SCORE WITH HIGH RISK ( 74%)  OF POST OPERATIVE Delirium   Pt is medically optimized for intermediate risk surgery   -EKG no acute ischemia ,  no active chest pain or SOB , trop and ck negative   no reported cardiac issues   lactate 1.3   TTE EF of 66 %. Grade I diastolic dysfunction.. Mild thickening of the anterior and posterior mitral valve leaflets.   Moderate mitral annular calcification. . Mild aortic regurgitation.. Mild pulmonic valve regurgitation.  -L/R wrist XR np acute  fractures  -multimodal pain therapy  -check bed orthostatics  -NPO sunday midnioght   SLP eval appreciated   wbc improved  could be stress/pain related and dehydration   monitor off abcx for now    c/w gentle hydration   CT head/neck negative for acute   get valporic level   PT/OT after surgery   spoke with ortho today they postponed for Monday for surgeon availability issue , they updated Ms. Sutherland     - Possible UTI / SIRS ON admission   UA strong  positive , unreliable hx    blood cultures NGTD , urine cultures was not collected , s/p 3days of ceftriaxone     #Incidental R breast nodule  #Hx breast mass s/p lumpectomy and radiation  -outpt f/u and w/u    #Incidental cholelithasis  -asymptomatic at this time, no LFT elevation  -consider CCY as outpt    #Intellectual Disability  #Bipolar disorder  -c/w VA, austedo    #Asthma, not in acute exacerbation  #Esophageal varices  #Hiatal hernia  monitor , resume home meds     Constipation   CTAP with Moderate tolarge rectal stool burden. No bowel obstruction.  bowel regimen and monitor   moving her bowel as per  staff ( they record all events)         #DVT ppx -HSQ held / scd - Resume DVT PPX TODY AFTER SURGERY   -VA duplx negative for dvt ppx     #GI ppx    Pending : ORIF today  -  resume DVT ppx after surgery     Family -Ms. Koko Doshi [phone]  acting as surrogate for several years    Palliative team consult appreciated - FULL CODE . - REFER TO Palliative note for details , can not be dnr/dni with out approval by MHLS given her developmental disability

## 2024-11-04 NOTE — BRIEF OPERATIVE NOTE - NSICDXBRIEFPROCEDURE_GEN_ALL_CORE_FT
PROCEDURES:  Hemiarthroplasty of left hip 04-Nov-2024 19:49:13  Ronan Paez   PROCEDURES:  ORIF, fracture, femoral neck 04-Nov-2024 20:17:53 CPT code 01902 David Garcia

## 2024-11-04 NOTE — PROGRESS NOTE ADULT - SUBJECTIVE AND OBJECTIVE BOX
SUBJECTIVE/OVERNIGHT EVENTS  Today is hospital day 4d. This morning patient was seen and examined at bedside, resting comfortably in bed. No acute or major events overnight.    MEDICATIONS  STANDING MEDICATIONS  Austedo XR 24 milliGRAM(s) 24 milliGRAM(s) Oral daily  calcium carbonate   1250 mG (OsCal) 1 Tablet(s) Oral two times a day  cefTRIAXone   IVPB 1000 milliGRAM(s) IV Intermittent every 24 hours  chlorhexidine 2% Cloths 1 Application(s) Topical <User Schedule>  cholecalciferol 1000 Unit(s) Oral daily  lactated ringers. 1000 milliLiter(s) IV Continuous <Continuous>  magnesium oxide 400 milliGRAM(s) Oral two times a day with meals  polyethylene glycol 3350 17 Gram(s) Oral two times a day  senna 2 Tablet(s) Oral at bedtime  valproic  acid Syrup 500 milliGRAM(s) Oral <User Schedule>  valproic  acid Syrup 1000 milliGRAM(s) Oral <User Schedule>    PRN MEDICATIONS  acetaminophen     Tablet .. 650 milliGRAM(s) Oral every 6 hours PRN  oxyCODONE    IR 2.5 milliGRAM(s) Oral every 6 hours PRN    VITALS  T(F): 97.4 (11-04-24 @ 08:18), Max: 98.5 (11-04-24 @ 00:13)  HR: 88 (11-04-24 @ 08:18) (70 - 94)  BP: 119/63 (11-04-24 @ 08:18) (117/84 - 129/72)  RR: 18 (11-04-24 @ 08:18) (18 - 19)  SpO2: 96% (11-04-24 @ 08:18) (93% - 96%)    PHYSICAL EXAM  GENERAL: NAD, lying in bed comfortably  HEAD:  Atraumatic, normocephalic  NECK: Supple, no JVD  HEART: Regular rate and rhythm, no murmurs, rubs, or gallops  LUNGS: Unlabored respirations.  Clear to auscultation bilaterally, no crackles, wheezing, or rhonchi  ABDOMEN: Soft, mildly distended, nontender to palpation  EXTREMITIES: 2+ peripheral pulses bilaterally. No clubbing, cyanosis, or edema  NERVOUS SYSTEM:  A&Ox3, no focal deficits   SKIN: No rashes or lesions    LABS             12.0   6.51  )-----------( 152      ( 11-04-24 @ 05:29 )             36.1     142  |  105  |  13  -------------------------<  87   11-04-24 @ 05:29  4.3  |  25  |  <0.5    Ca      8.4     11-04-24 @ 05:29  Mg     2.0     11-04-24 @ 05:29    TPro  5.5  /  Alb  3.2  /  TBili  0.3  /  DBili  x   /  AST  17  /  ALT  13  /  AlkPhos  64  /  GGT  x     11-04-24 @ 05:29        Urinalysis Basic - ( 04 Nov 2024 05:29 )    Color: x / Appearance: x / SG: x / pH: x  Gluc: 87 mg/dL / Ketone: x  / Bili: x / Urobili: x   Blood: x / Protein: x / Nitrite: x   Leuk Esterase: x / RBC: x / WBC x   Sq Epi: x / Non Sq Epi: x / Bacteria: x          Culture - Blood (collected 02 Nov 2024 11:29)  Source: .Blood BLOOD  Preliminary Report (03 Nov 2024 20:01):    No growth at 24 hours      IMAGING

## 2024-11-04 NOTE — PRE-ANESTHESIA EVALUATION ADULT - NSANTHADDINFOFT_GEN_ALL_CORE
risks, benefits, alternatives, discussed with the patient's family and they agree to proceed as planned. Patient seen, examined, consent obtained prior to transfer to OR

## 2024-11-04 NOTE — PROGRESS NOTE ADULT - ASSESSMENT
ORTHOPEDIC POST-OP CHECK    Subjective: POD0 s/p L hip hemiarthroplasty. Seen and examined at bedside. Doing well, pain controlled. Denies fevers, numbness/tingling. No other complaints.    MEDICATIONS  (STANDING):  calcium carbonate   1250 mG (OsCal) 1 Tablet(s) Oral two times a day  ceFAZolin   IVPB 2000 milliGRAM(s) IV Intermittent every 8 hours  chlorhexidine 2% Cloths 1 Application(s) Topical <User Schedule>  cholecalciferol 1000 Unit(s) Oral daily  lactated ringers. 1000 milliLiter(s) (75 mL/Hr) IV Continuous <Continuous>  lactated ringers. 1000 milliLiter(s) (100 mL/Hr) IV Continuous <Continuous>  magnesium oxide 400 milliGRAM(s) Oral two times a day with meals  polyethylene glycol 3350 17 Gram(s) Oral two times a day  senna 2 Tablet(s) Oral at bedtime  valproic  acid Syrup 500 milliGRAM(s) Oral <User Schedule>  valproic  acid Syrup 1000 milliGRAM(s) Oral <User Schedule>    MEDICATIONS  (PRN):  acetaminophen     Tablet .. 650 milliGRAM(s) Oral every 6 hours PRN Mild Pain (1 - 3)  HYDROmorphone  Injectable 0.5 milliGRAM(s) IV Push every 10 minutes PRN Moderate Pain (4 - 6)  HYDROmorphone  Injectable 1 milliGRAM(s) IV Push every 10 minutes PRN Severe Pain (7 -10)  ondansetron Injectable 4 milliGRAM(s) IV Push once PRN Nausea and/or Vomiting  oxyCODONE    IR 2.5 milliGRAM(s) Oral every 6 hours PRN Moderate Pain (4 - 6)      Objective:  T(C): 36.5 (11-04-24 @ 20:30), Max: 37.2 (11-04-24 @ 16:30)  HR: 92 (11-04-24 @ 20:45) (59 - 99)  BP: 113/56 (11-04-24 @ 20:45) (108/55 - 128/79)  RR: 13 (11-04-24 @ 20:45) (13 - 25)  SpO2: 97% (11-04-24 @ 20:45) (89% - 100%)    Physical Exam:    ***LE:  Dressing c/d/i  SILT s/s/sp/dp/t  Motor intact TA/EHL/GS  [facility]    Labs:                        12.0   6.51  )-----------( 152      ( 04 Nov 2024 05:29 )             36.1     11-04    142  |  105  |  13  ----------------------------<  87  4.3   |  25  |  <0.5[L]    Ca    8.4      04 Nov 2024 05:29  Mg     2.0     11-04    TPro  5.5[L]  /  Alb  3.2[L]  /  TBili  0.3  /  DBili  x   /  AST  17  /  ALT  13  /  AlkPhos  64  11-04      A/P: 84yFemale POD0 s/p L hip hemiarthroplasty on 11/4/24, doing well.    - Activity: WBAT LLE  - Abx: Ancef 2g q8hr x3 doses for a total of 24hrs post-operatively  - DVT PPx: LVX/SQH per primary team  - Pain control  - IS encouraged  - AM labs  - PT/Rehab  - D/C planning    - If discharged, patient should follow up with Dr. David Garcia at [address] [place] [address]. Phone number [phone] for scheduling/appointment.  - Patient should be discharged on 6 weeks (from surgery date) of appropriate DVT prophylaxis due to their decreased functional/ambulation status after lower extremity surgery. Orthopaedic preference would be Aspirin 81 mg BID  if there are no contraindications. If patient is already on home anticoagulation, they may continue home anticoagulation medication instead of aspirin. If patient is going to inpatient rehab/nursing facility, and they plan for DVT PPx with SQH/LVX, they may be placed on that instead of aspirin. ORTHOPEDIC POST-OP CHECK    Subjective: POD0 s/p L hip hemiarthroplasty. Seen and examined at bedside. Doing well, pain controlled. Denies fevers, numbness/tingling. No other complaints.    MEDICATIONS  (STANDING):  calcium carbonate   1250 mG (OsCal) 1 Tablet(s) Oral two times a day  ceFAZolin   IVPB 2000 milliGRAM(s) IV Intermittent every 8 hours  chlorhexidine 2% Cloths 1 Application(s) Topical <User Schedule>  cholecalciferol 1000 Unit(s) Oral daily  lactated ringers. 1000 milliLiter(s) (75 mL/Hr) IV Continuous <Continuous>  lactated ringers. 1000 milliLiter(s) (100 mL/Hr) IV Continuous <Continuous>  magnesium oxide 400 milliGRAM(s) Oral two times a day with meals  polyethylene glycol 3350 17 Gram(s) Oral two times a day  senna 2 Tablet(s) Oral at bedtime  valproic  acid Syrup 500 milliGRAM(s) Oral <User Schedule>  valproic  acid Syrup 1000 milliGRAM(s) Oral <User Schedule>    MEDICATIONS  (PRN):  acetaminophen     Tablet .. 650 milliGRAM(s) Oral every 6 hours PRN Mild Pain (1 - 3)  HYDROmorphone  Injectable 0.5 milliGRAM(s) IV Push every 10 minutes PRN Moderate Pain (4 - 6)  HYDROmorphone  Injectable 1 milliGRAM(s) IV Push every 10 minutes PRN Severe Pain (7 -10)  ondansetron Injectable 4 milliGRAM(s) IV Push once PRN Nausea and/or Vomiting  oxyCODONE    IR 2.5 milliGRAM(s) Oral every 6 hours PRN Moderate Pain (4 - 6)      Objective:  T(C): 36.5 (11-04-24 @ 20:30), Max: 37.2 (11-04-24 @ 16:30)  HR: 92 (11-04-24 @ 20:45) (59 - 99)  BP: 113/56 (11-04-24 @ 20:45) (108/55 - 128/79)  RR: 13 (11-04-24 @ 20:45) (13 - 25)  SpO2: 97% (11-04-24 @ 20:45) (89% - 100%)    Physical Exam:    LLE:  Dressing c/d/i  SILT s/s/sp/dp/t  Motor intact TA/EHL/GS  [facility]    Labs:                        12.0   6.51  )-----------( 152      ( 04 Nov 2024 05:29 )             36.1     11-04    142  |  105  |  13  ----------------------------<  87  4.3   |  25  |  <0.5[L]    Ca    8.4      04 Nov 2024 05:29  Mg     2.0     11-04    TPro  5.5[L]  /  Alb  3.2[L]  /  TBili  0.3  /  DBili  x   /  AST  17  /  ALT  13  /  AlkPhos  64  11-04      A/P: 84yFemale POD0 s/p L hip hemiarthroplasty on 11/4/24, doing well.    - Activity: WBAT LLE  - Abx: Ancef 2g q8hr x3 doses for a total of 24hrs post-operatively  - DVT PPx: LVX/SQH per primary team  - Pain control  - IS encouraged  - AM labs  - PT/Rehab  - D/C planning    - If discharged, patient should follow up with Dr. David Garcia at [address] [place] [address]. Phone number [phone] for scheduling/appointment.  - Patient should be discharged on 6 weeks (from surgery date) of appropriate DVT prophylaxis due to their decreased functional/ambulation status after lower extremity surgery. Orthopaedic preference would be Aspirin 81 mg BID  if there are no contraindications. If patient is already on home anticoagulation, they may continue home anticoagulation medication instead of aspirin. If patient is going to inpatient rehab/nursing facility, and they plan for DVT PPx with SQH/LVX, they may be placed on that instead of aspirin. ORTHOPEDIC POST-OP CHECK    Subjective: POD0 s/p L hip hemiarthroplasty. Seen and examined at bedside. Doing well, pain controlled. Denies fevers, numbness/tingling. No other complaints.    MEDICATIONS  (STANDING):  calcium carbonate   1250 mG (OsCal) 1 Tablet(s) Oral two times a day  ceFAZolin   IVPB 2000 milliGRAM(s) IV Intermittent every 8 hours  chlorhexidine 2% Cloths 1 Application(s) Topical <User Schedule>  cholecalciferol 1000 Unit(s) Oral daily  lactated ringers. 1000 milliLiter(s) (75 mL/Hr) IV Continuous <Continuous>  lactated ringers. 1000 milliLiter(s) (100 mL/Hr) IV Continuous <Continuous>  magnesium oxide 400 milliGRAM(s) Oral two times a day with meals  polyethylene glycol 3350 17 Gram(s) Oral two times a day  senna 2 Tablet(s) Oral at bedtime  valproic  acid Syrup 500 milliGRAM(s) Oral <User Schedule>  valproic  acid Syrup 1000 milliGRAM(s) Oral <User Schedule>    MEDICATIONS  (PRN):  acetaminophen     Tablet .. 650 milliGRAM(s) Oral every 6 hours PRN Mild Pain (1 - 3)  HYDROmorphone  Injectable 0.5 milliGRAM(s) IV Push every 10 minutes PRN Moderate Pain (4 - 6)  HYDROmorphone  Injectable 1 milliGRAM(s) IV Push every 10 minutes PRN Severe Pain (7 -10)  ondansetron Injectable 4 milliGRAM(s) IV Push once PRN Nausea and/or Vomiting  oxyCODONE    IR 2.5 milliGRAM(s) Oral every 6 hours PRN Moderate Pain (4 - 6)      Objective:  T(C): 36.5 (11-04-24 @ 20:30), Max: 37.2 (11-04-24 @ 16:30)  HR: 92 (11-04-24 @ 20:45) (59 - 99)  BP: 113/56 (11-04-24 @ 20:45) (108/55 - 128/79)  RR: 13 (11-04-24 @ 20:45) (13 - 25)  SpO2: 97% (11-04-24 @ 20:45) (89% - 100%)    Physical Exam:    LLE:  Dressing c/d/i  SILT s/s/sp/dp/t  Motor intact TA/EHL/GS  [facility]    Labs:                        12.0   6.51  )-----------( 152      ( 04 Nov 2024 05:29 )             36.1     11-04    142  |  105  |  13  ----------------------------<  87  4.3   |  25  |  <0.5[L]    Ca    8.4      04 Nov 2024 05:29  Mg     2.0     11-04    TPro  5.5[L]  /  Alb  3.2[L]  /  TBili  0.3  /  DBili  x   /  AST  17  /  ALT  13  /  AlkPhos  64  11-04      A/P: 84yFemale POD0 s/p L hip hemiarthroplasty on 11/4/24, doing well.    - Activity: WBAT LLE with anterolateral hip precautions  - Abx: Ancef 2g q8hr x3 doses for a total of 24hrs post-operatively  - DVT PPx: LVX/SQH per primary team  - Pain control  - IS encouraged  - AM labs  - PT/Rehab  - D/C planning    - If discharged, patient should follow up with Dr. David Garcia at [address] [place] [address]. Phone number [phone] for scheduling/appointment.  - Patient should be discharged on 6 weeks (from surgery date) of appropriate DVT prophylaxis due to their decreased functional/ambulation status after lower extremity surgery. Orthopaedic preference would be Aspirin 81 mg BID  if there are no contraindications. If patient is already on home anticoagulation, they may continue home anticoagulation medication instead of aspirin. If patient is going to inpatient rehab/nursing facility, and they plan for DVT PPx with SQH/LVX, they may be placed on that instead of aspirin.

## 2024-11-04 NOTE — PROGRESS NOTE ADULT - SUBJECTIVE AND OBJECTIVE BOX
T H I S   I S    N O  T   A    F I N A L I Z E D   N O T BABAR HIGGINS  84y, Female  Allergy: No Known Allergies  lactose (Stomach Upset)    Hospital Day: 4d    Patient seen and examined earlier today.     PMH/PSH:  PAST MEDICAL & SURGICAL HISTORY:  Atypical bipolar disorder      Mild mental retardation      Brito esophagus      Hyperlipemia      Breast mass, right  s/p lumpectomy & radiation 2007      Feeding by G-tube  s/p removal 2012      Femoral neck fracture          LAST 24-Hr EVENTS:    VITALS:  T(F): 98.9 (11-04-24 @ 16:30), Max: 98.9 (11-04-24 @ 16:30)  HR: 59 (11-04-24 @ 16:30)  BP: 126/74 (11-04-24 @ 16:30) (112/65 - 128/79)  RR: 22 (11-04-24 @ 16:30)  SpO2: 95% (11-04-24 @ 16:30)          TESTS & MEASUREMENTS:  Weight/BMI  70 (11-04-24 @ 06:39)    11-02-24 @ 08:01  -  11-03-24 @ 07:00  --------------------------------------------------------  IN: 0 mL / OUT: 475 mL / NET: -475 mL    11-03-24 @ 07:01  -  11-04-24 @ 07:00  --------------------------------------------------------  IN: 0 mL / OUT: 250 mL / NET: -250 mL                            12.0   6.51  )-----------( 152      ( 04 Nov 2024 05:29 )             36.1       INR: 1.08 ratio (10-31-24 @ 11:25)    11-04    142  |  105  |  13  ----------------------------<  87  4.3   |  25  |  <0.5[L]    Ca    8.4      04 Nov 2024 05:29  Mg     2.0     11-04    TPro  5.5[L]  /  Alb  3.2[L]  /  TBili  0.3  /  DBili  x   /  AST  17  /  ALT  13  /  AlkPhos  64  11-04    LIVER FUNCTIONS - ( 04 Nov 2024 05:29 )  Alb: 3.2 g/dL / Pro: 5.5 g/dL / ALK PHOS: 64 U/L / ALT: 13 U/L / AST: 17 U/L / GGT: x                 Culture - Blood (collected 11-02-24 @ 11:29)  Source: .Blood BLOOD  Preliminary Report (11-03-24 @ 20:01):    No growth at 24 hours      Urinalysis Basic - ( 04 Nov 2024 05:29 )    Color: x / Appearance: x / SG: x / pH: x  Gluc: 87 mg/dL / Ketone: x  / Bili: x / Urobili: x   Blood: x / Protein: x / Nitrite: x   Leuk Esterase: x / RBC: x / WBC x   Sq Epi: x / Non Sq Epi: x / Bacteria: x                            RADIOLOGY, ECG, & ADDITIONAL TESTS:  12 Lead ECG:   Ventricular Rate 91 BPM    Atrial Rate 91 BPM    P-R Interval 154 ms    QRS Duration 106 ms    Q-T Interval 368 ms    QTC Calculation(Bazett) 452 ms    P Axis 69 degrees    R Axis -53 degrees    T Axis 51 degrees    Diagnosis Line Normal sinus rhythm  Left anterior fascicular block  Voltage criteria for left ventricular hypertrophy  Possible Lateral infarct , age undetermined  Abnormal ECG    Confirmed by Kyaode Lange ([id number]) on 10/31/2024 7:26:42 PM (10-31-24 @ 16:13)    CT Head No Cont:   ACC: [id number] EXAM:  CT CERVICAL SPINE   ORDERED BY: YUSRA VIGIL     ACC: [id number] EXAM:  CT BRAIN   ORDERED BY: YUSRA VIGIL     PROCEDURE DATE:  10/31/2024          INTERPRETATION:  CLINICAL INDICATION: Trauma.    TECHNIQUE: CT of the head and cervical spine was performed without the   administration of intravenous contrast.    COMPARISON: CT head dated 12/10/2022.    FINDINGS:    CT HEAD:    There is stable prominence of the sulci, sylvian fissures, and   ventricles, reflecting mild diffuse parenchymal volume loss.    There are increased scattered patchy low attenuations in bilateral   periventricular and subcortical cerebral white matter consistent with   mild-moderate chronic microvascular ischemic changes.    No evidence of acuteterritorial infarct, intracranial hemorrhage or mass   lesion.    No hydrocephalus. There are no extra-axial fluid collections.    Status post right cataract surgery, stable. The imaged portions of the   paranasal sinuses are clear. The mastoid air cells are clear. The   visualized soft tissues and osseous structures appear normal.      CT CERVICAL SPINE:    The alignment is normal.    There is no evidence of acute fracture, compression deformity or facet   subluxation of the cervical spine.    The atlantoaxial relationships are maintained. The posterior elements are   intact. There is no significant prevertebral soft tissue swelling. The   visualized paraspinal soft tissues are unremarkable.    Stable severe multilevel endplate degenerative changes with marginal   osteophyte formation, uncovertebral spurring, loss of normal disc space   height as well as facet joint space compartment narrowing.    There is no high-grade spinal canal stenosis. Please note spinal canal   contents are suboptimally evaluated inherent to CT technique.    The visualized lung apices are within normal limits.      IMPRESSION:    CT HEAD:  No acute intracranial findings.    Slightly increased mild-moderate chronic microvascular ischemic changes.    CT CERVICAL SPINE:  No acute cervical fracture or facet subluxation.    --- End of Report ---            MARCELA BLANC MD; Attending Radiologist  This document has been electronically signed. Oct 31 2024 12:39PM (10-31-24 @ 12:11)    RECENT DIAGNOSTIC ORDERS:  Magnesium: AM Sched. Collection: 05-Nov-2024 04:30 (11-04-24 @ 12:06)  Comprehensive Metabolic Panel: AM Sched. Collection: 05-Nov-2024 04:30 (11-04-24 @ 12:06)  Complete Blood Count + Automated Diff: AM Sched. Collection: 05-Nov-2024 04:30 (11-04-24 @ 12:06)  Valproic Acid Level, Serum: AM Sched. Collection: 05-Nov-2024 04:30 (11-04-24 @ 12:06)      MEDICATIONS:  MEDICATIONS  (STANDING):  Austedo XR 24 milliGRAM(s) 24 milliGRAM(s) Oral daily  calcium carbonate   1250 mG (OsCal) 1 Tablet(s) Oral two times a day  chlorhexidine 2% Cloths 1 Application(s) Topical <User Schedule>  cholecalciferol 1000 Unit(s) Oral daily  lactated ringers. 1000 milliLiter(s) (75 mL/Hr) IV Continuous <Continuous>  magnesium oxide 400 milliGRAM(s) Oral two times a day with meals  polyethylene glycol 3350 17 Gram(s) Oral two times a day  senna 2 Tablet(s) Oral at bedtime  valproic  acid Syrup 1000 milliGRAM(s) Oral <User Schedule>  valproic  acid Syrup 500 milliGRAM(s) Oral <User Schedule>    MEDICATIONS  (PRN):  acetaminophen     Tablet .. 650 milliGRAM(s) Oral every 6 hours PRN Mild Pain (1 - 3)  oxyCODONE    IR 2.5 milliGRAM(s) Oral every 6 hours PRN Moderate Pain (4 - 6)      HOME MEDICATIONS:  Austedo XR 24 mg oral tablet, extended release (10-31)  cholecalciferol 25 mcg (1000 intl units) oral capsule (10-31)  FiberCon 625 mg oral tablet (10-31)  magnesium gluconate 250 mg oral tablet (10-31)  Oysco 500 (1250 mg calcium carbonate) oral tablet (10-31)  Tylenol 325 mg oral capsule (10-31)  valproic acid 250 mg/5 mL oral liquid (10-31)      PHYSICAL EXAM:  GENERAL:   CHEST/LUNG:   HEART:   ABDOMEN:   EXTREMITIES:             MINA, BABAR  84y, Female  Allergy: No Known Allergies  lactose (Stomach Upset)    Hospital Day: 4d    Patient seen and examined earlier today.  she stated that she has left  hip pain,  staff at bedside moved her bowel yesterday as per  staff     PMH/PSH:  PAST MEDICAL & SURGICAL HISTORY:  Atypical bipolar disorder      Mild mental retardation      Brito esophagus      Hyperlipemia      Breast mass, right  s/p lumpectomy & radiation 2007      Feeding by G-tube  s/p removal 2012      Femoral neck fracture          LAST 24-Hr EVENTS:    VITALS:  T(F): 98.9 (11-04-24 @ 16:30), Max: 98.9 (11-04-24 @ 16:30)  HR: 59 (11-04-24 @ 16:30)  BP: 126/74 (11-04-24 @ 16:30) (112/65 - 128/79)  RR: 22 (11-04-24 @ 16:30)  SpO2: 95% (11-04-24 @ 16:30)          TESTS & MEASUREMENTS:  Weight/BMI  70 (11-04-24 @ 06:39)    11-02-24 @ 08:01  -  11-03-24 @ 07:00  --------------------------------------------------------  IN: 0 mL / OUT: 475 mL / NET: -475 mL    11-03-24 @ 07:01  -  11-04-24 @ 07:00  --------------------------------------------------------  IN: 0 mL / OUT: 250 mL / NET: -250 mL                            12.0   6.51  )-----------( 152      ( 04 Nov 2024 05:29 )             36.1       INR: 1.08 ratio (10-31-24 @ 11:25)    11-04    142  |  105  |  13  ----------------------------<  87  4.3   |  25  |  <0.5[L]    Ca    8.4      04 Nov 2024 05:29  Mg     2.0     11-04    TPro  5.5[L]  /  Alb  3.2[L]  /  TBili  0.3  /  DBili  x   /  AST  17  /  ALT  13  /  AlkPhos  64  11-04    LIVER FUNCTIONS - ( 04 Nov 2024 05:29 )  Alb: 3.2 g/dL / Pro: 5.5 g/dL / ALK PHOS: 64 U/L / ALT: 13 U/L / AST: 17 U/L / GGT: x                 Culture - Blood (collected 11-02-24 @ 11:29)  Source: .Blood BLOOD  Preliminary Report (11-03-24 @ 20:01):    No growth at 24 hours      Urinalysis Basic - ( 04 Nov 2024 05:29 )    Color: x / Appearance: x / SG: x / pH: x  Gluc: 87 mg/dL / Ketone: x  / Bili: x / Urobili: x   Blood: x / Protein: x / Nitrite: x   Leuk Esterase: x / RBC: x / WBC x   Sq Epi: x / Non Sq Epi: x / Bacteria: x                            RADIOLOGY, ECG, & ADDITIONAL TESTS:  12 Lead ECG:   Ventricular Rate 91 BPM    Atrial Rate 91 BPM    P-R Interval 154 ms    QRS Duration 106 ms    Q-T Interval 368 ms    QTC Calculation(Bazett) 452 ms    P Axis 69 degrees    R Axis -53 degrees    T Axis 51 degrees    Diagnosis Line Normal sinus rhythm  Left anterior fascicular block  Voltage criteria for left ventricular hypertrophy  Possible Lateral infarct , age undetermined  Abnormal ECG    Confirmed by Kayode Lange ([id number]) on 10/31/2024 7:26:42 PM (10-31-24 @ 16:13)    CT Head No Cont:   ACC: [id number] EXAM:  CT CERVICAL SPINE   ORDERED BY: YUSRA VIGIL     ACC: [id number] EXAM:  CT BRAIN   ORDERED BY: YUSRA VIGIL     PROCEDURE DATE:  10/31/2024          INTERPRETATION:  CLINICAL INDICATION: Trauma.    TECHNIQUE: CT of the head and cervical spine was performed without the   administration of intravenous contrast.    COMPARISON: CT head dated 12/10/2022.    FINDINGS:    CT HEAD:    There is stable prominence of the sulci, sylvian fissures, and   ventricles, reflecting mild diffuse parenchymal volume loss.    There are increased scattered patchy low attenuations in bilateral   periventricular and subcortical cerebral white matter consistent with   mild-moderate chronic microvascular ischemic changes.    No evidence of acuteterritorial infarct, intracranial hemorrhage or mass   lesion.    No hydrocephalus. There are no extra-axial fluid collections.    Status post right cataract surgery, stable. The imaged portions of the   paranasal sinuses are clear. The mastoid air cells are clear. The   visualized soft tissues and osseous structures appear normal.      CT CERVICAL SPINE:    The alignment is normal.    There is no evidence of acute fracture, compression deformity or facet   subluxation of the cervical spine.    The atlantoaxial relationships are maintained. The posterior elements are   intact. There is no significant prevertebral soft tissue swelling. The   visualized paraspinal soft tissues are unremarkable.    Stable severe multilevel endplate degenerative changes with marginal   osteophyte formation, uncovertebral spurring, loss of normal disc space   height as well as facet joint space compartment narrowing.    There is no high-grade spinal canal stenosis. Please note spinal canal   contents are suboptimally evaluated inherent to CT technique.    The visualized lung apices are within normal limits.      IMPRESSION:    CT HEAD:  No acute intracranial findings.    Slightly increased mild-moderate chronic microvascular ischemic changes.    CT CERVICAL SPINE:  No acute cervical fracture or facet subluxation.    --- End of Report ---            MARCELA BLANC MD; Attending Radiologist  This document has been electronically signed. Oct 31 2024 12:39PM (10-31-24 @ 12:11)    RECENT DIAGNOSTIC ORDERS:  Magnesium: AM Sched. Collection: 05-Nov-2024 04:30 (11-04-24 @ 12:06)  Comprehensive Metabolic Panel: AM Sched. Collection: 05-Nov-2024 04:30 (11-04-24 @ 12:06)  Complete Blood Count + Automated Diff: AM Sched. Collection: 05-Nov-2024 04:30 (11-04-24 @ 12:06)  Valproic Acid Level, Serum: AM Sched. Collection: 05-Nov-2024 04:30 (11-04-24 @ 12:06)      MEDICATIONS:  MEDICATIONS  (STANDING):  Austedo XR 24 milliGRAM(s) 24 milliGRAM(s) Oral daily  calcium carbonate   1250 mG (OsCal) 1 Tablet(s) Oral two times a day  chlorhexidine 2% Cloths 1 Application(s) Topical <User Schedule>  cholecalciferol 1000 Unit(s) Oral daily  lactated ringers. 1000 milliLiter(s) (75 mL/Hr) IV Continuous <Continuous>  magnesium oxide 400 milliGRAM(s) Oral two times a day with meals  polyethylene glycol 3350 17 Gram(s) Oral two times a day  senna 2 Tablet(s) Oral at bedtime  valproic  acid Syrup 1000 milliGRAM(s) Oral <User Schedule>  valproic  acid Syrup 500 milliGRAM(s) Oral <User Schedule>    MEDICATIONS  (PRN):  acetaminophen     Tablet .. 650 milliGRAM(s) Oral every 6 hours PRN Mild Pain (1 - 3)  oxyCODONE    IR 2.5 milliGRAM(s) Oral every 6 hours PRN Moderate Pain (4 - 6)      HOME MEDICATIONS:  Austedo XR 24 mg oral tablet, extended release (10-31)  cholecalciferol 25 mcg (1000 intl units) oral capsule (10-31)  FiberCon 625 mg oral tablet (10-31)  magnesium gluconate 250 mg oral tablet (10-31)  Oysco 500 (1250 mg calcium carbonate) oral tablet (10-31)  Tylenol 325 mg oral capsule (10-31)  valproic acid 250 mg/5 mL oral liquid (10-31)      PHYSICAL EXAM:  On exam  General: awake, alert, oriented to self NAD, chronic ill appearance, edentulous   Lungs:  clear to ausculations b/l, normal resp effort  Heart: regular ryhthm , did not appreciated obvious murmur   Abdomen: soft, non tender non distended  Ext: Nn edema b/l , left LE short, ext rotated,tender on left hip area , no focal tender on the wrists ,

## 2024-11-04 NOTE — BRIEF OPERATIVE NOTE - OPERATION/FINDINGS
Displaced intracapsular left femoral neck fracture now status post cemented hemiarthroplasty  Depuy [place]  Cup 44mm   Head 28+1.5  Centralizer 10mm  Stem: Size 5 standard Displaced intracapsular left femoral neck fracture; post op WBAT with anterolateral hip precautions; Abx and DVT ppx per protocol  Dict:  Implants: Depuy [place] Size 5 standard offset with  +1.25/28/44 mm Bipolar with Simplex PMMA without Abx   Displaced intracapsular left femoral neck fracture; post op WBAT with anterolateral hip precautions; Abx and DVT ppx per protocol  Dict:90100  Implants: Depuy [place] Size 5 standard offset with  +1.25/28/44 mm Bipolar with Simplex PMMA without Abx

## 2024-11-05 LAB
ALBUMIN SERPL ELPH-MCNC: 3 G/DL — LOW (ref 3.5–5.2)
ALP SERPL-CCNC: 73 U/L — SIGNIFICANT CHANGE UP (ref 30–115)
ALT FLD-CCNC: 17 U/L — SIGNIFICANT CHANGE UP (ref 0–41)
ANION GAP SERPL CALC-SCNC: 14 MMOL/L — SIGNIFICANT CHANGE UP (ref 7–14)
AST SERPL-CCNC: 27 U/L — SIGNIFICANT CHANGE UP (ref 0–41)
BASOPHILS # BLD AUTO: 0.04 K/UL — SIGNIFICANT CHANGE UP (ref 0–0.2)
BASOPHILS NFR BLD AUTO: 0.4 % — SIGNIFICANT CHANGE UP (ref 0–1)
BILIRUB SERPL-MCNC: 0.3 MG/DL — SIGNIFICANT CHANGE UP (ref 0.2–1.2)
BUN SERPL-MCNC: 20 MG/DL — SIGNIFICANT CHANGE UP (ref 10–20)
CALCIUM SERPL-MCNC: 8.5 MG/DL — SIGNIFICANT CHANGE UP (ref 8.4–10.5)
CHLORIDE SERPL-SCNC: 103 MMOL/L — SIGNIFICANT CHANGE UP (ref 98–110)
CO2 SERPL-SCNC: 22 MMOL/L — SIGNIFICANT CHANGE UP (ref 17–32)
CREAT SERPL-MCNC: 0.6 MG/DL — LOW (ref 0.7–1.5)
EGFR: 88 ML/MIN/1.73M2 — SIGNIFICANT CHANGE UP
EOSINOPHIL # BLD AUTO: 0.01 K/UL — SIGNIFICANT CHANGE UP (ref 0–0.7)
EOSINOPHIL NFR BLD AUTO: 0.1 % — SIGNIFICANT CHANGE UP (ref 0–8)
GLUCOSE SERPL-MCNC: 121 MG/DL — HIGH (ref 70–99)
HCT VFR BLD CALC: 35 % — LOW (ref 37–47)
HGB BLD-MCNC: 11.5 G/DL — LOW (ref 12–16)
IMM GRANULOCYTES NFR BLD AUTO: 0.8 % — HIGH (ref 0.1–0.3)
LYMPHOCYTES # BLD AUTO: 1.42 K/UL — SIGNIFICANT CHANGE UP (ref 1.2–3.4)
LYMPHOCYTES # BLD AUTO: 14.6 % — LOW (ref 20.5–51.1)
MAGNESIUM SERPL-MCNC: 2 MG/DL — SIGNIFICANT CHANGE UP (ref 1.8–2.4)
MCHC RBC-ENTMCNC: 31.8 PG — HIGH (ref 27–31)
MCHC RBC-ENTMCNC: 32.9 G/DL — SIGNIFICANT CHANGE UP (ref 32–37)
MCV RBC AUTO: 96.7 FL — SIGNIFICANT CHANGE UP (ref 81–99)
MONOCYTES # BLD AUTO: 0.87 K/UL — HIGH (ref 0.1–0.6)
MONOCYTES NFR BLD AUTO: 8.9 % — SIGNIFICANT CHANGE UP (ref 1.7–9.3)
NEUTROPHILS # BLD AUTO: 7.32 K/UL — HIGH (ref 1.4–6.5)
NEUTROPHILS NFR BLD AUTO: 75.2 % — SIGNIFICANT CHANGE UP (ref 42.2–75.2)
NRBC # BLD: 0 /100 WBCS — SIGNIFICANT CHANGE UP (ref 0–0)
PLATELET # BLD AUTO: 122 K/UL — LOW (ref 130–400)
PMV BLD: 11.3 FL — HIGH (ref 7.4–10.4)
POTASSIUM SERPL-MCNC: 5.3 MMOL/L — HIGH (ref 3.5–5)
POTASSIUM SERPL-SCNC: 5.3 MMOL/L — HIGH (ref 3.5–5)
PROT SERPL-MCNC: 5.3 G/DL — LOW (ref 6–8)
RBC # BLD: 3.62 M/UL — LOW (ref 4.2–5.4)
RBC # FLD: 11.9 % — SIGNIFICANT CHANGE UP (ref 11.5–14.5)
SODIUM SERPL-SCNC: 139 MMOL/L — SIGNIFICANT CHANGE UP (ref 135–146)
VALPROATE SERPL-MCNC: 54 UG/ML — SIGNIFICANT CHANGE UP (ref 50–100)
WBC # BLD: 9.74 K/UL — SIGNIFICANT CHANGE UP (ref 4.8–10.8)
WBC # FLD AUTO: 9.74 K/UL — SIGNIFICANT CHANGE UP (ref 4.8–10.8)

## 2024-11-05 PROCEDURE — 99232 SBSQ HOSP IP/OBS MODERATE 35: CPT

## 2024-11-05 RX ADMIN — Medication 2 TABLET(S): at 22:02

## 2024-11-05 RX ADMIN — Medication 5000 UNIT(S): at 13:14

## 2024-11-05 RX ADMIN — Medication 1 TABLET(S): at 05:13

## 2024-11-05 RX ADMIN — VALPROIC ACID 500 MILLIGRAM(S): 250 SOLUTION ORAL at 17:22

## 2024-11-05 RX ADMIN — Medication 1 TABLET(S): at 17:22

## 2024-11-05 RX ADMIN — POLYETHYLENE GLYCOL 3350 17 GRAM(S): 17 POWDER, FOR SOLUTION ORAL at 17:24

## 2024-11-05 RX ADMIN — Medication 5000 UNIT(S): at 21:52

## 2024-11-05 RX ADMIN — CHLORHEXIDINE GLUCONATE 1 APPLICATION(S): 1.2 RINSE ORAL at 05:22

## 2024-11-05 RX ADMIN — POLYETHYLENE GLYCOL 3350 17 GRAM(S): 17 POWDER, FOR SOLUTION ORAL at 05:13

## 2024-11-05 RX ADMIN — Medication 100 MILLIGRAM(S): at 05:13

## 2024-11-05 RX ADMIN — Medication 100 MILLILITER(S): at 21:55

## 2024-11-05 RX ADMIN — Medication 400 MILLIGRAM(S): at 17:22

## 2024-11-05 RX ADMIN — Medication 1000 UNIT(S): at 11:45

## 2024-11-05 RX ADMIN — Medication 5000 UNIT(S): at 05:13

## 2024-11-05 RX ADMIN — Medication 400 MILLIGRAM(S): at 11:45

## 2024-11-05 RX ADMIN — Medication 100 MILLIGRAM(S): at 13:14

## 2024-11-05 RX ADMIN — VALPROIC ACID 1000 MILLIGRAM(S): 250 SOLUTION ORAL at 21:52

## 2024-11-05 NOTE — PROGRESS NOTE ADULT - SUBJECTIVE AND OBJECTIVE BOX
ORTHOPAEDIC SURGERY PROGRESS NOTE    Interval History:  Patient seen and examined at bedside.  Pain is controlled.  No complaints of chest pain, SOB, N/V.    MEDICATIONS  (STANDING):  calcium carbonate   1250 mG (OsCal) 1 Tablet(s) Oral two times a day  ceFAZolin   IVPB 2000 milliGRAM(s) IV Intermittent every 8 hours  chlorhexidine 2% Cloths 1 Application(s) Topical <User Schedule>  cholecalciferol 1000 Unit(s) Oral daily  heparin   Injectable 5000 Unit(s) SubCutaneous every 8 hours  lactated ringers. 1000 milliLiter(s) (75 mL/Hr) IV Continuous <Continuous>  lactated ringers. 1000 milliLiter(s) (100 mL/Hr) IV Continuous <Continuous>  magnesium oxide 400 milliGRAM(s) Oral two times a day with meals  polyethylene glycol 3350 17 Gram(s) Oral two times a day  senna 2 Tablet(s) Oral at bedtime  valproic  acid Syrup 500 milliGRAM(s) Oral <User Schedule>  valproic  acid Syrup 1000 milliGRAM(s) Oral <User Schedule>    MEDICATIONS  (PRN):  acetaminophen     Tablet .. 650 milliGRAM(s) Oral every 6 hours PRN Mild Pain (1 - 3)  ondansetron Injectable 4 milliGRAM(s) IV Push once PRN Nausea and/or Vomiting  oxyCODONE    IR 2.5 milliGRAM(s) Oral every 6 hours PRN Moderate Pain (4 - 6)      Vital Signs Last 24 Hrs  T(C): 36.5 (05 Nov 2024 08:09), Max: 37.2 (04 Nov 2024 16:30)  T(F): 97.7 (05 Nov 2024 08:09), Max: 98.9 (04 Nov 2024 16:30)  HR: 86 (05 Nov 2024 08:09) (59 - 99)  BP: 116/73 (05 Nov 2024 08:09) (108/55 - 126/74)  BP(mean): 80 (04 Nov 2024 20:30) (76 - 86)  RR: 18 (05 Nov 2024 08:09) (13 - 25)  SpO2: 98% (05 Nov 2024 08:09) (89% - 100%)    Physical Exam:   Dressing C/D/I  Compartments soft and compressible  Motor intact distally  SILT distally  CR<2sec, palpable pulses                           11.5   9.74  )-----------( 122      ( 05 Nov 2024 05:48 )             35.0     11-05    139  |  103  |  20  ----------------------------<  121[H]  5.3[H]   |  22  |  0.6[L]    Ca    8.5      05 Nov 2024 05:48  Mg     2.0     11-05    TPro  5.3[L]  /  Alb  3.0[L]  /  TBili  0.3  /  DBili  x   /  AST  27  /  ALT  17  /  AlkPhos  73  11-05        A/P: 84yFemale s/p l hip hemiarthroplasty 11/4. doing well    - OOB to Chair   - WBAT  - PT/OT  - Pain control   - Extremity icing/elevation  - Incentive Spirometry   - DVT Prophylaxis   - Discharge planning    - If discharged, patient should follow up with Dr. Gacria at [address] [place] [address]. Phone number [phone] for scheduling/appointment.  - Patient should be discharged on 6 weeks (from surgery date) of appropriate DVT prophylaxis due to their decreased functional/ambulation status after lower extremity surgery. Orthopaedic preference would be Aspirin 81 mg BID  if there are no contraindications. If patient is already on home anticoagulation, they may continue home anticoagulation medication instead of aspirin. If patient is going to inpatient rehab/nursing facility, and they plan for DVT PPx with SQH/LVX, they may be placed on that instead of aspirin.

## 2024-11-05 NOTE — PROGRESS NOTE ADULT - ASSESSMENT
84yoF hx intellectual disability, bipolar disorder, Breast mass (unknown side s/p lumpectomy and RT '07), asthma, hoover's, esophageal varices, s/p R cataract surgery, R hip and L humeral fx, hld here following fall. s/p L hip ORIF on 11/4.    #Acute L femoral neck fracture  #Unwitnessed fall, r/o syncope  #SIRS POA (wbc and HR) -- improved  - unclear story, sounds likely syncopal episode possibly orthostasis induced   - trauma workup negative  - wbc improved -- could be stress/pain related and dehydration   - remote tele x24hrs- periods of currently sinus tach  - EKG no acute ischemia ,  no active chest pain or SOB , trop and ck negative   - no reported cardiac issues   - METS<4, wheelchair dependent at baseline  - RCRI 0  - NSQIP  SCORE WITH HIGH RISK ( 74%)  OF POST OPERATIVE Delirium   - Pt is medically optimized for intermediate risk surgery   - 11/1 TTE - EF of 66 %. Grade I diastolic dysfunction.. Mild thickening of the anterior and posterior mitral valve leaflets. Moderate mitral annular calcification. . Mild aortic regurgitation.. Mild pulmonic valve regurgitation.  - multimodal pain therapy  - SLP eval appreciated   - c/w gentle hydration   - PT/OT after surgery   - s/p L hip ORIF on 11/4  - Ortho recs:  -- s/p L hip ORIF, displaced intracapsular left femoral neck fracture; post op WBAT with anterolateral hip precautions; Abx and DVT ppx per protocol  -- started on Ancef 2g q8h x3 doses and heparin for DVT ppx  -- Patient should be discharged on 6 weeks (from surgery date) of appropriate DVT prophylaxis due to their decreased functional/ambulation status after lower extremity surgery. Orthopaedic preference would be Aspirin 81 mg BID  if there are no contraindications. If patient is already on home anticoagulation, they may continue home anticoagulation medication instead of aspirin. If patient is going to inpatient rehab/nursing facility, and they plan for DVT PPx with SQH/LVX, they may be placed on that instead of aspirin.    #Possible UTI / SIRS ON admission   - UA strong  positive , unreliable hx   - Blood Cx (-)  - get urine culture, c/w ceftriaxone for now     #Incidental R breast nodule  #Hx breast mass s/p lumpectomy and radiation  - outpt f/u and w/u    #Incidental cholelithasis  - asymptomatic at this time, no LFT elevation  - consider [facility] as outpt    #Intellectual Disability  #Bipolar disorder  -c/w Valproic acid, austedo    #Asthma, not in acute exacerbation  #Esophageal varices  #Hiatal hernia  - monitor , resume home meds     #Constipation   - CTAP with Moderate tolarge rectal stool burden. No bowel obstruction.  - bowel regimen and monitor     MISC  #DVT prophylaxis: held prior to ORIF, resume Lovenox after procedure; duplex negative for DVTs  #GI prophylaxis: not indicated  #Diet: NPO prior to procedure, otherwise pureed, DASH/TLC, with moderately thick liquids  #Activity: Bedrest  #Code status: FULL CODE - REFER TO Palliative note for details , can not be dnr/dni with out approval by [facility] given her developmental disability   #Disposition: Acute  Pending: PT/OT evaluation, anticipate for d/c tomorrow  Contact: MsGracie Koko Doshi [phone]  acting as surrogate for several years

## 2024-11-05 NOTE — PROGRESS NOTE ADULT - SUBJECTIVE AND OBJECTIVE BOX
SUBJECTIVE/OVERNIGHT EVENTS  Today is hospital day 5d. This morning patient was seen and examined at bedside, resting comfortably in bed. No acute or major events overnight.    MEDICATIONS  STANDING MEDICATIONS  calcium carbonate   1250 mG (OsCal) 1 Tablet(s) Oral two times a day  ceFAZolin   IVPB 2000 milliGRAM(s) IV Intermittent every 8 hours  chlorhexidine 2% Cloths 1 Application(s) Topical <User Schedule>  cholecalciferol 1000 Unit(s) Oral daily  heparin   Injectable 5000 Unit(s) SubCutaneous every 8 hours  lactated ringers. 1000 milliLiter(s) IV Continuous <Continuous>  lactated ringers. 1000 milliLiter(s) IV Continuous <Continuous>  magnesium oxide 400 milliGRAM(s) Oral two times a day with meals  polyethylene glycol 3350 17 Gram(s) Oral two times a day  senna 2 Tablet(s) Oral at bedtime  valproic  acid Syrup 500 milliGRAM(s) Oral <User Schedule>  valproic  acid Syrup 1000 milliGRAM(s) Oral <User Schedule>    PRN MEDICATIONS  acetaminophen     Tablet .. 650 milliGRAM(s) Oral every 6 hours PRN  ondansetron Injectable 4 milliGRAM(s) IV Push once PRN  oxyCODONE    IR 2.5 milliGRAM(s) Oral every 6 hours PRN    VITALS  T(F): 97.7 (11-05-24 @ 08:09), Max: 98.9 (11-04-24 @ 16:30)  HR: 86 (11-05-24 @ 08:09) (59 - 99)  BP: 116/73 (11-05-24 @ 08:09) (108/55 - 126/74)  RR: 18 (11-05-24 @ 08:09) (13 - 25)  SpO2: 98% (11-05-24 @ 08:09) (89% - 100%)    PHYSICAL EXAM  GENERAL: NAD, lying in bed comfortably  HEAD:  Atraumatic, normocephalic  NECK: Supple, no JVD  HEART: Regular rate and rhythm, no murmurs, rubs, or gallops  LUNGS: Unlabored respirations.  Clear to auscultation bilaterally, no crackles, wheezing, or rhonchi  ABDOMEN: Soft, mildly distended, nontender to palpation  EXTREMITIES: 2+ peripheral pulses bilaterally. No clubbing, cyanosis, or edema  NERVOUS SYSTEM:  A&Ox3, no focal deficits   SKIN: No rashes or lesions    LABS             11.5   9.74  )-----------( 122      ( 11-05-24 @ 05:48 )             35.0     139  |  103  |  20  -------------------------<  121   11-05-24 @ 05:48  5.3  |  22  |  0.6    Ca      8.5     11-05-24 @ 05:48  Mg     2.0     11-05-24 @ 05:48    TPro  5.3  /  Alb  3.0  /  TBili  0.3  /  DBili  x   /  AST  27  /  ALT  17  /  AlkPhos  73  /  GGT  x     11-05-24 @ 05:48        Urinalysis Basic - ( 05 Nov 2024 05:48 )    Color: x / Appearance: x / SG: x / pH: x  Gluc: 121 mg/dL / Ketone: x  / Bili: x / Urobili: x   Blood: x / Protein: x / Nitrite: x   Leuk Esterase: x / RBC: x / WBC x   Sq Epi: x / Non Sq Epi: x / Bacteria: x          IMAGING

## 2024-11-05 NOTE — PROGRESS NOTE ADULT - ASSESSMENT
84yoF hx intellectual disability, bipolar disorder, Breast mass (unknown side s/p lumpectomy and RT '07), asthma, hoover's, esophageal varices, s/p R cataract surgery, R hip and L humeral fx, hld here following fall.     []Acute L femoral neck fracture-s/p l hip hemiarthroplasty 11/4  []Unwitnessed fall, r/o syncope  [] SIRS POA ( wbc and HR)   -unclear story, sounds likely syncopal episode possibly orthostasis induced   d/c tele , no events reported   -NSQIP  SCORE WITH HIGH RISK ( 74%)  OF POST OPERATIVE Delirium   TTE EF of 66 %. Grade I diastolic dysfunction.. Mild thickening of the anterior and posterior mitral valve leaflets.   Moderate mitral annular calcification. . Mild aortic regurgitation.. Mild pulmonic valve regurgitation.  -check bed orthostatics  SLP eval appreciated   wbc improved  could be stress/pain related and dehydration   c/w gentle hydration   CT head/neck negative for acute   get valporic level   PT/OT after surgery   received cefazolin  3 doses post op as per ortho     - Possible UTI / SIRS ON admission   UA strong  positive , unreliable hx    blood cultures NGTD , urine cultures was not collected , s/p 3days of ceftriaxone     #Incidental R breast nodule  #Hx breast mass s/p lumpectomy and radiation  -outpt f/u and w/u    #Incidental cholelithasis  -asymptomatic at this time, no LFT elevation  -consider CCY as outpt    #Intellectual Disability  #Bipolar disorder  -c/w [facility], austedo    #Asthma, not in acute exacerbation  #Esophageal varices  #Hiatal hernia  monitor , resume home meds     Constipation   CTAP with Moderate tolarge rectal stool burden. No bowel obstruction.  bowel regimen and monitor   moving her bowel as per  staff ( they record all events)         #DVT ppx -HSQ / scd   -[facility] duplx negative for dvt ppx     #GI ppx    Pending : PT/OT , monitor post operative      Family -Ms. Koko Doshi [phone]  acting as surrogate for several years    Palliative team consult appreciated - FULL CODE . - REFER TO Palliative note for details , can not be dnr/dni with out approval by [facility] given her developmental disability     from group home

## 2024-11-05 NOTE — PROGRESS NOTE ADULT - SUBJECTIVE AND OBJECTIVE BOX
T H I S   I S    N O  T   A    F I N A L I Z E D   N O T BABAR HIGGINS  84y, Female  Allergy: No Known Allergies  lactose (Stomach Upset)    Hospital Day: 5d    Patient seen and examined earlier today.     PMH/PSH:  PAST MEDICAL & SURGICAL HISTORY:  Atypical bipolar disorder      Mild mental retardation      Brito esophagus      Hyperlipemia      Breast mass, right  s/p lumpectomy & radiation 2007      Feeding by G-tube  s/p removal 2012      Femoral neck fracture          LAST 24-Hr EVENTS:    VITALS:  T(F): 97.7 (11-05-24 @ 08:09), Max: 98.9 (11-04-24 @ 16:30)  HR: 86 (11-05-24 @ 08:09)  BP: 116/73 (11-05-24 @ 08:09) (108/55 - 126/74)  RR: 18 (11-05-24 @ 08:09)  SpO2: 98% (11-05-24 @ 08:09)    Oxygen Delivery Therapy:   Oxygen Method: Nasal Cannula   LPM: 3   Targeted SpO2 Range (%): 94-98 (Most Adults patients)   O2 Guideline: If SPO2 is below target range may increase by 2L/min ONCE ONLY, not to exceed 6L/min and notify provider. Recheck SPO2 within 5 minutes (document SPO2).  If SPO2 remains below target SPO2 range, notify provider for additional instruction.  Consider RRT with unexpected escalation in oxygen requirements, especially if combined with increased respiratory rate.   O2 Weaning Guideline:  If SPO2 goal is met or exceeded for 2 hours decrease Oxygen by 2L/min. If already on 2L/min or less, trial oxygen discontinuation. Recheck SPO2 within 5 minutes and if SPO2 below target SPO2 range, resume previous oxygen therapy and notify provider (document the SPO2).    Additional Instructions:  PACU Only: Decrease oxygen as tolerated while maintaining target SPO2 range (11-04-24 @ 17:22)        TESTS & MEASUREMENTS:  Weight/BMI  70 (11-04-24 @ 17:17)    11-03-24 @ 07:01  -  11-04-24 @ 07:00  --------------------------------------------------------  IN: 0 mL / OUT: 250 mL / NET: -250 mL    11-04-24 @ 07:01  -  11-05-24 @ 07:00  --------------------------------------------------------  IN: 1200 mL / OUT: 0 mL / NET: 1200 mL                            11.5   9.74  )-----------( 122      ( 05 Nov 2024 05:48 )             35.0         11-05    139  |  103  |  20  ----------------------------<  121[H]  5.3[H]   |  22  |  0.6[L]    Ca    8.5      05 Nov 2024 05:48  Mg     2.0     11-05    TPro  5.3[L]  /  Alb  3.0[L]  /  TBili  0.3  /  DBili  x   /  AST  27  /  ALT  17  /  AlkPhos  73  11-05    LIVER FUNCTIONS - ( 05 Nov 2024 05:48 )  Alb: 3.0 g/dL / Pro: 5.3 g/dL / ALK PHOS: 73 U/L / ALT: 17 U/L / AST: 27 U/L / GGT: x                 Culture - Blood (collected 11-02-24 @ 11:29)  Source: .Blood BLOOD  Preliminary Report (11-04-24 @ 20:00):    No growth at 48 Hours      Urinalysis Basic - ( 05 Nov 2024 05:48 )    Color: x / Appearance: x / SG: x / pH: x  Gluc: 121 mg/dL / Ketone: x  / Bili: x / Urobili: x   Blood: x / Protein: x / Nitrite: x   Leuk Esterase: x / RBC: x / WBC x   Sq Epi: x / Non Sq Epi: x / Bacteria: x                            RADIOLOGY, ECG, & ADDITIONAL TESTS:  12 Lead ECG:   Ventricular Rate 91 BPM    Atrial Rate 91 BPM    P-R Interval 154 ms    QRS Duration 106 ms    Q-T Interval 368 ms    QTC Calculation(Bazett) 452 ms    P Axis 69 degrees    R Axis -53 degrees    T Axis 51 degrees    Diagnosis Line Normal sinus rhythm  Left anterior fascicular block  Voltage criteria for left ventricular hypertrophy  Possible Lateral infarct , age undetermined  Abnormal ECG    Confirmed by Kayode Lange ([id number]) on 10/31/2024 7:26:42 PM (10-31-24 @ 16:13)    CT Head No Cont:   ACC: [id number] EXAM:  CT CERVICAL SPINE   ORDERED BY: YUSRA VIGIL     ACC: [id number] EXAM:  CT BRAIN   ORDERED BY: YUSRA VIGIL     PROCEDURE DATE:  10/31/2024          INTERPRETATION:  CLINICAL INDICATION: Trauma.    TECHNIQUE: CT of the head and cervical spine was performed without the   administration of intravenous contrast.    COMPARISON: CT head dated 12/10/2022.    FINDINGS:    CT HEAD:    There is stable prominence of the sulci, sylvian fissures, and   ventricles, reflecting mild diffuse parenchymal volume loss.    There are increased scattered patchy low attenuations in bilateral   periventricular and subcortical cerebral white matter consistent with   mild-moderate chronic microvascular ischemic changes.    No evidence of acuteterritorial infarct, intracranial hemorrhage or mass   lesion.    No hydrocephalus. There are no extra-axial fluid collections.    Status post right cataract surgery, stable. The imaged portions of the   paranasal sinuses are clear. The mastoid air cells are clear. The   visualized soft tissues and osseous structures appear normal.      CT CERVICAL SPINE:    The alignment is normal.    There is no evidence of acute fracture, compression deformity or facet   subluxation of the cervical spine.    The atlantoaxial relationships are maintained. The posterior elements are   intact. There is no significant prevertebral soft tissue swelling. The   visualized paraspinal soft tissues are unremarkable.    Stable severe multilevel endplate degenerative changes with marginal   osteophyte formation, uncovertebral spurring, loss of normal disc space   height as well as facet joint space compartment narrowing.    There is no high-grade spinal canal stenosis. Please note spinal canal   contents are suboptimally evaluated inherent to CT technique.    The visualized lung apices are within normal limits.      IMPRESSION:    CT HEAD:  No acute intracranial findings.    Slightly increased mild-moderate chronic microvascular ischemic changes.    CT CERVICAL SPINE:  No acute cervical fracture or facet subluxation.    --- End of Report ---            MARCELA BLANC MD; Attending Radiologist  This document has been electronically signed. Oct 31 2024 12:39PM (10-31-24 @ 12:11)    RECENT DIAGNOSTIC ORDERS:  Magnesium: AM Sched. Collection: 06-Nov-2024 04:30 (11-05-24 @ 08:54)  Comprehensive Metabolic Panel: AM Sched. Collection: 06-Nov-2024 04:30 (11-05-24 @ 08:54)  Complete Blood Count + Automated Diff: AM Sched. Collection: 06-Nov-2024 04:30 (11-05-24 @ 08:54)  Surgical Pathology Report: 19:08 (11-05-24 @ 08:15)  Diet, DASH/TLC:   Sodium & Cholesterol Restricted  Gastroesophageal Reflux Disease (GERD)  Pureed (PUREED)  Moderately Thick Liquids (MODTHICKLIQS)  Lactose Restricted (Milk Sugar Intoler.) (11-04-24 @ 19:55)  Basic Metabolic Panel: STAT (11-04-24 @ 19:47)  Complete Blood Count: STAT (11-04-24 @ 19:47)      MEDICATIONS:  MEDICATIONS  (STANDING):  calcium carbonate   1250 mG (OsCal) 1 Tablet(s) Oral two times a day  chlorhexidine 2% Cloths 1 Application(s) Topical <User Schedule>  cholecalciferol 1000 Unit(s) Oral daily  heparin   Injectable 5000 Unit(s) SubCutaneous every 8 hours  lactated ringers. 1000 milliLiter(s) (75 mL/Hr) IV Continuous <Continuous>  lactated ringers. 1000 milliLiter(s) (100 mL/Hr) IV Continuous <Continuous>  magnesium oxide 400 milliGRAM(s) Oral two times a day with meals  polyethylene glycol 3350 17 Gram(s) Oral two times a day  senna 2 Tablet(s) Oral at bedtime  valproic  acid Syrup 500 milliGRAM(s) Oral <User Schedule>  valproic  acid Syrup 1000 milliGRAM(s) Oral <User Schedule>    MEDICATIONS  (PRN):  acetaminophen     Tablet .. 650 milliGRAM(s) Oral every 6 hours PRN Mild Pain (1 - 3)  ondansetron Injectable 4 milliGRAM(s) IV Push once PRN Nausea and/or Vomiting  oxyCODONE    IR 2.5 milliGRAM(s) Oral every 6 hours PRN Moderate Pain (4 - 6)      HOME MEDICATIONS:  Austedo XR 24 mg oral tablet, extended release (10-31)  cholecalciferol 25 mcg (1000 intl units) oral capsule (10-31)  FiberCon 625 mg oral tablet (10-31)  magnesium gluconate 250 mg oral tablet (10-31)  Oysco 500 (1250 mg calcium carbonate) oral tablet (10-31)  Tylenol 325 mg oral capsule (10-31)  valproic acid 250 mg/5 mL oral liquid (10-31)      PHYSICAL EXAM:  GENERAL:   CHEST/LUNG:   HEART:   ABDOMEN:   EXTREMITIES:           MINABABAR  84y, Female  Allergy: No Known Allergies  lactose (Stomach Upset)    Hospital Day: 5d    Patient seen and examined earlier today. GH at bedside - patient at her basline, no pain     PMH/PSH:  PAST MEDICAL & SURGICAL HISTORY:  Atypical bipolar disorder      Mild mental retardation      Brito esophagus      Hyperlipemia      Breast mass, right  s/p lumpectomy & radiation 2007      Feeding by G-tube  s/p removal 2012      Femoral neck fracture          LAST 24-Hr EVENTS:    VITALS:  T(F): 97.7 (11-05-24 @ 08:09), Max: 98.9 (11-04-24 @ 16:30)  HR: 86 (11-05-24 @ 08:09)  BP: 116/73 (11-05-24 @ 08:09) (108/55 - 126/74)  RR: 18 (11-05-24 @ 08:09)  SpO2: 98% (11-05-24 @ 08:09)    Oxygen Delivery Therapy:   Oxygen Method: Nasal Cannula   LPM: 3   Targeted SpO2 Range (%): 94-98 (Most Adults patients)   O2 Guideline: If SPO2 is below target range may increase by 2L/min ONCE ONLY, not to exceed 6L/min and notify provider. Recheck SPO2 within 5 minutes (document SPO2).  If SPO2 remains below target SPO2 range, notify provider for additional instruction.  Consider RRT with unexpected escalation in oxygen requirements, especially if combined with increased respiratory rate.   O2 Weaning Guideline:  If SPO2 goal is met or exceeded for 2 hours decrease Oxygen by 2L/min. If already on 2L/min or less, trial oxygen discontinuation. Recheck SPO2 within 5 minutes and if SPO2 below target SPO2 range, resume previous oxygen therapy and notify provider (document the SPO2).    Additional Instructions:  PACU Only: Decrease oxygen as tolerated while maintaining target SPO2 range (11-04-24 @ 17:22)        TESTS & MEASUREMENTS:  Weight/BMI  70 (11-04-24 @ 17:17)    11-03-24 @ 07:01  -  11-04-24 @ 07:00  --------------------------------------------------------  IN: 0 mL / OUT: 250 mL / NET: -250 mL    11-04-24 @ 07:01  -  11-05-24 @ 07:00  --------------------------------------------------------  IN: 1200 mL / OUT: 0 mL / NET: 1200 mL                            11.5   9.74  )-----------( 122      ( 05 Nov 2024 05:48 )             35.0         11-05    139  |  103  |  20  ----------------------------<  121[H]  5.3[H]   |  22  |  0.6[L]    Ca    8.5      05 Nov 2024 05:48  Mg     2.0     11-05    TPro  5.3[L]  /  Alb  3.0[L]  /  TBili  0.3  /  DBili  x   /  AST  27  /  ALT  17  /  AlkPhos  73  11-05    LIVER FUNCTIONS - ( 05 Nov 2024 05:48 )  Alb: 3.0 g/dL / Pro: 5.3 g/dL / ALK PHOS: 73 U/L / ALT: 17 U/L / AST: 27 U/L / GGT: x                 Culture - Blood (collected 11-02-24 @ 11:29)  Source: .Blood BLOOD  Preliminary Report (11-04-24 @ 20:00):    No growth at 48 Hours      Urinalysis Basic - ( 05 Nov 2024 05:48 )    Color: x / Appearance: x / SG: x / pH: x  Gluc: 121 mg/dL / Ketone: x  / Bili: x / Urobili: x   Blood: x / Protein: x / Nitrite: x   Leuk Esterase: x / RBC: x / WBC x   Sq Epi: x / Non Sq Epi: x / Bacteria: x                            RADIOLOGY, ECG, & ADDITIONAL TESTS:  12 Lead ECG:   Ventricular Rate 91 BPM    Atrial Rate 91 BPM    P-R Interval 154 ms    QRS Duration 106 ms    Q-T Interval 368 ms    QTC Calculation(Bazett) 452 ms    P Axis 69 degrees    R Axis -53 degrees    T Axis 51 degrees    Diagnosis Line Normal sinus rhythm  Left anterior fascicular block  Voltage criteria for left ventricular hypertrophy  Possible Lateral infarct , age undetermined  Abnormal ECG    Confirmed by Royzman, Kayode ([id number]) on 10/31/2024 7:26:42 PM (10-31-24 @ 16:13)    CT Head No Cont:   ACC: [id number] EXAM:  CT CERVICAL SPINE   ORDERED BY: YUSRA VIGIL     ACC: [id number] EXAM:  CT BRAIN   ORDERED BY: YUSRA VIGIL     PROCEDURE DATE:  10/31/2024          INTERPRETATION:  CLINICAL INDICATION: Trauma.    TECHNIQUE: CT of the head and cervical spine was performed without the   administration of intravenous contrast.    COMPARISON: CT head dated 12/10/2022.    FINDINGS:    CT HEAD:    There is stable prominence of the sulci, sylvian fissures, and   ventricles, reflecting mild diffuse parenchymal volume loss.    There are increased scattered patchy low attenuations in bilateral   periventricular and subcortical cerebral white matter consistent with   mild-moderate chronic microvascular ischemic changes.    No evidence of acuteterritorial infarct, intracranial hemorrhage or mass   lesion.    No hydrocephalus. There are no extra-axial fluid collections.    Status post right cataract surgery, stable. The imaged portions of the   paranasal sinuses are clear. The mastoid air cells are clear. The   visualized soft tissues and osseous structures appear normal.      CT CERVICAL SPINE:    The alignment is normal.    There is no evidence of acute fracture, compression deformity or facet   subluxation of the cervical spine.    The atlantoaxial relationships are maintained. The posterior elements are   intact. There is no significant prevertebral soft tissue swelling. The   visualized paraspinal soft tissues are unremarkable.    Stable severe multilevel endplate degenerative changes with marginal   osteophyte formation, uncovertebral spurring, loss of normal disc space   height as well as facet joint space compartment narrowing.    There is no high-grade spinal canal stenosis. Please note spinal canal   contents are suboptimally evaluated inherent to CT technique.    The visualized lung apices are within normal limits.      IMPRESSION:    CT HEAD:  No acute intracranial findings.    Slightly increased mild-moderate chronic microvascular ischemic changes.    CT CERVICAL SPINE:  No acute cervical fracture or facet subluxation.    --- End of Report ---            MARCELA BLANC MD; Attending Radiologist  This document has been electronically signed. Oct 31 2024 12:39PM (10-31-24 @ 12:11)    RECENT DIAGNOSTIC ORDERS:  Magnesium: AM Sched. Collection: 06-Nov-2024 04:30 (11-05-24 @ 08:54)  Comprehensive Metabolic Panel: AM Sched. Collection: 06-Nov-2024 04:30 (11-05-24 @ 08:54)  Complete Blood Count + Automated Diff: AM Sched. Collection: 06-Nov-2024 04:30 (11-05-24 @ 08:54)  Surgical Pathology Report: 19:08 (11-05-24 @ 08:15)  Diet, DASH/TLC:   Sodium & Cholesterol Restricted  Gastroesophageal Reflux Disease (GERD)  Pureed (PUREED)  Moderately Thick Liquids (MODTHICKLIQS)  Lactose Restricted (Milk Sugar Intoler.) (11-04-24 @ 19:55)  Basic Metabolic Panel: STAT (11-04-24 @ 19:47)  Complete Blood Count: STAT (11-04-24 @ 19:47)      MEDICATIONS:  MEDICATIONS  (STANDING):  calcium carbonate   1250 mG (OsCal) 1 Tablet(s) Oral two times a day  chlorhexidine 2% Cloths 1 Application(s) Topical <User Schedule>  cholecalciferol 1000 Unit(s) Oral daily  heparin   Injectable 5000 Unit(s) SubCutaneous every 8 hours  lactated ringers. 1000 milliLiter(s) (75 mL/Hr) IV Continuous <Continuous>  lactated ringers. 1000 milliLiter(s) (100 mL/Hr) IV Continuous <Continuous>  magnesium oxide 400 milliGRAM(s) Oral two times a day with meals  polyethylene glycol 3350 17 Gram(s) Oral two times a day  senna 2 Tablet(s) Oral at bedtime  valproic  acid Syrup 500 milliGRAM(s) Oral <User Schedule>  valproic  acid Syrup 1000 milliGRAM(s) Oral <User Schedule>    MEDICATIONS  (PRN):  acetaminophen     Tablet .. 650 milliGRAM(s) Oral every 6 hours PRN Mild Pain (1 - 3)  ondansetron Injectable 4 milliGRAM(s) IV Push once PRN Nausea and/or Vomiting  oxyCODONE    IR 2.5 milliGRAM(s) Oral every 6 hours PRN Moderate Pain (4 - 6)      HOME MEDICATIONS:  Austedo XR 24 mg oral tablet, extended release (10-31)  cholecalciferol 25 mcg (1000 intl units) oral capsule (10-31)  FiberCon 625 mg oral tablet (10-31)  magnesium gluconate 250 mg oral tablet (10-31)  Oysco 500 (1250 mg calcium carbonate) oral tablet (10-31)  Tylenol 325 mg oral capsule (10-31)  valproic acid 250 mg/5 mL oral liquid (10-31)      PHYSICAL EXAM:  On exam  General: awake, alert oriented to self , NAD, chronic ill appearance, edentulous   Lungs:  clear to ausculations b/l, normal resp effort  Heart: regular ryhthm   Abdomen: soft, non tender non distended  Ext: no edema, left hip dressing

## 2024-11-05 NOTE — PHYSICAL THERAPY INITIAL EVALUATION ADULT - GENERAL OBSERVATIONS, REHAB EVAL
[facility]  FAMILY MEDICINE RESIDENCY PROGRAM   OFFICE PROGRESS NOTE  DATEOF VISIT : 20    Patient : Erinn Sheldon   Sex : female   Age : 61 y.o.  : 1957           Chief Complaint :   Chief Complaint   Patient presents with    Diabetes       HPI:   61 y.o. female comes in today for follow up of dm2. Sore on toe with foot numbness. Admits that she hasn't been walking or exercising. Patient has an appointement with eye, Dr Tommy Mann on . Interested in following with PMR for back pain. Following with Dr. Jarrod Regalado. Last visit was virtual.    Mood is ok. Admits she is not depressed but COVID is making her sad because she has not seen her new grandson since  because her son lives in [facility] [employer].       Patient Active Problem List   Diagnosis    Diabetes mellitus type 2, uncontrolled ([address]    Hypertension    Hypercholesterolemia    GERD (gastroesophageal reflux disease)    Depression    Enlarged liver    Heart murmur    Chronic lower back pain    Facet joint disease    DJD (degenerative joint disease), lumbar    Bulging lumbar disc    Back pain    Anxiety    Bilateral leg edema    Microalbuminuria    Vitamin D insufficiency    Chronic kidney disease, stage 3, mod decreased GFR    Senile nuclear cataract    Fatty liver    Diabetic peripheral neuropathy (HCC)    Neural foraminal stenosis of lumbar spine    Type 2 diabetes mellitus with both eyes affected by mild nonproliferative retinopathy without macular edema, with long-term current use of insulin ([address]    Spinal stenosis of lumbar region       Past Medical History:   Diagnosis Date    Anxiety     Carpal tunnel syndrome on right     Chronic back pain     Chronic kidney disease, stage 3     Depression 10/28/2010    Diabetes mellitus type 2, uncontrolled ([address] 10/28/2010    with microalbuminuria    Diverticulosis     GERD (gastroesophageal reflux disease) 10/28/2010    Herniated intervertebral disk Cancer Paternal Aunt     High Blood Pressure Paternal Aunt     Cancer Maternal Grandmother [de-identified]        colorectal cancer    High Blood Pressure Maternal Grandmother     Heart Failure Maternal Grandmother     Arthritis Maternal Grandmother     High Cholesterol Maternal Grandmother     High Blood Pressure Paternal Grandmother     Stroke Paternal Grandmother     Cancer Maternal Aunt     High Blood Pressure Maternal Aunt     High Cholesterol Maternal Aunt     Diabetes Maternal Uncle     High Blood Pressure Maternal Uncle     High Cholesterol Maternal Uncle     Substance Abuse Maternal Uncle     High Blood Pressure Paternal Uncle        Past Surgical History:   Procedure Laterality Date    ECHO COMPLETE  3/19/2012         EYE MUSCLE SURGERY      as a child    HYSTERECTOMY      with salpingoophorectomy    HYSTERECTOMY, TOTAL ABDOMINAL Bilateral 2003    cervix removed per patient    NERVE BLOCK Right 05/01/2018    lumbar transforaminal nerve block #1 with sedation    MI KALIA NOSE/CLEFT LIP/TIP Right 5/1/2018    RIGHT LUMBAR TRANSFORAMINAL NERVE BLOCK #1 L3-4 L4-5 WITH IV SEDATIN performed by Ila Nagy DO at [address] History     Tobacco Use    Smoking status: Never Smoker    Smokeless tobacco: Never Used   Substance Use Topics    Alcohol use: Yes     Alcohol/week: 2.0 standard drinks     Types: 1 Glasses of wine, 1 Cans of beer per week     Comment: 1 time per month    Drug use: No       Review of Systems  Review of Systems   Constitutional: Negative for chills and fever. HENT: Negative for congestion, ear pain and sore throat. Eyes: Negative for pain and redness. Respiratory: Negative for cough and shortness of breath. Cardiovascular: Negative for chest pain, palpitations and leg swelling. Gastrointestinal: Negative for abdominal pain, blood in stool, constipation, diarrhea, nausea and vomiting.    Genitourinary: Negative for dysuria, frequency and hematuria. Musculoskeletal: Positive for back pain and gait problem. Negative for myalgias. Skin: Negative for rash. Allergic/Immunologic: Negative for environmental allergies. Neurological: Positive for numbness. Negative for dizziness and headaches. Hematological: Does not bruise/bleed easily. Psychiatric/Behavioral: The patient is not nervous/anxious. Physical Exam:  VS:  Blood pressure (!) 160/80, pulse 77, temperature 97.5 °F (36.4 °C), temperature source Temporal, resp. rate 20, height 5' 9\" (1.753 m), weight (!) 329 lb (149.2 kg), SpO2 98 %, not currently breastfeeding. Physical Exam  Constitutional:       Appearance: She is well-developed. HENT:      Head: Normocephalic and atraumatic. Cardiovascular:      Rate and Rhythm: Normal rate and regular rhythm. Heart sounds: No murmur. No friction rub. No gallop. Pulmonary:      Breath sounds: Normal breath sounds. No wheezing, rhonchi or rales. Abdominal:      General: Bowel sounds are normal.      Palpations: Abdomen is soft. There is no mass. Tenderness: There is no abdominal tenderness. Skin:     Nails: There is no clubbing. Rt 3rd toed with 2 mm dorsal abrasion. Monofilament: foot left nl, rt heel with some sensory difficulities    Assessment/Plan:  1. Uncontrolled type 2 diabetes mellitus with hyperglycemia (HCC)  a1c up to 7.8 from 7.4, she plans on increasing exercise and dietary modification  - POCT glycosylated hemoglobin (Hb A1C)  -  DIABETES FOOT EXAM  - atorvastatin (LIPITOR) 80 MG tablet; Take 1 tablet by mouth nightly  Dispense: 90 tablet; Refill: 1  - insulin glargine (LANTUS SOLOSTAR) 100 UNIT/ML injection pen; Inject 55 Units into the skin 2 times daily  Dispense: 35 pen; Refill: 1  - gabapentin (NEURONTIN) 600 MG tablet; Take 1 tablet by mouth 3 times daily for 180 days. Dispense: 270 tablet; Refill: 1  - metFORMIN (GLUCOPHAGE XR) 500 MG extended release tablet;  Take 1 tablet by mouth daily (with breakfast)  Dispense: 90 tablet; Refill: 1    2. Diabetic peripheral neuropathy (HCC)  - External Referral To Podiatry  - DULoxetine (CYMBALTA) 60 MG extended release capsule; TAKE 1 CAPSULE EVERY DAY  Dispense: 90 capsule; Refill: 1    3. Major depressive disorder, recurrent, in partial remission (HCC)  stable    4. Morbid obesity ([address]  Patient seems motivated to start weight loss measures which were discussed    5. Essential hypertension  Patient declined increase of clonidine. She would like to try dietary and lifestyle modification to lose weight to bring bp down. - metoprolol (LOPRESSOR) 100 MG tablet; Take 1 tablet by mouth 2 times daily Prescribed by Dr. Rosalie Maher: 180 tablet; Refill: 1  - potassium chloride (KLOR-CON M) 10 MEQ extended release tablet; Take 1 tablet by mouth 2 times daily  Dispense: 180 tablet; Refill: 1  - cloNIDine (CATAPRES) 0.1 MG tablet; Take 1 tablet by mouth 3 times daily  Dispense: 270 tablet; Refill: 1    6. Anxiety  Stable. - busPIRone (BUSPAR) 5 MG tablet; Take 1 tablet by mouth 3 times daily  Dispense: 270 tablet; Refill: 1    7. Neural foraminal stenosis of lumbar spine  Rf, helps patient, referral for PMR printed for the patient to call  - tiZANidine (ZANAFLEX) 4 MG tablet; Take 1 tablet by mouth every 6 hours as needed (muscle spasm)  Dispense: 90 tablet; Refill: 1    8. Right rotator cuff tendonitis  rf  - celecoxib (CELEBREX) 200 MG capsule; TAKE 1 CAPSULE EVERY DAY  Dispense: 90 capsule; Refill: 1    9. Need for shingles vaccine  Script printed  - zoster recombinant adjuvanted vaccine [facility]) 50 MCG/0.5ML SUSR injection; Inject 0.5 mLs into the muscle See Admin Instructions 1 dose now and repeat in 2-6 months  Dispense: 0.5 mL; Refill: 0    10. Screening mammogram, encounter for  - KAVIN DIGITAL SCREEN BILATERAL PER PROTOCOL; Future      Additional plan and future considerations:       Return for 4-8 weeks virtual annual medical wellness visit.     Signed by : Nereida Bojorquez MD Patient encountered lying in bed. Group home attendant in room. Patient with intellectual disability

## 2024-11-05 NOTE — PHYSICAL THERAPY INITIAL EVALUATION ADULT - ADDITIONAL COMMENTS
Patient lives in group home. As per group home attendant, patient is wheelchair bound, assisted in ADLs and able to perform assisted stand pivot transfers bed to chair.

## 2024-11-06 LAB
ALBUMIN SERPL ELPH-MCNC: 2.9 G/DL — LOW (ref 3.5–5.2)
ALP SERPL-CCNC: 65 U/L — SIGNIFICANT CHANGE UP (ref 30–115)
ALT FLD-CCNC: 11 U/L — SIGNIFICANT CHANGE UP (ref 0–41)
ANION GAP SERPL CALC-SCNC: 9 MMOL/L — SIGNIFICANT CHANGE UP (ref 7–14)
AST SERPL-CCNC: 20 U/L — SIGNIFICANT CHANGE UP (ref 0–41)
BASOPHILS # BLD AUTO: 0.04 K/UL — SIGNIFICANT CHANGE UP (ref 0–0.2)
BASOPHILS NFR BLD AUTO: 0.4 % — SIGNIFICANT CHANGE UP (ref 0–1)
BILIRUB SERPL-MCNC: 0.4 MG/DL — SIGNIFICANT CHANGE UP (ref 0.2–1.2)
BUN SERPL-MCNC: 15 MG/DL — SIGNIFICANT CHANGE UP (ref 10–20)
CALCIUM SERPL-MCNC: 8.3 MG/DL — LOW (ref 8.4–10.5)
CHLORIDE SERPL-SCNC: 104 MMOL/L — SIGNIFICANT CHANGE UP (ref 98–110)
CO2 SERPL-SCNC: 28 MMOL/L — SIGNIFICANT CHANGE UP (ref 17–32)
CREAT SERPL-MCNC: <0.5 MG/DL — LOW (ref 0.7–1.5)
EGFR: 98 ML/MIN/1.73M2 — SIGNIFICANT CHANGE UP
EOSINOPHIL # BLD AUTO: 0.06 K/UL — SIGNIFICANT CHANGE UP (ref 0–0.7)
EOSINOPHIL NFR BLD AUTO: 0.6 % — SIGNIFICANT CHANGE UP (ref 0–8)
GLUCOSE SERPL-MCNC: 103 MG/DL — HIGH (ref 70–99)
HCT VFR BLD CALC: 28.1 % — LOW (ref 37–47)
HCT VFR BLD CALC: 28.3 % — LOW (ref 37–47)
HGB BLD-MCNC: 9.2 G/DL — LOW (ref 12–16)
HGB BLD-MCNC: 9.4 G/DL — LOW (ref 12–16)
IMM GRANULOCYTES NFR BLD AUTO: 1.1 % — HIGH (ref 0.1–0.3)
LYMPHOCYTES # BLD AUTO: 2.48 K/UL — SIGNIFICANT CHANGE UP (ref 1.2–3.4)
LYMPHOCYTES # BLD AUTO: 26.2 % — SIGNIFICANT CHANGE UP (ref 20.5–51.1)
MAGNESIUM SERPL-MCNC: 1.9 MG/DL — SIGNIFICANT CHANGE UP (ref 1.8–2.4)
MCHC RBC-ENTMCNC: 31.8 PG — HIGH (ref 27–31)
MCHC RBC-ENTMCNC: 31.9 PG — HIGH (ref 27–31)
MCHC RBC-ENTMCNC: 32.5 G/DL — SIGNIFICANT CHANGE UP (ref 32–37)
MCHC RBC-ENTMCNC: 33.5 G/DL — SIGNIFICANT CHANGE UP (ref 32–37)
MCV RBC AUTO: 95.3 FL — SIGNIFICANT CHANGE UP (ref 81–99)
MCV RBC AUTO: 97.9 FL — SIGNIFICANT CHANGE UP (ref 81–99)
MONOCYTES # BLD AUTO: 1.32 K/UL — HIGH (ref 0.1–0.6)
MONOCYTES NFR BLD AUTO: 13.9 % — HIGH (ref 1.7–9.3)
NEUTROPHILS # BLD AUTO: 5.47 K/UL — SIGNIFICANT CHANGE UP (ref 1.4–6.5)
NEUTROPHILS NFR BLD AUTO: 57.8 % — SIGNIFICANT CHANGE UP (ref 42.2–75.2)
NRBC # BLD: 0 /100 WBCS — SIGNIFICANT CHANGE UP (ref 0–0)
NRBC # BLD: 0 /100 WBCS — SIGNIFICANT CHANGE UP (ref 0–0)
PLATELET # BLD AUTO: 207 K/UL — SIGNIFICANT CHANGE UP (ref 130–400)
PLATELET # BLD AUTO: 209 K/UL — SIGNIFICANT CHANGE UP (ref 130–400)
PMV BLD: 10.3 FL — SIGNIFICANT CHANGE UP (ref 7.4–10.4)
PMV BLD: 9.9 FL — SIGNIFICANT CHANGE UP (ref 7.4–10.4)
POTASSIUM SERPL-MCNC: 4.6 MMOL/L — SIGNIFICANT CHANGE UP (ref 3.5–5)
POTASSIUM SERPL-SCNC: 4.6 MMOL/L — SIGNIFICANT CHANGE UP (ref 3.5–5)
PROT SERPL-MCNC: 4.9 G/DL — LOW (ref 6–8)
RBC # BLD: 2.89 M/UL — LOW (ref 4.2–5.4)
RBC # BLD: 2.95 M/UL — LOW (ref 4.2–5.4)
RBC # FLD: 12.2 % — SIGNIFICANT CHANGE UP (ref 11.5–14.5)
RBC # FLD: 12.3 % — SIGNIFICANT CHANGE UP (ref 11.5–14.5)
SODIUM SERPL-SCNC: 141 MMOL/L — SIGNIFICANT CHANGE UP (ref 135–146)
WBC # BLD: 8.98 K/UL — SIGNIFICANT CHANGE UP (ref 4.8–10.8)
WBC # BLD: 9.47 K/UL — SIGNIFICANT CHANGE UP (ref 4.8–10.8)
WBC # FLD AUTO: 8.98 K/UL — SIGNIFICANT CHANGE UP (ref 4.8–10.8)
WBC # FLD AUTO: 9.47 K/UL — SIGNIFICANT CHANGE UP (ref 4.8–10.8)

## 2024-11-06 PROCEDURE — 99232 SBSQ HOSP IP/OBS MODERATE 35: CPT

## 2024-11-06 PROCEDURE — 71275 CT ANGIOGRAPHY CHEST: CPT | Mod: 26

## 2024-11-06 RX ORDER — LACTULOSE 10 G/15ML
10 SOLUTION ORAL EVERY 4 HOURS
Refills: 0 | Status: COMPLETED | OUTPATIENT
Start: 2024-11-06 | End: 2024-11-06

## 2024-11-06 RX ADMIN — Medication 1 TABLET(S): at 17:15

## 2024-11-06 RX ADMIN — VALPROIC ACID 500 MILLIGRAM(S): 250 SOLUTION ORAL at 17:15

## 2024-11-06 RX ADMIN — Medication 1 TABLET(S): at 05:31

## 2024-11-06 RX ADMIN — Medication 5000 UNIT(S): at 05:27

## 2024-11-06 RX ADMIN — Medication 400 MILLIGRAM(S): at 17:16

## 2024-11-06 RX ADMIN — Medication 1000 UNIT(S): at 11:12

## 2024-11-06 RX ADMIN — LACTULOSE 10 GRAM(S): 10 SOLUTION ORAL at 11:14

## 2024-11-06 RX ADMIN — CHLORHEXIDINE GLUCONATE 1 APPLICATION(S): 1.2 RINSE ORAL at 05:31

## 2024-11-06 RX ADMIN — POLYETHYLENE GLYCOL 3350 17 GRAM(S): 17 POWDER, FOR SOLUTION ORAL at 17:20

## 2024-11-06 RX ADMIN — Medication 2 TABLET(S): at 21:38

## 2024-11-06 RX ADMIN — OXYCODONE HYDROCHLORIDE 2.5 MILLIGRAM(S): 30 TABLET ORAL at 10:02

## 2024-11-06 RX ADMIN — Medication 400 MILLIGRAM(S): at 08:10

## 2024-11-06 RX ADMIN — POLYETHYLENE GLYCOL 3350 17 GRAM(S): 17 POWDER, FOR SOLUTION ORAL at 05:31

## 2024-11-06 RX ADMIN — Medication 5000 UNIT(S): at 21:33

## 2024-11-06 RX ADMIN — Medication 5000 UNIT(S): at 13:23

## 2024-11-06 RX ADMIN — ACETAMINOPHEN 500MG 650 MILLIGRAM(S): 500 TABLET, COATED ORAL at 23:36

## 2024-11-06 RX ADMIN — LACTULOSE 10 GRAM(S): 10 SOLUTION ORAL at 11:15

## 2024-11-06 RX ADMIN — OXYCODONE HYDROCHLORIDE 2.5 MILLIGRAM(S): 30 TABLET ORAL at 10:30

## 2024-11-06 RX ADMIN — VALPROIC ACID 1000 MILLIGRAM(S): 250 SOLUTION ORAL at 21:34

## 2024-11-06 NOTE — OCCUPATIONAL THERAPY INITIAL EVALUATION ADULT - PLANNED THERAPY INTERVENTIONS, OT EVAL
ADL retraining/IADL retraining/balance training/bed mobility training/massage/ROM/strengthening/stretching/transfer training

## 2024-11-06 NOTE — PROGRESS NOTE ADULT - SUBJECTIVE AND OBJECTIVE BOX
SUBJECTIVE/OVERNIGHT EVENTS  Today is hospital day 6d. This morning patient was seen and examined at bedside, resting comfortably in bed with 2L NC after noted to be hypoxic on RA this morning.    MEDICATIONS  STANDING MEDICATIONS  calcium carbonate   1250 mG (OsCal) 1 Tablet(s) Oral two times a day  chlorhexidine 2% Cloths 1 Application(s) Topical <User Schedule>  cholecalciferol 1000 Unit(s) Oral daily  heparin   Injectable 5000 Unit(s) SubCutaneous every 8 hours  lactated ringers. 1000 milliLiter(s) IV Continuous <Continuous>  lactated ringers. 1000 milliLiter(s) IV Continuous <Continuous>  lactulose Syrup 10 Gram(s) Oral every 4 hours  magnesium oxide 400 milliGRAM(s) Oral two times a day with meals  polyethylene glycol 3350 17 Gram(s) Oral two times a day  senna 2 Tablet(s) Oral at bedtime  valproic  acid Syrup 500 milliGRAM(s) Oral <User Schedule>  valproic  acid Syrup 1000 milliGRAM(s) Oral <User Schedule>    PRN MEDICATIONS  acetaminophen     Tablet .. 650 milliGRAM(s) Oral every 6 hours PRN  ondansetron Injectable 4 milliGRAM(s) IV Push once PRN  oxyCODONE    IR 2.5 milliGRAM(s) Oral every 6 hours PRN    VITALS  T(F): 99.4 (11-06-24 @ 07:00), Max: 99.9 (11-05-24 @ 23:41)  HR: 100 (11-06-24 @ 07:00) (100 - 118)  BP: 109/59 (11-06-24 @ 07:00) (101/61 - 114/52)  RR: 16 (11-06-24 @ 07:00) (16 - 18)  SpO2: 94% (11-05-24 @ 23:41) (94% - 98%)    PHYSICAL EXAM  GENERAL: NAD, lying in bed comfortably  HEAD:  Atraumatic, normocephalic  EYES: EOMI, PERRLA, conjunctiva and sclera clear  HEART: Regular rate and rhythm, no murmurs, rubs, or gallops  LUNGS: Unlabored respirations.  Clear to auscultation bilaterally, no crackles, wheezing, or rhonchi  ABDOMEN: Soft, nontender, nondistended, +BS  EXTREMITIES: 2+ peripheral pulses bilaterally. moderate TTP to Left hip  NERVOUS SYSTEM:  A&Ox3, no focal deficits   SKIN: No rashes or lesions    LABS             9.4    9.47  )-----------( 209      ( 11-06-24 @ 06:27 )             28.1     141  |  104  |  15  -------------------------<  103   11-06-24 @ 06:27  4.6  |  28  |  <0.5    Ca      8.3     11-06-24 @ 06:27  Mg     1.9     11-06-24 @ 06:27    TPro  4.9  /  Alb  2.9  /  TBili  0.4  /  DBili  x   /  AST  20  /  ALT  11  /  AlkPhos  65  /  GGT  x     11-06-24 @ 06:27        Urinalysis Basic - ( 06 Nov 2024 06:27 )    Color: x / Appearance: x / SG: x / pH: x  Gluc: 103 mg/dL / Ketone: x  / Bili: x / Urobili: x   Blood: x / Protein: x / Nitrite: x   Leuk Esterase: x / RBC: x / WBC x   Sq Epi: x / Non Sq Epi: x / Bacteria: x

## 2024-11-06 NOTE — PROGRESS NOTE ADULT - SUBJECTIVE AND OBJECTIVE BOX
ORTHOPAEDIC SURGERY PROGRESS NOTE    Interval History:  Patient seen and examined at bedside.  Pain is controlled.  No complaints of chest pain, SOB, N/V.    MEDICATIONS  (STANDING):  calcium carbonate   1250 mG (OsCal) 1 Tablet(s) Oral two times a day  chlorhexidine 2% Cloths 1 Application(s) Topical <User Schedule>  cholecalciferol 1000 Unit(s) Oral daily  heparin   Injectable 5000 Unit(s) SubCutaneous every 8 hours  lactated ringers. 1000 milliLiter(s) (75 mL/Hr) IV Continuous <Continuous>  lactated ringers. 1000 milliLiter(s) (100 mL/Hr) IV Continuous <Continuous>  magnesium oxide 400 milliGRAM(s) Oral two times a day with meals  polyethylene glycol 3350 17 Gram(s) Oral two times a day  senna 2 Tablet(s) Oral at bedtime  valproic  acid Syrup 500 milliGRAM(s) Oral <User Schedule>  valproic  acid Syrup 1000 milliGRAM(s) Oral <User Schedule>    MEDICATIONS  (PRN):  acetaminophen     Tablet .. 650 milliGRAM(s) Oral every 6 hours PRN Mild Pain (1 - 3)  ondansetron Injectable 4 milliGRAM(s) IV Push once PRN Nausea and/or Vomiting  oxyCODONE    IR 2.5 milliGRAM(s) Oral every 6 hours PRN Moderate Pain (4 - 6)      Vital Signs Last 24 Hrs  T(C): 37.4 (06 Nov 2024 07:00), Max: 37.7 (05 Nov 2024 23:41)  T(F): 99.4 (06 Nov 2024 07:00), Max: 99.9 (05 Nov 2024 23:41)  HR: 100 (06 Nov 2024 07:00) (86 - 118)  BP: 109/59 (06 Nov 2024 07:00) (101/61 - 116/73)  BP(mean): --  RR: 16 (06 Nov 2024 07:00) (16 - 18)  SpO2: 94% (05 Nov 2024 23:41) (94% - 98%)    Physical Exam:   Dressing C/D/I  Compartments soft and compressible  Motor intact distally  SILT distally  CR<2sec, palpable pulses                           9.4    9.47  )-----------( 209      ( 06 Nov 2024 06:27 )             28.1     11-05    139  |  103  |  20  ----------------------------<  121[H]  5.3[H]   |  22  |  0.6[L]    Ca    8.5      05 Nov 2024 05:48  Mg     2.0     11-05    TPro  5.3[L]  /  Alb  3.0[L]  /  TBili  0.3  /  DBili  x   /  AST  27  /  ALT  17  /  AlkPhos  73  11-05        A/P: 84yFemale s/p L hemiarthroplasty. doing well.    - OOB to Chair   - WBAT  - PT/OT  - Pain control   - Extremity icing/elevation  - Incentive Spirometry   - DVT Prophylaxis   - Discharge planning    - If discharged, patient should follow up with Dr. Garcia at [address] [place] [address]. Phone number [phone] for scheduling/appointment.  - Patient should be discharged on 6 weeks (from surgery date) of appropriate DVT prophylaxis due to their decreased functional/ambulation status after lower extremity surgery. Orthopaedic preference would be Aspirin 81 mg BID  if there are no contraindications. If patient is already on home anticoagulation, they may continue home anticoagulation medication instead of aspirin. If patient is going to inpatient rehab/nursing facility, and they plan for DVT PPx with SQH/LVX, they may be placed on that instead of aspirin.

## 2024-11-06 NOTE — OCCUPATIONAL THERAPY INITIAL EVALUATION ADULT - NSOTDISCHREC_GEN_A_CORE
Patient requires dependent A with activities of daily living. OT recommends D/C to rehabilitation facility when medically appropriate. Refer to evaluation for details.

## 2024-11-06 NOTE — PROGRESS NOTE ADULT - ASSESSMENT
84yoF hx intellectual disability, bipolar disorder, Breast mass (unknown side s/p lumpectomy and RT '07), asthma, hoover's, esophageal varices, s/p R cataract surgery, R hip and L humeral fx, hld here following fall.     # Acute L femoral neck fracture-s/p L hip hemiarthroplasty 11/4--received cefazolin  3 doses post op as per ortho   # Unwitnessed fall, r/o syncope  unclear story, sounds likely syncopal episode possibly orthostasis induced   wbc improved  could be stress/pain related and dehydration   CT head/neck negative for acute     # SIRS POA ( wbc and HR)  Possible UTI / SIRS ON admission   UA strong  positive , unreliable hx    blood cultures NGTD , urine cultures was not collected , s/p 3days of ceftriaxon  -  TTE EF of 66 %. Grade I diastolic dysfunction.. Mild thickening of the anterior and posterior mitral valve leaflets.   Moderate mitral annular calcification. . Mild aortic regurgitation.. Mild pulmonic valve regurgitation.      #Incidental R breast nodule  #Hx breast mass s/p lumpectomy and radiation  -outpt f/u and w/u    #Incidental cholelithiasis  -asymptomatic at this time, no LFT elevation  -consider CCY as outpt    #Intellectual Disability  #Bipolar disorder  -c/w [facility],  SLP eval appreciated   valproic level is 54  PT/OT after surgery    #Asthma, not in acute exacerbation  #Esophageal varices  #Hiatal hernia  monitor , resume home meds     #Constipation   CTAP with Moderate to large rectal stool burden. No bowel obstruction.  bowel regimen and monitor   moving her bowel as per  staff ( they record all events) Given lactulose today    # Desaturation today placed on nasal cannula-- CT chest-- no PE and drop in Hb noted--will transfuse if 4 PM cbc shows drop in hb    #DVT ppx -HSQ / scd   -VA duplx negative for dvt ppx     #GI ppx    Pending : PT/OT , monitor post operative      Family -Ms. Koko Doshi [phone]  acting as surrogate for several years    Palliative team consult appreciated - FULL CODE . - REFER TO Palliative note for details , can not be dnr/dni with out approval by [facility] given her developmental disability     from group home

## 2024-11-06 NOTE — PROGRESS NOTE ADULT - SUBJECTIVE AND OBJECTIVE BOX
SUBJECTIVE:    Patient is a 84y old Female who presents with a chief complaint of L hip fx ; ?syncope (06 Nov 2024 10:31)    Currently admitted to medicine with the primary diagnosis of Femoral neck fracture       Today is hospital day 6d.     PAST MEDICAL & SURGICAL HISTORY  Atypical bipolar disorder    Mild mental retardation    Brito esophagus    Hyperlipemia    Breast mass, right  s/p lumpectomy & radiation 2007    Feeding by G-tube  s/p removal 2012    Femoral neck fracture      ALLERGIES:  No Known Allergies    MEDICATIONS:  STANDING MEDICATIONS  calcium carbonate   1250 mG (OsCal) 1 Tablet(s) Oral two times a day  chlorhexidine 2% Cloths 1 Application(s) Topical <User Schedule>  cholecalciferol 1000 Unit(s) Oral daily  heparin   Injectable 5000 Unit(s) SubCutaneous every 8 hours  lactated ringers. 1000 milliLiter(s) IV Continuous <Continuous>  lactated ringers. 1000 milliLiter(s) IV Continuous <Continuous>  magnesium oxide 400 milliGRAM(s) Oral two times a day with meals  polyethylene glycol 3350 17 Gram(s) Oral two times a day  senna 2 Tablet(s) Oral at bedtime  valproic  acid Syrup 500 milliGRAM(s) Oral <User Schedule>  valproic  acid Syrup 1000 milliGRAM(s) Oral <User Schedule>    PRN MEDICATIONS  acetaminophen     Tablet .. 650 milliGRAM(s) Oral every 6 hours PRN  ondansetron Injectable 4 milliGRAM(s) IV Push once PRN  oxyCODONE    IR 2.5 milliGRAM(s) Oral every 6 hours PRN    VITALS:   T(F): 99.4  HR: 100  BP: 109/59  RR: 16  SpO2: 94%    LABS:                        9.4    9.47  )-----------( 209      ( 06 Nov 2024 06:27 )             28.1     11-06    141  |  104  |  15  ----------------------------<  103[H]  4.6   |  28  |  <0.5[L]    Ca    8.3[L]      06 Nov 2024 06:27  Mg     1.9     11-06    TPro  4.9[L]  /  Alb  2.9[L]  /  TBili  0.4  /  DBili  x   /  AST  20  /  ALT  11  /  AlkPhos  65  11-06      Urinalysis Basic - ( 06 Nov 2024 06:27 )    Color: x / Appearance: x / SG: x / pH: x  Gluc: 103 mg/dL / Ketone: x  / Bili: x / Urobili: x   Blood: x / Protein: x / Nitrite: x   Leuk Esterase: x / RBC: x / WBC x   Sq Epi: x / Non Sq Epi: x / Bacteria: x                RADIOLOGY:    PHYSICAL EXAM:  GEN: No acute distress  LUNGS: Clear to auscultation bilaterally   HEART: S1/S2 present. RRR.   ABD/ GI: Soft, non-tender, non-distended. Bowel sounds present  EXT: lt hip fracture-- thigh swollen  NEURO: AAOX0

## 2024-11-06 NOTE — OCCUPATIONAL THERAPY INITIAL EVALUATION ADULT - LIVES WITH, PROFILE
Pt is resident of [facility]. PTA pt was WC bound and dependent at baseline with ADLs and IADLs

## 2024-11-06 NOTE — OCCUPATIONAL THERAPY INITIAL EVALUATION ADULT - GENERAL OBSERVATIONS, REHAB EVAL
Pt encountered and left semireclined in bed in NAD, +IV lock, +sequentials, +aide bedside. RN Galo made aware Pt encountered and left semireclined in bed in NAD, +IV lock, +sequentials, +aide bedside, +2L NC O2. DANIELLE Rosenthal made aware

## 2024-11-06 NOTE — PROGRESS NOTE ADULT - ASSESSMENT
84yoF hx intellectual disability, bipolar disorder, Breast mass (unknown side s/p lumpectomy and RT '07), asthma, hoover's, esophageal varices, s/p R cataract surgery, R hip and L humeral fx, hld here following fall. s/p L hip ORIF on 11/4.    #Acute L femoral neck fracture  #Unwitnessed fall, r/o syncope  #SIRS POA (wbc and HR) -- improved  - unclear story, sounds likely syncopal episode possibly orthostasis induced   - trauma workup negative  - wbc improved -- could be stress/pain related and dehydration   - remote tele x24hrs- periods of currently sinus tach  - EKG no acute ischemia ,  no active chest pain or SOB , trop and ck negative   - no reported cardiac issues   - METS<4, wheelchair dependent at baseline  - RCRI 0  - NSQIP  SCORE WITH HIGH RISK ( 74%)  OF POST OPERATIVE Delirium   - Pt is medically optimized for intermediate risk surgery   - 11/1 TTE - EF of 66 %. Grade I diastolic dysfunction.. Mild thickening of the anterior and posterior mitral valve leaflets. Moderate mitral annular calcification. . Mild aortic regurgitation.. Mild pulmonic valve regurgitation.  - multimodal pain therapy  - SLP eval appreciated   - s/p L hip ORIF on 11/4  - PT consult- rec d/c to [facility] after d/c  - OT consult- OT recommends D/C to rehabilitation facility when medically appropriate.  - Ortho recs:  -- s/p L hip ORIF, displaced intracapsular left femoral neck fracture; post op WBAT with anterolateral hip precautions; Abx and DVT ppx per protocol  -- started on Ancef 2g q8h x3 doses and heparin for DVT ppx  -- Patient should be discharged on 6 weeks (from surgery date) of appropriate DVT prophylaxis due to their decreased functional/ambulation status after lower extremity surgery. Orthopaedic preference would be Aspirin 81 mg BID  if there are no contraindications. If patient is already on home anticoagulation, they may continue home anticoagulation medication instead of aspirin. If patient is going to inpatient rehab/nursing facility, and they plan for DVT PPx with SQH/LVX, they may be placed on that instead of aspirin.    #R/o PE  - 11/6 in the AM patient noted to be hypoxic on room air to the 80s (was placed on 2L NC); patient was also tachy to 100 but had been more tachycardic on 11/5  - Urgent CT Angio Chest with PE protocol ordered    #Acute Anemia  - 11/6 noted drop in Hgb from 11.5 to 9.4  - repeat CBC @ 4 PM    #Possible UTI / SIRS ON admission   - UA strong  positive , unreliable hx   - Blood Cx (-)  - get urine culture, c/w ceftriaxone for now     #Incidental R breast nodule  #Hx breast mass s/p lumpectomy and radiation  - outpt f/u and w/u    #Incidental cholelithiasis  - asymptomatic at this time, no LFT elevation  - consider CCY as outpt    #Intellectual Disability  #Bipolar disorder  -c/w Valproic acid, austedo    #Asthma, not in acute exacerbation  #Esophageal varices  #Hiatal hernia  - monitor , resume home meds     #Constipation   - CTAP with Moderate to large rectal stool burden. No bowel obstruction.  - bowel regimen Miralax + Senna  - 11/6 added Lactulose x2 doses    MISC  #DVT prophylaxis: Heparin 5000 q8h  #GI prophylaxis: not indicated  #Diet: pureed, DASH/TLC, with moderately thick liquids  #Activity: Bedrest  #Code status: FULL CODE - REFER TO Palliative note for details , can not be dnr/dni with out approval by [facility] given her developmental disability   #Disposition: Acute  Pending: CT Angio Chest to r/o PE; 16:00 CBC for Hgb  Contact: Ms. Koko Doshi [phone]  acting as surrogate for several years

## 2024-11-06 NOTE — OCCUPATIONAL THERAPY INITIAL EVALUATION ADULT - NSTOILETINGEQUIP_GEN_A_OT
I independently performed a history and physical on Rossy Olvera.   All diagnostic, treatment, and disposition decisions were made by myself in conjunction with the advanced practice provider.     For further details of Rossy Olvera's emergency department encounter, please see Skip Saba NP's documentation.      Chief complaint: Emesis (Started at 0100, pt is diabetic, ate fair food)      Patient states she had some nausea yesterday but is been vomiting since about 1 AM today.  She has type 1 diabetes and was concern for getting sick so she came to the ER today.  She denies any black or bloody emesis or stool.  No fevers.  No urinary or vaginal symptoms.  On exam abdomen benign.  Patient in mild distress due to nausea.  History From: History from : Patient  Limitations to history : None        Labs Reviewed   CBC WITH AUTO DIFFERENTIAL - Abnormal; Notable for the following components:       Result Value    WBC 24.9 (*)     RBC 5.46 (*)     Hematocrit 48.5 (*)     Platelets 471 (*)     Neutrophils Absolute 22.9 (*)     Lymphocytes Absolute 0.7 (*)     All other components within normal limits   COMPREHENSIVE METABOLIC PANEL - Abnormal; Notable for the following components:    Potassium 5.3 (*)     Chloride 97 (*)     CO2 12 (*)     Anion Gap 27 (*)     Glucose 319 (*)     Total Protein 8.4 (*)     All other components within normal limits    Narrative:     CALL  [place]  [facility] tel. [phone],  Chemistry results called to and read back by koffi velazquez rn, 07/24/2024  09:54, by YOLANDA   BLOOD GAS, VENOUS - Abnormal; Notable for the following components:    pH, Daniel 7.348 (*)     pCO2, Daniel 25.2 (*)     PO2, Daniel 74.8 (*)     HCO3, Venous 13.5 (*)     Base Excess, Daniel -9.8 (*)     Carboxyhemoglobin 3.9 (*)     All other components within normal limits   LIPASE - Abnormal; Notable for the following components:    Lipase 6.0 (*)     All other components within normal limits    Narrative:     CALL  [place] [facility]za  [facility] tel.  [phone],  Chemistry results called to and read back by koffi velazquez rn, 07/24/2024  09:54, by YOLANDA   URINALYSIS WITH REFLEX TO CULTURE - Abnormal; Notable for the following components:    Glucose, Ur >=1000 (*)     Ketones, Urine >=80 (*)     All other components within normal limits   PREGNANCY, URINE   URINE DRUG SCREEN   LACTIC ACID     CT ABDOMEN PELVIS W IV CONTRAST Additional Contrast? None    (Results Pending)       Point-of-care ultrasound performed by me:  No results found.    I personally saw this patient and performed a substantive portion of the visit including all aspects of the medical decision making.    MEDICAL DECISION MAKING    ED Medication Orders (From admission, onward)      Start Ordered     Status Ordering Provider    07/24/24 0845 07/24/24 0844  ondansetron (ZOFRAN) injection 4 mg  ONCE         Last MAR action: Given - by MADAY CHAMBERLAIN on 07/24/24 at 0916 DENISSE TATUM    07/24/24 0845 07/24/24 0844  sodium chloride 0.9 % bolus 1,000 mL  ONCE         Last MAR action: New Bag - by MADAY CHAMBERLAIN on 07/24/24 at 0916 DENISSE TATUM            Past Medical History:   Active Ambulatory Problems     Diagnosis Date Noted    Metatarsalgia of right foot 03/02/2016    Metatarsalgia 03/02/2016    Elbow sprain, left, initial encounter 12/10/2020    Intractable nausea and vomiting 11/20/2022    Cyclical vomiting, intractable 01/28/2023     Resolved Ambulatory Problems     Diagnosis Date Noted    No Resolved Ambulatory Problems     Past Medical History:   Diagnosis Date    Abdominal pain     ADHD     Back pain     Diabetes mellitus (HCC)        Chronic Conditions: Diabetes    CONSULTS: (Who and What was discussed)  None    CC/HPI Summary, DDx, ED Course, and Reassessment: While in the ER the patient's nausea resolved and she was able to tolerate oral fluids.  Her blood glucose trended downward.  I do not believe that she has DKA.  I do believe this is primarily a cannabinoid hyperemesis syndrome.  Given  3:1 commode

## 2024-11-06 NOTE — OCCUPATIONAL THERAPY INITIAL EVALUATION ADULT - ADDITIONAL COMMENTS
As per pts aide at bedside patient was Max A with all ADLs and IADLs PTA. Pt was wheelchair bound. Pt has an intellectual disability.

## 2024-11-07 LAB
ALBUMIN SERPL ELPH-MCNC: 2.7 G/DL — LOW (ref 3.5–5.2)
ALP SERPL-CCNC: 68 U/L — SIGNIFICANT CHANGE UP (ref 30–115)
ALT FLD-CCNC: 9 U/L — SIGNIFICANT CHANGE UP (ref 0–41)
ANION GAP SERPL CALC-SCNC: 7 MMOL/L — SIGNIFICANT CHANGE UP (ref 7–14)
AST SERPL-CCNC: 15 U/L — SIGNIFICANT CHANGE UP (ref 0–41)
BASOPHILS # BLD AUTO: 0.03 K/UL — SIGNIFICANT CHANGE UP (ref 0–0.2)
BASOPHILS NFR BLD AUTO: 0.4 % — SIGNIFICANT CHANGE UP (ref 0–1)
BILIRUB SERPL-MCNC: 0.3 MG/DL — SIGNIFICANT CHANGE UP (ref 0.2–1.2)
BUN SERPL-MCNC: 13 MG/DL — SIGNIFICANT CHANGE UP (ref 10–20)
CALCIUM SERPL-MCNC: 8.4 MG/DL — SIGNIFICANT CHANGE UP (ref 8.4–10.5)
CHLORIDE SERPL-SCNC: 103 MMOL/L — SIGNIFICANT CHANGE UP (ref 98–110)
CO2 SERPL-SCNC: 32 MMOL/L — SIGNIFICANT CHANGE UP (ref 17–32)
CREAT SERPL-MCNC: <0.5 MG/DL — LOW (ref 0.7–1.5)
CULTURE RESULTS: SIGNIFICANT CHANGE UP
EGFR: 98 ML/MIN/1.73M2 — SIGNIFICANT CHANGE UP
EOSINOPHIL # BLD AUTO: 0.15 K/UL — SIGNIFICANT CHANGE UP (ref 0–0.7)
EOSINOPHIL NFR BLD AUTO: 1.8 % — SIGNIFICANT CHANGE UP (ref 0–8)
GLUCOSE SERPL-MCNC: 96 MG/DL — SIGNIFICANT CHANGE UP (ref 70–99)
HCT VFR BLD CALC: 26.6 % — LOW (ref 37–47)
HCT VFR BLD CALC: 27 % — LOW (ref 37–47)
HGB BLD-MCNC: 8.9 G/DL — LOW (ref 12–16)
HGB BLD-MCNC: 9 G/DL — LOW (ref 12–16)
IMM GRANULOCYTES NFR BLD AUTO: 2.1 % — HIGH (ref 0.1–0.3)
LYMPHOCYTES # BLD AUTO: 2.72 K/UL — SIGNIFICANT CHANGE UP (ref 1.2–3.4)
LYMPHOCYTES # BLD AUTO: 33.1 % — SIGNIFICANT CHANGE UP (ref 20.5–51.1)
MAGNESIUM SERPL-MCNC: 2 MG/DL — SIGNIFICANT CHANGE UP (ref 1.8–2.4)
MCHC RBC-ENTMCNC: 32.3 PG — HIGH (ref 27–31)
MCHC RBC-ENTMCNC: 32.4 PG — HIGH (ref 27–31)
MCHC RBC-ENTMCNC: 33.3 G/DL — SIGNIFICANT CHANGE UP (ref 32–37)
MCHC RBC-ENTMCNC: 33.5 G/DL — SIGNIFICANT CHANGE UP (ref 32–37)
MCV RBC AUTO: 96.7 FL — SIGNIFICANT CHANGE UP (ref 81–99)
MCV RBC AUTO: 96.8 FL — SIGNIFICANT CHANGE UP (ref 81–99)
MONOCYTES # BLD AUTO: 1.1 K/UL — HIGH (ref 0.1–0.6)
MONOCYTES NFR BLD AUTO: 13.4 % — HIGH (ref 1.7–9.3)
NEUTROPHILS # BLD AUTO: 4.04 K/UL — SIGNIFICANT CHANGE UP (ref 1.4–6.5)
NEUTROPHILS NFR BLD AUTO: 49.2 % — SIGNIFICANT CHANGE UP (ref 42.2–75.2)
NRBC # BLD: 0 /100 WBCS — SIGNIFICANT CHANGE UP (ref 0–0)
NRBC # BLD: 0 /100 WBCS — SIGNIFICANT CHANGE UP (ref 0–0)
PLATELET # BLD AUTO: 208 K/UL — SIGNIFICANT CHANGE UP (ref 130–400)
PLATELET # BLD AUTO: 236 K/UL — SIGNIFICANT CHANGE UP (ref 130–400)
PMV BLD: 9.8 FL — SIGNIFICANT CHANGE UP (ref 7.4–10.4)
PMV BLD: 9.9 FL — SIGNIFICANT CHANGE UP (ref 7.4–10.4)
POTASSIUM SERPL-MCNC: 3.9 MMOL/L — SIGNIFICANT CHANGE UP (ref 3.5–5)
POTASSIUM SERPL-SCNC: 3.9 MMOL/L — SIGNIFICANT CHANGE UP (ref 3.5–5)
PROT SERPL-MCNC: 4.6 G/DL — LOW (ref 6–8)
RBC # BLD: 2.75 M/UL — LOW (ref 4.2–5.4)
RBC # BLD: 2.79 M/UL — LOW (ref 4.2–5.4)
RBC # FLD: 12.2 % — SIGNIFICANT CHANGE UP (ref 11.5–14.5)
RBC # FLD: 12.2 % — SIGNIFICANT CHANGE UP (ref 11.5–14.5)
SODIUM SERPL-SCNC: 142 MMOL/L — SIGNIFICANT CHANGE UP (ref 135–146)
SPECIMEN SOURCE: SIGNIFICANT CHANGE UP
SURGICAL PATHOLOGY STUDY: SIGNIFICANT CHANGE UP
WBC # BLD: 8.21 K/UL — SIGNIFICANT CHANGE UP (ref 4.8–10.8)
WBC # BLD: 8.71 K/UL — SIGNIFICANT CHANGE UP (ref 4.8–10.8)
WBC # FLD AUTO: 8.21 K/UL — SIGNIFICANT CHANGE UP (ref 4.8–10.8)
WBC # FLD AUTO: 8.71 K/UL — SIGNIFICANT CHANGE UP (ref 4.8–10.8)

## 2024-11-07 PROCEDURE — 99232 SBSQ HOSP IP/OBS MODERATE 35: CPT

## 2024-11-07 PROCEDURE — 76000 FLUOROSCOPY <1 HR PHYS/QHP: CPT | Mod: 26

## 2024-11-07 RX ORDER — IRON SUCROSE 20 MG/ML
200 INJECTION, SOLUTION INTRAVENOUS EVERY 24 HOURS
Refills: 0 | Status: DISCONTINUED | OUTPATIENT
Start: 2024-11-07 | End: 2024-11-07

## 2024-11-07 RX ORDER — IRON SUCROSE 20 MG/ML
200 INJECTION, SOLUTION INTRAVENOUS EVERY 24 HOURS
Refills: 0 | Status: DISCONTINUED | OUTPATIENT
Start: 2024-11-07 | End: 2024-11-11

## 2024-11-07 RX ADMIN — Medication 400 MILLIGRAM(S): at 17:09

## 2024-11-07 RX ADMIN — Medication 1000 UNIT(S): at 11:57

## 2024-11-07 RX ADMIN — Medication 1 TABLET(S): at 17:09

## 2024-11-07 RX ADMIN — Medication 5000 UNIT(S): at 21:30

## 2024-11-07 RX ADMIN — POLYETHYLENE GLYCOL 3350 17 GRAM(S): 17 POWDER, FOR SOLUTION ORAL at 05:27

## 2024-11-07 RX ADMIN — Medication 400 MILLIGRAM(S): at 08:06

## 2024-11-07 RX ADMIN — Medication 100 MILLILITER(S): at 05:28

## 2024-11-07 RX ADMIN — Medication 100 MILLILITER(S): at 21:45

## 2024-11-07 RX ADMIN — VALPROIC ACID 500 MILLIGRAM(S): 250 SOLUTION ORAL at 17:09

## 2024-11-07 RX ADMIN — Medication 2 TABLET(S): at 21:30

## 2024-11-07 RX ADMIN — Medication 5000 UNIT(S): at 05:22

## 2024-11-07 RX ADMIN — CHLORHEXIDINE GLUCONATE 1 APPLICATION(S): 1.2 RINSE ORAL at 05:22

## 2024-11-07 RX ADMIN — IRON SUCROSE 100 MILLIGRAM(S): 20 INJECTION, SOLUTION INTRAVENOUS at 13:21

## 2024-11-07 RX ADMIN — Medication 1 TABLET(S): at 05:22

## 2024-11-07 RX ADMIN — POLYETHYLENE GLYCOL 3350 17 GRAM(S): 17 POWDER, FOR SOLUTION ORAL at 17:11

## 2024-11-07 RX ADMIN — VALPROIC ACID 1000 MILLIGRAM(S): 250 SOLUTION ORAL at 21:31

## 2024-11-07 NOTE — PROGRESS NOTE ADULT - ASSESSMENT
84yoF hx intellectual disability, bipolar disorder, Breast mass (unknown side s/p lumpectomy and RT '07), asthma, hoover's, esophageal varices, s/p R cataract surgery, R hip and L humeral fx, hld here following fall. s/p L hip ORIF on .    #Acute L femoral neck fracture  #Unwitnessed fall, r/o syncope  #SIRS POA (wbc and HR) -- improved  #Left hip hematoma  - unclear story, sounds likely syncopal episode possibly orthostasis induced   - trauma workup negative  - wbc improved -- could be stress/pain related and dehydration   - remote tele x24hrs- periods of currently sinus tach  - EKG no acute ischemia ,  no active chest pain or SOB , trop and ck negative   - no reported cardiac issues   - METS<4, wheelchair dependent at baseline  - RCRI 0  - NSQIP  SCORE WITH HIGH RISK ( 74%)  OF POST OPERATIVE Delirium   - Pt is medically optimized for intermediate risk surgery   -  TTE - EF of 66 %. Grade I diastolic dysfunction.. Mild thickening of the anterior and posterior mitral valve leaflets. Moderate mitral annular calcification. . Mild aortic regurgitation.. Mild pulmonic valve regurgitation.  - multimodal pain therapy  - SLP eval appreciated   - s/p L hip ORIF on   - PT consult- rec d/c to [facility] after d/c  - OT consult- OT recommends D/C to rehabilitation facility when medically appropriate.  - Ortho recs:  -- s/p L hip ORIF, displaced intracapsular left femoral neck fracture; post op WBAT with anterolateral hip precautions; Abx and DVT ppx per protocol  -- started on Ancef 2g q8h x3 doses and heparin for DVT ppx  -- Patient should be discharged on 6 weeks (from surgery date) of appropriate DVT prophylaxis due to their decreased functional/ambulation status after lower extremity surgery. Orthopaedic preference would be Aspirin 81 mg BID  if there are no contraindications. If patient is already on home anticoagulation, they may continue home anticoagulation medication instead of aspirin. If patient is going to inpatient rehab/nursing facility, and they plan for DVT PPx with SQH/LVX, they may be placed on that instead of aspirin.  - left hip noted to have hematoma at site of procedure; will continue to closely monitor Hgb    #Acute Anemia  -  noted drop in Hgb from 11.5 to 9.4 --> 9.2 --> 8.9 on   - will give venofer 200 mg IVPB x5 doses if patient remains afebrile    #R/o PE -- negative  -  in the AM patient noted to be hypoxic on room air to the 80s (was placed on 2L NC); patient was also tachy to 100 but had been more tachycardic on  --> Urgent CT Angio Chest with PE protocol negative for PE    #Possible UTI / SIRS ON admission   - UA strong  positive , unreliable hx   - Blood Cx (-)  - received three day course of ceftriaxone    #Incidental R breast nodule  #Hx breast mass s/p lumpectomy and radiation  - outpt f/u and w/u    #Incidental cholelithiasis  - asymptomatic at this time, no LFT elevation  - consider CCY as outpt    #Intellectual Disability  #Bipolar disorder  -c/w Valproic acid, austedo    #Asthma, not in acute exacerbation  #Esophageal varices  #Hiatal hernia  - monitor , resume home meds     #Constipation   - CTAP with Moderate to large rectal stool burden. No bowel obstruction.  - bowel regimen Miralax + Senna  -  added Lactulose x2 doses    MISC  #DVT prophylaxis: Heparin 5000 q8h  #GI prophylaxis: not indicated  #Diet: pureed, DASH/TLC, with moderately thick liquids  #Activity: Bedrest  #Code status: FULL CODE - REFER TO Palliative note for details , can not be dnr/dni with out approval by [facility] given her developmental disability   #Disposition: Acute  Pendin:00 CBC for Hgb  Contact: MsGracie Koko Doshi [phone]  acting as surrogate for several years

## 2024-11-07 NOTE — PROGRESS NOTE ADULT - SUBJECTIVE AND OBJECTIVE BOX
Progress Note:   · Provider Specialty	Orthopedics    Reason for Admission:    Reason for Admission:  · Reason for Admission	L hip fx ;     · Subjective and Objective:   ORTHOPAEDIC SURGERY PROGRESS NOTE    Interval History:  Patient seen and examined at bedside.  Pain is controlled.  No complaints of chest pain, SOB, N/V.    MEDICATIONS  (STANDING):  calcium carbonate   1250 mG (OsCal) 1 Tablet(s) Oral two times a day  chlorhexidine 2% Cloths 1 Application(s) Topical <User Schedule>  cholecalciferol 1000 Unit(s) Oral daily  heparin   Injectable 5000 Unit(s) SubCutaneous every 8 hours  lactated ringers. 1000 milliLiter(s) (75 mL/Hr) IV Continuous <Continuous>  lactated ringers. 1000 milliLiter(s) (100 mL/Hr) IV Continuous <Continuous>  magnesium oxide 400 milliGRAM(s) Oral two times a day with meals  polyethylene glycol 3350 17 Gram(s) Oral two times a day  senna 2 Tablet(s) Oral at bedtime  valproic  acid Syrup 500 milliGRAM(s) Oral <User Schedule>  valproic  acid Syrup 1000 milliGRAM(s) Oral <User Schedule>    MEDICATIONS  (PRN):  acetaminophen     Tablet .. 650 milliGRAM(s) Oral every 6 hours PRN Mild Pain (1 - 3)  ondansetron Injectable 4 milliGRAM(s) IV Push once PRN Nausea and/or Vomiting  oxyCODONE    IR 2.5 milliGRAM(s) Oral every 6 hours PRN Moderate Pain (4 - 6)    Vital Signs Last 24 Hrs  T(C): 36.3 (07 Nov 2024 07:48), Max: 38.1 (06 Nov 2024 23:30)  T(F): 97.4 (07 Nov 2024 07:48), Max: 100.5 (06 Nov 2024 23:30)  HR: 84 (07 Nov 2024 07:48) (84 - 101)  BP: 106/63 (07 Nov 2024 07:48) (106/63 - 117/68)  BP(mean): --  RR: 19 (07 Nov 2024 07:48) (17 - 19)  SpO2: 95% (07 Nov 2024 07:48) (90% - 95%)    Parameters below as of 07 Nov 2024 07:48  Patient On (Oxygen Delivery Method): room air        Physical Exam:   Dressing C/D/I  Compartments soft and compressible  Motor intact distally  SILT distally  CR<2sec, palpable pulses                         8.9    8.21  )-----------( 208      ( 07 Nov 2024 05:40 )             26.6                           9.4    9.47  )-----------( 209      ( 06 Nov 2024 06:27 )             28.1     11-05    139  |  103  |  20  ----------------------------<  121[H]  5.3[H]   |  22  |  0.6[L]    Ca    8.5      05 Nov 2024 05:48  Mg     2.0     11-05    TPro  5.3[L]  /  Alb  3.0[L]  /  TBili  0.3  /  DBili  x   /  AST  27  /  ALT  17  /  AlkPhos  73  11-05        A/P: 84yFemale s/p L hemiarthroplasty. doing well.    - OOB to Chair   - WBAT  - PT/OT  - Pain control   - Extremity icing/elevation  - Incentive Spirometry   - DVT Prophylaxis   - Discharge planning    - If discharged, patient should follow up with Dr. Garcia at [address] [place] [address]. Phone number [phone] for scheduling/appointment.  - Patient should be discharged on 6 weeks (from surgery date) of appropriate DVT prophylaxis due to their decreased functional/ambulation status after lower extremity surgery. Orthopaedic preference would be Aspirin 81 mg BID  if there are no contraindications. If patient is already on home anticoagulation, they may continue home anticoagulation medication instead of aspirin. If patient is going to inpatient rehab/nursing facility, and they plan for DVT PPx with SQH/LVX, they may be placed on that instead of aspirin.

## 2024-11-07 NOTE — PROGRESS NOTE ADULT - ASSESSMENT
84yoF hx intellectual disability, bipolar disorder, Breast mass (unknown side s/p lumpectomy and RT '07), asthma, hoover's, esophageal varices, s/p R cataract surgery, R hip and L humeral fx, hld here following fall.     # Acute L femoral neck fracture-s/p L hip hemiarthroplasty 11/4--received cefazolin  3 doses post op as per ortho   # Unwitnessed fall, r/o syncope  unclear story, sounds likely syncopal episode possibly orthostasis induced   wbc improved  could be stress/pain related and dehydration   CT head/neck negative for acute   TTE EF of 66 %. Grade I diastolic dysfunction.. Mild thickening of the anterior and posterior mitral valve leaflets.   Moderate mitral annular calcification. . Mild aortic regurgitation.. Mild pulmonic valve regurgitation.    # SIRS POA ( wbc and HR)  Possible UTI / SIRS ON admission   UA strong  positive , unreliable hx    blood cultures NGTD , urine cultures was not collected , s/p 3days of ceftriaxon  - fever last night-- send UA      #Incidental R breast nodule  #Hx breast mass s/p lumpectomy and radiation  -outpt f/u and w/u    #Incidental cholelithiasis  -asymptomatic at this time, no LFT elevation  -consider CCY as outpt    #Intellectual Disability  #Bipolar disorder  -c/w [facility],  SLP eval appreciated   valproic level is 54  PT/OT after surgery    #Asthma, not in acute exacerbation  #Esophageal varices  #Hiatal hernia  monitor , resume home meds     #Constipation   CTAP with Moderate to large rectal stool burden. No bowel obstruction.  bowel regimen and monitor   moving her bowel as per  staff ( they record all events) Given lactulose --NO BM-- St x ray abd and fleet enema today.      #DVT ppx -HSQ / scd   -[facility] duplx negative for dvt ppx     #GI ppx    Pending : PT/OT , monitor post operative  fever.    Family -Ms. Koko Doshi [phone]  acting as surrogate for several years    Palliative team consult appreciated - FULL CODE . - REFER TO Palliative note for details , can not be dnr/dni with out approval by [facility] given her developmental disability     from group home. DC plan Monday.

## 2024-11-07 NOTE — PROGRESS NOTE ADULT - SUBJECTIVE AND OBJECTIVE BOX
SUBJECTIVE/OVERNIGHT EVENTS  Today is hospital day 7d. This morning patient was seen and examined at bedside, resting comfortably in bed. No acute or major events overnight.    MEDICATIONS  STANDING MEDICATIONS  calcium carbonate   1250 mG (OsCal) 1 Tablet(s) Oral two times a day  chlorhexidine 2% Cloths 1 Application(s) Topical <User Schedule>  cholecalciferol 1000 Unit(s) Oral daily  heparin   Injectable 5000 Unit(s) SubCutaneous every 8 hours  iron sucrose IVPB 200 milliGRAM(s) IV Intermittent every 24 hours  lactated ringers. 1000 milliLiter(s) IV Continuous <Continuous>  lactated ringers. 1000 milliLiter(s) IV Continuous <Continuous>  magnesium oxide 400 milliGRAM(s) Oral two times a day with meals  polyethylene glycol 3350 17 Gram(s) Oral two times a day  senna 2 Tablet(s) Oral at bedtime  valproic  acid Syrup 500 milliGRAM(s) Oral <User Schedule>  valproic  acid Syrup 1000 milliGRAM(s) Oral <User Schedule>    PRN MEDICATIONS  acetaminophen     Tablet .. 650 milliGRAM(s) Oral every 6 hours PRN  ondansetron Injectable 4 milliGRAM(s) IV Push once PRN  oxyCODONE    IR 2.5 milliGRAM(s) Oral every 6 hours PRN    VITALS  T(F): 97.4 (11-07-24 @ 07:48), Max: 100.5 (11-06-24 @ 23:30)  HR: 84 (11-07-24 @ 07:48) (84 - 101)  BP: 106/63 (11-07-24 @ 07:48) (106/63 - 117/68)  RR: 19 (11-07-24 @ 07:48) (17 - 19)  SpO2: 95% (11-07-24 @ 07:48) (90% - 95%)    PHYSICAL EXAM  GENERAL: NAD, lying in bed comfortably  HEAD:  Atraumatic, normocephalic  EYES: EOMI, PERRLA, conjunctiva and sclera clear  HEART: Regular rate and rhythm, no murmurs, rubs, or gallops  LUNGS: Unlabored respirations.  Clear to auscultation bilaterally, no crackles, wheezing, or rhonchi  ABDOMEN: Soft, nontender, nondistended, +BS  EXTREMITIES: 2+ peripheral pulses bilaterally. minimal TTP to Left hip, hematoma noted  NERVOUS SYSTEM:  A&Ox3, no focal deficits   SKIN: No rashes or lesions    LABS             8.9    8.21  )-----------( 208      ( 11-07-24 @ 05:40 )             26.6     142  |  103  |  13  -------------------------<  96   11-07-24 @ 05:40  3.9  |  32  |  <0.5    Ca      8.4     11-07-24 @ 05:40  Mg     2.0     11-07-24 @ 05:40    TPro  4.6  /  Alb  2.7  /  TBili  0.3  /  DBili  x   /  AST  15  /  ALT  9   /  AlkPhos  68  /  GGT  x     11-07-24 @ 05:40        Urinalysis Basic - ( 07 Nov 2024 05:40 )    Color: x / Appearance: x / SG: x / pH: x  Gluc: 96 mg/dL / Ketone: x  / Bili: x / Urobili: x   Blood: x / Protein: x / Nitrite: x   Leuk Esterase: x / RBC: x / WBC x   Sq Epi: x / Non Sq Epi: x / Bacteria: x          IMAGING

## 2024-11-07 NOTE — PROGRESS NOTE ADULT - SUBJECTIVE AND OBJECTIVE BOX
SUBJECTIVE:    Patient is a 84y old Female who presents with a chief complaint of L hip fx ; ?syncope (07 Nov 2024 11:11)    Currently admitted to medicine with the primary diagnosis of Femoral neck fracture       Today is hospital day 7d.     PAST MEDICAL & SURGICAL HISTORY  Atypical bipolar disorder    Mild mental retardation    Brito esophagus    Hyperlipemia    Breast mass, right  s/p lumpectomy & radiation 2007    Feeding by G-tube  s/p removal 2012    Femoral neck fracture      ALLERGIES:  No Known Allergies    MEDICATIONS:  STANDING MEDICATIONS  calcium carbonate   1250 mG (OsCal) 1 Tablet(s) Oral two times a day  chlorhexidine 2% Cloths 1 Application(s) Topical <User Schedule>  cholecalciferol 1000 Unit(s) Oral daily  heparin   Injectable 5000 Unit(s) SubCutaneous every 8 hours  iron sucrose IVPB 200 milliGRAM(s) IV Intermittent every 24 hours  lactated ringers. 1000 milliLiter(s) IV Continuous <Continuous>  lactated ringers. 1000 milliLiter(s) IV Continuous <Continuous>  magnesium oxide 400 milliGRAM(s) Oral two times a day with meals  polyethylene glycol 3350 17 Gram(s) Oral two times a day  senna 2 Tablet(s) Oral at bedtime  valproic  acid Syrup 500 milliGRAM(s) Oral <User Schedule>  valproic  acid Syrup 1000 milliGRAM(s) Oral <User Schedule>    PRN MEDICATIONS  acetaminophen     Tablet .. 650 milliGRAM(s) Oral every 6 hours PRN  ondansetron Injectable 4 milliGRAM(s) IV Push once PRN  oxyCODONE    IR 2.5 milliGRAM(s) Oral every 6 hours PRN    VITALS:   T(F): 98  HR: 82  BP: 113/64  RR: 17  SpO2: 96%    LABS:                        8.9    8.21  )-----------( 208      ( 07 Nov 2024 05:40 )             26.6     11-07    142  |  103  |  13  ----------------------------<  96  3.9   |  32  |  <0.5[L]    Ca    8.4      07 Nov 2024 05:40  Mg     2.0     11-07    TPro  4.6[L]  /  Alb  2.7[L]  /  TBili  0.3  /  DBili  x   /  AST  15  /  ALT  9   /  AlkPhos  68  11-07      Urinalysis Basic - ( 07 Nov 2024 05:40 )    Color: x / Appearance: x / SG: x / pH: x  Gluc: 96 mg/dL / Ketone: x  / Bili: x / Urobili: x   Blood: x / Protein: x / Nitrite: x   Leuk Esterase: x / RBC: x / WBC x   Sq Epi: x / Non Sq Epi: x / Bacteria: x                RADIOLOGY:    PHYSICAL EXAM:  GEN: No acute distress  LUNGS: Clear to auscultation bilaterally   HEART: S1/S2 present. RRR.   ABD/ GI: Soft, non-tender, non-distended. Bowel sounds present  EXT: NC/NC/NE/2+PP/SCOTT  NEURO: [id number]

## 2024-11-08 LAB
ALBUMIN SERPL ELPH-MCNC: 2.5 G/DL — LOW (ref 3.5–5.2)
ALP SERPL-CCNC: 65 U/L — SIGNIFICANT CHANGE UP (ref 30–115)
ALT FLD-CCNC: 11 U/L — SIGNIFICANT CHANGE UP (ref 0–41)
ANION GAP SERPL CALC-SCNC: 7 MMOL/L — SIGNIFICANT CHANGE UP (ref 7–14)
AST SERPL-CCNC: 15 U/L — SIGNIFICANT CHANGE UP (ref 0–41)
BASOPHILS # BLD AUTO: 0.04 K/UL — SIGNIFICANT CHANGE UP (ref 0–0.2)
BASOPHILS NFR BLD AUTO: 0.6 % — SIGNIFICANT CHANGE UP (ref 0–1)
BILIRUB SERPL-MCNC: 0.3 MG/DL — SIGNIFICANT CHANGE UP (ref 0.2–1.2)
BUN SERPL-MCNC: 11 MG/DL — SIGNIFICANT CHANGE UP (ref 10–20)
CALCIUM SERPL-MCNC: 8.2 MG/DL — LOW (ref 8.4–10.4)
CHLORIDE SERPL-SCNC: 104 MMOL/L — SIGNIFICANT CHANGE UP (ref 98–110)
CO2 SERPL-SCNC: 30 MMOL/L — SIGNIFICANT CHANGE UP (ref 17–32)
CREAT SERPL-MCNC: <0.5 MG/DL — LOW (ref 0.7–1.5)
EGFR: 105 ML/MIN/1.73M2 — SIGNIFICANT CHANGE UP
EOSINOPHIL # BLD AUTO: 0.14 K/UL — SIGNIFICANT CHANGE UP (ref 0–0.7)
EOSINOPHIL NFR BLD AUTO: 2 % — SIGNIFICANT CHANGE UP (ref 0–8)
GLUCOSE SERPL-MCNC: 93 MG/DL — SIGNIFICANT CHANGE UP (ref 70–99)
HCT VFR BLD CALC: 26.1 % — LOW (ref 37–47)
HGB BLD-MCNC: 8.6 G/DL — LOW (ref 12–16)
IMM GRANULOCYTES NFR BLD AUTO: 3.3 % — HIGH (ref 0.1–0.3)
LYMPHOCYTES # BLD AUTO: 2.07 K/UL — SIGNIFICANT CHANGE UP (ref 1.2–3.4)
LYMPHOCYTES # BLD AUTO: 29.8 % — SIGNIFICANT CHANGE UP (ref 20.5–51.1)
MAGNESIUM SERPL-MCNC: 1.9 MG/DL — SIGNIFICANT CHANGE UP (ref 1.8–2.4)
MCHC RBC-ENTMCNC: 32.1 PG — HIGH (ref 27–31)
MCHC RBC-ENTMCNC: 33 G/DL — SIGNIFICANT CHANGE UP (ref 32–37)
MCV RBC AUTO: 97.4 FL — SIGNIFICANT CHANGE UP (ref 81–99)
MONOCYTES # BLD AUTO: 0.73 K/UL — HIGH (ref 0.1–0.6)
MONOCYTES NFR BLD AUTO: 10.5 % — HIGH (ref 1.7–9.3)
NEUTROPHILS # BLD AUTO: 3.73 K/UL — SIGNIFICANT CHANGE UP (ref 1.4–6.5)
NEUTROPHILS NFR BLD AUTO: 53.8 % — SIGNIFICANT CHANGE UP (ref 42.2–75.2)
NRBC # BLD: 0 /100 WBCS — SIGNIFICANT CHANGE UP (ref 0–0)
PLATELET # BLD AUTO: 227 K/UL — SIGNIFICANT CHANGE UP (ref 130–400)
PMV BLD: 9.9 FL — SIGNIFICANT CHANGE UP (ref 7.4–10.4)
POTASSIUM SERPL-MCNC: 4.1 MMOL/L — SIGNIFICANT CHANGE UP (ref 3.5–5)
POTASSIUM SERPL-SCNC: 4.1 MMOL/L — SIGNIFICANT CHANGE UP (ref 3.5–5)
PROT SERPL-MCNC: 4.6 G/DL — LOW (ref 6–8)
RBC # BLD: 2.68 M/UL — LOW (ref 4.2–5.4)
RBC # FLD: 12.4 % — SIGNIFICANT CHANGE UP (ref 11.5–14.5)
SODIUM SERPL-SCNC: 141 MMOL/L — SIGNIFICANT CHANGE UP (ref 135–146)
WBC # BLD: 6.94 K/UL — SIGNIFICANT CHANGE UP (ref 4.8–10.8)
WBC # FLD AUTO: 6.94 K/UL — SIGNIFICANT CHANGE UP (ref 4.8–10.8)

## 2024-11-08 PROCEDURE — 99232 SBSQ HOSP IP/OBS MODERATE 35: CPT

## 2024-11-08 RX ADMIN — VALPROIC ACID 500 MILLIGRAM(S): 250 SOLUTION ORAL at 17:44

## 2024-11-08 RX ADMIN — POLYETHYLENE GLYCOL 3350 17 GRAM(S): 17 POWDER, FOR SOLUTION ORAL at 17:51

## 2024-11-08 RX ADMIN — Medication 400 MILLIGRAM(S): at 17:45

## 2024-11-08 RX ADMIN — Medication 5000 UNIT(S): at 05:09

## 2024-11-08 RX ADMIN — Medication 2 TABLET(S): at 22:04

## 2024-11-08 RX ADMIN — Medication 1 TABLET(S): at 17:45

## 2024-11-08 RX ADMIN — Medication 5000 UNIT(S): at 14:10

## 2024-11-08 RX ADMIN — Medication 400 MILLIGRAM(S): at 07:31

## 2024-11-08 RX ADMIN — VALPROIC ACID 1000 MILLIGRAM(S): 250 SOLUTION ORAL at 22:04

## 2024-11-08 RX ADMIN — Medication 1000 UNIT(S): at 11:20

## 2024-11-08 RX ADMIN — POLYETHYLENE GLYCOL 3350 17 GRAM(S): 17 POWDER, FOR SOLUTION ORAL at 05:09

## 2024-11-08 RX ADMIN — IRON SUCROSE 100 MILLIGRAM(S): 20 INJECTION, SOLUTION INTRAVENOUS at 14:13

## 2024-11-08 RX ADMIN — Medication 1 TABLET(S): at 05:09

## 2024-11-08 RX ADMIN — Medication 5000 UNIT(S): at 22:04

## 2024-11-08 RX ADMIN — CHLORHEXIDINE GLUCONATE 1 APPLICATION(S): 1.2 RINSE ORAL at 05:09

## 2024-11-08 NOTE — PROGRESS NOTE ADULT - SUBJECTIVE AND OBJECTIVE BOX
SUBJECTIVE:    Patient is a 84y old Female who presents with a chief complaint of L hip fx ; ?syncope (08 Nov 2024 07:09)    Currently admitted to medicine with the primary diagnosis of Femoral neck fracture       Today is hospital day 8d.     PAST MEDICAL & SURGICAL HISTORY  Atypical bipolar disorder    Mild mental retardation    Brito esophagus    Hyperlipemia    Breast mass, right  s/p lumpectomy & radiation 2007    Feeding by G-tube  s/p removal 2012    Femoral neck fracture      ALLERGIES:  No Known Allergies    MEDICATIONS:  STANDING MEDICATIONS  calcium carbonate   1250 mG (OsCal) 1 Tablet(s) Oral two times a day  chlorhexidine 2% Cloths 1 Application(s) Topical <User Schedule>  cholecalciferol 1000 Unit(s) Oral daily  heparin   Injectable 5000 Unit(s) SubCutaneous every 8 hours  iron sucrose IVPB 200 milliGRAM(s) IV Intermittent every 24 hours  lactated ringers. 1000 milliLiter(s) IV Continuous <Continuous>  lactated ringers. 1000 milliLiter(s) IV Continuous <Continuous>  magnesium oxide 400 milliGRAM(s) Oral two times a day with meals  polyethylene glycol 3350 17 Gram(s) Oral two times a day  senna 2 Tablet(s) Oral at bedtime  valproic  acid Syrup 500 milliGRAM(s) Oral <User Schedule>  valproic  acid Syrup 1000 milliGRAM(s) Oral <User Schedule>    PRN MEDICATIONS  acetaminophen     Tablet .. 650 milliGRAM(s) Oral every 6 hours PRN  ondansetron Injectable 4 milliGRAM(s) IV Push once PRN  oxyCODONE    IR 2.5 milliGRAM(s) Oral every 6 hours PRN    VITALS:   T(F): 98.2  HR: 87  BP: 124/67  RR: 18  SpO2: 97%    LABS:                        8.6    6.94  )-----------( 227      ( 08 Nov 2024 06:00 )             26.1     11-08    141  |  104  |  11  ----------------------------<  93  4.1   |  30  |  <0.5[L]    Ca    8.2[L]      08 Nov 2024 06:00  Mg     1.9     11-08    TPro  4.6[L]  /  Alb  2.5[L]  /  TBili  0.3  /  DBili  x   /  AST  15  /  ALT  11  /  AlkPhos  65  11-08      Urinalysis Basic - ( 08 Nov 2024 06:00 )    Color: x / Appearance: x / SG: x / pH: x  Gluc: 93 mg/dL / Ketone: x  / Bili: x / Urobili: x   Blood: x / Protein: x / Nitrite: x   Leuk Esterase: x / RBC: x / WBC x   Sq Epi: x / Non Sq Epi: x / Bacteria: x                RADIOLOGY:    PHYSICAL EXAM:  GEN: No acute distress  LUNGS: Clear to auscultation bilaterally   HEART: S1/S2 present. RRR.   ABD/ GI: Soft, non-tender, non-distended. Bowel sounds present  EXT: NC/NC/NE/2+PP/SCOTT  NEURO: [id number]

## 2024-11-08 NOTE — PROGRESS NOTE ADULT - ASSESSMENT
84yoF hx intellectual disability, bipolar disorder, Breast mass (unknown side s/p lumpectomy and RT '07), asthma, hoover's, esophageal varices, s/p R cataract surgery, R hip and L humeral fx, hld here following fall. s/p L hip ORIF on 11/4.    #Acute L femoral neck fracture  #Unwitnessed fall, r/o syncope  #SIRS POA (wbc and HR) -- improved  #Left hip hematoma  - unclear story, sounds likely syncopal episode possibly orthostasis induced   - trauma workup negative  - wbc improved -- could be stress/pain related and dehydration   - remote tele x24hrs- periods of currently sinus tach  - EKG no acute ischemia ,  no active chest pain or SOB , trop and ck negative   - no reported cardiac issues   - METS<4, wheelchair dependent at baseline  - RCRI 0  - NSQIP  SCORE WITH HIGH RISK ( 74%)  OF POST OPERATIVE Delirium   - Pt is medically optimized for intermediate risk surgery   - 11/1 TTE - EF of 66 %. Grade I diastolic dysfunction.. Mild thickening of the anterior and posterior mitral valve leaflets. Moderate mitral annular calcification. . Mild aortic regurgitation.. Mild pulmonic valve regurgitation.  - multimodal pain therapy  - SLP eval appreciated   - s/p L hip ORIF on 11/4  - PT consult- rec d/c to [facility] after d/c  - OT consult- OT recommends D/C to rehabilitation facility when medically appropriate.  - Ortho recs:  -- s/p L hip ORIF, displaced intracapsular left femoral neck fracture; post op WBAT with anterolateral hip precautions; Abx and DVT ppx per protocol  -- started on Ancef 2g q8h x3 doses and heparin for DVT ppx  -- Patient should be discharged on 6 weeks (from surgery date) of appropriate DVT prophylaxis due to their decreased functional/ambulation status after lower extremity surgery. Orthopaedic preference would be Aspirin 81 mg BID  if there are no contraindications. If patient is already on home anticoagulation, they may continue home anticoagulation medication instead of aspirin. If patient is going to inpatient rehab/nursing facility, and they plan for DVT PPx with SQH/LVX, they may be placed on that instead of aspirin.  - left hip noted to have hematoma at site of procedure; will continue to closely monitor Hgb    #Acute Anemia  - 11/6 noted drop in Hgb from 11.5 to 9.4 --> 9.2 --> 8.9 on 11/7  - will give venofer 200 mg IVPB q24h x5 doses starting 11/7    #R/o PE -- negative  - 11/6 in the AM patient noted to be hypoxic on room air to the 80s (was placed on 2L NC); patient was also tachy to 100 but had been more tachycardic on 11/5 --> Urgent CT Angio Chest with PE protocol negative for PE    #Possible UTI / SIRS ON admission   - UA strong  positive , unreliable hx   - Blood Cx (-)  - received three day course of ceftriaxone    #Incidental R breast nodule  #Hx breast mass s/p lumpectomy and radiation  - outpt f/u and w/u    #Incidental cholelithiasis  - asymptomatic at this time, no LFT elevation  - consider CCY as outpt    #Intellectual Disability  #Bipolar disorder  -c/w Valproic acid, austedo    #Asthma, not in acute exacerbation  #Esophageal varices  #Hiatal hernia  - monitor , resume home meds     #Constipation   - CTAP with Moderate to large rectal stool burden. No bowel obstruction.  - bowel regimen Miralax + Senna  - 11/6 added Lactulose x2 doses  - 11/7 ordered fleet enema + KUB to evaluate stool burden    MISC  #DVT prophylaxis: Heparin 5000 q8h  #GI prophylaxis: not indicated  #Diet: pureed, DASH/TLC, with moderately thick liquids  #Activity: IAT  #Code status: FULL CODE - REFER TO Palliative note for details , can not be dnr/dni with out approval by [facility] given her developmental disability   #Disposition: Acute  Pending: monitoring hemoglobin in setting of Left hip hematoma, BM in patient with hx of constipation s/p surgery with decreased ambulation  Contact: MsGracie Koko Doshi [phone]  acting as surrogate for several years   84yoF hx intellectual disability, bipolar disorder, Breast mass (unknown side s/p lumpectomy and RT '07), asthma, hoover's, esophageal varices, s/p R cataract surgery, R hip and L humeral fx, hld here following fall. s/p L hip ORIF on 11/4.    #Acute L femoral neck fracture  #Unwitnessed fall, r/o syncope  #SIRS POA (wbc and HR) -- improved  #Left hip hematoma  - unclear story, sounds likely syncopal episode possibly orthostasis induced   - trauma workup negative  - wbc improved -- could be stress/pain related and dehydration   - remote tele x24hrs- periods of currently sinus tach  - EKG no acute ischemia ,  no active chest pain or SOB , trop and ck negative   - no reported cardiac issues   - METS<4, wheelchair dependent at baseline  - RCRI 0  - NSQIP  SCORE WITH HIGH RISK ( 74%)  OF POST OPERATIVE Delirium   - Pt is medically optimized for intermediate risk surgery   - 11/1 TTE - EF of 66 %. Grade I diastolic dysfunction.. Mild thickening of the anterior and posterior mitral valve leaflets. Moderate mitral annular calcification. . Mild aortic regurgitation.. Mild pulmonic valve regurgitation.  - multimodal pain therapy  - SLP eval appreciated   - s/p L hip ORIF on 11/4  - PT consult- rec d/c to [facility] after d/c  - OT consult- OT recommends D/C to rehabilitation facility when medically appropriate.  - Ortho recs:  -- s/p L hip ORIF, displaced intracapsular left femoral neck fracture; post op WBAT with anterolateral hip precautions; Abx and DVT ppx per protocol  -- started on Ancef 2g q8h x3 doses and heparin for DVT ppx  -- Patient should be discharged on 6 weeks (from surgery date) of appropriate DVT prophylaxis due to their decreased functional/ambulation status after lower extremity surgery. Orthopaedic preference would be Aspirin 81 mg BID  if there are no contraindications. If patient is already on home anticoagulation, they may continue home anticoagulation medication instead of aspirin. If patient is going to inpatient rehab/nursing facility, and they plan for DVT PPx with SQH/LVX, they may be placed on that instead of aspirin.  - left hip noted to have hematoma at site of procedure; will continue to closely monitor Hgb    #Acute Anemia  - 11/6 noted drop in Hgb from 11.5 to 9.4 --> 9.2 --> 8.9 on 11/7  - will give venofer 200 mg IVPB q24h x5 doses starting 11/7    #R/o PE -- negative  - 11/6 in the AM patient noted to be hypoxic on room air to the 80s (was placed on 2L NC); patient was also tachy to 100 but had been more tachycardic on 11/5 --> Urgent CT Angio Chest with PE protocol negative for PE    #Possible UTI / SIRS ON admission   - UA strong  positive , unreliable hx   - Blood Cx (-)  - received three day course of ceftriaxone    #Incidental R breast nodule  #Hx breast mass s/p lumpectomy and radiation  - outpt f/u and w/u    #Incidental cholelithiasis  - asymptomatic at this time, no LFT elevation  - consider CCY as outpt    #Intellectual Disability  #Bipolar disorder  -c/w Valproic acid, austedo    #Asthma, not in acute exacerbation  #Esophageal varices  #Hiatal hernia  - monitor , resume home meds     #Constipation   - CTAP with Moderate to large rectal stool burden. No bowel obstruction.  - bowel regimen Miralax + Senna  - 11/6 added Lactulose x2 doses  - 11/7 KUB to eval stool burden- Small to moderate colonic stool load; moderate rectal fecal load; Nonobstructive bowel gas pattern.  - patient had bowel movement on 11/7 as per aid at bedside  - 11/8 - ordered tap water enema    MISC  #DVT prophylaxis: Heparin 5000 q8h  #GI prophylaxis: not indicated  #Diet: pureed, DASH/TLC, with moderately thick liquids  #Activity: IAT  #Code status: FULL CODE - REFER TO Palliative note for details , can not be dnr/dni with out approval by [facility] given her developmental disability   #Disposition: Acute  Pending: monitoring hemoglobin in setting of Left hip hematoma, BM in patient with hx of constipation s/p surgery with decreased ambulation  Contact: Ms. Koko Doshi [phone]  acting as surrogate for several years

## 2024-11-08 NOTE — PROGRESS NOTE ADULT - SUBJECTIVE AND OBJECTIVE BOX
SUBJECTIVE/OVERNIGHT EVENTS  Today is hospital day 8d. This morning patient was seen and examined at bedside, resting comfortably in bed. No acute or major events overnight.    MEDICATIONS  STANDING MEDICATIONS  calcium carbonate   1250 mG (OsCal) 1 Tablet(s) Oral two times a day  chlorhexidine 2% Cloths 1 Application(s) Topical <User Schedule>  cholecalciferol 1000 Unit(s) Oral daily  heparin   Injectable 5000 Unit(s) SubCutaneous every 8 hours  iron sucrose IVPB 200 milliGRAM(s) IV Intermittent every 24 hours  lactated ringers. 1000 milliLiter(s) IV Continuous <Continuous>  lactated ringers. 1000 milliLiter(s) IV Continuous <Continuous>  magnesium oxide 400 milliGRAM(s) Oral two times a day with meals  polyethylene glycol 3350 17 Gram(s) Oral two times a day  saline laxative (FLEET) Rectal Enema 1 Enema Rectal once  senna 2 Tablet(s) Oral at bedtime  valproic  acid Syrup 500 milliGRAM(s) Oral <User Schedule>  valproic  acid Syrup 1000 milliGRAM(s) Oral <User Schedule>    PRN MEDICATIONS  acetaminophen     Tablet .. 650 milliGRAM(s) Oral every 6 hours PRN  ondansetron Injectable 4 milliGRAM(s) IV Push once PRN  oxyCODONE    IR 2.5 milliGRAM(s) Oral every 6 hours PRN    VITALS  T(F): 99 (11-08-24 @ 00:34), Max: 99 (11-08-24 @ 00:34)  HR: 97 (11-08-24 @ 00:34) (82 - 97)  BP: 113/61 (11-08-24 @ 00:34) (106/63 - 113/64)  RR: 18 (11-08-24 @ 00:34) (17 - 19)  SpO2: 97% (11-08-24 @ 00:34) (95% - 97%)    PHYSICAL EXAM  GENERAL: NAD, lying in bed comfortably  HEAD:  Atraumatic, normocephalic  EYES: EOMI, PERRLA, conjunctiva and sclera clear  HEART: Regular rate and rhythm, no murmurs, rubs, or gallops  LUNGS: Unlabored respirations.  Clear to auscultation bilaterally, no crackles, wheezing, or rhonchi  ABDOMEN: Soft, nontender, nondistended, +BS  EXTREMITIES: 2+ peripheral pulses bilaterally. minimal TTP to Left hip, hematoma noted  NERVOUS SYSTEM:  A&Ox3, no focal deficits   SKIN: No rashes or lesions    LABS             9.0    8.71  )-----------( 236      ( 11-07-24 @ 18:58 )             27.0     142  |  103  |  13  -------------------------<  96   11-07-24 @ 05:40  3.9  |  32  |  <0.5    Ca      8.4     11-07-24 @ 05:40  Mg     2.0     11-07-24 @ 05:40    TPro  4.6  /  Alb  2.7  /  TBili  0.3  /  DBili  x   /  AST  15  /  ALT  9   /  AlkPhos  68  /  GGT  x     11-07-24 @ 05:40        Urinalysis Basic - ( 07 Nov 2024 05:40 )    Color: x / Appearance: x / SG: x / pH: x  Gluc: 96 mg/dL / Ketone: x  / Bili: x / Urobili: x   Blood: x / Protein: x / Nitrite: x   Leuk Esterase: x / RBC: x / WBC x   Sq Epi: x / Non Sq Epi: x / Bacteria: x

## 2024-11-08 NOTE — PROGRESS NOTE ADULT - ASSESSMENT
84yoF hx intellectual disability, bipolar disorder, Breast mass (unknown side s/p lumpectomy and RT '07), asthma, hoover's, esophageal varices, s/p R cataract surgery, R hip and L humeral fx, hld here following fall.     # Acute L femoral neck fracture-s/p L hip hemiarthroplasty 11/4--received cefazolin  3 doses post op as per ortho   # Unwitnessed fall, r/o syncope  unclear story, sounds likely syncopal episode possibly orthostasis induced   wbc improved  could be stress/pain related and dehydration   CT head/neck negative for acute   TTE EF of 66 %. Grade I diastolic dysfunction.. Mild thickening of the anterior and posterior mitral valve leaflets.   Moderate mitral annular calcification. . Mild aortic regurgitation.. Mild pulmonic valve regurgitation.    # SIRS POA ( wbc and HR)  Possible UTI / SIRS ON admission   UA strong  positive , unreliable hx    blood cultures NGTD , urine cultures was not collected , s/p 3days of ceftriaxon  - fever not noted today        #Hx breast mass s/p lumpectomy and radiation  -outpt f/u and w/u    #Incidental cholelithiasis  -asymptomatic at this time, no LFT elevation  -consider CCY as outpt    #Intellectual Disability  #Bipolar disorder  -c/w [facility],  SLP eval appreciated   valproic level is 54  PT/OT after surgery    #Asthma, not in acute exacerbation  #Esophageal varices  #Hiatal hernia  monitor , resume home meds     #Constipation   CTAP with Moderate to large rectal stool burden. No bowel obstruction.  bowel regimen and monitor   moving her bowel as per  staff ( they record all events) Given lactulose -- large BM today    #DVT ppx -HSQ / scd   -[facility] duplx negative for dvt ppx     #GI ppx    Pending : PT/OT ,monitor on weekend-- DC plan tuesday as per [facility] home.    Family -Ms. Koko Doshi [phone]  acting as surrogate for several years    Palliative team consult appreciated - FULL CODE . - REFER TO Palliative note for details , can not be dnr/dni with out approval by [facility] given her developmental disability     from group home. DC plan Monday.

## 2024-11-09 LAB
ALBUMIN SERPL ELPH-MCNC: 2.4 G/DL — LOW (ref 3.5–5.2)
ALP SERPL-CCNC: 61 U/L — SIGNIFICANT CHANGE UP (ref 30–115)
ALT FLD-CCNC: 10 U/L — SIGNIFICANT CHANGE UP (ref 0–41)
ANION GAP SERPL CALC-SCNC: 9 MMOL/L — SIGNIFICANT CHANGE UP (ref 7–14)
AST SERPL-CCNC: 14 U/L — SIGNIFICANT CHANGE UP (ref 0–41)
BASOPHILS # BLD AUTO: 0.05 K/UL — SIGNIFICANT CHANGE UP (ref 0–0.2)
BASOPHILS NFR BLD AUTO: 0.7 % — SIGNIFICANT CHANGE UP (ref 0–1)
BILIRUB SERPL-MCNC: 0.2 MG/DL — SIGNIFICANT CHANGE UP (ref 0.2–1.2)
BUN SERPL-MCNC: 8 MG/DL — LOW (ref 10–20)
CALCIUM SERPL-MCNC: 8.3 MG/DL — LOW (ref 8.4–10.5)
CHLORIDE SERPL-SCNC: 106 MMOL/L — SIGNIFICANT CHANGE UP (ref 98–110)
CO2 SERPL-SCNC: 26 MMOL/L — SIGNIFICANT CHANGE UP (ref 17–32)
CREAT SERPL-MCNC: <0.5 MG/DL — LOW (ref 0.7–1.5)
EGFR: 105 ML/MIN/1.73M2 — SIGNIFICANT CHANGE UP
EOSINOPHIL # BLD AUTO: 0.17 K/UL — SIGNIFICANT CHANGE UP (ref 0–0.7)
EOSINOPHIL NFR BLD AUTO: 2.4 % — SIGNIFICANT CHANGE UP (ref 0–8)
GLUCOSE SERPL-MCNC: 86 MG/DL — SIGNIFICANT CHANGE UP (ref 70–99)
HCT VFR BLD CALC: 25 % — LOW (ref 37–47)
HGB BLD-MCNC: 8.2 G/DL — LOW (ref 12–16)
IMM GRANULOCYTES NFR BLD AUTO: 5.4 % — HIGH (ref 0.1–0.3)
LYMPHOCYTES # BLD AUTO: 2.12 K/UL — SIGNIFICANT CHANGE UP (ref 1.2–3.4)
LYMPHOCYTES # BLD AUTO: 29.9 % — SIGNIFICANT CHANGE UP (ref 20.5–51.1)
MAGNESIUM SERPL-MCNC: 1.7 MG/DL — LOW (ref 1.8–2.4)
MCHC RBC-ENTMCNC: 32.3 PG — HIGH (ref 27–31)
MCHC RBC-ENTMCNC: 32.8 G/DL — SIGNIFICANT CHANGE UP (ref 32–37)
MCV RBC AUTO: 98.4 FL — SIGNIFICANT CHANGE UP (ref 81–99)
MONOCYTES # BLD AUTO: 0.75 K/UL — HIGH (ref 0.1–0.6)
MONOCYTES NFR BLD AUTO: 10.6 % — HIGH (ref 1.7–9.3)
NEUTROPHILS # BLD AUTO: 3.63 K/UL — SIGNIFICANT CHANGE UP (ref 1.4–6.5)
NEUTROPHILS NFR BLD AUTO: 51 % — SIGNIFICANT CHANGE UP (ref 42.2–75.2)
NRBC # BLD: 0 /100 WBCS — SIGNIFICANT CHANGE UP (ref 0–0)
PLATELET # BLD AUTO: 252 K/UL — SIGNIFICANT CHANGE UP (ref 130–400)
PMV BLD: 9.7 FL — SIGNIFICANT CHANGE UP (ref 7.4–10.4)
POTASSIUM SERPL-MCNC: 4.1 MMOL/L — SIGNIFICANT CHANGE UP (ref 3.5–5)
POTASSIUM SERPL-SCNC: 4.1 MMOL/L — SIGNIFICANT CHANGE UP (ref 3.5–5)
PROT SERPL-MCNC: 4.3 G/DL — LOW (ref 6–8)
RBC # BLD: 2.54 M/UL — LOW (ref 4.2–5.4)
RBC # FLD: 12.4 % — SIGNIFICANT CHANGE UP (ref 11.5–14.5)
SODIUM SERPL-SCNC: 141 MMOL/L — SIGNIFICANT CHANGE UP (ref 135–146)
WBC # BLD: 7.1 K/UL — SIGNIFICANT CHANGE UP (ref 4.8–10.8)
WBC # FLD AUTO: 7.1 K/UL — SIGNIFICANT CHANGE UP (ref 4.8–10.8)

## 2024-11-09 PROCEDURE — 99232 SBSQ HOSP IP/OBS MODERATE 35: CPT

## 2024-11-09 RX ADMIN — Medication 5000 UNIT(S): at 14:37

## 2024-11-09 RX ADMIN — Medication 400 MILLIGRAM(S): at 11:41

## 2024-11-09 RX ADMIN — Medication 5000 UNIT(S): at 05:45

## 2024-11-09 RX ADMIN — Medication 1 TABLET(S): at 05:45

## 2024-11-09 RX ADMIN — Medication 5000 UNIT(S): at 21:19

## 2024-11-09 RX ADMIN — VALPROIC ACID 500 MILLIGRAM(S): 250 SOLUTION ORAL at 17:31

## 2024-11-09 RX ADMIN — POLYETHYLENE GLYCOL 3350 17 GRAM(S): 17 POWDER, FOR SOLUTION ORAL at 05:45

## 2024-11-09 RX ADMIN — Medication 1000 UNIT(S): at 11:41

## 2024-11-09 RX ADMIN — IRON SUCROSE 100 MILLIGRAM(S): 20 INJECTION, SOLUTION INTRAVENOUS at 14:35

## 2024-11-09 RX ADMIN — POLYETHYLENE GLYCOL 3350 17 GRAM(S): 17 POWDER, FOR SOLUTION ORAL at 17:34

## 2024-11-09 RX ADMIN — Medication 100 MILLILITER(S): at 05:55

## 2024-11-09 RX ADMIN — Medication 400 MILLIGRAM(S): at 17:30

## 2024-11-09 RX ADMIN — Medication 1 TABLET(S): at 17:30

## 2024-11-09 RX ADMIN — CHLORHEXIDINE GLUCONATE 1 APPLICATION(S): 1.2 RINSE ORAL at 05:45

## 2024-11-09 RX ADMIN — VALPROIC ACID 1000 MILLIGRAM(S): 250 SOLUTION ORAL at 21:18

## 2024-11-09 RX ADMIN — Medication 2 TABLET(S): at 21:18

## 2024-11-09 NOTE — PROGRESS NOTE ADULT - SUBJECTIVE AND OBJECTIVE BOX
SUBJECTIVE:    Patient is a 84y old Female who presents with a chief complaint of L hip fx ; ?syncope (08 Nov 2024 16:51)    Currently admitted to medicine with the primary diagnosis of Femoral neck fracture       Today is hospital day 9d.     PAST MEDICAL & SURGICAL HISTORY  Atypical bipolar disorder    Mild mental retardation    Brito esophagus    Hyperlipemia    Breast mass, right  s/p lumpectomy & radiation 2007    Feeding by G-tube  s/p removal 2012    Femoral neck fracture      ALLERGIES:  No Known Allergies    MEDICATIONS:  STANDING MEDICATIONS  calcium carbonate   1250 mG (OsCal) 1 Tablet(s) Oral two times a day  chlorhexidine 2% Cloths 1 Application(s) Topical <User Schedule>  cholecalciferol 1000 Unit(s) Oral daily  heparin   Injectable 5000 Unit(s) SubCutaneous every 8 hours  iron sucrose IVPB 200 milliGRAM(s) IV Intermittent every 24 hours  lactated ringers. 1000 milliLiter(s) IV Continuous <Continuous>  magnesium oxide 400 milliGRAM(s) Oral two times a day with meals  polyethylene glycol 3350 17 Gram(s) Oral two times a day  senna 2 Tablet(s) Oral at bedtime  valproic  acid Syrup 500 milliGRAM(s) Oral <User Schedule>  valproic  acid Syrup 1000 milliGRAM(s) Oral <User Schedule>    PRN MEDICATIONS  acetaminophen     Tablet .. 650 milliGRAM(s) Oral every 6 hours PRN  ondansetron Injectable 4 milliGRAM(s) IV Push once PRN  oxyCODONE    IR 2.5 milliGRAM(s) Oral every 6 hours PRN    VITALS:   T(F): 97.4  HR: 81  BP: 113/50  RR: 16  SpO2: 96%    LABS:                        8.2    7.10  )-----------( 252      ( 09 Nov 2024 06:04 )             25.0     11-09    141  |  106  |  8[L]  ----------------------------<  86  4.1   |  26  |  <0.5[L]    Ca    8.3[L]      09 Nov 2024 06:04  Mg     1.7     11-09    TPro  4.3[L]  /  Alb  2.4[L]  /  TBili  0.2  /  DBili  x   /  AST  14  /  ALT  10  /  AlkPhos  61  11-09      Urinalysis Basic - ( 09 Nov 2024 06:04 )    Color: x / Appearance: x / SG: x / pH: x  Gluc: 86 mg/dL / Ketone: x  / Bili: x / Urobili: x   Blood: x / Protein: x / Nitrite: x   Leuk Esterase: x / RBC: x / WBC x   Sq Epi: x / Non Sq Epi: x / Bacteria: x                RADIOLOGY:    PHYSICAL EXAM:  GEN: No acute distress  LUNGS: Clear to auscultation bilaterally   HEART: S1/S2 present. RRR.   ABD/ GI: Soft, non-tender, non-distended. Bowel sounds present  EXT: lt hip fracture  NEURO: Awake and smiliing

## 2024-11-09 NOTE — PROGRESS NOTE ADULT - ASSESSMENT
84yoF hx intellectual disability, bipolar disorder, Breast mass (unknown side s/p lumpectomy and RT '07), asthma, hoover's, esophageal varices, s/p R cataract surgery, R hip and L humeral fx, hld here following fall.     # Acute L femoral neck fracture-s/p L hip hemiarthroplasty 11/4--received cefazolin  3 doses post op as per ortho   # Unwitnessed fall, r/o syncope  unclear story, sounds likely syncopal episode possibly orthostasis induced   wbc improved  could be stress/pain related and dehydration   CT head/neck negative for acute   TTE EF of 66 %. Grade I diastolic dysfunction.. Mild thickening of the anterior and posterior mitral valve leaflets.   Moderate mitral annular calcification. . Mild aortic regurgitation.. Mild pulmonic valve regurgitation.    # SIRS POA ( wbc and HR)  Possible UTI / SIRS ON admission   UA strong  positive , unreliable hx    blood cultures NGTD , urine cultures was not collected , s/p 3days of ceftriaxon  - fever not noted today        #Hx breast mass s/p lumpectomy and radiation  -outpt f/u and w/u    #Incidental cholelithiasis  -asymptomatic at this time, no LFT elevation  -consider CCY as outpt    #Intellectual Disability  #Bipolar disorder  -c/w [facility],  SLP eval appreciated   valproic level is 54  PT/OT after surgery    #Asthma, not in acute exacerbation  #Esophageal varices  #Hiatal hernia  monitor , resume home meds     #Constipation   CTAP with Moderate to large rectal stool burden. No bowel obstruction.  bowel regimen and monitor   moving her bowel as per  staff ( they record all events) Given lactulose -- large BM    #DVT ppx -HSQ / scd   -[facility] duplx negative for dvt ppx     #GI ppx    Pending : PT/OT ,monitor on weekend-- DC plan tuesday as per [facility] home.    Family -Ms. Koko Doshi [phone]  acting as surrogate for several years    Palliative team consult appreciated - FULL CODE . - REFER TO Palliative note for details , can not be dnr/dni with out approval by [facility] given her developmental disability     from group home. DC plan Monday.

## 2024-11-10 LAB
ALBUMIN SERPL ELPH-MCNC: 2.9 G/DL — LOW (ref 3.5–5.2)
ALP SERPL-CCNC: 67 U/L — SIGNIFICANT CHANGE UP (ref 30–115)
ALT FLD-CCNC: 12 U/L — SIGNIFICANT CHANGE UP (ref 0–41)
ANION GAP SERPL CALC-SCNC: 12 MMOL/L — SIGNIFICANT CHANGE UP (ref 7–14)
AST SERPL-CCNC: 17 U/L — SIGNIFICANT CHANGE UP (ref 0–41)
BASOPHILS # BLD AUTO: 0.07 K/UL — SIGNIFICANT CHANGE UP (ref 0–0.2)
BASOPHILS NFR BLD AUTO: 0.9 % — SIGNIFICANT CHANGE UP (ref 0–1)
BILIRUB SERPL-MCNC: 0.3 MG/DL — SIGNIFICANT CHANGE UP (ref 0.2–1.2)
BUN SERPL-MCNC: 7 MG/DL — LOW (ref 10–20)
CALCIUM SERPL-MCNC: 8.5 MG/DL — SIGNIFICANT CHANGE UP (ref 8.4–10.5)
CHLORIDE SERPL-SCNC: 105 MMOL/L — SIGNIFICANT CHANGE UP (ref 98–110)
CO2 SERPL-SCNC: 24 MMOL/L — SIGNIFICANT CHANGE UP (ref 17–32)
CREAT SERPL-MCNC: <0.5 MG/DL — LOW (ref 0.7–1.5)
EGFR: 98 ML/MIN/1.73M2 — SIGNIFICANT CHANGE UP
EOSINOPHIL # BLD AUTO: 0.13 K/UL — SIGNIFICANT CHANGE UP (ref 0–0.7)
EOSINOPHIL NFR BLD AUTO: 1.7 % — SIGNIFICANT CHANGE UP (ref 0–8)
GLUCOSE SERPL-MCNC: 99 MG/DL — SIGNIFICANT CHANGE UP (ref 70–99)
HCT VFR BLD CALC: 28.8 % — LOW (ref 37–47)
HGB BLD-MCNC: 9.5 G/DL — LOW (ref 12–16)
LYMPHOCYTES # BLD AUTO: 1.59 K/UL — SIGNIFICANT CHANGE UP (ref 1.2–3.4)
LYMPHOCYTES # BLD AUTO: 20.2 % — LOW (ref 20.5–51.1)
MAGNESIUM SERPL-MCNC: 1.9 MG/DL — SIGNIFICANT CHANGE UP (ref 1.8–2.4)
MCHC RBC-ENTMCNC: 32.4 PG — HIGH (ref 27–31)
MCHC RBC-ENTMCNC: 33 G/DL — SIGNIFICANT CHANGE UP (ref 32–37)
MCV RBC AUTO: 98.3 FL — SIGNIFICANT CHANGE UP (ref 81–99)
MONOCYTES # BLD AUTO: 0.35 K/UL — SIGNIFICANT CHANGE UP (ref 0.1–0.6)
MONOCYTES NFR BLD AUTO: 4.4 % — SIGNIFICANT CHANGE UP (ref 1.7–9.3)
NEUTROPHILS # BLD AUTO: 5.12 K/UL — SIGNIFICANT CHANGE UP (ref 1.4–6.5)
NEUTROPHILS NFR BLD AUTO: 64.9 % — SIGNIFICANT CHANGE UP (ref 42.2–75.2)
NRBC # BLD: SIGNIFICANT CHANGE UP /100 WBCS (ref 0–0)
PLATELET # BLD AUTO: 342 K/UL — SIGNIFICANT CHANGE UP (ref 130–400)
PMV BLD: 9.3 FL — SIGNIFICANT CHANGE UP (ref 7.4–10.4)
POTASSIUM SERPL-MCNC: 4.4 MMOL/L — SIGNIFICANT CHANGE UP (ref 3.5–5)
POTASSIUM SERPL-SCNC: 4.4 MMOL/L — SIGNIFICANT CHANGE UP (ref 3.5–5)
PROT SERPL-MCNC: 5.1 G/DL — LOW (ref 6–8)
RBC # BLD: 2.93 M/UL — LOW (ref 4.2–5.4)
RBC # FLD: 12.5 % — SIGNIFICANT CHANGE UP (ref 11.5–14.5)
SODIUM SERPL-SCNC: 141 MMOL/L — SIGNIFICANT CHANGE UP (ref 135–146)
WBC # BLD: 7.89 K/UL — SIGNIFICANT CHANGE UP (ref 4.8–10.8)
WBC # FLD AUTO: 7.89 K/UL — SIGNIFICANT CHANGE UP (ref 4.8–10.8)

## 2024-11-10 PROCEDURE — 99232 SBSQ HOSP IP/OBS MODERATE 35: CPT

## 2024-11-10 RX ADMIN — Medication 5000 UNIT(S): at 21:14

## 2024-11-10 RX ADMIN — POLYETHYLENE GLYCOL 3350 17 GRAM(S): 17 POWDER, FOR SOLUTION ORAL at 17:31

## 2024-11-10 RX ADMIN — Medication 1000 UNIT(S): at 11:31

## 2024-11-10 RX ADMIN — Medication 2 TABLET(S): at 21:14

## 2024-11-10 RX ADMIN — VALPROIC ACID 500 MILLIGRAM(S): 250 SOLUTION ORAL at 17:26

## 2024-11-10 RX ADMIN — VALPROIC ACID 1000 MILLIGRAM(S): 250 SOLUTION ORAL at 21:14

## 2024-11-10 RX ADMIN — Medication 1 TABLET(S): at 17:30

## 2024-11-10 RX ADMIN — Medication 400 MILLIGRAM(S): at 11:31

## 2024-11-10 RX ADMIN — Medication 5000 UNIT(S): at 13:47

## 2024-11-10 RX ADMIN — Medication 5000 UNIT(S): at 05:53

## 2024-11-10 RX ADMIN — POLYETHYLENE GLYCOL 3350 17 GRAM(S): 17 POWDER, FOR SOLUTION ORAL at 05:52

## 2024-11-10 RX ADMIN — Medication 1 TABLET(S): at 05:53

## 2024-11-10 RX ADMIN — Medication 400 MILLIGRAM(S): at 17:26

## 2024-11-10 RX ADMIN — CHLORHEXIDINE GLUCONATE 1 APPLICATION(S): 1.2 RINSE ORAL at 05:53

## 2024-11-10 RX ADMIN — IRON SUCROSE 100 MILLIGRAM(S): 20 INJECTION, SOLUTION INTRAVENOUS at 13:52

## 2024-11-10 NOTE — PROGRESS NOTE ADULT - ASSESSMENT
84yoF hx intellectual disability, bipolar disorder, Breast mass (unknown side s/p lumpectomy and RT '07), asthma, hoover's, esophageal varices, s/p R cataract surgery, R hip and L humeral fx, hld here following fall.     # Acute L femoral neck fracture-s/p L hip hemiarthroplasty 11/4--received cefazolin  3 doses post op as per ortho   # Anemia-- no blood transfusion-- given 5 day iv iron treatment.  hb is 9.5  # Unwitnessed fall, r/o syncope  unclear story, sounds likely syncopal episode possibly orthostasis induced   wbc improved  could be stress/pain related and dehydration   CT head/neck negative for acute   TTE EF of 66 %. Grade I diastolic dysfunction.. Mild thickening of the anterior and posterior mitral valve leaflets.   Moderate mitral annular calcification. . Mild aortic regurgitation.. Mild pulmonic valve regurgitation.    # SIRS POA ( wbc and HR)  Possible UTI / SIRS ON admission   UA strong  positive , unreliable hx    blood cultures NGTD , urine cultures was not collected , s/p 3days of ceftriaxone  - fever resolved        #Hx breast mass s/p lumpectomy and radiation  -outpt f/u and w/u    #Incidental cholelithiasis  -asymptomatic at this time, no LFT elevation  -consider CCY as outpt    #Intellectual Disability  #Bipolar disorder  -c/w [facility],  SLP eval appreciated   valproic level is 54  PT/OT after surgery    #Asthma, not in acute exacerbation  #Esophageal varices  #Hiatal hernia  monitor , resume home meds     #Constipation   CTAP with Moderate to large rectal stool burden. No bowel obstruction.  bowel regimen and monitor   moving her bowel as per  staff ( they record all events) Given lactulose -- large BMs     #DVT ppx -HSQ / scd   -[facility] duplx negative for dvt ppx     #GI ppx    Pending : monitor on weekend-- DC plan tuesday as per [facility] home. meeting with [facility] home on monday.    Family -Ms. Koko Doshi [phone]  acting as surrogate for several years    Palliative team consult appreciated - FULL CODE . - REFER TO Palliative note for details , can not be dnr/dni with out approval by [facility] given her developmental disability     from group home. DC plan Monday.

## 2024-11-10 NOTE — PROGRESS NOTE ADULT - SUBJECTIVE AND OBJECTIVE BOX
SUBJECTIVE:    Patient is a 84y old Female who presents with a chief complaint of L hip fx ; ?syncope (09 Nov 2024 14:45)    Currently admitted to medicine with the primary diagnosis of Femoral neck fracture       Today is hospital day 10d.     PAST MEDICAL & SURGICAL HISTORY  Atypical bipolar disorder    Mild mental retardation    Brito esophagus    Hyperlipemia    Breast mass, right  s/p lumpectomy & radiation 2007    Feeding by G-tube  s/p removal 2012    Femoral neck fracture      ALLERGIES:  No Known Allergies    MEDICATIONS:  STANDING MEDICATIONS  calcium carbonate   1250 mG (OsCal) 1 Tablet(s) Oral two times a day  chlorhexidine 2% Cloths 1 Application(s) Topical <User Schedule>  cholecalciferol 1000 Unit(s) Oral daily  heparin   Injectable 5000 Unit(s) SubCutaneous every 8 hours  iron sucrose IVPB 200 milliGRAM(s) IV Intermittent every 24 hours  lactated ringers. 1000 milliLiter(s) IV Continuous <Continuous>  magnesium oxide 400 milliGRAM(s) Oral two times a day with meals  polyethylene glycol 3350 17 Gram(s) Oral two times a day  senna 2 Tablet(s) Oral at bedtime  valproic  acid Syrup 500 milliGRAM(s) Oral <User Schedule>  valproic  acid Syrup 1000 milliGRAM(s) Oral <User Schedule>    PRN MEDICATIONS  acetaminophen     Tablet .. 650 milliGRAM(s) Oral every 6 hours PRN  ondansetron Injectable 4 milliGRAM(s) IV Push once PRN  oxyCODONE    IR 2.5 milliGRAM(s) Oral every 6 hours PRN    VITALS:   T(F): 97.4  HR: 86  BP: 109/70  RR: 19  SpO2: 98%    LABS:                        9.5    7.89  )-----------( 342      ( 10 Nov 2024 06:39 )             28.8     11-10    141  |  105  |  7[L]  ----------------------------<  99  4.4   |  24  |  <0.5[L]    Ca    8.5      10 Nov 2024 06:39  Mg     1.9     11-10    TPro  5.1[L]  /  Alb  2.9[L]  /  TBili  0.3  /  DBili  x   /  AST  17  /  ALT  12  /  AlkPhos  67  11-10      Urinalysis Basic - ( 10 Nov 2024 06:39 )    Color: x / Appearance: x / SG: x / pH: x  Gluc: 99 mg/dL / Ketone: x  / Bili: x / Urobili: x   Blood: x / Protein: x / Nitrite: x   Leuk Esterase: x / RBC: x / WBC x   Sq Epi: x / Non Sq Epi: x / Bacteria: x                RADIOLOGY:    PHYSICAL EXAM:  GEN: No acute distress  LUNGS: Clear to auscultation bilaterally   HEART: S1/S2 present. RRR.   ABD/ GI: Soft, non-tender, non-distended. Bowel sounds present  EXT: NC/NC/NE/2+PP/SCOTT  NEURO: [id number]

## 2024-11-11 LAB
ALBUMIN SERPL ELPH-MCNC: 2.8 G/DL — LOW (ref 3.5–5.2)
ALP SERPL-CCNC: 97 U/L — SIGNIFICANT CHANGE UP (ref 30–115)
ALT FLD-CCNC: 20 U/L — SIGNIFICANT CHANGE UP (ref 0–41)
ANION GAP SERPL CALC-SCNC: 9 MMOL/L — SIGNIFICANT CHANGE UP (ref 7–14)
AST SERPL-CCNC: 32 U/L — SIGNIFICANT CHANGE UP (ref 0–41)
BASOPHILS # BLD AUTO: 0.03 K/UL — SIGNIFICANT CHANGE UP (ref 0–0.2)
BASOPHILS NFR BLD AUTO: 0.3 % — SIGNIFICANT CHANGE UP (ref 0–1)
BILIRUB SERPL-MCNC: 0.4 MG/DL — SIGNIFICANT CHANGE UP (ref 0.2–1.2)
BUN SERPL-MCNC: 12 MG/DL — SIGNIFICANT CHANGE UP (ref 10–20)
CALCIUM SERPL-MCNC: 8.6 MG/DL — SIGNIFICANT CHANGE UP (ref 8.4–10.5)
CHLORIDE SERPL-SCNC: 104 MMOL/L — SIGNIFICANT CHANGE UP (ref 98–110)
CO2 SERPL-SCNC: 28 MMOL/L — SIGNIFICANT CHANGE UP (ref 17–32)
CREAT SERPL-MCNC: 0.6 MG/DL — LOW (ref 0.7–1.5)
EGFR: 88 ML/MIN/1.73M2 — SIGNIFICANT CHANGE UP
EOSINOPHIL # BLD AUTO: 0.03 K/UL — SIGNIFICANT CHANGE UP (ref 0–0.7)
EOSINOPHIL NFR BLD AUTO: 0.3 % — SIGNIFICANT CHANGE UP (ref 0–8)
GLUCOSE SERPL-MCNC: 84 MG/DL — SIGNIFICANT CHANGE UP (ref 70–99)
HCT VFR BLD CALC: 29.7 % — LOW (ref 37–47)
HGB BLD-MCNC: 9.7 G/DL — LOW (ref 12–16)
IMM GRANULOCYTES NFR BLD AUTO: 1.5 % — HIGH (ref 0.1–0.3)
INR BLD: 1.13 RATIO — SIGNIFICANT CHANGE UP (ref 0.65–1.3)
LACTATE SERPL-SCNC: 0.9 MMOL/L — SIGNIFICANT CHANGE UP (ref 0.7–2)
LYMPHOCYTES # BLD AUTO: 1.28 K/UL — SIGNIFICANT CHANGE UP (ref 1.2–3.4)
LYMPHOCYTES # BLD AUTO: 12.5 % — LOW (ref 20.5–51.1)
MAGNESIUM SERPL-MCNC: 3.1 MG/DL — CRITICAL HIGH (ref 1.8–2.4)
MCHC RBC-ENTMCNC: 32.3 PG — HIGH (ref 27–31)
MCHC RBC-ENTMCNC: 32.7 G/DL — SIGNIFICANT CHANGE UP (ref 32–37)
MCV RBC AUTO: 99 FL — SIGNIFICANT CHANGE UP (ref 81–99)
MONOCYTES # BLD AUTO: 0.64 K/UL — HIGH (ref 0.1–0.6)
MONOCYTES NFR BLD AUTO: 6.3 % — SIGNIFICANT CHANGE UP (ref 1.7–9.3)
NEUTROPHILS # BLD AUTO: 8.09 K/UL — HIGH (ref 1.4–6.5)
NEUTROPHILS NFR BLD AUTO: 79.1 % — HIGH (ref 42.2–75.2)
NRBC # BLD: 0 /100 WBCS — SIGNIFICANT CHANGE UP (ref 0–0)
PHOSPHATE SERPL-MCNC: 3.6 MG/DL — SIGNIFICANT CHANGE UP (ref 2.1–4.9)
PLATELET # BLD AUTO: 290 K/UL — SIGNIFICANT CHANGE UP (ref 130–400)
PMV BLD: 9.6 FL — SIGNIFICANT CHANGE UP (ref 7.4–10.4)
POTASSIUM SERPL-MCNC: 4.3 MMOL/L — SIGNIFICANT CHANGE UP (ref 3.5–5)
POTASSIUM SERPL-SCNC: 4.3 MMOL/L — SIGNIFICANT CHANGE UP (ref 3.5–5)
PROT SERPL-MCNC: 5.1 G/DL — LOW (ref 6–8)
PROTHROM AB SERPL-ACNC: 13.4 SEC — HIGH (ref 9.95–12.87)
RBC # BLD: 3 M/UL — LOW (ref 4.2–5.4)
RBC # FLD: 13.3 % — SIGNIFICANT CHANGE UP (ref 11.5–14.5)
SODIUM SERPL-SCNC: 141 MMOL/L — SIGNIFICANT CHANGE UP (ref 135–146)
WBC # BLD: 10.22 K/UL — SIGNIFICANT CHANGE UP (ref 4.8–10.8)
WBC # FLD AUTO: 10.22 K/UL — SIGNIFICANT CHANGE UP (ref 4.8–10.8)

## 2024-11-11 PROCEDURE — 99232 SBSQ HOSP IP/OBS MODERATE 35: CPT

## 2024-11-11 PROCEDURE — 71045 X-RAY EXAM CHEST 1 VIEW: CPT | Mod: 26

## 2024-11-11 RX ORDER — ACETAMINOPHEN 500MG 500 MG/1
650 TABLET, COATED ORAL ONCE
Refills: 0 | Status: COMPLETED | OUTPATIENT
Start: 2024-11-11 | End: 2024-11-11

## 2024-11-11 RX ADMIN — ACETAMINOPHEN 500MG 650 MILLIGRAM(S): 500 TABLET, COATED ORAL at 08:21

## 2024-11-11 RX ADMIN — Medication 1 TABLET(S): at 05:13

## 2024-11-11 RX ADMIN — Medication 1 TABLET(S): at 18:36

## 2024-11-11 RX ADMIN — Medication 5000 UNIT(S): at 05:13

## 2024-11-11 RX ADMIN — ACETAMINOPHEN 500MG 650 MILLIGRAM(S): 500 TABLET, COATED ORAL at 07:37

## 2024-11-11 RX ADMIN — Medication 400 MILLIGRAM(S): at 07:52

## 2024-11-11 RX ADMIN — Medication 2 TABLET(S): at 21:25

## 2024-11-11 RX ADMIN — Medication 5000 UNIT(S): at 21:26

## 2024-11-11 RX ADMIN — ACETAMINOPHEN 500MG 650 MILLIGRAM(S): 500 TABLET, COATED ORAL at 23:29

## 2024-11-11 RX ADMIN — Medication 5000 UNIT(S): at 16:00

## 2024-11-11 RX ADMIN — Medication 1000 UNIT(S): at 12:14

## 2024-11-11 RX ADMIN — Medication 400 MILLIGRAM(S): at 18:36

## 2024-11-11 RX ADMIN — VALPROIC ACID 1000 MILLIGRAM(S): 250 SOLUTION ORAL at 21:26

## 2024-11-11 RX ADMIN — VALPROIC ACID 500 MILLIGRAM(S): 250 SOLUTION ORAL at 18:36

## 2024-11-11 RX ADMIN — POLYETHYLENE GLYCOL 3350 17 GRAM(S): 17 POWDER, FOR SOLUTION ORAL at 05:13

## 2024-11-11 RX ADMIN — CHLORHEXIDINE GLUCONATE 1 APPLICATION(S): 1.2 RINSE ORAL at 05:13

## 2024-11-11 NOTE — PROGRESS NOTE ADULT - SUBJECTIVE AND OBJECTIVE BOX
24H events:    Today is hospital day 11d. This morning patient was seen and examined at bedside, resting comfortably in bed.    No acute or major events overnight.    PAST MEDICAL & SURGICAL HISTORY  Atypical bipolar disorder    Mild mental retardation    Brito esophagus    Hyperlipemia    Breast mass, right  s/p lumpectomy & radiation 2007    Feeding by G-tube  s/p removal 2012    Femoral neck fracture        SOCIAL HISTORY:  Social History:      ALLERGIES:  No Known Allergies      MEDICATIONS:  STANDING MEDICATIONS  calcium carbonate   1250 mG (OsCal) 1 Tablet(s) Oral two times a day  chlorhexidine 2% Cloths 1 Application(s) Topical <User Schedule>  cholecalciferol 1000 Unit(s) Oral daily  heparin   Injectable 5000 Unit(s) SubCutaneous every 8 hours  lactated ringers. 1000 milliLiter(s) IV Continuous <Continuous>  levoFLOXacin IVPB      levoFLOXacin IVPB 500 milliGRAM(s) IV Intermittent once  magnesium oxide 400 milliGRAM(s) Oral two times a day with meals  polyethylene glycol 3350 17 Gram(s) Oral two times a day  senna 2 Tablet(s) Oral at bedtime  valproic  acid Syrup 500 milliGRAM(s) Oral <User Schedule>  valproic  acid Syrup 1000 milliGRAM(s) Oral <User Schedule>    PRN MEDICATIONS  acetaminophen     Tablet .. 650 milliGRAM(s) Oral every 6 hours PRN  ondansetron Injectable 4 milliGRAM(s) IV Push once PRN  oxyCODONE    IR 2.5 milliGRAM(s) Oral every 6 hours PRN      VITALS:   T(C): 38.3 (11-11-24 @ 07:53), Max: 38.3 (11-11-24 @ 07:53)  HR: 98 (11-11-24 @ 07:53) (93 - 102)  BP: 102/63 (11-11-24 @ 07:53) (102/63 - 133/69)  RR: 17 (11-11-24 @ 07:53) (17 - 18)  SpO2: 98% (11-11-24 @ 07:53) (93% - 98%)  I&O's Summary    10 Nov 2024 07:01  -  11 Nov 2024 07:00  --------------------------------------------------------  IN: 0 mL / OUT: 1 mL / NET: -1 mL          PHYSICAL EXAM:    GEN: No acute distress  LUNGS: Clear to auscultation bilaterally   HEART: S1/S2 present. RRR.   ABD/ GI: Soft, non-tender, non-distended. Bowel sounds present  EXT: no edema   NEURO: AAOX3      LABS:                        9.5    7.89  )-----------( 342      ( 10 Nov 2024 06:39 )             28.8     11-10    141  |  105  |  7[L]  ----------------------------<  99  4.4   |  24  |  <0.5[L]    Ca    8.5      10 Nov 2024 06:39  Mg     1.9     11-10    TPro  5.1[L]  /  Alb  2.9[L]  /  TBili  0.3  /  DBili  x   /  AST  17  /  ALT  12  /  AlkPhos  67  11-10      Urinalysis Basic - ( 10 Nov 2024 06:39 )    Color: x / Appearance: x / SG: x / pH: x  Gluc: 99 mg/dL / Ketone: x  / Bili: x / Urobili: x   Blood: x / Protein: x / Nitrite: x   Leuk Esterase: x / RBC: x / WBC x   Sq Epi: x / Non Sq Epi: x / Bacteria: x

## 2024-11-11 NOTE — PROGRESS NOTE ADULT - ASSESSMENT
84yoF hx intellectual disability, bipolar disorder, Breast mass (unknown side s/p lumpectomy and RT '07), asthma, hoover's, esophageal varices, s/p R cataract surgery, R hip and L humeral fx, hld here following fall.     # Acute L femoral neck fracture-s/p L hip hemiarthroplasty 11/4--received cefazolin  3 doses post op as per ortho   # Anemia-- no blood transfusion-- given 3 day iv iron treatment.  hb is 9.7  # Unwitnessed fall, r/o syncope  unclear story, sounds likely syncopal episode possibly orthostasis induced   wbc improved  could be stress/pain related and dehydration   CT head/neck negative for acute   TTE EF of 66 %. Grade I diastolic dysfunction.. Mild thickening of the anterior and posterior mitral valve leaflets.   Moderate mitral annular calcification. . Mild aortic regurgitation.. Mild pulmonic valve regurgitation.    # SIRS POA ( wbc and HR)  Possible UTI / SIRS ON admission   UA strong  positive , unreliable hx    blood cultures NGTD , urine cultures was not collected , s/p 3days of ceftriaxone  - fever resolved    # Fever--  patient vomited yesterday-- r/o aspiration CXR is negative  started on levaquin    #Hx breast mass s/p lumpectomy and radiation  -outpt f/u and w/u    #Incidental cholelithiasis  -asymptomatic at this time, no LFT elevation  -consider CCY as outpt    #Intellectual Disability  #Bipolar disorder  -[facility],  SLP eval appreciated   valproic level is 54  PT/OT after surgery    #Asthma, not in acute exacerbation  #Esophageal varices  #Hiatal hernia  monitor , resume home meds     #Constipation   CTAP with Moderate to large rectal stool burden. No bowel obstruction.  bowel regimen and monitor   moving her bowel as per  staff ( they record all events) Given lactulose -- large BMs noted    #DVT ppx -HSQ / scd   -[facility] duplx negative for dvt ppx     #GI ppx    Pending : monitor for fever-- group home meeting tomorrow.    Family -Ms. Koko Doshi [phone]  acting as surrogate for several years    Palliative team consult appreciated - FULL CODE . - REFER TO Palliative note for details , can not be dnr/dni with out approval by [facility] given her developmental disability     from group home. DC plan Monday.

## 2024-11-11 NOTE — PROGRESS NOTE ADULT - ASSESSMENT
84yoF hx intellectual disability, bipolar disorder, Breast mass (unknown side s/p lumpectomy and RT '07), asthma, hoover's, esophageal varices, s/p R cataract surgery, R hip and L humeral fx, hld here following fall.     #fever today  patient asx but unreliable   not tachy, sbp stable  labs sent to follow wbc and lfts  cxr increa left side infiltrate s/o vomiting yest  levaquin started  f/u blood cx  f/u UA   will check wound     # Acute L femoral neck fracture-s/p L hip hemiarthroplasty 11/4 received cefazolin  3 doses post op as per ortho   # Anemia- no blood transfusion iron held   # Unwitnessed fall, r/o syncope  unclear story, sounds likely syncopal episode possibly orthostasis induced   wbc improved  could be stress/pain related and dehydration   CT head/neck negative for acute events   TTE EF of 66 %. Grade I diastolic dysfunction. Mild thickening of the anterior and posterior mitral valve leaflets.   Moderate mitral annular calcification. . Mild aortic regurgitation.. Mild pulmonic valve regurgitation.    # SIRS POA ( wbc and HR)  Possible UTI   UA strong  positive , unreliable hx  blood cultures NGTD , urine cultures was not collected , s/p 3days of ceftriaxone, fever resolved    #Hx breast mass s/p lumpectomy and radiation  -outpt f/u and w/u    #Incidental cholelithiasis  -asymptomatic at this time, no LFT elevation  -consider CCY as outpt    #Intellectual Disability  #Bipolar disorder  -[facility],  SLP eval appreciated   valproic level is 54  PT/OT after surgery    #Asthma, not in acute exacerbation  #Esophageal varices  #Hiatal hernia  monitor , resume home meds     #Constipation   CTAP with Moderate to large rectal stool burden. No bowel obstruction.  bowel regimen and monitor   moving her bowel as per  staff ( they record all events) Given lactulose ,  large BMs     #DVT ppx -HSQ / scd   -VA duplx negative for dvt ppx     #GI ppx: none       Family -Ms. Koko Doshi [phone]  acting as surrogate for several years    Palliative team consult - FULL CODE . - REFER TO Palliative note for details , can not be dnr/dni with out approval by [facility] [place] given her developmental disability

## 2024-11-11 NOTE — PROGRESS NOTE ADULT - SUBJECTIVE AND OBJECTIVE BOX
SUBJECTIVE:    Patient is a 84y old Female who presents with a chief complaint of L hip fx ; ?syncope (11 Nov 2024 11:08)    Currently admitted to medicine with the primary diagnosis of Femoral neck fracture       Today is hospital day 11d.     PAST MEDICAL & SURGICAL HISTORY  Atypical bipolar disorder    Mild mental retardation    Brito esophagus    Hyperlipemia    Breast mass, right  s/p lumpectomy & radiation 2007    Feeding by G-tube  s/p removal 2012    Femoral neck fracture      ALLERGIES:  No Known Allergies    MEDICATIONS:  STANDING MEDICATIONS  calcium carbonate   1250 mG (OsCal) 1 Tablet(s) Oral two times a day  chlorhexidine 2% Cloths 1 Application(s) Topical <User Schedule>  cholecalciferol 1000 Unit(s) Oral daily  heparin   Injectable 5000 Unit(s) SubCutaneous every 8 hours  lactated ringers. 1000 milliLiter(s) IV Continuous <Continuous>  levoFLOXacin IVPB      levoFLOXacin IVPB 500 milliGRAM(s) IV Intermittent once  magnesium oxide 400 milliGRAM(s) Oral two times a day with meals  polyethylene glycol 3350 17 Gram(s) Oral two times a day  senna 2 Tablet(s) Oral at bedtime  valproic  acid Syrup 500 milliGRAM(s) Oral <User Schedule>  valproic  acid Syrup 1000 milliGRAM(s) Oral <User Schedule>    PRN MEDICATIONS  acetaminophen     Tablet .. 650 milliGRAM(s) Oral every 6 hours PRN  ondansetron Injectable 4 milliGRAM(s) IV Push once PRN  oxyCODONE    IR 2.5 milliGRAM(s) Oral every 6 hours PRN    VITALS:   T(F): 101  HR: 98  BP: 102/63  RR: 17  SpO2: 98%    LABS:                        9.7    10.22 )-----------( 290      ( 11 Nov 2024 11:49 )             29.7     11-11    141  |  104  |  12  ----------------------------<  84  4.3   |  28  |  0.6[L]    Ca    8.6      11 Nov 2024 11:49  Phos  3.6     11-11  Mg     1.9     11-10    TPro  5.1[L]  /  Alb  2.8[L]  /  TBili  0.4  /  DBili  x   /  AST  32  /  ALT  20  /  AlkPhos  97  11-11    PT/INR - ( 11 Nov 2024 11:49 )   PT: 13.40 sec;   INR: 1.13 ratio           Urinalysis Basic - ( 11 Nov 2024 11:49 )    Color: x / Appearance: x / SG: x / pH: x  Gluc: 84 mg/dL / Ketone: x  / Bili: x / Urobili: x   Blood: x / Protein: x / Nitrite: x   Leuk Esterase: x / RBC: x / WBC x   Sq Epi: x / Non Sq Epi: x / Bacteria: x        Lactate, Blood: 0.9 mmol/L (11-11-24 @ 11:49)          RADIOLOGY:    PHYSICAL EXAM:  GEN: No acute distress  LUNGS: Clear to auscultation bilaterally   HEART: S1/S2 present. RRR.   ABD/ GI: Soft, non-tender, non-distended. Bowel sounds present  EXT: Lt femur fracture  NEURO: AAOX3

## 2024-11-12 LAB
ALBUMIN SERPL ELPH-MCNC: 2.7 G/DL — LOW (ref 3.5–5.2)
ALP SERPL-CCNC: 79 U/L — SIGNIFICANT CHANGE UP (ref 30–115)
ALT FLD-CCNC: 18 U/L — SIGNIFICANT CHANGE UP (ref 0–41)
ANION GAP SERPL CALC-SCNC: 6 MMOL/L — LOW (ref 7–14)
APPEARANCE UR: ABNORMAL
AST SERPL-CCNC: 23 U/L — SIGNIFICANT CHANGE UP (ref 0–41)
BASOPHILS # BLD AUTO: 0.02 K/UL — SIGNIFICANT CHANGE UP (ref 0–0.2)
BASOPHILS NFR BLD AUTO: 0.2 % — SIGNIFICANT CHANGE UP (ref 0–1)
BILIRUB SERPL-MCNC: 0.2 MG/DL — SIGNIFICANT CHANGE UP (ref 0.2–1.2)
BILIRUB UR-MCNC: NEGATIVE — SIGNIFICANT CHANGE UP
BUN SERPL-MCNC: 11 MG/DL — SIGNIFICANT CHANGE UP (ref 10–20)
CALCIUM SERPL-MCNC: 7.9 MG/DL — LOW (ref 8.4–10.4)
CHLORIDE SERPL-SCNC: 104 MMOL/L — SIGNIFICANT CHANGE UP (ref 98–110)
CO2 SERPL-SCNC: 28 MMOL/L — SIGNIFICANT CHANGE UP (ref 17–32)
COLOR SPEC: SIGNIFICANT CHANGE UP
CREAT SERPL-MCNC: <0.5 MG/DL — LOW (ref 0.7–1.5)
DIFF PNL FLD: NEGATIVE — SIGNIFICANT CHANGE UP
EGFR: 98 ML/MIN/1.73M2 — SIGNIFICANT CHANGE UP
EOSINOPHIL # BLD AUTO: 0.08 K/UL — SIGNIFICANT CHANGE UP (ref 0–0.7)
EOSINOPHIL NFR BLD AUTO: 0.9 % — SIGNIFICANT CHANGE UP (ref 0–8)
GLUCOSE SERPL-MCNC: 100 MG/DL — HIGH (ref 70–99)
GLUCOSE UR QL: NEGATIVE MG/DL — SIGNIFICANT CHANGE UP
HCT VFR BLD CALC: 26.7 % — LOW (ref 37–47)
HGB BLD-MCNC: 8.5 G/DL — LOW (ref 12–16)
IMM GRANULOCYTES NFR BLD AUTO: 1.7 % — HIGH (ref 0.1–0.3)
KETONES UR-MCNC: ABNORMAL MG/DL
LEUKOCYTE ESTERASE UR-ACNC: ABNORMAL
LYMPHOCYTES # BLD AUTO: 2.07 K/UL — SIGNIFICANT CHANGE UP (ref 1.2–3.4)
LYMPHOCYTES # BLD AUTO: 22.8 % — SIGNIFICANT CHANGE UP (ref 20.5–51.1)
MAGNESIUM SERPL-MCNC: 1.9 MG/DL — SIGNIFICANT CHANGE UP (ref 1.8–2.4)
MCHC RBC-ENTMCNC: 31.8 G/DL — LOW (ref 32–37)
MCHC RBC-ENTMCNC: 31.8 PG — HIGH (ref 27–31)
MCV RBC AUTO: 100 FL — HIGH (ref 81–99)
MONOCYTES # BLD AUTO: 0.9 K/UL — HIGH (ref 0.1–0.6)
MONOCYTES NFR BLD AUTO: 9.9 % — HIGH (ref 1.7–9.3)
NEUTROPHILS # BLD AUTO: 5.85 K/UL — SIGNIFICANT CHANGE UP (ref 1.4–6.5)
NEUTROPHILS NFR BLD AUTO: 64.5 % — SIGNIFICANT CHANGE UP (ref 42.2–75.2)
NITRITE UR-MCNC: NEGATIVE — SIGNIFICANT CHANGE UP
NRBC # BLD: 0 /100 WBCS — SIGNIFICANT CHANGE UP (ref 0–0)
PH UR: 6 — SIGNIFICANT CHANGE UP (ref 5–8)
PHOSPHATE SERPL-MCNC: 3.5 MG/DL — SIGNIFICANT CHANGE UP (ref 2.1–4.9)
PLATELET # BLD AUTO: 233 K/UL — SIGNIFICANT CHANGE UP (ref 130–400)
PMV BLD: 9.2 FL — SIGNIFICANT CHANGE UP (ref 7.4–10.4)
POTASSIUM SERPL-MCNC: 3.8 MMOL/L — SIGNIFICANT CHANGE UP (ref 3.5–5)
POTASSIUM SERPL-SCNC: 3.8 MMOL/L — SIGNIFICANT CHANGE UP (ref 3.5–5)
PROT SERPL-MCNC: 4.6 G/DL — LOW (ref 6–8)
PROT UR-MCNC: 30 MG/DL
RBC # BLD: 2.67 M/UL — LOW (ref 4.2–5.4)
RBC # FLD: 13.2 % — SIGNIFICANT CHANGE UP (ref 11.5–14.5)
SODIUM SERPL-SCNC: 138 MMOL/L — SIGNIFICANT CHANGE UP (ref 135–146)
SP GR SPEC: >1.03 — HIGH (ref 1–1.03)
UROBILINOGEN FLD QL: 1 MG/DL — SIGNIFICANT CHANGE UP (ref 0.2–1)
WBC # BLD: 9.07 K/UL — SIGNIFICANT CHANGE UP (ref 4.8–10.8)
WBC # FLD AUTO: 9.07 K/UL — SIGNIFICANT CHANGE UP (ref 4.8–10.8)

## 2024-11-12 PROCEDURE — 99232 SBSQ HOSP IP/OBS MODERATE 35: CPT

## 2024-11-12 RX ADMIN — Medication 5000 UNIT(S): at 13:48

## 2024-11-12 RX ADMIN — VALPROIC ACID 500 MILLIGRAM(S): 250 SOLUTION ORAL at 17:25

## 2024-11-12 RX ADMIN — Medication 1000 UNIT(S): at 11:15

## 2024-11-12 RX ADMIN — Medication 5000 UNIT(S): at 05:09

## 2024-11-12 RX ADMIN — Medication 400 MILLIGRAM(S): at 17:25

## 2024-11-12 RX ADMIN — Medication 400 MILLIGRAM(S): at 08:47

## 2024-11-12 RX ADMIN — CHLORHEXIDINE GLUCONATE 1 APPLICATION(S): 1.2 RINSE ORAL at 05:09

## 2024-11-12 RX ADMIN — Medication 1 TABLET(S): at 17:25

## 2024-11-12 RX ADMIN — VALPROIC ACID 1000 MILLIGRAM(S): 250 SOLUTION ORAL at 21:44

## 2024-11-12 RX ADMIN — POLYETHYLENE GLYCOL 3350 17 GRAM(S): 17 POWDER, FOR SOLUTION ORAL at 17:25

## 2024-11-12 RX ADMIN — Medication 2 TABLET(S): at 21:43

## 2024-11-12 RX ADMIN — Medication 5000 UNIT(S): at 21:44

## 2024-11-12 RX ADMIN — Medication 1 TABLET(S): at 05:09

## 2024-11-12 NOTE — PROGRESS NOTE ADULT - ASSESSMENT
84yoF hx intellectual disability, bipolar disorder, Breast mass (unknown side s/p lumpectomy and RT '07), asthma, hoover's, esophageal varices, s/p R cataract surgery, R hip and L humeral fx, hld here following fall.     #fever 11/11 2x  patient asx but unreliable    no  wbc and  lfts w/in normal and lactate negative   cxr increa left side infiltrate s/o vomiting over weekend   Ua not impressive   levaquin started 11/11   f/u blood cx   wound also checked   if no more fever probable dc feliciano    # Acute L femoral neck fracture-s/p L hip hemiarthroplasty 11/4 received cefazolin  3 doses post op as per ortho   # Anemia- no blood transfusion iron held   # Unwitnessed fall, r/o syncope  unclear story, sounds likely syncopal episode possibly orthostasis induced   wbc improved  could be stress/pain related and dehydration   CT head/neck negative for acute events   TTE EF of 66 %. Grade I diastolic dysfunction. Mild thickening of the anterior and posterior mitral valve leaflets.   Moderate mitral annular calcification. . Mild aortic regurgitation.. Mild pulmonic valve regurgitation.    # SIRS POA ( wbc and HR)  Possible UTI   UA strong  positive , unreliable hx  blood cultures NGTD , urine cultures was not collected , s/p 3days of ceftriaxone, fever resolved    #Hx breast mass s/p lumpectomy and radiation  -outpt f/u and w/u    #Incidental cholelithiasis  -asymptomatic at this time, no LFT elevation  -consider CCY as outpt    #Intellectual Disability  #Bipolar disorder  -[facility],  [facility]P eval   valproic level is 54  bed bound     #Asthma, not in acute exacerbation  #Esophageal varices  #Hiatal hernia  monitor , resume home meds     #Constipation   CTAP with Moderate to large rectal stool burden. No bowel obstruction.  bowel regimen and monitor   moving her bowel as per  staff ( they record all events)     #DVT ppx -HSQ / scd   -VA duplx negative for dvt ppx     #GI ppx: none     Family -Ms. Koko Doshi [phone]  acting as surrogate for several years    Palliative team consult - FULL CODE . - REFER TO Palliative note for details , can not be dnr/dni with out approval by [facility] given her developmental disability

## 2024-11-12 NOTE — PROGRESS NOTE ADULT - SUBJECTIVE AND OBJECTIVE BOX
24H events:    Today is hospital day 12d. This morning patient was seen and examined at bedside, resting comfortably in bed.  2x febrile yest.   No acute or major events overnight except 2 lareg BM.    PAST MEDICAL & SURGICAL HISTORY  Atypical bipolar disorder    Mild mental retardation    Brito esophagus    Hyperlipemia    Breast mass, right  s/p lumpectomy & radiation 2007    Feeding by G-tube  s/p removal 2012    Femoral neck fracture        SOCIAL HISTORY:  Social History:      ALLERGIES:  No Known Allergies      MEDICATIONS:  STANDING MEDICATIONS  calcium carbonate   1250 mG (OsCal) 1 Tablet(s) Oral two times a day  chlorhexidine 2% Cloths 1 Application(s) Topical <User Schedule>  cholecalciferol 1000 Unit(s) Oral daily  heparin   Injectable 5000 Unit(s) SubCutaneous every 8 hours  lactated ringers. 1000 milliLiter(s) IV Continuous <Continuous>  levoFLOXacin  Tablet 500 milliGRAM(s) Oral every 24 hours  magnesium oxide 400 milliGRAM(s) Oral two times a day with meals  polyethylene glycol 3350 17 Gram(s) Oral two times a day  senna 2 Tablet(s) Oral at bedtime  valproic  acid Syrup 500 milliGRAM(s) Oral <User Schedule>  valproic  acid Syrup 1000 milliGRAM(s) Oral <User Schedule>    PRN MEDICATIONS  acetaminophen     Tablet .. 650 milliGRAM(s) Oral every 6 hours PRN  ondansetron Injectable 4 milliGRAM(s) IV Push once PRN      VITALS:   T(C): 36.7 (11-12-24 @ 07:49), Max: 38.1 (11-11-24 @ 23:34)  HR: 84 (11-12-24 @ 07:49) (84 - 108)  BP: 105/65 (11-12-24 @ 07:49) (105/65 - 113/62)  RR: 18 (11-12-24 @ 07:49) (18 - 19)  SpO2: 96% (11-12-24 @ 07:49) (95% - 96%)  I&O's Summary    11 Nov 2024 07:01  -  12 Nov 2024 07:00  --------------------------------------------------------  IN: 600 mL / OUT: 906 mL / NET: -306 mL          PHYSICAL EXAM:    GEN: No acute distress  LUNGS: Clear to auscultation bilaterally   HEART: S1/S2 present. RRR.   ABD/ GI: Soft, non-tender, non-distended. Bowel sounds present  EXT: no edema   NEURO: AAOX2    LABS:                        8.5    9.07  )-----------( 233      ( 12 Nov 2024 05:22 )             26.7     11-12    138  |  104  |  11  ----------------------------<  100[H]  3.8   |  28  |  <0.5[L]    Ca    7.9[L]      12 Nov 2024 05:22  Phos  3.5     11-12  Mg     1.9     11-12    TPro  4.6[L]  /  Alb  2.7[L]  /  TBili  0.2  /  DBili  x   /  AST  23  /  ALT  18  /  AlkPhos  79  11-12    PT/INR - ( 11 Nov 2024 11:49 )   PT: 13.40 sec;   INR: 1.13 ratio           Urinalysis Basic - ( 12 Nov 2024 05:22 )    Color: x / Appearance: x / SG: x / pH: x  Gluc: 100 mg/dL / Ketone: x  / Bili: x / Urobili: x   Blood: x / Protein: x / Nitrite: x   Leuk Esterase: x / RBC: x / WBC x   Sq Epi: x / Non Sq Epi: x / Bacteria: x        Lactate, Blood: 0.9 mmol/L (11-11-24 @ 11:49)

## 2024-11-13 ENCOUNTER — TRANSCRIPTION ENCOUNTER (OUTPATIENT)
Age: 84
End: 2024-11-13

## 2024-11-13 LAB
ALBUMIN SERPL ELPH-MCNC: 2.7 G/DL — LOW (ref 3.5–5.2)
ALP SERPL-CCNC: 77 U/L — SIGNIFICANT CHANGE UP (ref 30–115)
ALT FLD-CCNC: 13 U/L — SIGNIFICANT CHANGE UP (ref 0–41)
ANION GAP SERPL CALC-SCNC: 7 MMOL/L — SIGNIFICANT CHANGE UP (ref 7–14)
AST SERPL-CCNC: 15 U/L — SIGNIFICANT CHANGE UP (ref 0–41)
BASOPHILS # BLD AUTO: 0.03 K/UL — SIGNIFICANT CHANGE UP (ref 0–0.2)
BASOPHILS NFR BLD AUTO: 0.4 % — SIGNIFICANT CHANGE UP (ref 0–1)
BILIRUB SERPL-MCNC: 0.2 MG/DL — SIGNIFICANT CHANGE UP (ref 0.2–1.2)
BUN SERPL-MCNC: 11 MG/DL — SIGNIFICANT CHANGE UP (ref 10–20)
CALCIUM SERPL-MCNC: 8.3 MG/DL — LOW (ref 8.4–10.5)
CHLORIDE SERPL-SCNC: 104 MMOL/L — SIGNIFICANT CHANGE UP (ref 98–110)
CO2 SERPL-SCNC: 29 MMOL/L — SIGNIFICANT CHANGE UP (ref 17–32)
CREAT SERPL-MCNC: <0.5 MG/DL — LOW (ref 0.7–1.5)
CULTURE RESULTS: SIGNIFICANT CHANGE UP
EGFR: 98 ML/MIN/1.73M2 — SIGNIFICANT CHANGE UP
EOSINOPHIL # BLD AUTO: 0.13 K/UL — SIGNIFICANT CHANGE UP (ref 0–0.7)
EOSINOPHIL NFR BLD AUTO: 1.7 % — SIGNIFICANT CHANGE UP (ref 0–8)
GLUCOSE SERPL-MCNC: 98 MG/DL — SIGNIFICANT CHANGE UP (ref 70–99)
HCT VFR BLD CALC: 28.1 % — LOW (ref 37–47)
HGB BLD-MCNC: 9.1 G/DL — LOW (ref 12–16)
IMM GRANULOCYTES NFR BLD AUTO: 1.3 % — HIGH (ref 0.1–0.3)
LYMPHOCYTES # BLD AUTO: 2.23 K/UL — SIGNIFICANT CHANGE UP (ref 1.2–3.4)
LYMPHOCYTES # BLD AUTO: 29 % — SIGNIFICANT CHANGE UP (ref 20.5–51.1)
MAGNESIUM SERPL-MCNC: 1.9 MG/DL — SIGNIFICANT CHANGE UP (ref 1.8–2.4)
MCHC RBC-ENTMCNC: 32.2 PG — HIGH (ref 27–31)
MCHC RBC-ENTMCNC: 32.4 G/DL — SIGNIFICANT CHANGE UP (ref 32–37)
MCV RBC AUTO: 99.3 FL — HIGH (ref 81–99)
MONOCYTES # BLD AUTO: 0.77 K/UL — HIGH (ref 0.1–0.6)
MONOCYTES NFR BLD AUTO: 10 % — HIGH (ref 1.7–9.3)
NEUTROPHILS # BLD AUTO: 4.42 K/UL — SIGNIFICANT CHANGE UP (ref 1.4–6.5)
NEUTROPHILS NFR BLD AUTO: 57.6 % — SIGNIFICANT CHANGE UP (ref 42.2–75.2)
NRBC # BLD: 0 /100 WBCS — SIGNIFICANT CHANGE UP (ref 0–0)
PHOSPHATE SERPL-MCNC: 3 MG/DL — SIGNIFICANT CHANGE UP (ref 2.1–4.9)
PLATELET # BLD AUTO: 245 K/UL — SIGNIFICANT CHANGE UP (ref 130–400)
PMV BLD: 9.4 FL — SIGNIFICANT CHANGE UP (ref 7.4–10.4)
POTASSIUM SERPL-MCNC: 4.2 MMOL/L — SIGNIFICANT CHANGE UP (ref 3.5–5)
POTASSIUM SERPL-SCNC: 4.2 MMOL/L — SIGNIFICANT CHANGE UP (ref 3.5–5)
PROT SERPL-MCNC: 4.7 G/DL — LOW (ref 6–8)
RBC # BLD: 2.83 M/UL — LOW (ref 4.2–5.4)
RBC # FLD: 13.2 % — SIGNIFICANT CHANGE UP (ref 11.5–14.5)
SODIUM SERPL-SCNC: 140 MMOL/L — SIGNIFICANT CHANGE UP (ref 135–146)
SPECIMEN SOURCE: SIGNIFICANT CHANGE UP
WBC # BLD: 7.68 K/UL — SIGNIFICANT CHANGE UP (ref 4.8–10.8)
WBC # FLD AUTO: 7.68 K/UL — SIGNIFICANT CHANGE UP (ref 4.8–10.8)

## 2024-11-13 PROCEDURE — 93010 ELECTROCARDIOGRAM REPORT: CPT

## 2024-11-13 PROCEDURE — 99232 SBSQ HOSP IP/OBS MODERATE 35: CPT

## 2024-11-13 RX ORDER — SENNOSIDES 8.6 MG
2 TABLET ORAL AT BEDTIME
Refills: 0 | Status: DISCONTINUED | OUTPATIENT
Start: 2024-11-13 | End: 2024-11-18

## 2024-11-13 RX ORDER — POLYETHYLENE GLYCOL 3350 17 G/17G
17 POWDER, FOR SOLUTION ORAL
Refills: 0 | Status: DISCONTINUED | OUTPATIENT
Start: 2024-11-13 | End: 2024-11-18

## 2024-11-13 RX ADMIN — POLYETHYLENE GLYCOL 3350 17 GRAM(S): 17 POWDER, FOR SOLUTION ORAL at 05:33

## 2024-11-13 RX ADMIN — VALPROIC ACID 500 MILLIGRAM(S): 250 SOLUTION ORAL at 17:47

## 2024-11-13 RX ADMIN — Medication 1000 UNIT(S): at 13:33

## 2024-11-13 RX ADMIN — Medication 5000 UNIT(S): at 21:18

## 2024-11-13 RX ADMIN — Medication 5000 UNIT(S): at 05:34

## 2024-11-13 RX ADMIN — Medication 1 TABLET(S): at 17:47

## 2024-11-13 RX ADMIN — VALPROIC ACID 1000 MILLIGRAM(S): 250 SOLUTION ORAL at 21:18

## 2024-11-13 RX ADMIN — CHLORHEXIDINE GLUCONATE 1 APPLICATION(S): 1.2 RINSE ORAL at 05:34

## 2024-11-13 RX ADMIN — Medication 1 TABLET(S): at 05:33

## 2024-11-13 RX ADMIN — Medication 400 MILLIGRAM(S): at 08:15

## 2024-11-13 RX ADMIN — Medication 400 MILLIGRAM(S): at 17:47

## 2024-11-13 RX ADMIN — Medication 5000 UNIT(S): at 13:32

## 2024-11-13 NOTE — DISCHARGE NOTE PROVIDER - ATTENDING DISCHARGE PHYSICAL EXAMINATION:
On exam  General: awake, alert, NAD, chronic ill appearance  Lungs:  clear to ausculations b/l, normal resp effort  Heart: regular ryhthm   Abdomen: soft, non tender non distended , + BS , hip dressing no discharge   Ext: no edema, left  forearm with mild area of erythema , non tender - much improved

## 2024-11-13 NOTE — DISCHARGE NOTE PROVIDER - HOSPITAL COURSE
84yoF hx intellectual disability, bipolar disorder, Breast mass (unknown side s/p lumpectomy and RT '07), asthma, hoover's, esophageal varices, s/p R cataract surgery, R hip and L humeral fx, hld here following fall.     #fever 11/11 2x  patient asx but unreliable    no  wbc and  lfts w/in normal and lactate negative   cxr increa left side infiltrate s/o vomiting over weekend   Ua not impressive   levaquin started 11/11   f/u blood cx   wound also checked   if no more fever probable dc feliciano    # Acute L femoral neck fracture-s/p L hip hemiarthroplasty 11/4 received cefazolin  3 doses post op as per ortho   # Anemia- no blood transfusion iron held   # Unwitnessed fall, r/o syncope  unclear story, sounds likely syncopal episode possibly orthostasis induced   wbc improved  could be stress/pain related and dehydration   CT head/neck negative for acute events   TTE EF of 66 %. Grade I diastolic dysfunction. Mild thickening of the anterior and posterior mitral valve leaflets.   Moderate mitral annular calcification. . Mild aortic regurgitation.. Mild pulmonic valve regurgitation.    # SIRS POA ( wbc and HR)  Possible UTI   UA strong  positive , unreliable hx  blood cultures NGTD , urine cultures was not collected , s/p 3days of ceftriaxone, fever resolved    #Hx breast mass s/p lumpectomy and radiation  -outpt f/u and w/u    #Incidental cholelithiasis  -asymptomatic at this time, no LFT elevation  -consider CCY as outpt    #Intellectual Disability  #Bipolar disorder  -[facility],  SLP eval   valproic level is 54  bed bound     #Asthma, not in acute exacerbation  #Esophageal varices  #Hiatal hernia  monitor , resume home meds     #Constipation   CTAP with Moderate to large rectal stool burden. No bowel obstruction.  bowel regimen and monitor   moving her bowel as per  staff ( they record all events)     #DVT ppx -HSQ / scd   -[facility] duplx negative for dvt ppx     #GI ppx: none     Family -Ms. Koko Doshi [phone]  acting as surrogate for several years    Palliative team consult - FULL CODE . - REFER TO Palliative note for details , can not be dnr/dni with out approval by \[facility] [place] [facility]\"" given her developmental disability   84yoF hx intellectual disability, bipolar disorder, Breast mass (unknown side s/p lumpectomy and RT '07), asthma, hoover's, esophageal varices, s/p R cataract surgery, R hip and L humeral fx, hld here following fall.     #fever 11/11 2x  patient asx but unreliable    no  wbc and  lfts w/in normal and lactate negative   cxr increa left side infiltrate s/o vomiting over weekend   Ua not impressive   levaquin started 11/11 total 5 days as per ID  f/u blood cx   wound also checked   UExtre phlebitis , no dvt on US  no dvt on repeat US lower extremity     # Acute L femoral neck fracture-s/p L hip hemiarthroplasty 11/4 received cefazolin  3 doses post op as per ortho   # Anemia- no blood transfusion iron held   # Unwitnessed fall, r/o syncope  unclear story, sounds likely syncopal episode possibly orthostasis induced   wbc improved  could be stress/pain related and dehydration   CT head/neck negative for acute events   TTE EF of 66 %. Grade I diastolic dysfunction. Mild thickening of the anterior and posterior mitral valve leaflets.   Moderate mitral annular calcification. . Mild aortic regurgitation.. Mild pulmonic valve regurgitation.  - If discharged, patient should follow up with Dr. Garcia at [address] [place] [address]. Phone number [phone] for scheduling/appointment.  - Patient should be discharged on 6 weeks (from surgery date) of appropriate DVT prophylaxis due to their decreased functional/ambulation status after lower extremity surgery. Orthopaedic preference would be Aspirin 81 mg BID  if there are no contraindications. If patient is already on home anticoagulation, they may continue home anticoagulation medication instead of aspirin. If patient is going to inpatient rehab/nursing facility, and they plan for DVT PPx with SQH/LVX, they may be placed on that instead of aspirin.      # SIRS POA ( wbc and HR)  Possible UTI   UA strong  positive , unreliable hx  blood cultures NGTD , urine cultures was not collected , s/p 3days of ceftriaxone, fever resolved    #Hx breast mass s/p lumpectomy and radiation  -outpt f/u and w/u    #Incidental cholelithiasis  -asymptomatic at this time, no LFT elevation  -consider CCY as outpt    #Intellectual Disability  #Bipolar disorder  -[facility],  SLP eval   valproic level is 54  bed bound     #Asthma, not in acute exacerbation  #Esophageal varices  #Hiatal hernia  monitor , resume home meds     #Constipation   CTAP with Moderate to large rectal stool burden. No bowel obstruction.  bowel regimen and monitor   moving her bowel as per  staff ( they record all events)     #DVT ppx -HSQ / scd   -[facility] duplx negative for dvt ppx     #GI ppx: none     Family -Ms. Koko Doshi [phone]  acting as surrogate for several years     can not be dnr/dni with out approval by [facility] given her developmental disability     84yoF hx intellectual disability, bipolar disorder, Breast mass (unknown side s/p lumpectomy and RT '07), asthma, hoover's, esophageal varices, s/p R cataract surgery, R hip and L humeral fx, hld here following fall.     #fever 11/11 2x  patient asx but unreliable    no  wbc and  lfts w/in normal and lactate negative   cxr increa left side infiltrate s/o vomiting over weekend   Ua not impressive   levaquin started 11/11 total 5 days as per ID  f/u blood cx   wound also checked   UExtre phlebitis , no dvt on US  no dvt on repeat US lower extremity     # Acute L femoral neck fracture-s/p L hip hemiarthroplasty 11/4 received cefazolin  3 doses post op as per ortho   # Anemia- no blood transfusion iron held   # Unwitnessed fall, r/o syncope  unclear story, sounds likely syncopal episode possibly orthostasis induced   wbc improved  could be stress/pain related and dehydration   CT head/neck negative for acute events   TTE EF of 66 %. Grade I diastolic dysfunction. Mild thickening of the anterior and posterior mitral valve leaflets.   Moderate mitral annular calcification. . Mild aortic regurgitation.. Mild pulmonic valve regurgitation.  - If discharged, patient should follow up with Dr. Garcia at [address] [place] [address]. Phone number [phone] for scheduling/appointment.  - Patient should be discharged on 6 weeks (from surgery date) of appropriate DVT prophylaxis due to their decreased functional/ambulation status after lower extremity surgery. Orthopaedic preference would be Aspirin 81 mg BID  if there are no contraindications. If patient is already on home anticoagulation, they may continue home anticoagulation medication instead of aspirin. If patient is going to inpatient rehab/nursing facility, and they plan for DVT PPx with SQH/LVX, they may be placed on that instead of aspirin.      # SIRS POA ( wbc and HR)  Possible UTI   UA strong  positive , unreliable hx  blood cultures NGTD , urine cultures was not collected , s/p 3days of ceftriaxone, fever resolved    #Hx breast mass s/p lumpectomy and radiation  -outpt f/u and w/u    #Incidental cholelithiasis  -asymptomatic at this time, no LFT elevation  -consider CCY as outpt    #Intellectual Disability  #Bipolar disorder  -[facility],  SLP eval   valproic level is 54  bed bound     #Asthma, not in acute exacerbation  #Esophageal varices  #Hiatal hernia  monitor , resume home meds     #Constipation   CTAP with Moderate to large rectal stool burden. No bowel obstruction.  bowel regimen and monitor   moving her bowel as per  staff ( they record all events)     #DVT ppx -HSQ / scd   -[facility] duplx negative for dvt ppx     #GI ppx: none     Family -Ms. Koko Doshi [phone]  acting as surrogate for several years     can not be dnr/dni with out approval by [facility] given her developmental disability      Pt is doing fine, tolerating food, moving her bowel ,  staff at bedside,   conference call done with  staff/MD , ortho to call the    as per Dr Tiara odom , I sent lovenox 40 mg daily for DVT ppx ( not aspirin or heparin )   also I Sent miralax   I left  for the patient family Ms. Koko Doshi [phone] as were no answer

## 2024-11-13 NOTE — CONSULT NOTE ADULT - TIME BILLING
I have personally seen and examined this patient.  I have reviewed all pertinent clinical information and reviewed all relevant imaging and diagnostic studies personally.   I discussed my recommendations with the primary team and aide at bedside. I have personally seen and examined this patient.  I have reviewed all pertinent clinical information and reviewed all relevant imaging and diagnostic studies personally.   I discussed my recommendations with the primary team Dr Hogan at bedside.

## 2024-11-13 NOTE — DISCHARGE NOTE PROVIDER - NSDCCPCAREPLAN_GEN_ALL_CORE_FT
PRINCIPAL DISCHARGE DIAGNOSIS  Diagnosis: Femoral neck fracture  Assessment and Plan of Treatment: Acute L femoral neck fracture post  L hip hemiarthroplasty 11/4 , please follow up with orthopedics outpatient.  - patient should follow up with Dr. Garcia at [address] [place] [address]. Phone number [phone] for scheduling/appointment.  - Patient should be discharged on 6 weeks (from surgery date) of appropriate DVT prophylaxis due to their decreased functional/ambulation status after lower extremity surgery. Orthopaedic preference would be Aspirin 81 mg BID  if there are no contraindications. If patient is already on home anticoagulation, they may continue home anticoagulation medication instead of aspirin. If patient is going to inpatient rehab/nursing facility, and they plan for DVT PPx with SQH/LVX, they may be placed on that instead of aspirin.       PRINCIPAL DISCHARGE DIAGNOSIS  Diagnosis: Femoral neck fracture  Assessment and Plan of Treatment: Acute L femoral neck fracture post  L hip hemiarthroplasty 11/4 , please follow up with orthopedics outpatient.  - patient should follow up with Dr. Garcia at [address] [place] [address]. Phone number [phone] for scheduling/appointment.  - Patient should be discharged on 6 weeks (from surgery date) of appropriate DVT prophylaxis due to their decreased functional/ambulation status after lower extremity surgery. Orthopaedic preference would be Aspirin 81 mg BID  if there are no contraindications. If patient is already on home anticoagulation, they may continue home anticoagulation medication instead of aspirin. If patient is going to inpatient rehab/nursing facility, and they plan for DVT PPx with SQH/LVX, they may be placed on that instead of aspirin.  Wound care instructions: dressings appear c/d/i, current dressing may stay in place until clinic follow up. If saturated, can replace with dry dressing gauze and tegaderm.  Suture/staple management also to be done outpatient.       PRINCIPAL DISCHARGE DIAGNOSIS  Diagnosis: Femoral neck fracture  Assessment and Plan of Treatment: Acute L femoral neck fracture post  L hip hemiarthroplasty 11/4 , please follow up with orthopedics outpatient.  - patient should follow up with Dr. Garcia at [address] [place] [address]. Phone number [phone] for scheduling/appointment.  - Patient should be discharged on 6 weeks (from surgery date) of appropriate DVT prophylaxis due to their decreased functional/ambulation status after lower extremity surgery. Orthopaedic preference would be Aspirin 81 mg BID  if there are no contraindications. If patient is already on home anticoagulation, they may continue home anticoagulation medication instead of aspirin. If patient is going to inpatient rehab/nursing facility, and they plan for DVT PPx with SQH/LVX, they may be placed on that instead of aspirin.  Wound care instructions: dressings appear c/d/i, current dressing may stay in place until clinic follow up. If saturated, can replace with dry dressing gauze and tegaderm.  Suture/staple management also to be done outpatient.        SECONDARY DISCHARGE DIAGNOSES  Diagnosis: Constipation  Assessment and Plan of Treatment: CT abdomen showed, Moderate to large rectal stool burden.  Pt moving her bowel, tolerating food, please monitor bowel movment and avoid constipation        PRINCIPAL DISCHARGE DIAGNOSIS  Diagnosis: Femoral neck fracture  Assessment and Plan of Treatment: Acute L femoral neck fracture post  L hip hemiarthroplasty 11/4 , please follow up with orthopedics outpatient.  - patient should follow up with Dr. Garcia at [address] [place] [address]. Phone number [phone] for scheduling/appointment.  - Patient should be discharged on 6 weeks (from surgery date) of appropriate DVT prophylaxis due to their decreased functional/ambulation status after lower extremity surgery. Orthopaedic preference would be Aspirin 81 mg BID  if there are no contraindications. If patient is already on home anticoagulation, they may continue home anticoagulation medication instead of aspirin. If patient is going to inpatient rehab/nursing facility, and they plan for DVT PPx with SQH/LVX, they may be placed on that instead of aspirin.  Wound care instructions: dressings appear c/d/i, current dressing may stay in place until clinic follow up. If saturated, can replace with dry dressing gauze and tegaderm.  Suture/staple management also to be done outpatient.        SECONDARY DISCHARGE DIAGNOSES  Diagnosis: Constipation  Assessment and Plan of Treatment: CT abdomen showed, Moderate to large rectal stool burden.  Pt moving her bowel, tolerating food, please monitor bowel movment and avoid constipation       Diagnosis: Cholelithiases  Assessment and Plan of Treatment: Immaging showed Incidental cholelithiasis, -asymptomatic at this time, no LFT elevation, please follow up with outpatient surgery for possible cholycyctectomy

## 2024-11-13 NOTE — CONSULT NOTE ADULT - SUBJECTIVE AND OBJECTIVE BOX
BABAR ENRIQUEZ  84y, Female  Allergy: No Known Allergies  lactose (Stomach Upset)      CHIEF COMPLAINT:   L hip fx ; ?syncope (12 Nov 2024 10:47)      LOS  13d    HPI  HPI:  84yoF hx intellectual disability, bipolar disorder, Breast mass (unknown side s/p lumpectomy and RT '07), asthma, hoover's, esophageal varices, s/p R cataract surgery, R hip and L humeral fx, hld here following fall. Pt wheelchair bound at baseline, max assist. Story per pt and SIDDSO aid unclear, appears that pt got up from wheelchair, felt dizzy and possibly blacked out, and fell in the bathroom. Aid states someone else was with her and denies any falls. After this, pt c/o L/R wrist, L knee, and L/R hip pain prompting ED eval. Difficulty to obtain ROS of pt but denies chest pains, palpitations, dizziness or lightheadedness at baseline.    Labs significant for WBC 13k    Imaging includes - XR of hip L femur, knees, chest. CTh/n and CAP. Findings include acute L femoral neck fx, R breast nodule with calcifications, cholelithiasis w/o CBD dilation, small hiatal hernia, large stool burden    EKG: N/A    No meds ordered per ED    Vitals  T(C): 36.8 (10-31-24 @ 11:09), Max: 36.8 (10-31-24 @ 11:09)  T(F): 98.2 (10-31-24 @ 11:09), Max: 98.2 (10-31-24 @ 11:09)  HR: 95 (10-31-24 @ 11:09) (95 - 95)  BP: 122/92 (10-31-24 @ 11:09) (122/92 - 122/92)  RR: 18 (10-31-24 @ 11:09) (18 - 18)  SpO2: 94% (10-31-24 @ 11:09) (94% - 94%)  Wt(kg): --   (31 Oct 2024 16:00)      INFECTIOUS DISEASE HISTORY:  ID consulted for fever  Tm 11/6 100.5  Tm 11/11 101  No leukocytosis    11/6 CTA no PE  CXR no PNA  No viral swab sent    11/2 UA pyuria , no UCX  11/12 UA without significant pyuria WBC 23, UCX pending  11/2 BCX NGTD , 11/11 BCX NGTD     ABX  ceFAZolin   IVPB 11/4-11/5  cefTRIAXone   IVPB 11/2-11/3  levoFLOXacin  Tablet 11/11-present        Currently ordered for:  levoFLOXacin  Tablet 500 milliGRAM(s) Oral every 24 hours      PMH  PAST MEDICAL & SURGICAL HISTORY:  Atypical bipolar disorder      Mild mental retardation      Hoover esophagus      Hyperlipemia      Breast mass, right  s/p lumpectomy & radiation 2007      Feeding by G-tube  s/p removal 2012      Femoral neck fracture          FAMILY HISTORY  No pertinent family history in first degree relatives        SOCIAL HISTORY  Social History:        ROS  ***    VITALS:  T(F): 97.8, Max: 99.4 (11-12-24 @ 16:00)  HR: 88  BP: 108/63  RR: 18Vital Signs Last 24 Hrs  T(C): 36.6 (13 Nov 2024 07:49), Max: 37.4 (12 Nov 2024 16:00)  T(F): 97.8 (13 Nov 2024 07:49), Max: 99.4 (12 Nov 2024 16:00)  HR: 88 (13 Nov 2024 07:49) (88 - 96)  BP: 108/63 (13 Nov 2024 07:49) (103/66 - 108/64)  BP(mean): --  RR: 18 (13 Nov 2024 07:49) (18 - 18)  SpO2: 97% (13 Nov 2024 07:49) (94% - 97%)    Parameters below as of 13 Nov 2024 07:49  Patient On (Oxygen Delivery Method): room air        PHYSICAL EXAM:  ***    TESTS & MEASUREMENTS:                        9.1    7.68  )-----------( 245      ( 13 Nov 2024 06:23 )             28.1     11-13    140  |  104  |  11  ----------------------------<  98  4.2   |  29  |  <0.5[L]    Ca    8.3[L]      13 Nov 2024 06:23  Phos  3.0     11-13  Mg     1.9     11-13    TPro  4.7[L]  /  Alb  2.7[L]  /  TBili  0.2  /  DBili  x   /  AST  15  /  ALT  13  /  AlkPhos  77  11-13      LIVER FUNCTIONS - ( 13 Nov 2024 06:23 )  Alb: 2.7 g/dL / Pro: 4.7 g/dL / ALK PHOS: 77 U/L / ALT: 13 U/L / AST: 15 U/L / GGT: x           Urinalysis Basic - ( 13 Nov 2024 06:23 )    Color: x / Appearance: x / SG: x / pH: x  Gluc: 98 mg/dL / Ketone: x  / Bili: x / Urobili: x   Blood: x / Protein: x / Nitrite: x   Leuk Esterase: x / RBC: x / WBC x   Sq Epi: x / Non Sq Epi: x / Bacteria: x        Culture - Blood (collected 11-11-24 @ 11:49)  Source: .Blood BLOOD  Preliminary Report (11-12-24 @ 23:00):    No growth at 24 hours    Culture - Blood (collected 11-02-24 @ 11:29)  Source: .Blood BLOOD  Final Report (11-07-24 @ 20:00):    No growth at 5 days        Lactate, Blood: 0.9 mmol/L (11-11-24 @ 11:49)      INFECTIOUS DISEASES TESTING      INFLAMMATORY MARKERS      RADIOLOGY & ADDITIONAL TESTS:  I have personally reviewed the last Chest xray  CXR      CT      CARDIOLOGY TESTING  12 Lead ECG:   Ventricular Rate 91 BPM    Atrial Rate 91 BPM    P-R Interval 154 ms <TRUNCATED> (10-31-24 @ 16:13)       MEDICATIONS  calcium carbonate   1250 mG (OsCal) 1 Oral two times a day  chlorhexidine 2% Cloths 1 Topical <User Schedule>  cholecalciferol 1000 Oral daily  heparin   Injectable 5000 SubCutaneous every 8 hours  lactated ringers. 1000 IV Continuous <Continuous>  levoFLOXacin  Tablet 500 Oral every 24 hours  magnesium oxide 400 Oral two times a day with meals  valproic  acid Syrup 500 Oral <User Schedule>  valproic  acid Syrup 1000 Oral <User Schedule>      ANTIBIOTICS:  levoFLOXacin  Tablet 500 milliGRAM(s) Oral every 24 hours      ALLERGIES:  No Known Allergies  lactose (Stomach Upset)           BABAR ENRIQUEZ  84y, Female  Allergy: No Known Allergies  lactose (Stomach Upset)      CHIEF COMPLAINT:   L hip fx ; ?syncope (12 Nov 2024 10:47)      LOS  13d    HPI  HPI:  84yoF hx intellectual disability, bipolar disorder, Breast mass (unknown side s/p lumpectomy and RT '07), asthma, hoover's, esophageal varices, s/p R cataract surgery, R hip and L humeral fx, hld here following fall. Pt wheelchair bound at baseline, max assist. Story per pt and SIDDSO aid unclear, appears that pt got up from wheelchair, felt dizzy and possibly blacked out, and fell in the bathroom. Aid states someone else was with her and denies any falls. After this, pt c/o L/R wrist, L knee, and L/R hip pain prompting ED eval. Difficulty to obtain ROS of pt but denies chest pains, palpitations, dizziness or lightheadedness at baseline.    Labs significant for WBC 13k    Imaging includes - XR of hip L femur, knees, chest. CTh/n and CAP. Findings include acute L femoral neck fx, R breast nodule with calcifications, cholelithiasis w/o CBD dilation, small hiatal hernia, large stool burden    EKG: N/A    No meds ordered per ED    Vitals  T(C): 36.8 (10-31-24 @ 11:09), Max: 36.8 (10-31-24 @ 11:09)  T(F): 98.2 (10-31-24 @ 11:09), Max: 98.2 (10-31-24 @ 11:09)  HR: 95 (10-31-24 @ 11:09) (95 - 95)  BP: 122/92 (10-31-24 @ 11:09) (122/92 - 122/92)  RR: 18 (10-31-24 @ 11:09) (18 - 18)  SpO2: 94% (10-31-24 @ 11:09) (94% - 94%)  Wt(kg): --   (31 Oct 2024 16:00)      INFECTIOUS DISEASE HISTORY:  ID consulted for fever  aide at bedside, reports patient back to baseline  cannot obtain further history from the patient secondary to altered mental status or sedation   Tm 11/6 100.5  Tm 11/11 101  No leukocytosis    11/6 CTA no PE  CXR no PNA  No viral swab sent    11/2 UA pyuria , no UCX  11/12 UA without significant pyuria WBC 23, UCX pending  11/2 BCX NGTD , 11/11 BCX NGTD     ABX  ceFAZolin   IVPB 11/4-11/5  cefTRIAXone   IVPB 11/2-11/3  levoFLOXacin  Tablet 11/11-present        Currently ordered for:  levoFLOXacin  Tablet 500 milliGRAM(s) Oral every 24 hours      PMH  PAST MEDICAL & SURGICAL HISTORY:  Atypical bipolar disorder      Mild mental retardation      Hoover esophagus      Hyperlipemia      Breast mass, right  s/p lumpectomy & radiation 2007      Feeding by G-tube  s/p removal 2012      Femoral neck fracture          FAMILY HISTORY  No pertinent family history in first degree relatives        SOCIAL HISTORY  Social History:        ROS  unable to obtain history secondary to patient's mental status and/or sedation     VITALS:  T(F): 97.8, Max: 99.4 (11-12-24 @ 16:00)  HR: 88  BP: 108/63  RR: 18Vital Signs Last 24 Hrs  T(C): 36.6 (13 Nov 2024 07:49), Max: 37.4 (12 Nov 2024 16:00)  T(F): 97.8 (13 Nov 2024 07:49), Max: 99.4 (12 Nov 2024 16:00)  HR: 88 (13 Nov 2024 07:49) (88 - 96)  BP: 108/63 (13 Nov 2024 07:49) (103/66 - 108/64)  BP(mean): --  RR: 18 (13 Nov 2024 07:49) (18 - 18)  SpO2: 97% (13 Nov 2024 07:49) (94% - 97%)    Parameters below as of 13 Nov 2024 07:49  Patient On (Oxygen Delivery Method): room air        PHYSICAL EXAM:  Gen: chronically ill appearing   HEENT: Normocephalic, atraumatic  Neck: supple, no lymphadenopathy  CV: Regular rate & regular rhythm  Lungs: decreased BS at bases, no fremitus  Abdomen: Soft, BS present  Ext: Warm, well perfused  Neuro: non focal, not following commands  Skin: no rash, no erythema   L hip dressings  Lines: no phlebitis     TESTS & MEASUREMENTS:                        9.1    7.68  )-----------( 245      ( 13 Nov 2024 06:23 )             28.1     11-13    140  |  104  |  11  ----------------------------<  98  4.2   |  29  |  <0.5[L]    Ca    8.3[L]      13 Nov 2024 06:23  Phos  3.0     11-13  Mg     1.9     11-13    TPro  4.7[L]  /  Alb  2.7[L]  /  TBili  0.2  /  DBili  x   /  AST  15  /  ALT  13  /  AlkPhos  77  11-13      LIVER FUNCTIONS - ( 13 Nov 2024 06:23 )  Alb: 2.7 g/dL / Pro: 4.7 g/dL / ALK PHOS: 77 U/L / ALT: 13 U/L / AST: 15 U/L / GGT: x           Urinalysis Basic - ( 13 Nov 2024 06:23 )    Color: x / Appearance: x / SG: x / pH: x  Gluc: 98 mg/dL / Ketone: x  / Bili: x / Urobili: x   Blood: x / Protein: x / Nitrite: x   Leuk Esterase: x / RBC: x / WBC x   Sq Epi: x / Non Sq Epi: x / Bacteria: x        Culture - Blood (collected 11-11-24 @ 11:49)  Source: .Blood BLOOD  Preliminary Report (11-12-24 @ 23:00):    No growth at 24 hours    Culture - Blood (collected 11-02-24 @ 11:29)  Source: .Blood BLOOD  Final Report (11-07-24 @ 20:00):    No growth at 5 days        Lactate, Blood: 0.9 mmol/L (11-11-24 @ 11:49)      INFECTIOUS DISEASES TESTING      INFLAMMATORY MARKERS      RADIOLOGY & ADDITIONAL TESTS:  I have personally reviewed the last Chest xray  CXR      CT      CARDIOLOGY TESTING  12 Lead ECG:   Ventricular Rate 91 BPM    Atrial Rate 91 BPM    P-R Interval 154 ms <TRUNCATED> (10-31-24 @ 16:13)       MEDICATIONS  calcium carbonate   1250 mG (OsCal) 1 Oral two times a day  chlorhexidine 2% Cloths 1 Topical <User Schedule>  cholecalciferol 1000 Oral daily  heparin   Injectable 5000 SubCutaneous every 8 hours  lactated ringers. 1000 IV Continuous <Continuous>  levoFLOXacin  Tablet 500 Oral every 24 hours  magnesium oxide 400 Oral two times a day with meals  valproic  acid Syrup 500 Oral <User Schedule>  valproic  acid Syrup 1000 Oral <User Schedule>      ANTIBIOTICS:  levoFLOXacin  Tablet 500 milliGRAM(s) Oral every 24 hours      ALLERGIES:  No Known Allergies  lactose (Stomach Upset)           BABAR ENRIQUEZ  84y, Female  Allergy: No Known Allergies  lactose (Stomach Upset)      CHIEF COMPLAINT:   L hip fx ; ?syncope (12 Nov 2024 10:47)      LOS  13d    HPI  HPI:  84yoF hx intellectual disability, bipolar disorder, Breast mass (unknown side s/p lumpectomy and RT '07), asthma, hoover's, esophageal varices, s/p R cataract surgery, R hip and L humeral fx, hld here following fall. Pt wheelchair bound at baseline, max assist. Story per pt and SIDDSO aid unclear, appears that pt got up from wheelchair, felt dizzy and possibly blacked out, and fell in the bathroom. Aid states someone else was with her and denies any falls. After this, pt c/o L/R wrist, L knee, and L/R hip pain prompting ED eval. Difficulty to obtain ROS of pt but denies chest pains, palpitations, dizziness or lightheadedness at baseline.    Labs significant for WBC 13k    Imaging includes - XR of hip L femur, knees, chest. CTh/n and CAP. Findings include acute L femoral neck fx, R breast nodule with calcifications, cholelithiasis w/o CBD dilation, small hiatal hernia, large stool burden    EKG: N/A    No meds ordered per ED    Vitals  T(C): 36.8 (10-31-24 @ 11:09), Max: 36.8 (10-31-24 @ 11:09)  T(F): 98.2 (10-31-24 @ 11:09), Max: 98.2 (10-31-24 @ 11:09)  HR: 95 (10-31-24 @ 11:09) (95 - 95)  BP: 122/92 (10-31-24 @ 11:09) (122/92 - 122/92)  RR: 18 (10-31-24 @ 11:09) (18 - 18)  SpO2: 94% (10-31-24 @ 11:09) (94% - 94%)  Wt(kg): --   (31 Oct 2024 16:00)      INFECTIOUS DISEASE HISTORY:  ID consulted for fever  aide at bedside, reports patient back to baseline  cannot obtain further history from the patient secondary to altered mental status or sedation   Tm 11/6 100.5  Tm 11/11 101  No leukocytosis    11/6 CTA no PE  CXR no PNA  No viral swab sent    11/2 UA pyuria , no UCX  11/12 UA without significant pyuria WBC 23, UCX pending  11/2 BCX NGTD , 11/11 BCX NGTD     ABX  ceFAZolin   IVPB 11/4-11/5  cefTRIAXone   IVPB 11/2-11/3  levoFLOXacin  Tablet 11/11-present        Currently ordered for:  levoFLOXacin  Tablet 500 milliGRAM(s) Oral every 24 hours      PMH  PAST MEDICAL & SURGICAL HISTORY:  Atypical bipolar disorder      Mild mental retardation      Hoover esophagus      Hyperlipemia      Breast mass, right  s/p lumpectomy & radiation 2007      Feeding by G-tube  s/p removal 2012      Femoral neck fracture          FAMILY HISTORY  No pertinent family history in first degree relatives        SOCIAL HISTORY  Social History:        ROS  unable to obtain history secondary to patient's mental status and/or sedation     VITALS:  T(F): 97.8, Max: 99.4 (11-12-24 @ 16:00)  HR: 88  BP: 108/63  RR: 18Vital Signs Last 24 Hrs  T(C): 36.6 (13 Nov 2024 07:49), Max: 37.4 (12 Nov 2024 16:00)  T(F): 97.8 (13 Nov 2024 07:49), Max: 99.4 (12 Nov 2024 16:00)  HR: 88 (13 Nov 2024 07:49) (88 - 96)  BP: 108/63 (13 Nov 2024 07:49) (103/66 - 108/64)  BP(mean): --  RR: 18 (13 Nov 2024 07:49) (18 - 18)  SpO2: 97% (13 Nov 2024 07:49) (94% - 97%)    Parameters below as of 13 Nov 2024 07:49  Patient On (Oxygen Delivery Method): room air        PHYSICAL EXAM:  Gen: chronically ill appearing   HEENT: Normocephalic, atraumatic  Neck: supple, no lymphadenopathy  CV: Regular rate & regular rhythm  Lungs: decreased BS at bases, no fremitus  Abdomen: Soft, BS present  Ext: Warm, well perfused  Neuro: non focal, not following commands  Skin: no rash, no erythema   L hip dressings  Lines: no phlebitis , LUE edema erythema    TESTS & MEASUREMENTS:                        9.1    7.68  )-----------( 245      ( 13 Nov 2024 06:23 )             28.1     11-13    140  |  104  |  11  ----------------------------<  98  4.2   |  29  |  <0.5[L]    Ca    8.3[L]      13 Nov 2024 06:23  Phos  3.0     11-13  Mg     1.9     11-13    TPro  4.7[L]  /  Alb  2.7[L]  /  TBili  0.2  /  DBili  x   /  AST  15  /  ALT  13  /  AlkPhos  77  11-13      LIVER FUNCTIONS - ( 13 Nov 2024 06:23 )  Alb: 2.7 g/dL / Pro: 4.7 g/dL / ALK PHOS: 77 U/L / ALT: 13 U/L / AST: 15 U/L / GGT: x           Urinalysis Basic - ( 13 Nov 2024 06:23 )    Color: x / Appearance: x / SG: x / pH: x  Gluc: 98 mg/dL / Ketone: x  / Bili: x / Urobili: x   Blood: x / Protein: x / Nitrite: x   Leuk Esterase: x / RBC: x / WBC x   Sq Epi: x / Non Sq Epi: x / Bacteria: x        Culture - Blood (collected 11-11-24 @ 11:49)  Source: .Blood BLOOD  Preliminary Report (11-12-24 @ 23:00):    No growth at 24 hours    Culture - Blood (collected 11-02-24 @ 11:29)  Source: .Blood BLOOD  Final Report (11-07-24 @ 20:00):    No growth at 5 days        Lactate, Blood: 0.9 mmol/L (11-11-24 @ 11:49)      INFECTIOUS DISEASES TESTING      INFLAMMATORY MARKERS      RADIOLOGY & ADDITIONAL TESTS:  I have personally reviewed the last Chest xray  CXR      CT      CARDIOLOGY TESTING  12 Lead ECG:   Ventricular Rate 91 BPM    Atrial Rate 91 BPM    P-R Interval 154 ms <TRUNCATED> (10-31-24 @ 16:13)       MEDICATIONS  calcium carbonate   1250 mG (OsCal) 1 Oral two times a day  chlorhexidine 2% Cloths 1 Topical <User Schedule>  cholecalciferol 1000 Oral daily  heparin   Injectable 5000 SubCutaneous every 8 hours  lactated ringers. 1000 IV Continuous <Continuous>  levoFLOXacin  Tablet 500 Oral every 24 hours  magnesium oxide 400 Oral two times a day with meals  valproic  acid Syrup 500 Oral <User Schedule>  valproic  acid Syrup 1000 Oral <User Schedule>      ANTIBIOTICS:  levoFLOXacin  Tablet 500 milliGRAM(s) Oral every 24 hours      ALLERGIES:  No Known Allergies  lactose (Stomach Upset)

## 2024-11-13 NOTE — CONSULT NOTE ADULT - ASSESSMENT
ASSESSMENT  84yoF hx intellectual disability, bipolar disorder, Breast mass (unknown side s/p lumpectomy and RT '07), asthma, hoover's, esophageal varices, s/p R cataract surgery, R hip and L humeral fx, hld here following fall. Admitted 10/31. Found to have a fem neck fx. ID consulted for fever    IMPRESSION  #  ID consulted for fever  Tm 11/6 100.5  Tm 11/11 101  No leukocytosis  11/6 CTA no PE  CXR no PNA  11/2 UA pyuria , no UCX  11/12 UA without significant pyuria WBC 23, UCX pending  11/2 BCX NGTD , 11/11 BCX NGTD   #Immunodeficiency secondary to Senescence  which could results in poor clinical outcomes  #Abx allergy: No Known Allergies    Creatinine: <0.5 (11-13-24 @ 06:23)      Weight (kg): 70 (11-04-24 @ 17:17)    RECOMMENDATIONS  This is an incomplete consult note. All final recommendations to follow after interview and examination of the patient. Please follow recommendations noted below.    If any questions, please send a message or call on [employer] Teams  Please continue to update ID with any pertinent new laboratory, radiographic findings, or change in clinical status ASSESSMENT  84yoF hx intellectual disability, bipolar disorder, Breast mass (unknown side s/p lumpectomy and RT '07), asthma, hoover's, esophageal varices, s/p R cataract surgery, R hip and L humeral fx, hld here following fall. Admitted 10/31. Found to have a fem neck fx. ID consulted for fever    IMPRESSION  #ID consulted for fever  Tm 11/6 100.5  Tm 11/11 101  No leukocytosis  11/6 CTA no PE  CXR no PNA  11/2 UA pyuria , no UCX  11/12 UA without significant pyuria WBC 23, UCX pending  11/2 BCX NGTD , 11/11 BCX NGTD   #Immunodeficiency secondary to Senescence  which could results in poor clinical outcomes  #Abx allergy: No Known Allergies    Creatinine: <0.5 (11-13-24 @ 06:23)      Weight (kg): 70 (11-04-24 @ 17:17)    RECOMMENDATIONS  - f/u UCX, no initial UCX sent and patient had a + UA  - Continue PO levaquin 500mg dialy x 5 days    If any questions, please send a message or call on [employer] Teams  Please continue to update ID with any pertinent new laboratory, radiographic findings, or change in clinical status ASSESSMENT  84yoF hx intellectual disability, bipolar disorder, Breast mass (unknown side s/p lumpectomy and RT '07), asthma, hoover's, esophageal varices, s/p R cataract surgery, R hip and L humeral fx, hld here following fall. Admitted 10/31. Found to have a fem neck fx. ID consulted for fever    IMPRESSION  #ID consulted for fever  Tm 11/6 100.5  Tm 11/11 101  No leukocytosis  11/6 CTA no PE  CXR no PNA  11/2 UA pyuria , no UCX  11/12 UA without significant pyuria WBC 23, UCX pending  11/2 BCX NGTD , 11/11 BCX NGTD   #Immunodeficiency secondary to Senescence  which could results in poor clinical outcomes  #Abx allergy: No Known Allergies    Creatinine: <0.5 (11-13-24 @ 06:23)      Weight (kg): 70 (11-04-24 @ 17:17)    RECOMMENDATIONS  - f/u UCX, no initial UCX sent and patient had a + UA  - Continue empiric PO levaquin 500mg daily x 5 days  - Influenza/RSV/COVID19 PCR     If any questions, please send a message or call on [email or web address] Teams  Please continue to update ID with any pertinent new laboratory, radiographic findings, or change in clinical status ASSESSMENT  84yoF hx intellectual disability, bipolar disorder, Breast mass (unknown side s/p lumpectomy and RT '07), asthma, hoover's, esophageal varices, s/p R cataract surgery, R hip and L humeral fx, hld here following fall. Admitted 10/31. Found to have a fem neck fx. ID consulted for fever    IMPRESSION  #ID consulted for fever  Possible LUE phlebitis vs UTI  Tm 11/6 100.5  Tm 11/11 101  No leukocytosis  11/6 CTA no PE  CXR no PNA  11/2 UA pyuria , no UCX  11/12 UA without significant pyuria WBC 23, UCX pending  11/2 BCX NGTD , 11/11 BCX NGTD   #Immunodeficiency secondary to Senescence  which could results in poor clinical outcomes  #Abx allergy: No Known Allergies    Creatinine: <0.5 (11-13-24 @ 06:23)      Weight (kg): 70 (11-04-24 @ 17:17)    RECOMMENDATIONS  - f/u UCX, no initial UCX sent and patient had a + UA  - Continue empiric PO levaquin 500mg daily x 5 days  - Monitor LUE  - Influenza/RSV/COVID19 PCR     If any questions, please send a message or call on [employer] Teams  Please continue to update ID with any pertinent new laboratory, radiographic findings, or change in clinical status

## 2024-11-13 NOTE — DISCHARGE NOTE PROVIDER - NSDCFUADDAPPT_GEN_ALL_CORE_FT
Do you need a primary care doctor or follow-up with a specialist? Our care coordinators will help you find providers near you and schedule any follow-up care visits.    Monday-Friday: 9am-5pm    Call our [facility] team: [phone] Do you need a primary care doctor or follow-up with a specialist? Our care coordinators will help you find providers near you and schedule any follow-up care visits.    Monday-Friday: 9am-5pm    Call our [facility] team: [phone]    patient should follow up with Dr. Garcia at [address] [facility]. Phone number [phone] for scheduling/appointment

## 2024-11-13 NOTE — DISCHARGE NOTE PROVIDER - NSDCMRMEDTOKEN_GEN_ALL_CORE_FT
Austedo XR 24 mg oral tablet, extended release: 1 tab(s) orally once a day  cholecalciferol 25 mcg (1000 intl units) oral capsule: 1 cap(s) orally once a day  FiberCon 625 mg oral tablet: 1 tab(s) orally 2 times a day  magnesium gluconate 250 mg oral tablet: 1 tab(s) orally 2 times a day  Oysco 500 (1250 mg calcium carbonate) oral tablet: 1 tab(s) orally 2 times a day  Tylenol 325 mg oral capsule: 2 cap(s) orally every 8 hours as needed for  mild pain  valproic acid 250 mg/5 mL oral liquid: 10 milliliter(s) orally once a day 5pm and 20mL at 9PM   Austedo XR 24 mg oral tablet, extended release: 1 tab(s) orally once a day  cholecalciferol 25 mcg (1000 intl units) oral capsule: 1 cap(s) orally once a day  FiberCon 625 mg oral tablet: 1 tab(s) orally 2 times a day  heparin: 5000 units sc 2x a day  magnesium gluconate 250 mg oral tablet: 1 tab(s) orally 2 times a day  Oysco 500 (1250 mg calcium carbonate) oral tablet: 1 tab(s) orally 2 times a day  Tylenol 325 mg oral capsule: 2 cap(s) orally every 8 hours as needed for  mild pain  valproic acid 250 mg/5 mL oral liquid: 10 milliliter(s) orally once a day 5pm and 20mL at 9PM   Austedo XR 24 mg oral tablet, extended release: 1 tab(s) orally once a day  cholecalciferol 25 mcg (1000 intl units) oral capsule: 1 cap(s) orally once a day  FiberCon 625 mg oral tablet: 1 tab(s) orally 2 times a day  heparin: 5000 units sc 2x a day  Lovenox 40 mg/0.4 mL injectable solution: 40 milligram(s) subcutaneously once a day  magnesium gluconate 250 mg oral tablet: 1 tab(s) orally 2 times a day  Oysco 500 (1250 mg calcium carbonate) oral tablet: 1 tab(s) orally 2 times a day  polyethylene glycol 3350 oral powder for reconstitution: 17 gram(s) orally once a day  Tylenol 325 mg oral capsule: 2 cap(s) orally every 8 hours as needed for  mild pain  valproic acid 250 mg/5 mL oral liquid: 10 milliliter(s) orally once a day 5pm and 20mL at 9PM   Austedo XR 24 mg oral tablet, extended release: 1 tab(s) orally once a day  cholecalciferol 25 mcg (1000 intl units) oral capsule: 1 cap(s) orally once a day  FiberCon 625 mg oral tablet: 1 tab(s) orally 2 times a day  Lovenox 40 mg/0.4 mL injectable solution: 40 milligram(s) subcutaneously once a day  magnesium gluconate 250 mg oral tablet: 1 tab(s) orally 2 times a day  Oysco 500 (1250 mg calcium carbonate) oral tablet: 1 tab(s) orally 2 times a day  polyethylene glycol 3350 oral powder for reconstitution: 17 gram(s) orally 2 times a day As needed Constipation  Tylenol 325 mg oral capsule: 2 cap(s) orally every 8 hours as needed for  mild pain  valproic acid 250 mg/5 mL oral liquid: 10 milliliter(s) orally once a day 5pm and 20mL at 9PM

## 2024-11-13 NOTE — PROGRESS NOTE ADULT - ASSESSMENT
84yoF hx intellectual disability, bipolar disorder, Breast mass (unknown side s/p lumpectomy and RT '07), asthma, hoover's, esophageal varices, s/p R cataract surgery, R hip and L humeral fx, hld here following fall.     # Acute L femoral neck fracture-s/p L hip hemiarthroplasty 11/4--received cefazolin  3 doses post op as per ortho   # Anemia-- no blood transfusion-- given 3 day iv iron treatment.  hb is 9.7  # Unwitnessed fall, r/o syncope  unclear story, sounds likely syncopal episode possibly orthostasis induced   wbc improved  could be stress/pain related and dehydration   CT head/neck negative for acute   TTE EF of 66 %. Grade I diastolic dysfunction.. Mild thickening of the anterior and posterior mitral valve leaflets.   Moderate mitral annular calcification. . Mild aortic regurgitation.. Mild pulmonic valve regurgitation.    # SIRS POA ( wbc and HR)  Possible UTI / SIRS ON admission   UA strong  positive , unreliable hx    blood cultures NGTD , urine cultures was not collected , s/p 3days of ceftriaxone  - was started on Levaquin for possible aspiration    CXR is negative  Check RVP and ID Consult   repeat LE duplex     #Hx breast mass s/p lumpectomy and radiation  -outpt f/u and w/u    #Incidental cholelithiasis  -asymptomatic at this time, no LFT elevation  -consider CCY as outpt    #Intellectual Disability  #Bipolar disorder  -[facility]/w [facility],  SLP eval appreciated   valproic level is 54  PT/OT after surgery    #Asthma, not in acute exacerbation  #Esophageal varices  #Hiatal hernia  monitor , resume home meds     #Constipation   CTAP with Moderate to large rectal stool burden. No bowel obstruction.  bowel regimen and monitor   moving her bowel as per  staff ( they record all events)     #DVT ppx -HSQ / scd   -[facility] duplx negative for dvt ppx     #GI ppx    Pending : monitor for fever--RVP, LE duplex,  group home meeting tomorrow.    Family -Ms. Koko Doshi [phone]  acting as surrogate for several years    Palliative team consult appreciated - FULL CODE . - REFER TO Palliative note for details , can not be dnr/dni with out approval by [facility] given her developmental disability     from group home.

## 2024-11-13 NOTE — DISCHARGE NOTE PROVIDER - DISCHARGE DIET
DASH Diet/Pureed Diet/Other Diet Instructions/Mildly Thick Liquids DASH Diet/Pureed Diet/Moderately Thick Liquids/Other Diet Instructions

## 2024-11-13 NOTE — DISCHARGE NOTE PROVIDER - POSTFACE STATEMENT FOR MINUTES SPENT
minutes on the discharge service. Spine appears normal, movement of extremities grossly intact. No edema

## 2024-11-13 NOTE — DISCHARGE NOTE PROVIDER - CARE PROVIDER_API CALL
David Garcia  Orthopaedic Surgery  [address] [facility] [place] [address]  Phone: [phone]  Fax: [phone]  Follow Up Time: 1 week

## 2024-11-13 NOTE — PROGRESS NOTE ADULT - SUBJECTIVE AND OBJECTIVE BOX
T H I S   I S    N O  T   A    F I N A L I Z E D   N O T BABAR HIGGINS  84y, Female  Allergy: No Known Allergies  lactose (Stomach Upset)    Hospital Day: 13d    Patient seen and examined earlier today.     PMH/PSH:  PAST MEDICAL & SURGICAL HISTORY:  Atypical bipolar disorder      Mild mental retardation      Brito esophagus      Hyperlipemia      Breast mass, right  s/p lumpectomy & radiation 2007      Feeding by G-tube  s/p removal 2012      Femoral neck fracture          LAST 24-Hr EVENTS:    VITALS:  T(F): 97.8 (11-13-24 @ 07:49), Max: 99.4 (11-12-24 @ 16:00)  HR: 88 (11-13-24 @ 07:49)  BP: 108/63 (11-13-24 @ 07:49) (103/66 - 108/64)  RR: 18 (11-13-24 @ 07:49)  SpO2: 97% (11-13-24 @ 07:49)          TESTS & MEASUREMENTS:  Weight/BMI      11-11-24 @ 07:01  -  11-12-24 @ 07:00  --------------------------------------------------------  IN: 600 mL / OUT: 906 mL / NET: -306 mL    11-12-24 @ 07:01  -  11-13-24 @ 07:00  --------------------------------------------------------  IN: 0 mL / OUT: 602 mL / NET: -602 mL                            9.1    7.68  )-----------( 245      ( 13 Nov 2024 06:23 )             28.1       INR: 1.13 ratio (11-11-24 @ 11:49)    11-13    140  |  104  |  11  ----------------------------<  98  4.2   |  29  |  <0.5[L]    Ca    8.3[L]      13 Nov 2024 06:23  Phos  3.0     11-13  Mg     1.9     11-13    TPro  4.7[L]  /  Alb  2.7[L]  /  TBili  0.2  /  DBili  x   /  AST  15  /  ALT  13  /  AlkPhos  77  11-13    LIVER FUNCTIONS - ( 13 Nov 2024 06:23 )  Alb: 2.7 g/dL / Pro: 4.7 g/dL / ALK PHOS: 77 U/L / ALT: 13 U/L / AST: 15 U/L / GGT: x                 Culture - Urine (collected 11-12-24 @ 02:30)  Source: Catheterized Catheterized  Final Report (11-13-24 @ 10:18):    <10,000 CFU/mL Normal Urogenital Christen    Culture - Blood (collected 11-11-24 @ 11:49)  Source: .Blood BLOOD  Preliminary Report (11-12-24 @ 23:00):    No growth at 24 hours      Urinalysis Basic - ( 13 Nov 2024 06:23 )    Color: x / Appearance: x / SG: x / pH: x  Gluc: 98 mg/dL / Ketone: x  / Bili: x / Urobili: x   Blood: x / Protein: x / Nitrite: x   Leuk Esterase: x / RBC: x / WBC x   Sq Epi: x / Non Sq Epi: x / Bacteria: x                            RADIOLOGY, ECG, & ADDITIONAL TESTS:  12 Lead ECG:   Ventricular Rate 91 BPM    Atrial Rate 91 BPM    P-R Interval 154 ms    QRS Duration 106 ms    Q-T Interval 368 ms    QTC Calculation(Bazett) 452 ms    P Axis 69 degrees    R Axis -53 degrees    T Axis 51 degrees    Diagnosis Line Normal sinus rhythm  Left anterior fascicular block  Voltage criteria for left ventricular hypertrophy  Possible Lateral infarct , age undetermined  Abnormal ECG    Confirmed by Kayode Lange ([id number]) on 10/31/2024 7:26:42 PM (10-31-24 @ 16:13)    CT Angio Chest PE Protocol w/ IV Cont:   ACC: [id number] EXAM:  CT ANGIO CHEST PULM ART [facility]   ORDERED BY: MARAH PERRY     PROCEDURE DATE:  11/06/2024          INTERPRETATION:  CLINICAL INDICATION: r/o PE    TECHNIQUE:  CTA of the thorax was performed after administration of intravenous  contrast. Sagittal and coronal reformats were performed as well as MIP   reconstructions.    COMPARISON CT: 10/31/2024    INTERPRETATION:    AIRWAYS, LUNGS AND PLEURA: Mildly increased bibasilar dependent   atelectasis. Otherwise, without significant change    MEDIASTINUM: No significant change    HEART AND VESSELS: No significant change There are no definite filling   defects in the main pulmonary artery or segmental branches to suggest   pulmonary embolus.    PARTIALLY IMAGED UPPER ABDOMEN: Cholelithiasis    BONES AND SOFT TISSUES: No significant change    IMPRESSION:  No definite filling defects in the main pulmonary artery or segmental   branches to suggest pulmonary embolus.    Additional findings are unchanged in this short interval follow-up CT.        --- End of Report ---            EVER CUNHA MD; Attending Radiologist  This document has been electronically signed. Nov 6 2024 11:39AM (11-06-24 @ 10:52)    RECENT DIAGNOSTIC ORDERS:  12 Lead ECG (11-13-24 @ 09:10)  Respiratory Viral Panel with COVID-19 by TRE: Routine (11-13-24 @ 09:07)      MEDICATIONS:  MEDICATIONS  (STANDING):  calcium carbonate   1250 mG (OsCal) 1 Tablet(s) Oral two times a day  chlorhexidine 2% Cloths 1 Application(s) Topical <User Schedule>  cholecalciferol 1000 Unit(s) Oral daily  heparin   Injectable 5000 Unit(s) SubCutaneous every 8 hours  lactated ringers. 1000 milliLiter(s) (75 mL/Hr) IV Continuous <Continuous>  levoFLOXacin  Tablet 500 milliGRAM(s) Oral every 24 hours  magnesium oxide 400 milliGRAM(s) Oral two times a day with meals  valproic  acid Syrup 500 milliGRAM(s) Oral <User Schedule>  valproic  acid Syrup 1000 milliGRAM(s) Oral <User Schedule>    MEDICATIONS  (PRN):  acetaminophen     Tablet .. 650 milliGRAM(s) Oral every 6 hours PRN Mild Pain (1 - 3)  polyethylene glycol 3350 17 Gram(s) Oral two times a day PRN Constipation  senna 2 Tablet(s) Oral at bedtime PRN Constipation      HOME MEDICATIONS:  Austedo XR 24 mg oral tablet, extended release (10-31)  cholecalciferol 25 mcg (1000 intl units) oral capsule (10-31)  FiberCon 625 mg oral tablet (10-31)  magnesium gluconate 250 mg oral tablet (10-31)  Oysco 500 (1250 mg calcium carbonate) oral tablet (10-31)  Tylenol 325 mg oral capsule (10-31)  valproic acid 250 mg/5 mL oral liquid (10-31)      PHYSICAL EXAM:  GENERAL:   CHEST/LUNG:   HEART:   ABDOMEN:   EXTREMITIES:           MINA, BABAR  84y, Female  Allergy: No Known Allergies  lactose (Stomach Upset)    Hospital Day: 13d    Patient seen and examined earlier today.  her staff at bedside , no events reported     PMH/PSH:  PAST MEDICAL & SURGICAL HISTORY:  Atypical bipolar disorder      Mild mental retardation      Brito esophagus      Hyperlipemia      Breast mass, right  s/p lumpectomy & radiation 2007      Feeding by G-tube  s/p removal 2012      Femoral neck fracture          LAST 24-Hr EVENTS:    VITALS:  T(F): 97.8 (11-13-24 @ 07:49), Max: 99.4 (11-12-24 @ 16:00)  HR: 88 (11-13-24 @ 07:49)  BP: 108/63 (11-13-24 @ 07:49) (103/66 - 108/64)  RR: 18 (11-13-24 @ 07:49)  SpO2: 97% (11-13-24 @ 07:49)          TESTS & MEASUREMENTS:  Weight/BMI      11-11-24 @ 07:01  -  11-12-24 @ 07:00  --------------------------------------------------------  IN: 600 mL / OUT: 906 mL / NET: -306 mL    11-12-24 @ 07:01  -  11-13-24 @ 07:00  --------------------------------------------------------  IN: 0 mL / OUT: 602 mL / NET: -602 mL                            9.1    7.68  )-----------( 245      ( 13 Nov 2024 06:23 )             28.1       INR: 1.13 ratio (11-11-24 @ 11:49)    11-13    140  |  104  |  11  ----------------------------<  98  4.2   |  29  |  <0.5[L]    Ca    8.3[L]      13 Nov 2024 06:23  Phos  3.0     11-13  Mg     1.9     11-13    TPro  4.7[L]  /  Alb  2.7[L]  /  TBili  0.2  /  DBili  x   /  AST  15  /  ALT  13  /  AlkPhos  77  11-13    LIVER FUNCTIONS - ( 13 Nov 2024 06:23 )  Alb: 2.7 g/dL / Pro: 4.7 g/dL / ALK PHOS: 77 U/L / ALT: 13 U/L / AST: 15 U/L / GGT: x                 Culture - Urine (collected 11-12-24 @ 02:30)  Source: Catheterized Catheterized  Final Report (11-13-24 @ 10:18):    <10,000 CFU/mL Normal Urogenital Christen    Culture - Blood (collected 11-11-24 @ 11:49)  Source: .Blood BLOOD  Preliminary Report (11-12-24 @ 23:00):    No growth at 24 hours      Urinalysis Basic - ( 13 Nov 2024 06:23 )    Color: x / Appearance: x / SG: x / pH: x  Gluc: 98 mg/dL / Ketone: x  / Bili: x / Urobili: x   Blood: x / Protein: x / Nitrite: x   Leuk Esterase: x / RBC: x / WBC x   Sq Epi: x / Non Sq Epi: x / Bacteria: x                            RADIOLOGY, ECG, & ADDITIONAL TESTS:  12 Lead ECG:   Ventricular Rate 91 BPM    Atrial Rate 91 BPM    P-R Interval 154 ms    QRS Duration 106 ms    Q-T Interval 368 ms    QTC Calculation(Bazett) 452 ms    P Axis 69 degrees    R Axis -53 degrees    T Axis 51 degrees    Diagnosis Line Normal sinus rhythm  Left anterior fascicular block  Voltage criteria for left ventricular hypertrophy  Possible Lateral infarct , age undetermined  Abnormal ECG    Confirmed by Kayode Lange ([id number]) on 10/31/2024 7:26:42 PM (10-31-24 @ 16:13)    CT Angio Chest PE Protocol w/ IV Cont:   ACC: [id number] EXAM:  CT ANGIO CHEST PULM ART [facility]   ORDERED BY: MARAH PERRY     PROCEDURE DATE:  11/06/2024          INTERPRETATION:  CLINICAL INDICATION: r/o PE    TECHNIQUE:  CTA of the thorax was performed after administration of intravenous  contrast. Sagittal and coronal reformats were performed as well as MIP   reconstructions.    COMPARISON CT: 10/31/2024    INTERPRETATION:    AIRWAYS, LUNGS AND PLEURA: Mildly increased bibasilar dependent   atelectasis. Otherwise, without significant change    MEDIASTINUM: No significant change    HEART AND VESSELS: No significant change There are no definite filling   defects in the main pulmonary artery or segmental branches to suggest   pulmonary embolus.    PARTIALLY IMAGED UPPER ABDOMEN: Cholelithiasis    BONES AND SOFT TISSUES: No significant change    IMPRESSION:  No definite filling defects in the main pulmonary artery or segmental   branches to suggest pulmonary embolus.    Additional findings are unchanged in this short interval follow-up CT.        --- End of Report ---            EVER CUNHA MD; Attending Radiologist  This document has been electronically signed. Nov 6 2024 11:39AM (11-06-24 @ 10:52)    RECENT DIAGNOSTIC ORDERS:  12 Lead ECG (11-13-24 @ 09:10)  Respiratory Viral Panel with COVID-19 by TRE: Routine (11-13-24 @ 09:07)      MEDICATIONS:  MEDICATIONS  (STANDING):  calcium carbonate   1250 mG (OsCal) 1 Tablet(s) Oral two times a day  chlorhexidine 2% Cloths 1 Application(s) Topical <User Schedule>  cholecalciferol 1000 Unit(s) Oral daily  heparin   Injectable 5000 Unit(s) SubCutaneous every 8 hours  lactated ringers. 1000 milliLiter(s) (75 mL/Hr) IV Continuous <Continuous>  levoFLOXacin  Tablet 500 milliGRAM(s) Oral every 24 hours  magnesium oxide 400 milliGRAM(s) Oral two times a day with meals  valproic  acid Syrup 500 milliGRAM(s) Oral <User Schedule>  valproic  acid Syrup 1000 milliGRAM(s) Oral <User Schedule>    MEDICATIONS  (PRN):  acetaminophen     Tablet .. 650 milliGRAM(s) Oral every 6 hours PRN Mild Pain (1 - 3)  polyethylene glycol 3350 17 Gram(s) Oral two times a day PRN Constipation  senna 2 Tablet(s) Oral at bedtime PRN Constipation      HOME MEDICATIONS:  Austedo XR 24 mg oral tablet, extended release (10-31)  cholecalciferol 25 mcg (1000 intl units) oral capsule (10-31)  FiberCon 625 mg oral tablet (10-31)  magnesium gluconate 250 mg oral tablet (10-31)  Oysco 500 (1250 mg calcium carbonate) oral tablet (10-31)  Tylenol 325 mg oral capsule (10-31)  valproic acid 250 mg/5 mL oral liquid (10-31)      PHYSICAL EXAM:  GENERAL:   CHEST/LUNG:   HEART:   ABDOMEN:   EXTREMITIES:           MINA, BABAR  84y, Female  Allergy: No Known Allergies  lactose (Stomach Upset)    Hospital Day: 13d    Patient seen and examined earlier today.  her staff at bedside , no events reported     PMH/PSH:  PAST MEDICAL & SURGICAL HISTORY:  Atypical bipolar disorder      Mild mental retardation      Brito esophagus      Hyperlipemia      Breast mass, right  s/p lumpectomy & radiation 2007      Feeding by G-tube  s/p removal 2012      Femoral neck fracture          LAST 24-Hr EVENTS:    VITALS:  T(F): 97.8 (11-13-24 @ 07:49), Max: 99.4 (11-12-24 @ 16:00)  HR: 88 (11-13-24 @ 07:49)  BP: 108/63 (11-13-24 @ 07:49) (103/66 - 108/64)  RR: 18 (11-13-24 @ 07:49)  SpO2: 97% (11-13-24 @ 07:49)          TESTS & MEASUREMENTS:  Weight/BMI      11-11-24 @ 07:01  -  11-12-24 @ 07:00  --------------------------------------------------------  IN: 600 mL / OUT: 906 mL / NET: -306 mL    11-12-24 @ 07:01  -  11-13-24 @ 07:00  --------------------------------------------------------  IN: 0 mL / OUT: 602 mL / NET: -602 mL                            9.1    7.68  )-----------( 245      ( 13 Nov 2024 06:23 )             28.1       INR: 1.13 ratio (11-11-24 @ 11:49)    11-13    140  |  104  |  11  ----------------------------<  98  4.2   |  29  |  <0.5[L]    Ca    8.3[L]      13 Nov 2024 06:23  Phos  3.0     11-13  Mg     1.9     11-13    TPro  4.7[L]  /  Alb  2.7[L]  /  TBili  0.2  /  DBili  x   /  AST  15  /  ALT  13  /  AlkPhos  77  11-13    LIVER FUNCTIONS - ( 13 Nov 2024 06:23 )  Alb: 2.7 g/dL / Pro: 4.7 g/dL / ALK PHOS: 77 U/L / ALT: 13 U/L / AST: 15 U/L / GGT: x                 Culture - Urine (collected 11-12-24 @ 02:30)  Source: Catheterized Catheterized  Final Report (11-13-24 @ 10:18):    <10,000 CFU/mL Normal Urogenital Christen    Culture - Blood (collected 11-11-24 @ 11:49)  Source: .Blood BLOOD  Preliminary Report (11-12-24 @ 23:00):    No growth at 24 hours      Urinalysis Basic - ( 13 Nov 2024 06:23 )    Color: x / Appearance: x / SG: x / pH: x  Gluc: 98 mg/dL / Ketone: x  / Bili: x / Urobili: x   Blood: x / Protein: x / Nitrite: x   Leuk Esterase: x / RBC: x / WBC x   Sq Epi: x / Non Sq Epi: x / Bacteria: x                            RADIOLOGY, ECG, & ADDITIONAL TESTS:  12 Lead ECG:   Ventricular Rate 91 BPM    Atrial Rate 91 BPM    P-R Interval 154 ms    QRS Duration 106 ms    Q-T Interval 368 ms    QTC Calculation(Bazett) 452 ms    P Axis 69 degrees    R Axis -53 degrees    T Axis 51 degrees    Diagnosis Line Normal sinus rhythm  Left anterior fascicular block  Voltage criteria for left ventricular hypertrophy  Possible Lateral infarct , age undetermined  Abnormal ECG    Confirmed by Kayode Lange ([id number]) on 10/31/2024 7:26:42 PM (10-31-24 @ 16:13)    CT Angio Chest PE Protocol w/ IV Cont:   ACC: [id number] EXAM:  CT ANGIO CHEST PULM ART [facility]   ORDERED BY: MARAH PERRY     PROCEDURE DATE:  11/06/2024          INTERPRETATION:  CLINICAL INDICATION: r/o PE    TECHNIQUE:  CTA of the thorax was performed after administration of intravenous  contrast. Sagittal and coronal reformats were performed as well as MIP   reconstructions.    COMPARISON CT: 10/31/2024    INTERPRETATION:    AIRWAYS, LUNGS AND PLEURA: Mildly increased bibasilar dependent   atelectasis. Otherwise, without significant change    MEDIASTINUM: No significant change    HEART AND VESSELS: No significant change There are no definite filling   defects in the main pulmonary artery or segmental branches to suggest   pulmonary embolus.    PARTIALLY IMAGED UPPER ABDOMEN: Cholelithiasis    BONES AND SOFT TISSUES: No significant change    IMPRESSION:  No definite filling defects in the main pulmonary artery or segmental   branches to suggest pulmonary embolus.    Additional findings are unchanged in this short interval follow-up CT.        --- End of Report ---            EVER CUNHA MD; Attending Radiologist  This document has been electronically signed. Nov 6 2024 11:39AM (11-06-24 @ 10:52)    RECENT DIAGNOSTIC ORDERS:  12 Lead ECG (11-13-24 @ 09:10)  Respiratory Viral Panel with COVID-19 by TRE: Routine (11-13-24 @ 09:07)      MEDICATIONS:  MEDICATIONS  (STANDING):  calcium carbonate   1250 mG (OsCal) 1 Tablet(s) Oral two times a day  chlorhexidine 2% Cloths 1 Application(s) Topical <User Schedule>  cholecalciferol 1000 Unit(s) Oral daily  heparin   Injectable 5000 Unit(s) SubCutaneous every 8 hours  lactated ringers. 1000 milliLiter(s) (75 mL/Hr) IV Continuous <Continuous>  levoFLOXacin  Tablet 500 milliGRAM(s) Oral every 24 hours  magnesium oxide 400 milliGRAM(s) Oral two times a day with meals  valproic  acid Syrup 500 milliGRAM(s) Oral <User Schedule>  valproic  acid Syrup 1000 milliGRAM(s) Oral <User Schedule>    MEDICATIONS  (PRN):  acetaminophen     Tablet .. 650 milliGRAM(s) Oral every 6 hours PRN Mild Pain (1 - 3)  polyethylene glycol 3350 17 Gram(s) Oral two times a day PRN Constipation  senna 2 Tablet(s) Oral at bedtime PRN Constipation      HOME MEDICATIONS:  Austedo XR 24 mg oral tablet, extended release (10-31)  cholecalciferol 25 mcg (1000 intl units) oral capsule (10-31)  FiberCon 625 mg oral tablet (10-31)  magnesium gluconate 250 mg oral tablet (10-31)  Oysco 500 (1250 mg calcium carbonate) oral tablet (10-31)  Tylenol 325 mg oral capsule (10-31)  valproic acid 250 mg/5 mL oral liquid (10-31)      PHYSICAL EXAM:  On exam  General: awake, alert, NAD, chronic ill appearance  Lungs:  clear to ausculations b/l, normal resp effort  Heart: regular ryhthm   Abdomen: soft, non tender non distended  Ext: no edema, can move all  his extremities

## 2024-11-13 NOTE — DISCHARGE NOTE PROVIDER - INSTRUCTIONS
lactose restricted  lactose restricted , Continue puree and mod thick liquid diet  oral hygiene; position upright (90 degrees); small sips/bites  dependent  1:1 feed

## 2024-11-14 LAB
ALBUMIN SERPL ELPH-MCNC: 2.8 G/DL — LOW (ref 3.5–5.2)
ALP SERPL-CCNC: 76 U/L — SIGNIFICANT CHANGE UP (ref 30–115)
ALT FLD-CCNC: 10 U/L — SIGNIFICANT CHANGE UP (ref 0–41)
ANION GAP SERPL CALC-SCNC: 11 MMOL/L — SIGNIFICANT CHANGE UP (ref 7–14)
AST SERPL-CCNC: 18 U/L — SIGNIFICANT CHANGE UP (ref 0–41)
BASOPHILS # BLD AUTO: 0.05 K/UL — SIGNIFICANT CHANGE UP (ref 0–0.2)
BASOPHILS NFR BLD AUTO: 0.8 % — SIGNIFICANT CHANGE UP (ref 0–1)
BILIRUB SERPL-MCNC: <0.2 MG/DL — SIGNIFICANT CHANGE UP (ref 0.2–1.2)
BUN SERPL-MCNC: 10 MG/DL — SIGNIFICANT CHANGE UP (ref 10–20)
CALCIUM SERPL-MCNC: 8.4 MG/DL — SIGNIFICANT CHANGE UP (ref 8.4–10.4)
CHLORIDE SERPL-SCNC: 104 MMOL/L — SIGNIFICANT CHANGE UP (ref 98–110)
CO2 SERPL-SCNC: 24 MMOL/L — SIGNIFICANT CHANGE UP (ref 17–32)
CREAT SERPL-MCNC: 0.5 MG/DL — LOW (ref 0.7–1.5)
EGFR: 92 ML/MIN/1.73M2 — SIGNIFICANT CHANGE UP
EOSINOPHIL # BLD AUTO: 0.09 K/UL — SIGNIFICANT CHANGE UP (ref 0–0.7)
EOSINOPHIL NFR BLD AUTO: 1.4 % — SIGNIFICANT CHANGE UP (ref 0–8)
GLUCOSE SERPL-MCNC: 92 MG/DL — SIGNIFICANT CHANGE UP (ref 70–99)
HCT VFR BLD CALC: 27.9 % — LOW (ref 37–47)
HGB BLD-MCNC: 8.8 G/DL — LOW (ref 12–16)
IMM GRANULOCYTES NFR BLD AUTO: 2.3 % — HIGH (ref 0.1–0.3)
LYMPHOCYTES # BLD AUTO: 2.29 K/UL — SIGNIFICANT CHANGE UP (ref 1.2–3.4)
LYMPHOCYTES # BLD AUTO: 36.8 % — SIGNIFICANT CHANGE UP (ref 20.5–51.1)
MAGNESIUM SERPL-MCNC: 2.1 MG/DL — SIGNIFICANT CHANGE UP (ref 1.8–2.4)
MCHC RBC-ENTMCNC: 31.5 G/DL — LOW (ref 32–37)
MCHC RBC-ENTMCNC: 31.8 PG — HIGH (ref 27–31)
MCV RBC AUTO: 100.7 FL — HIGH (ref 81–99)
MONOCYTES # BLD AUTO: 0.49 K/UL — SIGNIFICANT CHANGE UP (ref 0.1–0.6)
MONOCYTES NFR BLD AUTO: 7.9 % — SIGNIFICANT CHANGE UP (ref 1.7–9.3)
NEUTROPHILS # BLD AUTO: 3.16 K/UL — SIGNIFICANT CHANGE UP (ref 1.4–6.5)
NEUTROPHILS NFR BLD AUTO: 50.8 % — SIGNIFICANT CHANGE UP (ref 42.2–75.2)
NRBC # BLD: 0 /100 WBCS — SIGNIFICANT CHANGE UP (ref 0–0)
PHOSPHATE SERPL-MCNC: 3.3 MG/DL — SIGNIFICANT CHANGE UP (ref 2.1–4.9)
PLATELET # BLD AUTO: 198 K/UL — SIGNIFICANT CHANGE UP (ref 130–400)
PMV BLD: 9.7 FL — SIGNIFICANT CHANGE UP (ref 7.4–10.4)
POTASSIUM SERPL-MCNC: 4.5 MMOL/L — SIGNIFICANT CHANGE UP (ref 3.5–5)
POTASSIUM SERPL-SCNC: 4.5 MMOL/L — SIGNIFICANT CHANGE UP (ref 3.5–5)
PROT SERPL-MCNC: 5 G/DL — LOW (ref 6–8)
RAPID RVP RESULT: SIGNIFICANT CHANGE UP
RBC # BLD: 2.77 M/UL — LOW (ref 4.2–5.4)
RBC # FLD: 13.2 % — SIGNIFICANT CHANGE UP (ref 11.5–14.5)
SARS-COV-2 RNA SPEC QL NAA+PROBE: SIGNIFICANT CHANGE UP
SODIUM SERPL-SCNC: 139 MMOL/L — SIGNIFICANT CHANGE UP (ref 135–146)
WBC # BLD: 6.22 K/UL — SIGNIFICANT CHANGE UP (ref 4.8–10.8)
WBC # FLD AUTO: 6.22 K/UL — SIGNIFICANT CHANGE UP (ref 4.8–10.8)

## 2024-11-14 PROCEDURE — 93970 EXTREMITY STUDY: CPT | Mod: 26

## 2024-11-14 PROCEDURE — 99232 SBSQ HOSP IP/OBS MODERATE 35: CPT

## 2024-11-14 PROCEDURE — 93971 EXTREMITY STUDY: CPT | Mod: 26,LT

## 2024-11-14 RX ADMIN — Medication 400 MILLIGRAM(S): at 10:14

## 2024-11-14 RX ADMIN — Medication 1 TABLET(S): at 05:32

## 2024-11-14 RX ADMIN — Medication 5000 UNIT(S): at 20:24

## 2024-11-14 RX ADMIN — Medication 400 MILLIGRAM(S): at 18:04

## 2024-11-14 RX ADMIN — CHLORHEXIDINE GLUCONATE 1 APPLICATION(S): 1.2 RINSE ORAL at 05:33

## 2024-11-14 RX ADMIN — Medication 5000 UNIT(S): at 13:21

## 2024-11-14 RX ADMIN — VALPROIC ACID 500 MILLIGRAM(S): 250 SOLUTION ORAL at 18:04

## 2024-11-14 RX ADMIN — Medication 1 TABLET(S): at 18:07

## 2024-11-14 RX ADMIN — Medication 1000 UNIT(S): at 11:54

## 2024-11-14 RX ADMIN — VALPROIC ACID 1000 MILLIGRAM(S): 250 SOLUTION ORAL at 20:24

## 2024-11-14 RX ADMIN — Medication 5000 UNIT(S): at 05:32

## 2024-11-14 NOTE — PROGRESS NOTE ADULT - ASSESSMENT
84yoF hx intellectual disability, bipolar disorder, Breast mass (unknown side s/p lumpectomy and RT '07), asthma, hoover's, esophageal varices, s/p R cataract surgery, R hip and L humeral fx, hld here following fall.     #fever 11/11 2x  patient asx but unreliable    no  wbc and  lfts w/in normal and lactate negative   cxr increa left side infiltrate s/o vomiting over weekend   Ua not impressive , f/u cx  RVP neg  levaquin started 11/11 end as per id 11/15  f/u blood cx   wound also checked       # Acute L femoral neck fracture-s/p L hip hemiarthroplasty 11/4 received cefazolin  3 doses post op as per ortho   # Anemia- no blood transfusion iron held   # Unwitnessed fall, r/o syncope  unclear story, sounds likely syncopal episode possibly orthostasis induced   wbc improved  could be stress/pain related and dehydration   CT head/neck negative for acute events   TTE EF of 66 %. Grade I diastolic dysfunction. Mild thickening of the anterior and posterior mitral valve leaflets.   Moderate mitral annular calcification. . Mild aortic regurgitation.. Mild pulmonic valve regurgitation.    # SIRS POA ( wbc and HR)  Possible UTI   UA strong  positive , unreliable hx  blood cultures NGTD , urine cultures was not collected , s/p 3days of ceftriaxone, fever resolved    #Hx breast mass s/p lumpectomy and radiation  -outpt f/u and w/u    #Incidental cholelithiasis  -asymptomatic at this time, no LFT elevation  -consider CCY as outpt    #Intellectual Disability  #Bipolar disorder  -c/w [facility],  SLP eval   valproic level is 54  bed bound     #Asthma, not in acute exacerbation  #Esophageal varices  #Hiatal hernia  monitor , resume home meds     #Constipation   CTAP with Moderate to large rectal stool burden. No bowel obstruction.  bowel regimen and monitor   moving her bowel as per  staff ( they record all events)     #DVT ppx -HSQ / scd   -[facility] duplx negative for dvt ppx     #GI ppx: none     Family -Ms. Koko Doshi [phone]  acting as surrogate for several years    Palliative team consult - FULL CODE . - REFER TO Palliative note for details , can not be dnr/dni with out approval by [facility] given her developmental disability

## 2024-11-14 NOTE — PROGRESS NOTE ADULT - SUBJECTIVE AND OBJECTIVE BOX
T H I S   I S    N O  T   A    F I N A L I Z E D   N O T BABAR HIGGINS  84y, Female  Allergy: No Known Allergies  lactose (Stomach Upset)    Hospital Day: 14d    Patient seen and examined earlier today.     PMH/PSH:  PAST MEDICAL & SURGICAL HISTORY:  Atypical bipolar disorder      Mild mental retardation      Brito esophagus      Hyperlipemia      Breast mass, right  s/p lumpectomy & radiation 2007      Feeding by G-tube  s/p removal 2012      Femoral neck fracture          LAST 24-Hr EVENTS:    VITALS:  T(F): 97.7 (11-14-24 @ 07:59), Max: 98.9 (11-14-24 @ 00:00)  HR: 76 (11-14-24 @ 07:59)  BP: 100/62 (11-14-24 @ 07:59) (100/62 - 109/70)  RR: 18 (11-14-24 @ 07:59)  SpO2: 96% (11-14-24 @ 07:59)          TESTS & MEASUREMENTS:  Weight/BMI      11-12-24 @ 07:01  -  11-13-24 @ 07:00  --------------------------------------------------------  IN: 0 mL / OUT: 602 mL / NET: -602 mL    11-13-24 @ 07:01  -  11-14-24 @ 07:00  --------------------------------------------------------  IN: 0 mL / OUT: 400 mL / NET: -400 mL                            8.8    6.22  )-----------( 198      ( 14 Nov 2024 06:56 )             27.9       INR: 1.13 ratio (11-11-24 @ 11:49)    11-14    139  |  104  |  10  ----------------------------<  92  4.5   |  24  |  0.5[L]    Ca    8.4      14 Nov 2024 06:56  Phos  3.3     11-14  Mg     2.1     11-14    TPro  5.0[L]  /  Alb  2.8[L]  /  TBili  <0.2  /  DBili  x   /  AST  18  /  ALT  10  /  AlkPhos  76  11-14    LIVER FUNCTIONS - ( 14 Nov 2024 06:56 )  Alb: 2.8 g/dL / Pro: 5.0 g/dL / ALK PHOS: 76 U/L / ALT: 10 U/L / AST: 18 U/L / GGT: x                 Culture - Urine (collected 11-12-24 @ 02:30)  Source: Catheterized Catheterized  Final Report (11-13-24 @ 10:18):    <10,000 CFU/mL Normal Urogenital Christen    Culture - Blood (collected 11-11-24 @ 11:49)  Source: .Blood BLOOD  Preliminary Report (11-13-24 @ 23:08):    No growth at 48 Hours      Urinalysis Basic - ( 14 Nov 2024 06:56 )    Color: x / Appearance: x / SG: x / pH: x  Gluc: 92 mg/dL / Ketone: x  / Bili: x / Urobili: x   Blood: x / Protein: x / Nitrite: x   Leuk Esterase: x / RBC: x / WBC x   Sq Epi: x / Non Sq Epi: x / Bacteria: x                            RADIOLOGY, ECG, & ADDITIONAL TESTS:  12 Lead ECG:   Ventricular Rate 84 BPM    Atrial Rate 84 BPM    P-R Interval 136 ms    QRS Duration 122 ms    Q-T Interval 378 ms    QTC Calculation(Bazett) 446 ms    P Axis 64 degrees    R Axis -52 degrees    T Axis 49 degrees    Diagnosis Line Sinus rhythm  Right bundle branch block  Left axis deviation  Voltage criteria for left ventricular hypertrophy  Cannot rule out Septal infarct , age undetermined  Abnormal ECG    Confirmed by SAIMA FU, CHELLE ([id number]) on 11/13/2024 11:13:55 PM (11-13-24 @ 10:39)    CT Angio Chest PE Protocol w/ IV Cont:   ACC: [id number] EXAM:  CT ANGIO CHEST PULM ART [facility]   ORDERED BY: MARAH PERRY     PROCEDURE DATE:  11/06/2024          INTERPRETATION:  CLINICAL INDICATION: r/o PE    TECHNIQUE:  CTA of the thorax was performed after administration of intravenous  contrast. Sagittal and coronal reformats were performed as well as MIP   reconstructions.    COMPARISON CT: 10/31/2024    INTERPRETATION:    AIRWAYS, LUNGS AND PLEURA: Mildly increased bibasilar dependent   atelectasis. Otherwise, without significant change    MEDIASTINUM: No significant change    HEART AND VESSELS: No significant change There are no definite filling   defects in the main pulmonary artery or segmental branches to suggest   pulmonary embolus.    PARTIALLY IMAGED UPPER ABDOMEN: Cholelithiasis    BONES AND SOFT TISSUES: No significant change    IMPRESSION:  No definite filling defects in the main pulmonary artery or segmental   branches to suggest pulmonary embolus.    Additional findings are unchanged in this short interval follow-up CT.        --- End of Report ---            EVER CUNHA MD; Attending Radiologist  This document has been electronically signed. Nov 6 2024 11:39AM (11-06-24 @ 10:52)    RECENT DIAGNOSTIC ORDERS:  [facility] Duplex Lower Ext Vein Scan, Bilat: Routine   Indication: r/o dvt  Transport: Stretcher-Crib (11-14-24 @ 08:55)      MEDICATIONS:  MEDICATIONS  (STANDING):  calcium carbonate   1250 mG (OsCal) 1 Tablet(s) Oral two times a day  chlorhexidine 2% Cloths 1 Application(s) Topical <User Schedule>  cholecalciferol 1000 Unit(s) Oral daily  heparin   Injectable 5000 Unit(s) SubCutaneous every 8 hours  lactated ringers. 1000 milliLiter(s) (75 mL/Hr) IV Continuous <Continuous>  levoFLOXacin  Tablet 500 milliGRAM(s) Oral every 24 hours  magnesium oxide 400 milliGRAM(s) Oral two times a day with meals  valproic  acid Syrup 1000 milliGRAM(s) Oral <User Schedule>  valproic  acid Syrup 500 milliGRAM(s) Oral <User Schedule>    MEDICATIONS  (PRN):  acetaminophen     Tablet .. 650 milliGRAM(s) Oral every 6 hours PRN Mild Pain (1 - 3)  polyethylene glycol 3350 17 Gram(s) Oral two times a day PRN Constipation  senna 2 Tablet(s) Oral at bedtime PRN Constipation      HOME MEDICATIONS:  Austedo XR 24 mg oral tablet, extended release (10-31)  cholecalciferol 25 mcg (1000 intl units) oral capsule (10-31)  FiberCon 625 mg oral tablet (10-31)  magnesium gluconate 250 mg oral tablet (10-31)  Oysco 500 (1250 mg calcium carbonate) oral tablet (10-31)  Tylenol 325 mg oral capsule (10-31)  valproic acid 250 mg/5 mL oral liquid (10-31)      PHYSICAL EXAM:  GENERAL:   CHEST/LUNG:   HEART:   ABDOMEN:   EXTREMITIES:             MINA BABAR  84y, Female  Allergy: No Known Allergies  lactose (Stomach Upset)    Hospital Day: 14d    Patient seen and examined earlier today. she feels ok, staff at bedside     PMH/PSH:  PAST MEDICAL & SURGICAL HISTORY:  Atypical bipolar disorder      Mild mental retardation      Brito esophagus      Hyperlipemia      Breast mass, right  s/p lumpectomy & radiation 2007      Feeding by G-tube  s/p removal 2012      Femoral neck fracture          LAST 24-Hr EVENTS:    VITALS:  T(F): 97.7 (11-14-24 @ 07:59), Max: 98.9 (11-14-24 @ 00:00)  HR: 76 (11-14-24 @ 07:59)  BP: 100/62 (11-14-24 @ 07:59) (100/62 - 109/70)  RR: 18 (11-14-24 @ 07:59)  SpO2: 96% (11-14-24 @ 07:59)          TESTS & MEASUREMENTS:  Weight/BMI      11-12-24 @ 07:01  -  11-13-24 @ 07:00  --------------------------------------------------------  IN: 0 mL / OUT: 602 mL / NET: -602 mL    11-13-24 @ 07:01  -  11-14-24 @ 07:00  --------------------------------------------------------  IN: 0 mL / OUT: 400 mL / NET: -400 mL                            8.8    6.22  )-----------( 198      ( 14 Nov 2024 06:56 )             27.9       INR: 1.13 ratio (11-11-24 @ 11:49)    11-14    139  |  104  |  10  ----------------------------<  92  4.5   |  24  |  0.5[L]    Ca    8.4      14 Nov 2024 06:56  Phos  3.3     11-14  Mg     2.1     11-14    TPro  5.0[L]  /  Alb  2.8[L]  /  TBili  <0.2  /  DBili  x   /  AST  18  /  ALT  10  /  AlkPhos  76  11-14    LIVER FUNCTIONS - ( 14 Nov 2024 06:56 )  Alb: 2.8 g/dL / Pro: 5.0 g/dL / ALK PHOS: 76 U/L / ALT: 10 U/L / AST: 18 U/L / GGT: x                 Culture - Urine (collected 11-12-24 @ 02:30)  Source: Catheterized Catheterized  Final Report (11-13-24 @ 10:18):    <10,000 CFU/mL Normal Urogenital Christen    Culture - Blood (collected 11-11-24 @ 11:49)  Source: .Blood BLOOD  Preliminary Report (11-13-24 @ 23:08):    No growth at 48 Hours      Urinalysis Basic - ( 14 Nov 2024 06:56 )    Color: x / Appearance: x / SG: x / pH: x  Gluc: 92 mg/dL / Ketone: x  / Bili: x / Urobili: x   Blood: x / Protein: x / Nitrite: x   Leuk Esterase: x / RBC: x / WBC x   Sq Epi: x / Non Sq Epi: x / Bacteria: x                            RADIOLOGY, ECG, & ADDITIONAL TESTS:  12 Lead ECG:   Ventricular Rate 84 BPM    Atrial Rate 84 BPM    P-R Interval 136 ms    QRS Duration 122 ms    Q-T Interval 378 ms    QTC Calculation(Bazett) 446 ms    P Axis 64 degrees    R Axis -52 degrees    T Axis 49 degrees    Diagnosis Line Sinus rhythm  Right bundle branch block  Left axis deviation  Voltage criteria for left ventricular hypertrophy  Cannot rule out Septal infarct , age undetermined  Abnormal ECG    Confirmed by SAIMA FU, [facility] ([id number]) on 11/13/2024 11:13:55 PM (11-13-24 @ 10:39)    CT Angio Chest PE Protocol w/ IV Cont:   ACC: [id number] EXAM:  CT ANGIO CHEST PULM ART [facility]   ORDERED BY: MARAH PERRY     PROCEDURE DATE:  11/06/2024          INTERPRETATION:  CLINICAL INDICATION: r/o PE    TECHNIQUE:  CTA of the thorax was performed after administration of intravenous  contrast. Sagittal and coronal reformats were performed as well as MIP   reconstructions.    COMPARISON CT: 10/31/2024    INTERPRETATION:    AIRWAYS, LUNGS AND PLEURA: Mildly increased bibasilar dependent   atelectasis. Otherwise, without significant change    MEDIASTINUM: No significant change    HEART AND VESSELS: No significant change There are no definite filling   defects in the main pulmonary artery or segmental branches to suggest   pulmonary embolus.    PARTIALLY IMAGED UPPER ABDOMEN: Cholelithiasis    BONES AND SOFT TISSUES: No significant change    IMPRESSION:  No definite filling defects in the main pulmonary artery or segmental   branches to suggest pulmonary embolus.    Additional findings are unchanged in this short interval follow-up CT.        --- End of Report ---            EVER CUNHA MD; Attending Radiologist  This document has been electronically signed. Nov 6 2024 11:39AM (11-06-24 @ 10:52)    RECENT DIAGNOSTIC ORDERS:  [facility] Duplex Lower Ext Vein Scan, Bilat: Routine   Indication: r/o dvt  Transport: Stretcher-Crib (11-14-24 @ 08:55)      MEDICATIONS:  MEDICATIONS  (STANDING):  calcium carbonate   1250 mG (OsCal) 1 Tablet(s) Oral two times a day  chlorhexidine 2% Cloths 1 Application(s) Topical <User Schedule>  cholecalciferol 1000 Unit(s) Oral daily  heparin   Injectable 5000 Unit(s) SubCutaneous every 8 hours  lactated ringers. 1000 milliLiter(s) (75 mL/Hr) IV Continuous <Continuous>  levoFLOXacin  Tablet 500 milliGRAM(s) Oral every 24 hours  magnesium oxide 400 milliGRAM(s) Oral two times a day with meals  valproic  acid Syrup 1000 milliGRAM(s) Oral <User Schedule>  valproic  acid Syrup 500 milliGRAM(s) Oral <User Schedule>    MEDICATIONS  (PRN):  acetaminophen     Tablet .. 650 milliGRAM(s) Oral every 6 hours PRN Mild Pain (1 - 3)  polyethylene glycol 3350 17 Gram(s) Oral two times a day PRN Constipation  senna 2 Tablet(s) Oral at bedtime PRN Constipation      HOME MEDICATIONS:  Austedo XR 24 mg oral tablet, extended release (10-31)  cholecalciferol 25 mcg (1000 intl units) oral capsule (10-31)  FiberCon 625 mg oral tablet (10-31)  magnesium gluconate 250 mg oral tablet (10-31)  Oysco 500 (1250 mg calcium carbonate) oral tablet (10-31)  Tylenol 325 mg oral capsule (10-31)  valproic acid 250 mg/5 mL oral liquid (10-31)      PHYSICAL EXAM:  On exam  General: awake, alert, NAD, chronic ill appearance  Lungs:  clear to ausculations b/l, normal resp effort  Heart: regular ryhthm   Abdomen: soft, non tender non distended  Ext: no edema, can move all  his extremities

## 2024-11-14 NOTE — PROGRESS NOTE ADULT - ASSESSMENT
84yoF hx intellectual disability, bipolar disorder, Breast mass (unknown side s/p lumpectomy and RT '07), asthma, hoover's, esophageal varices, s/p R cataract surgery, R hip and L humeral fx, hld here following fall.     # Acute L femoral neck fracture-s/p L hip hemiarthroplasty 11/4--received cefazolin  3 doses post op as per ortho   # Anemia-- no blood transfusion-- given 3 day iv iron treatment.  hb is 9.7  # Unwitnessed fall, r/o syncope  unclear story, sounds likely syncopal episode possibly orthostasis induced   wbc improved  could be stress/pain related and dehydration   CT head/neck negative for acute   TTE EF of 66 %. Grade I diastolic dysfunction.. Mild thickening of the anterior and posterior mitral valve leaflets.   Moderate mitral annular calcification. . Mild aortic regurgitation.. Mild pulmonic valve regurgitation.    # SIRS POA ( wbc and HR)  Possible UTI / SIRS ON admission   UA strong  positive , unreliable hx    blood cultures NGTD , initial urine cultures was not collected , s/p 3days of ceftriaxone  then urine culture is negative   - was started on Levaquin for possible aspiration    CXR is negative  Check RVP and ID Consult appreciated 5 days of levofloxacin   repeat LE duplex negative   left upper ext possible phlebitis forearm with mild area of erythema , non tender - get LUE duplex     #Hx breast mass s/p lumpectomy and radiation  -outpt f/u and w/u    #Incidental cholelithiasis  -asymptomatic at this time, no LFT elevation  -consider CCY as outpt    #Intellectual Disability  #Bipolar disorder  -c/w [facility],  SLP eval appreciated   valproic level is 54  PT/OT after surgery    #Asthma, not in acute exacerbation  #Esophageal varices  #Hiatal hernia  monitor , resume home meds     #Constipation   CTAP with Moderate to large rectal stool burden. No bowel obstruction.  bowel regimen and monitor   moving her bowel as per  staff ( they record all events)     #DVT ppx -HSQ / scd   -[facility] duplx negative for dvt ppx     #GI ppx    Pending : monitor for fever left Upper  duplex,  group home meeting tomorrow.  D/C PLAN     Family -Ms. Koko Doshi [phone]  acting as surrogate for several years    Palliative team consult appreciated - FULL CODE . - REFER TO Palliative note for details , can not be dnr/dni with out approval by [facility]LS given her developmental disability     from group home.

## 2024-11-14 NOTE — PROGRESS NOTE ADULT - SUBJECTIVE AND OBJECTIVE BOX
24H events:    Today is hospital day 14d. This morning patient was seen and examined at bedside, resting comfortably in bed.    No acute or major events overnight.    PAST MEDICAL & SURGICAL HISTORY  Atypical bipolar disorder    Mild mental retardation    Brito esophagus    Hyperlipemia    Breast mass, right  s/p lumpectomy & radiation 2007    Feeding by G-tube  s/p removal 2012    Femoral neck fracture        SOCIAL HISTORY:  Social History:      ALLERGIES:  No Known Allergies      MEDICATIONS:  STANDING MEDICATIONS  calcium carbonate   1250 mG (OsCal) 1 Tablet(s) Oral two times a day  chlorhexidine 2% Cloths 1 Application(s) Topical <User Schedule>  cholecalciferol 1000 Unit(s) Oral daily  heparin   Injectable 5000 Unit(s) SubCutaneous every 8 hours  lactated ringers. 1000 milliLiter(s) IV Continuous <Continuous>  levoFLOXacin  Tablet 500 milliGRAM(s) Oral every 24 hours  magnesium oxide 400 milliGRAM(s) Oral two times a day with meals  valproic  acid Syrup 500 milliGRAM(s) Oral <User Schedule>  valproic  acid Syrup 1000 milliGRAM(s) Oral <User Schedule>    PRN MEDICATIONS  acetaminophen     Tablet .. 650 milliGRAM(s) Oral every 6 hours PRN  polyethylene glycol 3350 17 Gram(s) Oral two times a day PRN  senna 2 Tablet(s) Oral at bedtime PRN      VITALS:   T(C): 36.5 (11-14-24 @ 07:59), Max: 37.2 (11-14-24 @ 00:00)  HR: 76 (11-14-24 @ 07:59) (76 - 91)  BP: 100/62 (11-14-24 @ 07:59) (100/62 - 109/70)  RR: 18 (11-14-24 @ 07:59) (18 - 20)  SpO2: 96% (11-14-24 @ 07:59) (95% - 96%)  I&O's Summary    13 Nov 2024 07:01  -  14 Nov 2024 07:00  --------------------------------------------------------  IN: 0 mL / OUT: 400 mL / NET: -400 mL          PHYSICAL EXAM:      GEN: No acute distress  LUNGS: Clear to auscultation bilaterally   HEART: S1/S2 present. RRR.   ABD/ GI: Soft, non-tender, non-distended. Bowel sounds present  EXT: no edema   NEURO: AAOX2    LABS:                        8.8    6.22  )-----------( 198      ( 14 Nov 2024 06:56 )             27.9     11-14    139  |  104  |  10  ----------------------------<  92  4.5   |  24  |  0.5[L]    Ca    8.4      14 Nov 2024 06:56  Phos  3.3     11-14  Mg     2.1     11-14    TPro  5.0[L]  /  Alb  2.8[L]  /  TBili  <0.2  /  DBili  x   /  AST  18  /  ALT  10  /  AlkPhos  76  11-14      Urinalysis Basic - ( 14 Nov 2024 06:56 )    Color: x / Appearance: x / SG: x / pH: x  Gluc: 92 mg/dL / Ketone: x  / Bili: x / Urobili: x   Blood: x / Protein: x / Nitrite: x   Leuk Esterase: x / RBC: x / WBC x   Sq Epi: x / Non Sq Epi: x / Bacteria: x            Culture - Urine (collected 12 Nov 2024 02:30)  Source: Catheterized Catheterized  Final Report (13 Nov 2024 10:18):    <10,000 CFU/mL Normal Urogenital Christen

## 2024-11-15 LAB
ALBUMIN SERPL ELPH-MCNC: 2.9 G/DL — LOW (ref 3.5–5.2)
ALP SERPL-CCNC: 72 U/L — SIGNIFICANT CHANGE UP (ref 30–115)
ALT FLD-CCNC: 13 U/L — SIGNIFICANT CHANGE UP (ref 0–41)
ANION GAP SERPL CALC-SCNC: 9 MMOL/L — SIGNIFICANT CHANGE UP (ref 7–14)
AST SERPL-CCNC: 19 U/L — SIGNIFICANT CHANGE UP (ref 0–41)
BASOPHILS # BLD AUTO: 0.04 K/UL — SIGNIFICANT CHANGE UP (ref 0–0.2)
BASOPHILS NFR BLD AUTO: 0.6 % — SIGNIFICANT CHANGE UP (ref 0–1)
BILIRUB SERPL-MCNC: 0.2 MG/DL — SIGNIFICANT CHANGE UP (ref 0.2–1.2)
BUN SERPL-MCNC: 11 MG/DL — SIGNIFICANT CHANGE UP (ref 10–20)
CALCIUM SERPL-MCNC: 8.4 MG/DL — SIGNIFICANT CHANGE UP (ref 8.4–10.5)
CHLORIDE SERPL-SCNC: 105 MMOL/L — SIGNIFICANT CHANGE UP (ref 98–110)
CO2 SERPL-SCNC: 29 MMOL/L — SIGNIFICANT CHANGE UP (ref 17–32)
CREAT SERPL-MCNC: <0.5 MG/DL — LOW (ref 0.7–1.5)
EGFR: 98 ML/MIN/1.73M2 — SIGNIFICANT CHANGE UP
EOSINOPHIL # BLD AUTO: 0.1 K/UL — SIGNIFICANT CHANGE UP (ref 0–0.7)
EOSINOPHIL NFR BLD AUTO: 1.5 % — SIGNIFICANT CHANGE UP (ref 0–8)
GLUCOSE SERPL-MCNC: 95 MG/DL — SIGNIFICANT CHANGE UP (ref 70–99)
HCT VFR BLD CALC: 27.7 % — LOW (ref 37–47)
HGB BLD-MCNC: 8.9 G/DL — LOW (ref 12–16)
IMM GRANULOCYTES NFR BLD AUTO: 5.2 % — HIGH (ref 0.1–0.3)
LYMPHOCYTES # BLD AUTO: 2.54 K/UL — SIGNIFICANT CHANGE UP (ref 1.2–3.4)
LYMPHOCYTES # BLD AUTO: 37.5 % — SIGNIFICANT CHANGE UP (ref 20.5–51.1)
MAGNESIUM SERPL-MCNC: 2 MG/DL — SIGNIFICANT CHANGE UP (ref 1.8–2.4)
MCHC RBC-ENTMCNC: 32.1 G/DL — SIGNIFICANT CHANGE UP (ref 32–37)
MCHC RBC-ENTMCNC: 32.4 PG — HIGH (ref 27–31)
MCV RBC AUTO: 100.7 FL — HIGH (ref 81–99)
MONOCYTES # BLD AUTO: 0.56 K/UL — SIGNIFICANT CHANGE UP (ref 0.1–0.6)
MONOCYTES NFR BLD AUTO: 8.3 % — SIGNIFICANT CHANGE UP (ref 1.7–9.3)
NEUTROPHILS # BLD AUTO: 3.18 K/UL — SIGNIFICANT CHANGE UP (ref 1.4–6.5)
NEUTROPHILS NFR BLD AUTO: 46.9 % — SIGNIFICANT CHANGE UP (ref 42.2–75.2)
NRBC # BLD: 0 /100 WBCS — SIGNIFICANT CHANGE UP (ref 0–0)
PHOSPHATE SERPL-MCNC: 3.2 MG/DL — SIGNIFICANT CHANGE UP (ref 2.1–4.9)
PLATELET # BLD AUTO: 255 K/UL — SIGNIFICANT CHANGE UP (ref 130–400)
PMV BLD: 9.7 FL — SIGNIFICANT CHANGE UP (ref 7.4–10.4)
POTASSIUM SERPL-MCNC: 4 MMOL/L — SIGNIFICANT CHANGE UP (ref 3.5–5)
POTASSIUM SERPL-SCNC: 4 MMOL/L — SIGNIFICANT CHANGE UP (ref 3.5–5)
PROT SERPL-MCNC: 5 G/DL — LOW (ref 6–8)
RBC # BLD: 2.75 M/UL — LOW (ref 4.2–5.4)
RBC # FLD: 13.4 % — SIGNIFICANT CHANGE UP (ref 11.5–14.5)
SODIUM SERPL-SCNC: 143 MMOL/L — SIGNIFICANT CHANGE UP (ref 135–146)
WBC # BLD: 6.77 K/UL — SIGNIFICANT CHANGE UP (ref 4.8–10.8)
WBC # FLD AUTO: 6.77 K/UL — SIGNIFICANT CHANGE UP (ref 4.8–10.8)

## 2024-11-15 PROCEDURE — 99232 SBSQ HOSP IP/OBS MODERATE 35: CPT

## 2024-11-15 RX ORDER — HEPARIN SODIUM,PORCINE 1000/ML
0 VIAL (ML) INJECTION
Qty: 0 | Refills: 0 | DISCHARGE
Start: 2024-11-15

## 2024-11-15 RX ADMIN — Medication 1000 UNIT(S): at 12:20

## 2024-11-15 RX ADMIN — Medication 400 MILLIGRAM(S): at 17:36

## 2024-11-15 RX ADMIN — VALPROIC ACID 1000 MILLIGRAM(S): 250 SOLUTION ORAL at 21:47

## 2024-11-15 RX ADMIN — CHLORHEXIDINE GLUCONATE 1 APPLICATION(S): 1.2 RINSE ORAL at 05:20

## 2024-11-15 RX ADMIN — Medication 5000 UNIT(S): at 21:47

## 2024-11-15 RX ADMIN — Medication 1 TABLET(S): at 05:20

## 2024-11-15 RX ADMIN — Medication 1 TABLET(S): at 17:36

## 2024-11-15 RX ADMIN — Medication 5000 UNIT(S): at 13:20

## 2024-11-15 RX ADMIN — Medication 5000 UNIT(S): at 05:20

## 2024-11-15 RX ADMIN — VALPROIC ACID 500 MILLIGRAM(S): 250 SOLUTION ORAL at 17:31

## 2024-11-15 RX ADMIN — Medication 400 MILLIGRAM(S): at 08:34

## 2024-11-15 NOTE — CHART NOTE - NSCHARTNOTEFT_GEN_A_CORE
Orthopedic Surgery Update Note    Patient planned for discharge today. Orthopedics called for wound care instructions while at facility.  Wound care instructions: dressings appear c/d/i, current dressing may stay in place until clinic follow up. If saturated, can replace with dry dressing gauze and tegaderm.  Suture/staple management also to be done outpatient.

## 2024-11-15 NOTE — PROGRESS NOTE ADULT - SUBJECTIVE AND OBJECTIVE BOX
T H I S   I S    N O  T   A    F I N A L I Z E D   N O T BABAR HIGGINS  84y, Female  Allergy: No Known Allergies  lactose (Stomach Upset)    Hospital Day: 15d    Patient seen and examined earlier today.     PMH/PSH:  PAST MEDICAL & SURGICAL HISTORY:  Atypical bipolar disorder      Mild mental retardation      Brito esophagus      Hyperlipemia      Breast mass, right  s/p lumpectomy & radiation 2007      Feeding by G-tube  s/p removal 2012      Femoral neck fracture          LAST 24-Hr EVENTS:    VITALS:  T(F): 98.2 (11-15-24 @ 07:44), Max: 98.6 (11-14-24 @ 23:11)  HR: 78 (11-15-24 @ 07:44)  BP: 100/64 (11-15-24 @ 07:44) (100/64 - 115/68)  RR: 18 (11-15-24 @ 07:44)  SpO2: 94% (11-15-24 @ 07:44)          TESTS & MEASUREMENTS:  Weight/BMI      11-13-24 @ 07:01  -  11-14-24 @ 07:00  --------------------------------------------------------  IN: 0 mL / OUT: 400 mL / NET: -400 mL    11-14-24 @ 07:01  -  11-15-24 @ 07:00  --------------------------------------------------------  IN: 0 mL / OUT: 500 mL / NET: -500 mL                            8.9    6.77  )-----------( 255      ( 15 Nov 2024 06:33 )             27.7       INR: 1.13 ratio (11-11-24 @ 11:49)    11-15    143  |  105  |  11  ----------------------------<  95  4.0   |  29  |  <0.5[L]    Ca    8.4      15 Nov 2024 06:33  Phos  3.2     11-15  Mg     2.0     11-15    TPro  5.0[L]  /  Alb  2.9[L]  /  TBili  0.2  /  DBili  x   /  AST  19  /  ALT  13  /  AlkPhos  72  11-15    LIVER FUNCTIONS - ( 15 Nov 2024 06:33 )  Alb: 2.9 g/dL / Pro: 5.0 g/dL / ALK PHOS: 72 U/L / ALT: 13 U/L / AST: 19 U/L / GGT: x                 Culture - Urine (collected 11-12-24 @ 02:30)  Source: Catheterized Catheterized  Final Report (11-13-24 @ 10:18):    <10,000 CFU/mL Normal Urogenital Christen    Culture - Blood (collected 11-11-24 @ 11:49)  Source: .Blood BLOOD  Preliminary Report (11-14-24 @ 23:00):    No growth at 72 Hours      Urinalysis Basic - ( 15 Nov 2024 06:33 )    Color: x / Appearance: x / SG: x / pH: x  Gluc: 95 mg/dL / Ketone: x  / Bili: x / Urobili: x   Blood: x / Protein: x / Nitrite: x   Leuk Esterase: x / RBC: x / WBC x   Sq Epi: x / Non Sq Epi: x / Bacteria: x                            RADIOLOGY, ECG, & ADDITIONAL TESTS:  12 Lead ECG:   Ventricular Rate 84 BPM    Atrial Rate 84 BPM    P-R Interval 136 ms    QRS Duration 122 ms    Q-T Interval 378 ms    QTC Calculation(Bazett) 446 ms    P Axis 64 degrees    R Axis -52 degrees    T Axis 49 degrees    Diagnosis Line Sinus rhythm  Right bundle branch block  Left axis deviation  Voltage criteria for left ventricular hypertrophy  Cannot rule out Septal infarct , age undetermined  Abnormal ECG    Confirmed by SAIMA FU, CHELLE ([id number]) on 11/13/2024 11:13:55 PM (11-13-24 @ 10:39)    CT Angio Chest PE Protocol w/ IV Cont:   ACC: [id number] EXAM:  CT ANGIO CHEST PULM ART [facility]   ORDERED BY: MARAH PERRY     PROCEDURE DATE:  11/06/2024          INTERPRETATION:  CLINICAL INDICATION: r/o PE    TECHNIQUE:  CTA of the thorax was performed after administration of intravenous  contrast. Sagittal and coronal reformats were performed as well as MIP   reconstructions.    COMPARISON CT: 10/31/2024    INTERPRETATION:    AIRWAYS, LUNGS AND PLEURA: Mildly increased bibasilar dependent   atelectasis. Otherwise, without significant change    MEDIASTINUM: No significant change    HEART AND VESSELS: No significant change There are no definite filling   defects in the main pulmonary artery or segmental branches to suggest   pulmonary embolus.    PARTIALLY IMAGED UPPER ABDOMEN: Cholelithiasis    BONES AND SOFT TISSUES: No significant change    IMPRESSION:  No definite filling defects in the main pulmonary artery or segmental   branches to suggest pulmonary embolus.    Additional findings are unchanged in this short interval follow-up CT.        --- End of Report ---            EVER CUNHA MD; Attending Radiologist  This document has been electronically signed. Nov 6 2024 11:39AM (11-06-24 @ 10:52)    RECENT DIAGNOSTIC ORDERS:      MEDICATIONS:  MEDICATIONS  (STANDING):  calcium carbonate   1250 mG (OsCal) 1 Tablet(s) Oral two times a day  chlorhexidine 2% Cloths 1 Application(s) Topical <User Schedule>  cholecalciferol 1000 Unit(s) Oral daily  heparin   Injectable 5000 Unit(s) SubCutaneous every 8 hours  lactated ringers. 1000 milliLiter(s) (75 mL/Hr) IV Continuous <Continuous>  magnesium oxide 400 milliGRAM(s) Oral two times a day with meals  valproic  acid Syrup 500 milliGRAM(s) Oral <User Schedule>  valproic  acid Syrup 1000 milliGRAM(s) Oral <User Schedule>    MEDICATIONS  (PRN):  acetaminophen     Tablet .. 650 milliGRAM(s) Oral every 6 hours PRN Mild Pain (1 - 3)  polyethylene glycol 3350 17 Gram(s) Oral two times a day PRN Constipation  senna 2 Tablet(s) Oral at bedtime PRN Constipation      HOME MEDICATIONS:  Austedo XR 24 mg oral tablet, extended release (10-31)  cholecalciferol 25 mcg (1000 intl units) oral capsule (10-31)  FiberCon 625 mg oral tablet (10-31)  heparin (11-15)  magnesium gluconate 250 mg oral tablet (10-31)  Oysco 500 (1250 mg calcium carbonate) oral tablet (10-31)  Tylenol 325 mg oral capsule (10-31)  valproic acid 250 mg/5 mL oral liquid (10-31)      PHYSICAL EXAM:  GENERAL:   CHEST/LUNG:   HEART:   ABDOMEN:   EXTREMITIES:               MINA BABAR  84y, Female  Allergy: No Known Allergies  lactose (Stomach Upset)    Hospital Day: 15d    Patient seen and examined earlier today. doing ok, [facility] staff at bedside     PMH/PSH:  PAST MEDICAL & SURGICAL HISTORY:  Atypical bipolar disorder      Mild mental retardation      Brito esophagus      Hyperlipemia      Breast mass, right  s/p lumpectomy & radiation 2007      Feeding by G-tube  s/p removal 2012      Femoral neck fracture          LAST 24-Hr EVENTS:    VITALS:  T(F): 98.2 (11-15-24 @ 07:44), Max: 98.6 (11-14-24 @ 23:11)  HR: 78 (11-15-24 @ 07:44)  BP: 100/64 (11-15-24 @ 07:44) (100/64 - 115/68)  RR: 18 (11-15-24 @ 07:44)  SpO2: 94% (11-15-24 @ 07:44)          TESTS & MEASUREMENTS:  Weight/BMI      11-13-24 @ 07:01  -  11-14-24 @ 07:00  --------------------------------------------------------  IN: 0 mL / OUT: 400 mL / NET: -400 mL    11-14-24 @ 07:01  -  11-15-24 @ 07:00  --------------------------------------------------------  IN: 0 mL / OUT: 500 mL / NET: -500 mL                            8.9    6.77  )-----------( 255      ( 15 Nov 2024 06:33 )             27.7       INR: 1.13 ratio (11-11-24 @ 11:49)    11-15    143  |  105  |  11  ----------------------------<  95  4.0   |  29  |  <0.5[L]    Ca    8.4      15 Nov 2024 06:33  Phos  3.2     11-15  Mg     2.0     11-15    TPro  5.0[L]  /  Alb  2.9[L]  /  TBili  0.2  /  DBili  x   /  AST  19  /  ALT  13  /  AlkPhos  72  11-15    LIVER FUNCTIONS - ( 15 Nov 2024 06:33 )  Alb: 2.9 g/dL / Pro: 5.0 g/dL / ALK PHOS: 72 U/L / ALT: 13 U/L / AST: 19 U/L / GGT: x                 Culture - Urine (collected 11-12-24 @ 02:30)  Source: Catheterized Catheterized  Final Report (11-13-24 @ 10:18):    <10,000 CFU/mL Normal Urogenital Christen    Culture - Blood (collected 11-11-24 @ 11:49)  Source: .Blood BLOOD  Preliminary Report (11-14-24 @ 23:00):    No growth at 72 Hours      Urinalysis Basic - ( 15 Nov 2024 06:33 )    Color: x / Appearance: x / SG: x / pH: x  Gluc: 95 mg/dL / Ketone: x  / Bili: x / Urobili: x   Blood: x / Protein: x / Nitrite: x   Leuk Esterase: x / RBC: x / WBC x   Sq Epi: x / Non Sq Epi: x / Bacteria: x                            RADIOLOGY, ECG, & ADDITIONAL TESTS:  12 Lead ECG:   Ventricular Rate 84 BPM    Atrial Rate 84 BPM    P-R Interval 136 ms    QRS Duration 122 ms    Q-T Interval 378 ms    QTC Calculation(Bazett) 446 ms    P Axis 64 degrees    R Axis -52 degrees    T Axis 49 degrees    Diagnosis Line Sinus rhythm  Right bundle branch block  Left axis deviation  Voltage criteria for left ventricular hypertrophy  Cannot rule out Septal infarct , age undetermined  Abnormal ECG    Confirmed by SAIMA FU, [facility] ([id number]) on 11/13/2024 11:13:55 PM (11-13-24 @ 10:39)    CT Angio Chest PE Protocol w/ IV Cont:   ACC: [id number] EXAM:  CT ANGIO CHEST PULM ART [facility]   ORDERED BY: MARAH PERRY     PROCEDURE DATE:  11/06/2024          INTERPRETATION:  CLINICAL INDICATION: r/o PE    TECHNIQUE:  CTA of the thorax was performed after administration of intravenous  contrast. Sagittal and coronal reformats were performed as well as MIP   reconstructions.    COMPARISON CT: 10/31/2024    INTERPRETATION:    AIRWAYS, LUNGS AND PLEURA: Mildly increased bibasilar dependent   atelectasis. Otherwise, without significant change    MEDIASTINUM: No significant change    HEART AND VESSELS: No significant change There are no definite filling   defects in the main pulmonary artery or segmental branches to suggest   pulmonary embolus.    PARTIALLY IMAGED UPPER ABDOMEN: Cholelithiasis    BONES AND SOFT TISSUES: No significant change    IMPRESSION:  No definite filling defects in the main pulmonary artery or segmental   branches to suggest pulmonary embolus.    Additional findings are unchanged in this short interval follow-up CT.        --- End of Report ---            EVER CUNHA MD; Attending Radiologist  This document has been electronically signed. Nov 6 2024 11:39AM (11-06-24 @ 10:52)    RECENT DIAGNOSTIC ORDERS:      MEDICATIONS:  MEDICATIONS  (STANDING):  calcium carbonate   1250 mG (OsCal) 1 Tablet(s) Oral two times a day  chlorhexidine 2% Cloths 1 Application(s) Topical <User Schedule>  cholecalciferol 1000 Unit(s) Oral daily  heparin   Injectable 5000 Unit(s) SubCutaneous every 8 hours  lactated ringers. 1000 milliLiter(s) (75 mL/Hr) IV Continuous <Continuous>  magnesium oxide 400 milliGRAM(s) Oral two times a day with meals  valproic  acid Syrup 500 milliGRAM(s) Oral <User Schedule>  valproic  acid Syrup 1000 milliGRAM(s) Oral <User Schedule>    MEDICATIONS  (PRN):  acetaminophen     Tablet .. 650 milliGRAM(s) Oral every 6 hours PRN Mild Pain (1 - 3)  polyethylene glycol 3350 17 Gram(s) Oral two times a day PRN Constipation  senna 2 Tablet(s) Oral at bedtime PRN Constipation      HOME MEDICATIONS:  Austedo XR 24 mg oral tablet, extended release (10-31)  cholecalciferol 25 mcg (1000 intl units) oral capsule (10-31)  FiberCon 625 mg oral tablet (10-31)  heparin (11-15)  magnesium gluconate 250 mg oral tablet (10-31)  Oysco 500 (1250 mg calcium carbonate) oral tablet (10-31)  Tylenol 325 mg oral capsule (10-31)  valproic acid 250 mg/5 mL oral liquid (10-31)      PHYSICAL EXAM:  On exam  General: awake, alert, NAD, chronic ill appearance  Lungs:  clear to ausculations b/l, normal resp effort  Heart: regular ryhthm   Abdomen: soft, non tender non distended , hip dressing no discharge   Ext: no edema,

## 2024-11-15 NOTE — PROGRESS NOTE ADULT - ASSESSMENT
84yoF hx intellectual disability, bipolar disorder, Breast mass (unknown side s/p lumpectomy and RT '07), asthma, hoover's, esophageal varices, s/p R cataract surgery, R hip and L humeral fx, hld here following fall.     # Acute L femoral neck fracture-s/p L hip hemiarthroplasty 11/4--received cefazolin  3 doses post op as per ortho   # Anemia-- no blood transfusion-- given 3 day iv iron treatment.  hb flactuates   # Unwitnessed fall, r/o syncope  unclear story, sounds likely syncopal episode possibly orthostasis induced   wbc improved  could be stress/pain related and dehydration   CT head/neck negative for acute   TTE EF of 66 %. Grade I diastolic dysfunction.. Mild thickening of the anterior and posterior mitral valve leaflets.   Moderate mitral annular calcification. . Mild aortic regurgitation.. Mild pulmonic valve regurgitation.    # SIRS POA ( wbc and HR)  Possible UTI / SIRS ON admission   UA strong  positive , unreliable hx    blood cultures NGTD , initial urine cultures was not collected , s/p 3days of ceftriaxone  then urine culture is negative   - was started on Levaquin for possible aspiration    CXR is negative  RVP negative  and ID Consult appreciated 5 days of levofloxacin   repeat LE duplex negative   left  forearm with mild area of erythema , non tender -  LUE duplex No evidence of left upper extremity deep venous thrombosis.  Left Cephalic vein superficial thrombophlebitis      #Hx breast mass s/p lumpectomy and radiation  -outpt f/u and w/u    #Incidental cholelithiasis  -asymptomatic at this time, no LFT elevation  -consider CCY as outpt    #Intellectual Disability  #Bipolar disorder  -c/w [facility], resume austedo   SLP eval appreciated   valproic level is 54  PT/OT after surgery    #Asthma, not in acute exacerbation  #Esophageal varices  #Hiatal hernia  monitor , resume home meds     #Constipation   CTAP with Moderate to large rectal stool burden. No bowel obstruction.  bowel regimen and monitor   moving her bowel as per  staff ( they record all events)     #DVT ppx -HSQ / scd   -[facility] duplx negative for dvt ppx     #GI ppx    Pending : monitor for fever left Upper  duplex,  group home meeting tomorrow.  D/C PLAN     Family -Ms. Koko Doshi [phone]  acting as surrogate for several years    Palliative team consult appreciated - FULL CODE . - REFER TO Palliative note for details , can not be dnr/dni with out approval by MHLS given her developmental disability     from group home.   call conference done today - needed ortho wound reccs , postponed to Monday  as per ortho note today 11/15- Wound care instructions: dressings appear c/d/i, current dressing may stay in place until clinic follow up. If saturated, can replace with dry dressing gauze and tegaderm.  Suture/staple management also to be done outpatient.

## 2024-11-16 LAB
ALBUMIN SERPL ELPH-MCNC: 2.9 G/DL — LOW (ref 3.5–5.2)
ALP SERPL-CCNC: 74 U/L — SIGNIFICANT CHANGE UP (ref 30–115)
ALT FLD-CCNC: 14 U/L — SIGNIFICANT CHANGE UP (ref 0–41)
ANION GAP SERPL CALC-SCNC: 7 MMOL/L — SIGNIFICANT CHANGE UP (ref 7–14)
AST SERPL-CCNC: 19 U/L — SIGNIFICANT CHANGE UP (ref 0–41)
BASOPHILS # BLD AUTO: 0.07 K/UL — SIGNIFICANT CHANGE UP (ref 0–0.2)
BASOPHILS NFR BLD AUTO: 1 % — SIGNIFICANT CHANGE UP (ref 0–1)
BILIRUB SERPL-MCNC: 0.3 MG/DL — SIGNIFICANT CHANGE UP (ref 0.2–1.2)
BUN SERPL-MCNC: 13 MG/DL — SIGNIFICANT CHANGE UP (ref 10–20)
CALCIUM SERPL-MCNC: 8.5 MG/DL — SIGNIFICANT CHANGE UP (ref 8.4–10.5)
CHLORIDE SERPL-SCNC: 107 MMOL/L — SIGNIFICANT CHANGE UP (ref 98–110)
CO2 SERPL-SCNC: 29 MMOL/L — SIGNIFICANT CHANGE UP (ref 17–32)
CREAT SERPL-MCNC: <0.5 MG/DL — LOW (ref 0.7–1.5)
CULTURE RESULTS: SIGNIFICANT CHANGE UP
EGFR: 98 ML/MIN/1.73M2 — SIGNIFICANT CHANGE UP
EOSINOPHIL # BLD AUTO: 0.08 K/UL — SIGNIFICANT CHANGE UP (ref 0–0.7)
EOSINOPHIL NFR BLD AUTO: 1.2 % — SIGNIFICANT CHANGE UP (ref 0–8)
GLUCOSE BLDC GLUCOMTR-MCNC: 108 MG/DL — HIGH (ref 70–99)
GLUCOSE SERPL-MCNC: 90 MG/DL — SIGNIFICANT CHANGE UP (ref 70–99)
HCT VFR BLD CALC: 28.9 % — LOW (ref 37–47)
HGB BLD-MCNC: 9.3 G/DL — LOW (ref 12–16)
IMM GRANULOCYTES NFR BLD AUTO: 6.3 % — HIGH (ref 0.1–0.3)
LYMPHOCYTES # BLD AUTO: 2.83 K/UL — SIGNIFICANT CHANGE UP (ref 1.2–3.4)
LYMPHOCYTES # BLD AUTO: 42.3 % — SIGNIFICANT CHANGE UP (ref 20.5–51.1)
MAGNESIUM SERPL-MCNC: 2 MG/DL — SIGNIFICANT CHANGE UP (ref 1.8–2.4)
MCHC RBC-ENTMCNC: 32.2 G/DL — SIGNIFICANT CHANGE UP (ref 32–37)
MCHC RBC-ENTMCNC: 32.2 PG — HIGH (ref 27–31)
MCV RBC AUTO: 100 FL — HIGH (ref 81–99)
MONOCYTES # BLD AUTO: 0.5 K/UL — SIGNIFICANT CHANGE UP (ref 0.1–0.6)
MONOCYTES NFR BLD AUTO: 7.5 % — SIGNIFICANT CHANGE UP (ref 1.7–9.3)
NEUTROPHILS # BLD AUTO: 2.79 K/UL — SIGNIFICANT CHANGE UP (ref 1.4–6.5)
NEUTROPHILS NFR BLD AUTO: 41.7 % — LOW (ref 42.2–75.2)
NRBC # BLD: 0 /100 WBCS — SIGNIFICANT CHANGE UP (ref 0–0)
PHOSPHATE SERPL-MCNC: 3.4 MG/DL — SIGNIFICANT CHANGE UP (ref 2.1–4.9)
PLATELET # BLD AUTO: 255 K/UL — SIGNIFICANT CHANGE UP (ref 130–400)
PMV BLD: 9.5 FL — SIGNIFICANT CHANGE UP (ref 7.4–10.4)
POTASSIUM SERPL-MCNC: 4.3 MMOL/L — SIGNIFICANT CHANGE UP (ref 3.5–5)
POTASSIUM SERPL-SCNC: 4.3 MMOL/L — SIGNIFICANT CHANGE UP (ref 3.5–5)
PROT SERPL-MCNC: 5.2 G/DL — LOW (ref 6–8)
RBC # BLD: 2.89 M/UL — LOW (ref 4.2–5.4)
RBC # FLD: 13.4 % — SIGNIFICANT CHANGE UP (ref 11.5–14.5)
SODIUM SERPL-SCNC: 143 MMOL/L — SIGNIFICANT CHANGE UP (ref 135–146)
SPECIMEN SOURCE: SIGNIFICANT CHANGE UP
WBC # BLD: 6.69 K/UL — SIGNIFICANT CHANGE UP (ref 4.8–10.8)
WBC # FLD AUTO: 6.69 K/UL — SIGNIFICANT CHANGE UP (ref 4.8–10.8)

## 2024-11-16 PROCEDURE — 99232 SBSQ HOSP IP/OBS MODERATE 35: CPT

## 2024-11-16 RX ADMIN — VALPROIC ACID 1000 MILLIGRAM(S): 250 SOLUTION ORAL at 21:24

## 2024-11-16 RX ADMIN — Medication 1000 UNIT(S): at 13:03

## 2024-11-16 RX ADMIN — Medication 5000 UNIT(S): at 13:03

## 2024-11-16 RX ADMIN — CHLORHEXIDINE GLUCONATE 1 APPLICATION(S): 1.2 RINSE ORAL at 05:20

## 2024-11-16 RX ADMIN — Medication 5000 UNIT(S): at 05:20

## 2024-11-16 RX ADMIN — VALPROIC ACID 500 MILLIGRAM(S): 250 SOLUTION ORAL at 17:24

## 2024-11-16 RX ADMIN — Medication 1 TABLET(S): at 17:24

## 2024-11-16 RX ADMIN — Medication 400 MILLIGRAM(S): at 08:14

## 2024-11-16 RX ADMIN — Medication 5000 UNIT(S): at 21:24

## 2024-11-16 RX ADMIN — Medication 1 TABLET(S): at 05:20

## 2024-11-16 RX ADMIN — Medication 400 MILLIGRAM(S): at 17:23

## 2024-11-16 NOTE — PROGRESS NOTE ADULT - SUBJECTIVE AND OBJECTIVE BOX
T H I S   I S    N O  T   A    F I N A L I Z E D   N O T BABAR HIGGINS  84y, Female  Allergy: No Known Allergies  lactose (Stomach Upset)    Hospital Day: 16d    Patient seen and examined earlier today.     PMH/PSH:  PAST MEDICAL & SURGICAL HISTORY:  Atypical bipolar disorder      Mild mental retardation      Brito esophagus      Hyperlipemia      Breast mass, right  s/p lumpectomy & radiation 2007      Feeding by G-tube  s/p removal 2012      Femoral neck fracture          LAST 24-Hr EVENTS:    VITALS:  T(F): 98.3 (11-16-24 @ 09:08), Max: 98.3 (11-15-24 @ 23:46)  HR: 80 (11-16-24 @ 09:08)  BP: 108/66 (11-16-24 @ 09:08) (99/65 - 118/69)  RR: 18 (11-16-24 @ 09:08)  SpO2: 95% (11-16-24 @ 09:08)          TESTS & MEASUREMENTS:  Weight/BMI      11-14-24 @ 07:01  -  11-15-24 @ 07:00  --------------------------------------------------------  IN: 0 mL / OUT: 500 mL / NET: -500 mL    11-15-24 @ 07:01  -  11-16-24 @ 07:00  --------------------------------------------------------  IN: 0 mL / OUT: 401 mL / NET: -401 mL                            9.3    6.69  )-----------( 255      ( 16 Nov 2024 06:27 )             28.9       INR: 1.13 ratio (11-11-24 @ 11:49)    11-16    143  |  107  |  13  ----------------------------<  90  4.3   |  29  |  <0.5[L]    Ca    8.5      16 Nov 2024 06:27  Phos  3.4     11-16  Mg     2.0     11-16    TPro  5.2[L]  /  Alb  2.9[L]  /  TBili  0.3  /  DBili  x   /  AST  19  /  ALT  14  /  AlkPhos  74  11-16    LIVER FUNCTIONS - ( 16 Nov 2024 06:27 )  Alb: 2.9 g/dL / Pro: 5.2 g/dL / ALK PHOS: 74 U/L / ALT: 14 U/L / AST: 19 U/L / GGT: x                 Culture - Urine (collected 11-12-24 @ 02:30)  Source: Catheterized Catheterized  Final Report (11-13-24 @ 10:18):    <10,000 CFU/mL Normal Urogenital Christen    Culture - Blood (collected 11-11-24 @ 11:49)  Source: .Blood BLOOD  Preliminary Report (11-15-24 @ 23:00):    No growth at 4 days      Urinalysis Basic - ( 16 Nov 2024 06:27 )    Color: x / Appearance: x / SG: x / pH: x  Gluc: 90 mg/dL / Ketone: x  / Bili: x / Urobili: x   Blood: x / Protein: x / Nitrite: x   Leuk Esterase: x / RBC: x / WBC x   Sq Epi: x / Non Sq Epi: x / Bacteria: x                            RADIOLOGY, ECG, & ADDITIONAL TESTS:  12 Lead ECG:   Ventricular Rate 84 BPM    Atrial Rate 84 BPM    P-R Interval 136 ms    QRS Duration 122 ms    Q-T Interval 378 ms    QTC Calculation(Bazett) 446 ms    P Axis 64 degrees    R Axis -52 degrees    T Axis 49 degrees    Diagnosis Line Sinus rhythm  Right bundle branch block  Left axis deviation  Voltage criteria for left ventricular hypertrophy  Cannot rule out Septal infarct , age undetermined  Abnormal ECG    Confirmed by SAIMA FU, [facility] ([id number]) on 11/13/2024 11:13:55 PM (11-13-24 @ 10:39)      RECENT DIAGNOSTIC ORDERS:      MEDICATIONS:  MEDICATIONS  (STANDING):  Austedo XR 24 milliGRAM(s),Austedo XR 24 milliGRAM(s) 24 milliGRAM(s) Oral daily  calcium carbonate   1250 mG (OsCal) 1 Tablet(s) Oral two times a day  chlorhexidine 2% Cloths 1 Application(s) Topical <User Schedule>  cholecalciferol 1000 Unit(s) Oral daily  heparin   Injectable 5000 Unit(s) SubCutaneous every 8 hours  lactated ringers. 1000 milliLiter(s) (75 mL/Hr) IV Continuous <Continuous>  magnesium oxide 400 milliGRAM(s) Oral two times a day with meals  valproic  acid Syrup 500 milliGRAM(s) Oral <User Schedule>  valproic  acid Syrup 1000 milliGRAM(s) Oral <User Schedule>    MEDICATIONS  (PRN):  acetaminophen     Tablet .. 650 milliGRAM(s) Oral every 6 hours PRN Mild Pain (1 - 3)  polyethylene glycol 3350 17 Gram(s) Oral two times a day PRN Constipation  senna 2 Tablet(s) Oral at bedtime PRN Constipation      HOME MEDICATIONS:  Austedo XR 24 mg oral tablet, extended release (10-31)  cholecalciferol 25 mcg (1000 intl units) oral capsule (10-31)  FiberCon 625 mg oral tablet (10-31)  heparin (11-15)  magnesium gluconate 250 mg oral tablet (10-31)  Oysco 500 (1250 mg calcium carbonate) oral tablet (10-31)  Tylenol 325 mg oral capsule (10-31)  valproic acid 250 mg/5 mL oral liquid (10-31)      PHYSICAL EXAM:  GENERAL:   CHEST/LUNG:   HEART:   ABDOMEN:   EXTREMITIES:               MINA BABAR  84y, Female  Allergy: No Known Allergies  lactose (Stomach Upset)    Hospital Day: 16d    Patient seen and examined earlier today.  Staff at bedside - the patient initially was sleepy but woke up to her basline     PMH/PSH:  PAST MEDICAL & SURGICAL HISTORY:  Atypical bipolar disorder      Mild mental retardation      Brito esophagus      Hyperlipemia      Breast mass, right  s/p lumpectomy & radiation 2007      Feeding by G-tube  s/p removal 2012      Femoral neck fracture          LAST 24-Hr EVENTS:    VITALS:  T(F): 98.3 (11-16-24 @ 09:08), Max: 98.3 (11-15-24 @ 23:46)  HR: 80 (11-16-24 @ 09:08)  BP: 108/66 (11-16-24 @ 09:08) (99/65 - 118/69)  RR: 18 (11-16-24 @ 09:08)  SpO2: 95% (11-16-24 @ 09:08)          TESTS & MEASUREMENTS:  Weight/BMI      11-14-24 @ 07:01  -  11-15-24 @ 07:00  --------------------------------------------------------  IN: 0 mL / OUT: 500 mL / NET: -500 mL    11-15-24 @ 07:01  -  11-16-24 @ 07:00  --------------------------------------------------------  IN: 0 mL / OUT: 401 mL / NET: -401 mL                            9.3    6.69  )-----------( 255      ( 16 Nov 2024 06:27 )             28.9       INR: 1.13 ratio (11-11-24 @ 11:49)    11-16    143  |  107  |  13  ----------------------------<  90  4.3   |  29  |  <0.5[L]    Ca    8.5      16 Nov 2024 06:27  Phos  3.4     11-16  Mg     2.0     11-16    TPro  5.2[L]  /  Alb  2.9[L]  /  TBili  0.3  /  DBili  x   /  AST  19  /  ALT  14  /  AlkPhos  74  11-16    LIVER FUNCTIONS - ( 16 Nov 2024 06:27 )  Alb: 2.9 g/dL / Pro: 5.2 g/dL / ALK PHOS: 74 U/L / ALT: 14 U/L / AST: 19 U/L / GGT: x                 Culture - Urine (collected 11-12-24 @ 02:30)  Source: Catheterized Catheterized  Final Report (11-13-24 @ 10:18):    <10,000 CFU/mL Normal Urogenital Christen    Culture - Blood (collected 11-11-24 @ 11:49)  Source: .Blood BLOOD  Preliminary Report (11-15-24 @ 23:00):    No growth at 4 days      Urinalysis Basic - ( 16 Nov 2024 06:27 )    Color: x / Appearance: x / SG: x / pH: x  Gluc: 90 mg/dL / Ketone: x  / Bili: x / Urobili: x   Blood: x / Protein: x / Nitrite: x   Leuk Esterase: x / RBC: x / WBC x   Sq Epi: x / Non Sq Epi: x / Bacteria: x                            RADIOLOGY, ECG, & ADDITIONAL TESTS:  12 Lead ECG:   Ventricular Rate 84 BPM    Atrial Rate 84 BPM    P-R Interval 136 ms    QRS Duration 122 ms    Q-T Interval 378 ms    QTC Calculation(Bazett) 446 ms    P Axis 64 degrees    R Axis -52 degrees    T Axis 49 degrees    Diagnosis Line Sinus rhythm  Right bundle branch block  Left axis deviation  Voltage criteria for left ventricular hypertrophy  Cannot rule out Septal infarct , age undetermined  Abnormal ECG    Confirmed by SAIMA FU, [facility] ([id number]) on 11/13/2024 11:13:55 PM (11-13-24 @ 10:39)      RECENT DIAGNOSTIC ORDERS:      MEDICATIONS:  MEDICATIONS  (STANDING):  Austedo XR 24 milliGRAM(s),Austedo XR 24 milliGRAM(s) 24 milliGRAM(s) Oral daily  calcium carbonate   1250 mG (OsCal) 1 Tablet(s) Oral two times a day  chlorhexidine 2% Cloths 1 Application(s) Topical <User Schedule>  cholecalciferol 1000 Unit(s) Oral daily  heparin   Injectable 5000 Unit(s) SubCutaneous every 8 hours  lactated ringers. 1000 milliLiter(s) (75 mL/Hr) IV Continuous <Continuous>  magnesium oxide 400 milliGRAM(s) Oral two times a day with meals  valproic  acid Syrup 500 milliGRAM(s) Oral <User Schedule>  valproic  acid Syrup 1000 milliGRAM(s) Oral <User Schedule>    MEDICATIONS  (PRN):  acetaminophen     Tablet .. 650 milliGRAM(s) Oral every 6 hours PRN Mild Pain (1 - 3)  polyethylene glycol 3350 17 Gram(s) Oral two times a day PRN Constipation  senna 2 Tablet(s) Oral at bedtime PRN Constipation      HOME MEDICATIONS:  Austedo XR 24 mg oral tablet, extended release (10-31)  cholecalciferol 25 mcg (1000 intl units) oral capsule (10-31)  FiberCon 625 mg oral tablet (10-31)  heparin (11-15)  magnesium gluconate 250 mg oral tablet (10-31)  Oysco 500 (1250 mg calcium carbonate) oral tablet (10-31)  Tylenol 325 mg oral capsule (10-31)  valproic acid 250 mg/5 mL oral liquid (10-31)      PHYSICAL EXAM:    On exam  General: awake, alert, NAD, chronic ill appearance  Lungs:  clear to ausculations b/l, normal resp effort  Heart: regular ryhthm   Abdomen: soft, non tender non distended , hip dressing no discharge   Ext: no edema, left  forearm with mild area of erythema , non tender -improved

## 2024-11-16 NOTE — PROGRESS NOTE ADULT - ASSESSMENT
84yoF hx intellectual disability, bipolar disorder, Breast mass (unknown side s/p lumpectomy and RT '07), asthma, hoover's, esophageal varices, s/p R cataract surgery, R hip and L humeral fx, hld here following fall.     # Acute L femoral neck fracture-s/p L hip hemiarthroplasty 11/4--received cefazolin  3 doses post op as per ortho   # Anemia-- no blood transfusion-- given 3 day iv iron treatment.  hb flactuates   # Unwitnessed fall, r/o syncope  unclear story, sounds likely syncopal episode possibly orthostasis induced   wbc improved  could be stress/pain related and dehydration   CT head/neck negative for acute   TTE EF of 66 %. Grade I diastolic dysfunction.. Mild thickening of the anterior and posterior mitral valve leaflets.   Moderate mitral annular calcification. . Mild aortic regurgitation.. Mild pulmonic valve regurgitation.    # SIRS POA ( wbc and HR)  Possible UTI / SIRS ON admission   UA strong  positive , unreliable hx    blood cultures NGTD , initial urine cultures was not collected , s/p 3days of ceftriaxone  then urine culture is negative   - was started on Levaquin for possible aspiration    CXR is negative  RVP negative  and ID Consult appreciated 5 days of levofloxacin   repeat LE duplex negative   left  forearm with mild area of erythema , non tender -improved   LUE duplex No evidence of left upper extremity deep venous thrombosis.  Left Cephalic vein superficial thrombophlebitis      #Hx breast mass s/p lumpectomy and radiation  -outpt f/u and w/u    #Incidental cholelithiasis  -asymptomatic at this time, no LFT elevation  -consider CCY as outpt    #Intellectual Disability  #Bipolar disorder  -[facility], resume austedo   SLP eval appreciated   valproic level is 54  PT/OT after surgery    #Asthma, not in acute exacerbation  #Esophageal varices  #Hiatal hernia  monitor , resume home meds     #Constipation   CTAP with Moderate to large rectal stool burden. No bowel obstruction.  bowel regimen and monitor   moving her bowel as per  staff ( they record all events)     #DVT ppx -HSQ / scd   -[facility] duplx negative for dvt ppx     #GI ppx    Pending : monitor for fever left Upper  duplex,  group home meeting tomorrow.  D/C PLAN     Family -Ms. Koko Doshi [phone]  acting as surrogate for several years    Palliative team consult appreciated - FULL CODE . - REFER TO Palliative note for details , can not be dnr/dni with out approval by [facility] given her developmental disability     from group home.   call conference done today - needed ortho wound reccs , postponed to Monday  as per ortho note today 11/15- Wound care instructions: dressings appear c/d/i, current dressing may stay in place until clinic follow up. If saturated, can replace with dry dressing gauze and tegaderm.  Suture/staple management also to be done outpatient.      84yoF hx intellectual disability, bipolar disorder, Breast mass (unknown side s/p lumpectomy and RT '07), asthma, hoover's, esophageal varices, s/p R cataract surgery, R hip and L humeral fx, hld here following fall.     # Acute L femoral neck fracture-s/p L hip hemiarthroplasty 11/4--received cefazolin  3 doses post op as per ortho   # Anemia-- no blood transfusion-- given 3 day iv iron treatment.  hb flactuates   # Unwitnessed fall, r/o syncope  unclear story, sounds likely syncopal episode possibly orthostasis induced   wbc improved  could be stress/pain related and dehydration   CT head/neck negative for acute   TTE EF of 66 %. Grade I diastolic dysfunction.. Mild thickening of the anterior and posterior mitral valve leaflets.   Moderate mitral annular calcification. . Mild aortic regurgitation.. Mild pulmonic valve regurgitation.    # SIRS POA ( wbc and HR)  Possible UTI / SIRS ON admission   UA strong  positive , unreliable hx    blood cultures NGTD , initial urine cultures was not collected , s/p 3days of ceftriaxone  then urine culture is negative   - was started on Levaquin for possible aspiration    CXR is negative  RVP negative  and ID Consult appreciated 5 days of levofloxacin   repeat LE duplex negative   left  forearm with mild area of erythema , non tender -improved   LUE duplex No evidence of left upper extremity deep venous thrombosis.  Left Cephalic vein superficial thrombophlebitis      #Hx breast mass s/p lumpectomy and radiation  -outpt f/u and w/u    #Incidental cholelithiasis  -asymptomatic at this time, no LFT elevation  -consider CCY as outpt    #Intellectual Disability  #Bipolar disorder  -[facility], resumed austedo   SLP eval appreciated   valproic level is 54  PT/OT after surgery    #Asthma, not in acute exacerbation  #Esophageal varices  #Hiatal hernia  monitor , resume home meds     #Constipation   CTAP with Moderate to large rectal stool burden. No bowel obstruction.  bowel regimen and monitor   moving her bowel as per  staff ( they record all events)     #DVT ppx -HSQ / scd   -[facility] duplx negative for dvt ppx     #GI ppx    Pending : monitor for fever left Upper  duplex,  group home meeting tomorrow.  D/C PLAN     Family -Ms. Koko Doshi [phone]  acting as surrogate for several years    Palliative team consult appreciated - FULL CODE . - REFER TO Palliative note for details , can not be dnr/dni with out approval by [facility] given her developmental disability     from group home.   call conference done today - needed ortho wound reccs , postponed to Monday  as per ortho note today 11/15- Wound care instructions: dressings appear c/d/i, current dressing may stay in place until clinic follow up. If saturated, can replace with dry dressing gauze and tegaderm.  Suture/staple management also to be done outpatient.

## 2024-11-17 LAB
ALBUMIN SERPL ELPH-MCNC: 3.2 G/DL — LOW (ref 3.5–5.2)
ALP SERPL-CCNC: 84 U/L — SIGNIFICANT CHANGE UP (ref 30–115)
ALT FLD-CCNC: 14 U/L — SIGNIFICANT CHANGE UP (ref 0–41)
ANION GAP SERPL CALC-SCNC: 9 MMOL/L — SIGNIFICANT CHANGE UP (ref 7–14)
AST SERPL-CCNC: 19 U/L — SIGNIFICANT CHANGE UP (ref 0–41)
BASOPHILS # BLD AUTO: 0.09 K/UL — SIGNIFICANT CHANGE UP (ref 0–0.2)
BASOPHILS NFR BLD AUTO: 1.3 % — HIGH (ref 0–1)
BILIRUB SERPL-MCNC: 0.3 MG/DL — SIGNIFICANT CHANGE UP (ref 0.2–1.2)
BUN SERPL-MCNC: 11 MG/DL — SIGNIFICANT CHANGE UP (ref 10–20)
CALCIUM SERPL-MCNC: 9 MG/DL — SIGNIFICANT CHANGE UP (ref 8.4–10.5)
CHLORIDE SERPL-SCNC: 106 MMOL/L — SIGNIFICANT CHANGE UP (ref 98–110)
CO2 SERPL-SCNC: 27 MMOL/L — SIGNIFICANT CHANGE UP (ref 17–32)
CREAT SERPL-MCNC: <0.5 MG/DL — LOW (ref 0.7–1.5)
EGFR: 98 ML/MIN/1.73M2 — SIGNIFICANT CHANGE UP
EOSINOPHIL # BLD AUTO: 0.1 K/UL — SIGNIFICANT CHANGE UP (ref 0–0.7)
EOSINOPHIL NFR BLD AUTO: 1.4 % — SIGNIFICANT CHANGE UP (ref 0–8)
GLUCOSE SERPL-MCNC: 100 MG/DL — HIGH (ref 70–99)
HCT VFR BLD CALC: 33.1 % — LOW (ref 37–47)
HGB BLD-MCNC: 10.5 G/DL — LOW (ref 12–16)
IMM GRANULOCYTES NFR BLD AUTO: 5.8 % — HIGH (ref 0.1–0.3)
LYMPHOCYTES # BLD AUTO: 2.68 K/UL — SIGNIFICANT CHANGE UP (ref 1.2–3.4)
LYMPHOCYTES # BLD AUTO: 38.7 % — SIGNIFICANT CHANGE UP (ref 20.5–51.1)
MAGNESIUM SERPL-MCNC: 2.1 MG/DL — SIGNIFICANT CHANGE UP (ref 1.8–2.4)
MCHC RBC-ENTMCNC: 31.7 G/DL — LOW (ref 32–37)
MCHC RBC-ENTMCNC: 31.9 PG — HIGH (ref 27–31)
MCV RBC AUTO: 100.6 FL — HIGH (ref 81–99)
MONOCYTES # BLD AUTO: 0.46 K/UL — SIGNIFICANT CHANGE UP (ref 0.1–0.6)
MONOCYTES NFR BLD AUTO: 6.6 % — SIGNIFICANT CHANGE UP (ref 1.7–9.3)
NEUTROPHILS # BLD AUTO: 3.19 K/UL — SIGNIFICANT CHANGE UP (ref 1.4–6.5)
NEUTROPHILS NFR BLD AUTO: 46.2 % — SIGNIFICANT CHANGE UP (ref 42.2–75.2)
NRBC # BLD: 0 /100 WBCS — SIGNIFICANT CHANGE UP (ref 0–0)
PHOSPHATE SERPL-MCNC: 3.3 MG/DL — SIGNIFICANT CHANGE UP (ref 2.1–4.9)
PLATELET # BLD AUTO: 275 K/UL — SIGNIFICANT CHANGE UP (ref 130–400)
PMV BLD: 9.4 FL — SIGNIFICANT CHANGE UP (ref 7.4–10.4)
POTASSIUM SERPL-MCNC: 4.4 MMOL/L — SIGNIFICANT CHANGE UP (ref 3.5–5)
POTASSIUM SERPL-SCNC: 4.4 MMOL/L — SIGNIFICANT CHANGE UP (ref 3.5–5)
PROT SERPL-MCNC: 5.9 G/DL — LOW (ref 6–8)
RBC # BLD: 3.29 M/UL — LOW (ref 4.2–5.4)
RBC # FLD: 13.5 % — SIGNIFICANT CHANGE UP (ref 11.5–14.5)
SODIUM SERPL-SCNC: 142 MMOL/L — SIGNIFICANT CHANGE UP (ref 135–146)
WBC # BLD: 6.92 K/UL — SIGNIFICANT CHANGE UP (ref 4.8–10.8)
WBC # FLD AUTO: 6.92 K/UL — SIGNIFICANT CHANGE UP (ref 4.8–10.8)

## 2024-11-17 PROCEDURE — 99232 SBSQ HOSP IP/OBS MODERATE 35: CPT

## 2024-11-17 RX ADMIN — VALPROIC ACID 500 MILLIGRAM(S): 250 SOLUTION ORAL at 18:25

## 2024-11-17 RX ADMIN — CHLORHEXIDINE GLUCONATE 1 APPLICATION(S): 1.2 RINSE ORAL at 05:37

## 2024-11-17 RX ADMIN — Medication 1 TABLET(S): at 18:26

## 2024-11-17 RX ADMIN — Medication 1000 UNIT(S): at 11:07

## 2024-11-17 RX ADMIN — Medication 400 MILLIGRAM(S): at 11:06

## 2024-11-17 RX ADMIN — Medication 400 MILLIGRAM(S): at 18:26

## 2024-11-17 RX ADMIN — Medication 5000 UNIT(S): at 21:16

## 2024-11-17 RX ADMIN — Medication 5000 UNIT(S): at 05:31

## 2024-11-17 RX ADMIN — VALPROIC ACID 1000 MILLIGRAM(S): 250 SOLUTION ORAL at 21:16

## 2024-11-17 RX ADMIN — Medication 5000 UNIT(S): at 14:25

## 2024-11-17 RX ADMIN — Medication 1 TABLET(S): at 05:31

## 2024-11-17 NOTE — PROGRESS NOTE ADULT - TIME BILLING
time spent on review of labs, imaging studies, old records, obtaining history, personally examining patient, counselling and communicating with patient, entering orders for medications/tests/etc, discussions with other health care providers, documentation in electronic health records, independent interpretation of labs, imaging/procedure results and care coordination.

## 2024-11-17 NOTE — PROGRESS NOTE ADULT - ASSESSMENT
84yoF hx intellectual disability, bipolar disorder, Breast mass (unknown side s/p lumpectomy and RT '07), asthma, hoover's, esophageal varices, s/p R cataract surgery, R hip and L humeral fx, hld here following fall.     # Acute L femoral neck fracture-s/p L hip hemiarthroplasty 11/4--received cefazolin  3 doses post op as per ortho   # Anemia-- no blood transfusion-- given 3 day iv iron treatment.  hb flactuates   # Unwitnessed fall, r/o syncope  unclear story, sounds likely syncopal episode possibly orthostasis induced   wbc improved  could be stress/pain related and dehydration   CT head/neck negative for acute   TTE EF of 66 %. Grade I diastolic dysfunction.. Mild thickening of the anterior and posterior mitral valve leaflets.   Moderate mitral annular calcification. . Mild aortic regurgitation.. Mild pulmonic valve regurgitation.    # SIRS POA ( wbc and HR)  Possible UTI / SIRS ON admission   UA strong  positive , unreliable hx    blood cultures NGTD , initial urine cultures was not collected , s/p 3days of ceftriaxone  then urine culture is negative   - was started on Levaquin for possible aspiration    CXR is negative  RVP negative  and ID Consult appreciated 5 days of levofloxacin   repeat LE duplex negative   left  forearm with mild area of erythema , non tender -improved   LUE duplex No evidence of left upper extremity deep venous thrombosis.  Left Cephalic vein superficial thrombophlebitis      #Hx breast mass s/p lumpectomy and radiation  -outpt f/u and w/u    #Incidental cholelithiasis  -asymptomatic at this time, no LFT elevation  -consider CCY as outpt    #Intellectual Disability  #Bipolar disorder  -[facility], resumed austedo   SLP eval appreciated   valproic level is 54  PT/OT after surgery    #Asthma, not in acute exacerbation  #Esophageal varices  #Hiatal hernia  monitor , resume home meds     #Constipation   CTAP with Moderate to large rectal stool burden. No bowel obstruction.  bowel regimen and monitor   moving her bowel as per  staff ( they record all events)     #DVT ppx -HSQ / scd   -[facility] duplx negative for dvt ppx     #GI ppx    Pending : monitor for fever   group home meeting tomorrow.  D/C PLAN     Family -Ms. Koko Doshi [phone]  acting as surrogate for several years    Palliative team consult appreciated - FULL CODE . - REFER TO Palliative note for details , can not be dnr/dni with out approval by [facility] given her developmental disability     from group home.   call conference done today - needed ortho wound reccs , postponed to Monday  as per ortho note today 11/15- Wound care instructions: dressings appear c/d/i, current dressing may stay in place until clinic follow up. If saturated, can replace with dry dressing gauze and tegaderm.  Suture/staple management also to be done outpatient.

## 2024-11-17 NOTE — PROGRESS NOTE ADULT - REASON FOR ADMISSION
L hip fx ; ?syncope

## 2024-11-17 NOTE — PROGRESS NOTE ADULT - SUBJECTIVE AND OBJECTIVE BOX
T H I S   I S    N O  T   A    F I N A L I Z E D   N O T BABAR HIGGINS  84y, Female  Allergy: No Known Allergies  lactose (Stomach Upset)    Hospital Day: 17d    Patient seen and examined earlier today.     PMH/PSH:  PAST MEDICAL & SURGICAL HISTORY:  Atypical bipolar disorder      Mild mental retardation      Brito esophagus      Hyperlipemia      Breast mass, right  s/p lumpectomy & radiation 2007      Feeding by G-tube  s/p removal 2012      Femoral neck fracture          LAST 24-Hr EVENTS:    VITALS:  T(F): 98.7 (11-17-24 @ 07:32), Max: 98.7 (11-17-24 @ 07:32)  HR: 81 (11-17-24 @ 07:32)  BP: 107/63 (11-17-24 @ 07:32) (107/63 - 148/77)  RR: 18 (11-17-24 @ 07:32)  SpO2: 95% (11-17-24 @ 07:32)          TESTS & MEASUREMENTS:  Weight/BMI      11-15-24 @ 07:01  -  11-16-24 @ 07:00  --------------------------------------------------------  IN: 0 mL / OUT: 401 mL / NET: -401 mL    11-16-24 @ 07:01  -  11-17-24 @ 07:00  --------------------------------------------------------  IN: 0 mL / OUT: 1050 mL / NET: -1050 mL                            10.5   6.92  )-----------( 275      ( 17 Nov 2024 05:50 )             33.1         11-17    142  |  106  |  11  ----------------------------<  100[H]  4.4   |  27  |  <0.5[L]    Ca    9.0      17 Nov 2024 05:50  Phos  3.3     11-17  Mg     2.1     11-17    TPro  5.9[L]  /  Alb  3.2[L]  /  TBili  0.3  /  DBili  x   /  AST  19  /  ALT  14  /  AlkPhos  84  11-17    LIVER FUNCTIONS - ( 17 Nov 2024 05:50 )  Alb: 3.2 g/dL / Pro: 5.9 g/dL / ALK PHOS: 84 U/L / ALT: 14 U/L / AST: 19 U/L / GGT: x                 Culture - Urine (collected 11-12-24 @ 02:30)  Source: Catheterized Catheterized  Final Report (11-13-24 @ 10:18):    <10,000 CFU/mL Normal Urogenital Christen    Culture - Blood (collected 11-11-24 @ 11:49)  Source: .Blood BLOOD  Final Report (11-16-24 @ 23:07):    No growth at 5 days      Urinalysis Basic - ( 17 Nov 2024 05:50 )    Color: x / Appearance: x / SG: x / pH: x  Gluc: 100 mg/dL / Ketone: x  / Bili: x / Urobili: x   Blood: x / Protein: x / Nitrite: x   Leuk Esterase: x / RBC: x / WBC x   Sq Epi: x / Non Sq Epi: x / Bacteria: x                            RADIOLOGY, ECG, & ADDITIONAL TESTS:  12 Lead ECG:   Ventricular Rate 84 BPM    Atrial Rate 84 BPM    P-R Interval 136 ms    QRS Duration 122 ms    Q-T Interval 378 ms    QTC Calculation(Bazett) 446 ms    P Axis 64 degrees    R Axis -52 degrees    T Axis 49 degrees    Diagnosis Line Sinus rhythm  Right bundle branch block  Left axis deviation  Voltage criteria for left ventricular hypertrophy  Cannot rule out Septal infarct , age undetermined  Abnormal ECG    Confirmed by SAIMA FU, [facility] ([id number]) on 11/13/2024 11:13:55 PM (11-13-24 @ 10:39)      RECENT DIAGNOSTIC ORDERS:      MEDICATIONS:  MEDICATIONS  (STANDING):  Austedo XR 24 milliGRAM(s),Austedo XR 24 milliGRAM(s) 24 milliGRAM(s) Oral daily  calcium carbonate   1250 mG (OsCal) 1 Tablet(s) Oral two times a day  chlorhexidine 2% Cloths 1 Application(s) Topical <User Schedule>  cholecalciferol 1000 Unit(s) Oral daily  heparin   Injectable 5000 Unit(s) SubCutaneous every 8 hours  lactated ringers. 1000 milliLiter(s) (75 mL/Hr) IV Continuous <Continuous>  magnesium oxide 400 milliGRAM(s) Oral two times a day with meals  valproic  acid Syrup 1000 milliGRAM(s) Oral <User Schedule>  valproic  acid Syrup 500 milliGRAM(s) Oral <User Schedule>    MEDICATIONS  (PRN):  acetaminophen     Tablet .. 650 milliGRAM(s) Oral every 6 hours PRN Mild Pain (1 - 3)  polyethylene glycol 3350 17 Gram(s) Oral two times a day PRN Constipation  senna 2 Tablet(s) Oral at bedtime PRN Constipation      HOME MEDICATIONS:  Austedo XR 24 mg oral tablet, extended release (10-31)  cholecalciferol 25 mcg (1000 intl units) oral capsule (10-31)  FiberCon 625 mg oral tablet (10-31)  heparin (11-15)  magnesium gluconate 250 mg oral tablet (10-31)  Oysco 500 (1250 mg calcium carbonate) oral tablet (10-31)  Tylenol 325 mg oral capsule (10-31)  valproic acid 250 mg/5 mL oral liquid (10-31)      PHYSICAL EXAM:  GENERAL:   CHEST/LUNG:   HEART:   ABDOMEN:   EXTREMITIES:               MINA, BABAR  84y, Female  Allergy: No Known Allergies  lactose (Stomach Upset)    Hospital Day: 17d    Patient seen and examined earlier today.  staff at bedside, no reported issues     PMH/PSH:  PAST MEDICAL & SURGICAL HISTORY:  Atypical bipolar disorder      Mild mental retardation      Brito esophagus      Hyperlipemia      Breast mass, right  s/p lumpectomy & radiation 2007      Feeding by G-tube  s/p removal 2012      Femoral neck fracture          LAST 24-Hr EVENTS:    VITALS:  T(F): 98.7 (11-17-24 @ 07:32), Max: 98.7 (11-17-24 @ 07:32)  HR: 81 (11-17-24 @ 07:32)  BP: 107/63 (11-17-24 @ 07:32) (107/63 - 148/77)  RR: 18 (11-17-24 @ 07:32)  SpO2: 95% (11-17-24 @ 07:32)          TESTS & MEASUREMENTS:  Weight/BMI      11-15-24 @ 07:01  -  11-16-24 @ 07:00  --------------------------------------------------------  IN: 0 mL / OUT: 401 mL / NET: -401 mL    11-16-24 @ 07:01  -  11-17-24 @ 07:00  --------------------------------------------------------  IN: 0 mL / OUT: 1050 mL / NET: -1050 mL                            10.5   6.92  )-----------( 275      ( 17 Nov 2024 05:50 )             33.1         11-17    142  |  106  |  11  ----------------------------<  100[H]  4.4   |  27  |  <0.5[L]    Ca    9.0      17 Nov 2024 05:50  Phos  3.3     11-17  Mg     2.1     11-17    TPro  5.9[L]  /  Alb  3.2[L]  /  TBili  0.3  /  DBili  x   /  AST  19  /  ALT  14  /  AlkPhos  84  11-17    LIVER FUNCTIONS - ( 17 Nov 2024 05:50 )  Alb: 3.2 g/dL / Pro: 5.9 g/dL / ALK PHOS: 84 U/L / ALT: 14 U/L / AST: 19 U/L / GGT: x                 Culture - Urine (collected 11-12-24 @ 02:30)  Source: Catheterized Catheterized  Final Report (11-13-24 @ 10:18):    <10,000 CFU/mL Normal Urogenital Christen    Culture - Blood (collected 11-11-24 @ 11:49)  Source: .Blood BLOOD  Final Report (11-16-24 @ 23:07):    No growth at 5 days      Urinalysis Basic - ( 17 Nov 2024 05:50 )    Color: x / Appearance: x / SG: x / pH: x  Gluc: 100 mg/dL / Ketone: x  / Bili: x / Urobili: x   Blood: x / Protein: x / Nitrite: x   Leuk Esterase: x / RBC: x / WBC x   Sq Epi: x / Non Sq Epi: x / Bacteria: x                            RADIOLOGY, ECG, & ADDITIONAL TESTS:  12 Lead ECG:   Ventricular Rate 84 BPM    Atrial Rate 84 BPM    P-R Interval 136 ms    QRS Duration 122 ms    Q-T Interval 378 ms    QTC Calculation(Bazett) 446 ms    P Axis 64 degrees    R Axis -52 degrees    T Axis 49 degrees    Diagnosis Line Sinus rhythm  Right bundle branch block  Left axis deviation  Voltage criteria for left ventricular hypertrophy  Cannot rule out Septal infarct , age undetermined  Abnormal ECG    Confirmed by SAIMA FU, [facility] ([id number]) on 11/13/2024 11:13:55 PM (11-13-24 @ 10:39)      RECENT DIAGNOSTIC ORDERS:      MEDICATIONS:  MEDICATIONS  (STANDING):  Austedo XR 24 milliGRAM(s),Austedo XR 24 milliGRAM(s) 24 milliGRAM(s) Oral daily  calcium carbonate   1250 mG (OsCal) 1 Tablet(s) Oral two times a day  chlorhexidine 2% Cloths 1 Application(s) Topical <User Schedule>  cholecalciferol 1000 Unit(s) Oral daily  heparin   Injectable 5000 Unit(s) SubCutaneous every 8 hours  lactated ringers. 1000 milliLiter(s) (75 mL/Hr) IV Continuous <Continuous>  magnesium oxide 400 milliGRAM(s) Oral two times a day with meals  valproic  acid Syrup 1000 milliGRAM(s) Oral <User Schedule>  valproic  acid Syrup 500 milliGRAM(s) Oral <User Schedule>    MEDICATIONS  (PRN):  acetaminophen     Tablet .. 650 milliGRAM(s) Oral every 6 hours PRN Mild Pain (1 - 3)  polyethylene glycol 3350 17 Gram(s) Oral two times a day PRN Constipation  senna 2 Tablet(s) Oral at bedtime PRN Constipation      HOME MEDICATIONS:  Austedo XR 24 mg oral tablet, extended release (10-31)  cholecalciferol 25 mcg (1000 intl units) oral capsule (10-31)  FiberCon 625 mg oral tablet (10-31)  heparin (11-15)  magnesium gluconate 250 mg oral tablet (10-31)  Oysco 500 (1250 mg calcium carbonate) oral tablet (10-31)  Tylenol 325 mg oral capsule (10-31)  valproic acid 250 mg/5 mL oral liquid (10-31)      PHYSICAL EXAM:  On exam  General: awake, alert, NAD, chronic ill appearance  Lungs:  clear to ausculations b/l, normal resp effort  Heart: regular ryhthm   Abdomen: soft, non tender non distended , hip dressing no discharge   Ext: no edema, left  forearm with mild area of erythema , non tender -improved

## 2024-11-17 NOTE — PROGRESS NOTE ADULT - PROVIDER SPECIALTY LIST ADULT
Hospitalist
Internal Medicine
Hospitalist
Internal Medicine
Internal Medicine
Orthopedics
Orthopedics
Hospitalist
Internal Medicine
Orthopedics
Hospitalist
Internal Medicine
Hospitalist

## 2024-11-18 ENCOUNTER — TRANSCRIPTION ENCOUNTER (OUTPATIENT)
Age: 84
End: 2024-11-18

## 2024-11-18 VITALS
TEMPERATURE: 99 F | SYSTOLIC BLOOD PRESSURE: 113 MMHG | HEART RATE: 91 BPM | OXYGEN SATURATION: 93 % | DIASTOLIC BLOOD PRESSURE: 68 MMHG | RESPIRATION RATE: 18 BRPM

## 2024-11-18 LAB
ALBUMIN SERPL ELPH-MCNC: 3.2 G/DL — LOW (ref 3.5–5.2)
ALP SERPL-CCNC: 88 U/L — SIGNIFICANT CHANGE UP (ref 30–115)
ALT FLD-CCNC: 11 U/L — SIGNIFICANT CHANGE UP (ref 0–41)
ANION GAP SERPL CALC-SCNC: 8 MMOL/L — SIGNIFICANT CHANGE UP (ref 7–14)
AST SERPL-CCNC: 14 U/L — SIGNIFICANT CHANGE UP (ref 0–41)
BASOPHILS # BLD AUTO: 0 K/UL — SIGNIFICANT CHANGE UP (ref 0–0.2)
BASOPHILS NFR BLD AUTO: 0 % — SIGNIFICANT CHANGE UP (ref 0–1)
BILIRUB SERPL-MCNC: 0.2 MG/DL — SIGNIFICANT CHANGE UP (ref 0.2–1.2)
BUN SERPL-MCNC: 12 MG/DL — SIGNIFICANT CHANGE UP (ref 10–20)
CALCIUM SERPL-MCNC: 8.7 MG/DL — SIGNIFICANT CHANGE UP (ref 8.4–10.4)
CHLORIDE SERPL-SCNC: 104 MMOL/L — SIGNIFICANT CHANGE UP (ref 98–110)
CO2 SERPL-SCNC: 28 MMOL/L — SIGNIFICANT CHANGE UP (ref 17–32)
CREAT SERPL-MCNC: <0.5 MG/DL — LOW (ref 0.7–1.5)
EGFR: 98 ML/MIN/1.73M2 — SIGNIFICANT CHANGE UP
EOSINOPHIL # BLD AUTO: 0 K/UL — SIGNIFICANT CHANGE UP (ref 0–0.7)
EOSINOPHIL NFR BLD AUTO: 0 % — SIGNIFICANT CHANGE UP (ref 0–8)
GLUCOSE SERPL-MCNC: 98 MG/DL — SIGNIFICANT CHANGE UP (ref 70–99)
HCT VFR BLD CALC: 32.9 % — LOW (ref 37–47)
HGB BLD-MCNC: 10.5 G/DL — LOW (ref 12–16)
LYMPHOCYTES # BLD AUTO: 1.52 K/UL — SIGNIFICANT CHANGE UP (ref 1.2–3.4)
LYMPHOCYTES # BLD AUTO: 17.2 % — LOW (ref 20.5–51.1)
MAGNESIUM SERPL-MCNC: 2.1 MG/DL — SIGNIFICANT CHANGE UP (ref 1.8–2.4)
MCHC RBC-ENTMCNC: 31.6 PG — HIGH (ref 27–31)
MCHC RBC-ENTMCNC: 31.9 G/DL — LOW (ref 32–37)
MCV RBC AUTO: 99.1 FL — HIGH (ref 81–99)
MONOCYTES # BLD AUTO: 0.76 K/UL — HIGH (ref 0.1–0.6)
MONOCYTES NFR BLD AUTO: 8.6 % — SIGNIFICANT CHANGE UP (ref 1.7–9.3)
NEUTROPHILS # BLD AUTO: 5.63 K/UL — SIGNIFICANT CHANGE UP (ref 1.4–6.5)
NEUTROPHILS NFR BLD AUTO: 63.8 % — SIGNIFICANT CHANGE UP (ref 42.2–75.2)
PHOSPHATE SERPL-MCNC: 3.7 MG/DL — SIGNIFICANT CHANGE UP (ref 2.1–4.9)
PLATELET # BLD AUTO: 280 K/UL — SIGNIFICANT CHANGE UP (ref 130–400)
PMV BLD: 9.5 FL — SIGNIFICANT CHANGE UP (ref 7.4–10.4)
POTASSIUM SERPL-MCNC: 4.5 MMOL/L — SIGNIFICANT CHANGE UP (ref 3.5–5)
POTASSIUM SERPL-SCNC: 4.5 MMOL/L — SIGNIFICANT CHANGE UP (ref 3.5–5)
PROT SERPL-MCNC: 5.8 G/DL — LOW (ref 6–8)
RBC # BLD: 3.32 M/UL — LOW (ref 4.2–5.4)
RBC # FLD: 13.5 % — SIGNIFICANT CHANGE UP (ref 11.5–14.5)
SODIUM SERPL-SCNC: 140 MMOL/L — SIGNIFICANT CHANGE UP (ref 135–146)
WBC # BLD: 8.82 K/UL — SIGNIFICANT CHANGE UP (ref 4.8–10.8)
WBC # FLD AUTO: 8.82 K/UL — SIGNIFICANT CHANGE UP (ref 4.8–10.8)

## 2024-11-18 PROCEDURE — 99239 HOSP IP/OBS DSCHRG MGMT >30: CPT

## 2024-11-18 RX ORDER — POLYETHYLENE GLYCOL 3350 17 G/17G
17 POWDER, FOR SOLUTION ORAL
Qty: 0 | Refills: 0 | DISCHARGE
Start: 2024-11-18

## 2024-11-18 RX ORDER — ENOXAPARIN SODIUM 30 MG/.3ML
40 INJECTION SUBCUTANEOUS
Qty: 1 | Refills: 0
Start: 2024-11-18 | End: 2024-12-17

## 2024-11-18 RX ORDER — POLYETHYLENE GLYCOL 3350 17 G/17G
17 POWDER, FOR SOLUTION ORAL
Qty: 1 | Refills: 0
Start: 2024-11-18 | End: 2024-12-17

## 2024-11-18 RX ADMIN — Medication 5000 UNIT(S): at 13:22

## 2024-11-18 RX ADMIN — Medication 400 MILLIGRAM(S): at 08:23

## 2024-11-18 RX ADMIN — Medication 1 TABLET(S): at 05:13

## 2024-11-18 RX ADMIN — Medication 5000 UNIT(S): at 05:21

## 2024-11-18 RX ADMIN — Medication 1000 UNIT(S): at 11:03

## 2024-11-18 RX ADMIN — CHLORHEXIDINE GLUCONATE 1 APPLICATION(S): 1.2 RINSE ORAL at 05:29

## 2024-11-18 NOTE — DISCHARGE NOTE NURSING/CASE MANAGEMENT/SOCIAL WORK - PATIENT PORTAL LINK FT
You can access the FollowMyHealth Patient Portal offered by [facility] by registering at the following website: [email or web address]. By joining [facility]’s FollowMyHealth portal, you will also be able to view your health information using other applications (apps) compatible with our system.

## 2024-11-18 NOTE — DISCHARGE NOTE NURSING/CASE MANAGEMENT/SOCIAL WORK - NSDCVIVACCINE_GEN_ALL_CORE_FT
Tdap; 10-Dec-2022 08:36; Adriana Lloyd (RN); [employer]; [id number] (Exp. Date: 08-Dec-2024); IntraMuscular; Deltoid Right.; 0.5 milliLiter(s); VIS (VIS Published: 09-May-2013, VIS Presented: 10-Dec-2022);

## 2024-11-18 NOTE — DISCHARGE NOTE NURSING/CASE MANAGEMENT/SOCIAL WORK - FINANCIAL ASSISTANCE
[facility] provides services at a reduced cost to those who are determined to be eligible through [facility]’s financial assistance program. Information regarding [facility]’s financial assistance program can be found by going to [email or web address] or by calling [phone].

## 2024-11-18 NOTE — DISCHARGE NOTE NURSING/CASE MANAGEMENT/SOCIAL WORK - NSDCFUADDAPPT_GEN_ALL_CORE_FT
Do you need a primary care doctor or follow-up with a specialist? Our care coordinators will help you find providers near you and schedule any follow-up care visits.    Monday-Friday: 9am-5pm    Call our [facility] team: [phone]

## 2024-11-19 NOTE — CDI QUERY NOTE - NSCDIOTHERTXTBX_GEN_ALL_CORE_HH
Clinical documentation and/or evidence of the patient’s presentation, evaluation, and medical management, as evidenced below, may support a diagnosis that is not documented in the medical record.  In order to ensure accurate coding and accuracy of the clinical record, the documentation in this patient’s medical record requires additional clarification.      If you think the supporting documentation and/or clinical evidence supports a more specific diagnosis, please include more specific documentation of a diagnosis associated with these findings in your progress note and/or discharge summary.    Please clarify if the patient was found to have one of the following:    • SIRS associated with UTI and sepsis was present on admit  • SIRS unrelated to UTI without sepsis was present on admit 	  • Other (specify)    Supporting documentation and/or clinical evidence:     11/1 Progress Note Adult-Hospitalist Attending: ... SIRS POA ( wbc and HR) ... wbc improved 13k -- 9 K could be stress/pain related and dehydration     11/2 Progress Note Adult-Hospitalist Attending: ... Possible UTI / SIRS ON admission. UA strong  positive , unreliable hx. would get urine culture and blood cultures and start ceftriaxone for now     11/18 Discharge Note Provider: ... SIRS POA ( wbc and HR)  Possible UTI. UA strong  positive , unreliable hx  blood cultures NGTD , urine cultures was not collected , s/p 3days of ceftriaxone, fever resolved    Lab findings: 10/31 WBC-13.04    Nursing VS flowsheet: 10/31 HR 95, 98, 112    Per MAR: Rocephin IVPB. Indication: Genitourinary infection. Administered 11/2-11/4      Thank you,  Iraida Leiva RN [facility]S  [phone]

## 2024-11-19 NOTE — CDI QUERY NOTE - NSCDITREATMDFT_GEN_ALL_CORE_HH
Dr. Shelton, Was paged by family at 450am. Called [phone] and spoke with mother. Mother states that she was in hospital last Thursday due to generalized tonic clonic seizure and she was started on Keppra. Family states that Keppra has caused dizziness, stomach pain, back pain, vomiting, migraines, confusion, constipation. Family states that she has been missing school due to symptoms of Keppra. Family states that she is on lexapro 5mg on 10/6/2023. Family states that she has a history of attempted suicide, anxiety, and depression. Family states that she is on Yahaira birth control since 3/2023. Family states that she has had no seizure like activity since last Thursday. I would recommend holding the Keppra at this time, mother states that she took her morning dose. I would recommend Zofran for vomiting. I would consider alternative antiepileptic therapy per her neurologist. Family to use emergency intranasal midazolam with any prolonged breakthrough seizure.       Results/Data:  CT head on 6/28/2023 - IMPRESSION: No acute intracranial process.    EEG on 10/3/2023 - Hyperventilation elicited few burst of generalized spike and waves 3 Hz per second lasting up to 4 seconds without clinical correlation. During tracing multiple burst of generalized spike and waves lasting one second were present. Impression: Abnormal awake and sleep one hour electroencephalogram. Finding is a risk of having seizure generalized in nature, Absence seizure is in the differential, no clinical seizure captured during recording period.    MRI brain on 10/26/2023 - IMPRESSION: No acute intracranial abnormality.

## 2024-11-21 NOTE — CHART NOTE - NSCHARTNOTESELECT_GEN_ALL_CORE
Anesthesia Detail Level: Detailed Quality 431: Preventive Care And Screening: Unhealthy Alcohol Use - Screening: Patient not identified as an unhealthy alcohol user when screened for unhealthy alcohol use using a systematic screening method Quality 130: Documentation Of Current Medications In The Medical Record: Current Medications Documented Quality 226: Preventive Care And Screening: Tobacco Use: Screening And Cessation Intervention: Patient screened for tobacco use and is an ex/non-smoker

## 2024-11-22 ENCOUNTER — APPOINTMENT (OUTPATIENT)
Dept: ORTHOPEDIC SURGERY | Facility: CLINIC | Age: 84
End: 2024-11-22

## 2024-11-22 DIAGNOSIS — S72.002D FRACTURE OF UNSPECIFIED PART OF NECK OF LEFT FEMUR, SUBSEQUENT ENCOUNTER FOR CLOSED FRACTURE WITH ROUTINE HEALING: ICD-10-CM

## 2024-11-22 DIAGNOSIS — Z96.649 PRESENCE OF UNSPECIFIED ARTIFICIAL HIP JOINT: ICD-10-CM

## 2024-11-22 PROCEDURE — 99024 POSTOP FOLLOW-UP VISIT: CPT

## 2024-11-22 PROCEDURE — 73503 X-RAY EXAM HIP UNI 4/> VIEWS: CPT | Mod: LT

## 2024-12-11 ENCOUNTER — INPATIENT (INPATIENT)
Facility: HOSPITAL | Age: 84
LOS: 1 days | Discharge: HOME | DRG: 300 | End: 2024-12-13
Attending: INTERNAL MEDICINE | Admitting: INTERNAL MEDICINE
Payer: MEDICARE

## 2024-12-11 VITALS
DIASTOLIC BLOOD PRESSURE: 60 MMHG | RESPIRATION RATE: 18 BRPM | HEART RATE: 88 BPM | WEIGHT: 149.91 LBS | OXYGEN SATURATION: 95 % | SYSTOLIC BLOOD PRESSURE: 125 MMHG | TEMPERATURE: 98 F

## 2024-12-11 DIAGNOSIS — I82.409 ACUTE EMBOLISM AND THROMBOSIS OF UNSPECIFIED DEEP VEINS OF UNSPECIFIED LOWER EXTREMITY: ICD-10-CM

## 2024-12-11 DIAGNOSIS — Z93.1 GASTROSTOMY STATUS: Chronic | ICD-10-CM

## 2024-12-11 DIAGNOSIS — Z96.642 PRESENCE OF LEFT ARTIFICIAL HIP JOINT: Chronic | ICD-10-CM

## 2024-12-11 DIAGNOSIS — Z96.641 PRESENCE OF RIGHT ARTIFICIAL HIP JOINT: Chronic | ICD-10-CM

## 2024-12-11 DIAGNOSIS — S72.009A FRACTURE OF UNSPECIFIED PART OF NECK OF UNSPECIFIED FEMUR, INITIAL ENCOUNTER FOR CLOSED FRACTURE: Chronic | ICD-10-CM

## 2024-12-11 LAB
ALBUMIN SERPL ELPH-MCNC: 3.4 G/DL — LOW (ref 3.5–5.2)
ALP SERPL-CCNC: 73 U/L — SIGNIFICANT CHANGE UP (ref 30–115)
ALT FLD-CCNC: 8 U/L — SIGNIFICANT CHANGE UP (ref 0–41)
ANION GAP SERPL CALC-SCNC: 9 MMOL/L — SIGNIFICANT CHANGE UP (ref 7–14)
APTT BLD: 33.5 SEC — SIGNIFICANT CHANGE UP (ref 27–39.2)
AST SERPL-CCNC: 28 U/L — SIGNIFICANT CHANGE UP (ref 0–41)
BASOPHILS # BLD AUTO: 0.02 K/UL — SIGNIFICANT CHANGE UP (ref 0–0.2)
BASOPHILS NFR BLD AUTO: 0.3 % — SIGNIFICANT CHANGE UP (ref 0–1)
BILIRUB SERPL-MCNC: 0.3 MG/DL — SIGNIFICANT CHANGE UP (ref 0.2–1.2)
BUN SERPL-MCNC: 16 MG/DL — SIGNIFICANT CHANGE UP (ref 10–20)
CALCIUM SERPL-MCNC: 9.7 MG/DL — SIGNIFICANT CHANGE UP (ref 8.4–10.5)
CHLORIDE SERPL-SCNC: 103 MMOL/L — SIGNIFICANT CHANGE UP (ref 98–110)
CO2 SERPL-SCNC: 27 MMOL/L — SIGNIFICANT CHANGE UP (ref 17–32)
CREAT SERPL-MCNC: <0.5 MG/DL — LOW (ref 0.7–1.5)
D DIMER BLD IA.RAPID-MCNC: 594 NG/ML DDU — HIGH
EGFR: 98 ML/MIN/1.73M2 — SIGNIFICANT CHANGE UP
EOSINOPHIL # BLD AUTO: 0.12 K/UL — SIGNIFICANT CHANGE UP (ref 0–0.7)
EOSINOPHIL NFR BLD AUTO: 1.8 % — SIGNIFICANT CHANGE UP (ref 0–8)
FLUAV AG NPH QL: SIGNIFICANT CHANGE UP
FLUBV AG NPH QL: SIGNIFICANT CHANGE UP
GAS PNL BLDV: SIGNIFICANT CHANGE UP
GLUCOSE SERPL-MCNC: 130 MG/DL — HIGH (ref 70–99)
HCT VFR BLD CALC: 37 % — SIGNIFICANT CHANGE UP (ref 37–47)
HGB BLD-MCNC: 11.9 G/DL — LOW (ref 12–16)
IMM GRANULOCYTES NFR BLD AUTO: 0.4 % — HIGH (ref 0.1–0.3)
INR BLD: 1.03 RATIO — SIGNIFICANT CHANGE UP (ref 0.65–1.3)
LIDOCAIN IGE QN: 61 U/L — HIGH (ref 7–60)
LYMPHOCYTES # BLD AUTO: 1.91 K/UL — SIGNIFICANT CHANGE UP (ref 1.2–3.4)
LYMPHOCYTES # BLD AUTO: 28.3 % — SIGNIFICANT CHANGE UP (ref 20.5–51.1)
MAGNESIUM SERPL-MCNC: 1.9 MG/DL — SIGNIFICANT CHANGE UP (ref 1.8–2.4)
MCHC RBC-ENTMCNC: 31.3 PG — HIGH (ref 27–31)
MCHC RBC-ENTMCNC: 32.2 G/DL — SIGNIFICANT CHANGE UP (ref 32–37)
MCV RBC AUTO: 97.4 FL — SIGNIFICANT CHANGE UP (ref 81–99)
MONOCYTES # BLD AUTO: 0.43 K/UL — SIGNIFICANT CHANGE UP (ref 0.1–0.6)
MONOCYTES NFR BLD AUTO: 6.4 % — SIGNIFICANT CHANGE UP (ref 1.7–9.3)
NEUTROPHILS # BLD AUTO: 4.25 K/UL — SIGNIFICANT CHANGE UP (ref 1.4–6.5)
NEUTROPHILS NFR BLD AUTO: 62.8 % — SIGNIFICANT CHANGE UP (ref 42.2–75.2)
NRBC # BLD: 0 /100 WBCS — SIGNIFICANT CHANGE UP (ref 0–0)
NT-PROBNP SERPL-SCNC: 360 PG/ML — HIGH (ref 0–300)
PLATELET # BLD AUTO: 213 K/UL — SIGNIFICANT CHANGE UP (ref 130–400)
PMV BLD: 10.2 FL — SIGNIFICANT CHANGE UP (ref 7.4–10.4)
POTASSIUM SERPL-MCNC: 4.5 MMOL/L — SIGNIFICANT CHANGE UP (ref 3.5–5)
POTASSIUM SERPL-SCNC: 4.5 MMOL/L — SIGNIFICANT CHANGE UP (ref 3.5–5)
PROT SERPL-MCNC: 6.5 G/DL — SIGNIFICANT CHANGE UP (ref 6–8)
PROTHROM AB SERPL-ACNC: 12.2 SEC — SIGNIFICANT CHANGE UP (ref 9.95–12.87)
RBC # BLD: 3.8 M/UL — LOW (ref 4.2–5.4)
RBC # FLD: 13.1 % — SIGNIFICANT CHANGE UP (ref 11.5–14.5)
RSV RNA NPH QL NAA+NON-PROBE: SIGNIFICANT CHANGE UP
SARS-COV-2 RNA SPEC QL NAA+PROBE: SIGNIFICANT CHANGE UP
SODIUM SERPL-SCNC: 139 MMOL/L — SIGNIFICANT CHANGE UP (ref 135–146)
TROPONIN T, HIGH SENSITIVITY RESULT: 15 NG/L — HIGH (ref 6–13)
TROPONIN T, HIGH SENSITIVITY RESULT: 16 NG/L — HIGH (ref 6–13)
WBC # BLD: 6.76 K/UL — SIGNIFICANT CHANGE UP (ref 4.8–10.8)
WBC # FLD AUTO: 6.76 K/UL — SIGNIFICANT CHANGE UP (ref 4.8–10.8)

## 2024-12-11 PROCEDURE — 86900 BLOOD TYPING SEROLOGIC ABO: CPT

## 2024-12-11 PROCEDURE — 71045 X-RAY EXAM CHEST 1 VIEW: CPT | Mod: 26

## 2024-12-11 PROCEDURE — 36415 COLL VENOUS BLD VENIPUNCTURE: CPT

## 2024-12-11 PROCEDURE — 86850 RBC ANTIBODY SCREEN: CPT

## 2024-12-11 PROCEDURE — 97163 PT EVAL HIGH COMPLEX 45 MIN: CPT | Mod: GP

## 2024-12-11 PROCEDURE — 80053 COMPREHEN METABOLIC PANEL: CPT

## 2024-12-11 PROCEDURE — 99285 EMERGENCY DEPT VISIT HI MDM: CPT | Mod: GC

## 2024-12-11 PROCEDURE — 85025 COMPLETE CBC W/AUTO DIFF WBC: CPT

## 2024-12-11 PROCEDURE — 93970 EXTREMITY STUDY: CPT | Mod: 26

## 2024-12-11 PROCEDURE — 99222 1ST HOSP IP/OBS MODERATE 55: CPT

## 2024-12-11 PROCEDURE — 86901 BLOOD TYPING SEROLOGIC RH(D): CPT

## 2024-12-11 PROCEDURE — 71275 CT ANGIOGRAPHY CHEST: CPT | Mod: 26,MC

## 2024-12-11 PROCEDURE — 74177 CT ABD & PELVIS W/CONTRAST: CPT | Mod: 26,MC

## 2024-12-11 RX ORDER — ENOXAPARIN SODIUM 30 MG/.3ML
65 INJECTION SUBCUTANEOUS
Refills: 0 | Status: DISCONTINUED | OUTPATIENT
Start: 2024-12-11 | End: 2024-12-11

## 2024-12-11 RX ORDER — VALPROIC ACID 250 MG/5ML
10 SOLUTION ORAL
Refills: 0 | DISCHARGE

## 2024-12-11 RX ORDER — POLYETHYLENE GLYCOL 3350 17 G/17G
17 POWDER, FOR SOLUTION ORAL
Refills: 0 | Status: DISCONTINUED | OUTPATIENT
Start: 2024-12-11 | End: 2024-12-13

## 2024-12-11 RX ORDER — VALPROIC ACID 250 MG/5ML
500 SOLUTION ORAL
Refills: 0 | Status: DISCONTINUED | OUTPATIENT
Start: 2024-12-11 | End: 2024-12-13

## 2024-12-11 RX ORDER — DEUTETRABENAZINE 48 MG/1
1 TABLET, FILM COATED, EXTENDED RELEASE ORAL
Refills: 0 | DISCHARGE

## 2024-12-11 RX ORDER — ENOXAPARIN SODIUM 30 MG/.3ML
70 INJECTION SUBCUTANEOUS ONCE
Refills: 0 | Status: COMPLETED | OUTPATIENT
Start: 2024-12-11 | End: 2024-12-11

## 2024-12-11 RX ORDER — VALPROIC ACID 250 MG/5ML
20 SOLUTION ORAL
Refills: 0 | DISCHARGE

## 2024-12-11 RX ORDER — VALPROIC ACID 250 MG/5ML
1000 SOLUTION ORAL AT BEDTIME
Refills: 0 | Status: DISCONTINUED | OUTPATIENT
Start: 2024-12-11 | End: 2024-12-13

## 2024-12-11 RX ORDER — APIXABAN 2.5 MG/1
10 TABLET, FILM COATED ORAL EVERY 12 HOURS
Refills: 0 | Status: DISCONTINUED | OUTPATIENT
Start: 2024-12-12 | End: 2024-12-13

## 2024-12-11 RX ORDER — MAGNESIUM GLUCONATE 29.25 MG
1 TABLET ORAL
Refills: 0 | DISCHARGE

## 2024-12-11 RX ORDER — CALCIUM CARBONATE 500(1250)
1 TABLET ORAL
Refills: 0 | DISCHARGE

## 2024-12-11 RX ORDER — ACETAMINOPHEN 500MG 500 MG/1
2 TABLET, COATED ORAL
Refills: 0 | DISCHARGE

## 2024-12-11 RX ORDER — CHOLECALCIFEROL (VITAMIN D3) 10MCG/0.25
1 DROPS ORAL
Refills: 0 | DISCHARGE

## 2024-12-11 RX ORDER — GUAIFENESIN 400 MG
200 TABLET ORAL EVERY 6 HOURS
Refills: 0 | Status: DISCONTINUED | OUTPATIENT
Start: 2024-12-11 | End: 2024-12-13

## 2024-12-11 RX ORDER — TETRABENAZINE 25 MG/1
50 TABLET ORAL DAILY
Refills: 0 | Status: DISCONTINUED | OUTPATIENT
Start: 2024-12-12 | End: 2024-12-13

## 2024-12-11 RX ADMIN — VALPROIC ACID 500 MILLIGRAM(S): 250 SOLUTION ORAL at 19:13

## 2024-12-11 RX ADMIN — ENOXAPARIN SODIUM 70 MILLIGRAM(S): 30 INJECTION SUBCUTANEOUS at 15:32

## 2024-12-11 RX ADMIN — POLYETHYLENE GLYCOL 3350 17 GRAM(S): 17 POWDER, FOR SOLUTION ORAL at 19:14

## 2024-12-11 RX ADMIN — Medication 200 MILLIGRAM(S): at 22:18

## 2024-12-11 RX ADMIN — VALPROIC ACID 1000 MILLIGRAM(S): 250 SOLUTION ORAL at 21:29

## 2024-12-11 NOTE — ED PROVIDER NOTE - CLINICAL SUMMARY MEDICAL DECISION MAKING FREE TEXT BOX
84-year-old female presents to the ED for evaluation of leg swelling and cough dyspnea after recent hemiarthroplasty despite being discharged on Lovenox.  Patient is a limited historian and is mostly bedridden at baseline due to intellectual disability.  Emergency department screening exam, labs and imaging, CT angiography for workup of PE and was found to have a new DVT.  Given the patient's social determinants of health the best option is to admit for continued management as she performed a DVT while on Lovenox and inpatient setting will be used for escalation and anticoagulants.

## 2024-12-11 NOTE — H&P ADULT - NSHPPHYSICALEXAM_GEN_ALL_CORE
PHYSICAL EXAM:  CONSTITUTIONAL: No acute distress, well-developed, well-groomed, AAOx3  HEAD: Atraumatic, normocephalic  EYES: EOM intact, PERRLA, conjunctiva and sclera clear  ENT: Supple, no masses, no thyromegaly, no bruits, no JVD; moist mucous membranes  PULMONARY: Clear to auscultation bilaterally; no wheezes, rales, or rhonchi  CARDIOVASCULAR: Regular rate and rhythm; no murmurs, rubs, or gallops  GASTROINTESTINAL: Soft, non-tender, non-distended; bowel sounds present  MUSCULOSKELETAL: 2+ peripheral pulses; no clubbing, no cyanosis, no edema  NEUROLOGY: non-focal  SKIN: No rashes or lesions; warm and dry PHYSICAL EXAM:  CONSTITUTIONAL: No acute distress, AAOx1  PULMONARY: Clear to auscultation bilaterally; no wheezes  CARDIOVASCULAR: Regular rate and rhythm; no murmurs, rubs, or gallops  GASTROINTESTINAL: Soft, non-tender, mildly distended; bowel sounds present  MUSCULOSKELETAL: 2+ peripheral pulses; +1 left lower limb edema

## 2024-12-11 NOTE — H&P ADULT - ATTENDING COMMENTS
84-year-old female with  intellectual disability, bipolar disorder, DLD, breast mass ( s/p lumpectomy and RT '07), asthma, hoover's esophagus, esophageal varices, recent admission 10/31-11/18 for L hip fx s/p hemiarthroplasty (surgery on 11.4) presenting from group home for left leg swelling and pain, patient is poor historian, complain from leg pain, she was noticed to have left leg swelling by staff and was brought to Ed, In the Ed venous duplex showed left femoral DVT, CT chest negative.     PHYSICAL EXAM:  GENERAL: NAD, awake  HEAD:  Atraumatic, Normocephalic.  EYES: EOMI, PERRLA, conjunctiva and sclera clear.  NECK: Supple, No JVD.  CHEST/LUNG: Clear to auscultation bilaterally; No wheeze.  HEART: Regular rate and rhythm; S1 S2.   ABDOMEN: Soft, Nontender, Nondistended; Bowel sounds present.  EXTREMITIES:  left leg edema, tenderness.   PSYCH: demented, oriented to place and name.     A/P:   Acute Left leg DVT post surgery:   venous duplex showed left femoral and Popliteal vein DVT.   CT chest negative for PE  Start on Eliquis 10mg BID for 7 days then 5mg BID for 3 months.

## 2024-12-11 NOTE — ED PROVIDER NOTE - ATTENDING CONTRIBUTION TO CARE
I personally evaluated the patient. I reviewed the Resident's or Physician Assistant's note (as assigned above), and agree with the findings and plan except as documented in my note.    84-year-old female presents to the ED for evaluation of right swollen with shortness of breath after recent surgical procedure.  Patient is chronically intellectually disabled and has a counselor at bedside to assist with history of present illness and review of systems.    PMH is significant for bipolar 1, breast masses, esophageal varices, Brito's esophagus and recent hip fracture with hemiarthroplasty discharged on Lovenox to the facility    Review of systems consistent for pain productive cough     GENERAL: female in no distress.   HEENT: Mostly edentulous  CHEST: normal work of breathing noted no accessory muscle use  CV: pulses intact  Abdomen: Soft, no rebound no guarding  EXTR: Left lower extremity edema noted with warmth and Tenderness      Impression: Leg swelling, postoperatively    Plan: IV, labs, imaging, supportive care & reevaluation

## 2024-12-11 NOTE — H&P ADULT - ASSESSMENT
84-year-old female with PMHx of intellectual disability, bipolar disorder, DLD, breast mass (unknown side s/p lumpectomy and RT '07), asthma, hoover's esophagus, esophageal varices, s/p R cataract surgery, R hip fx s/p hemiarthroplasty (2019), HLD , recent admission 10/31-11/18 for L hip fx s/p hemiarthroplasty (surgery on 11.4, discharged on SQ lovenox) presenting from [facility] with left leg swelling    #New provoked DVT- no PE  - Duplex prelim result  - Chart reviewed, patient has no obvious contraindications to therapeutic AC  Plan  - Lovenox today  - Transition to Eliquis in AM  - Vascular eval?  - Follow up duplex official result      #Femoral neck fracture-s/p L hip hemiarthroplasty 11/4      #Hx breast mass s/p lumpectomy and radiation  -outpt f/u and w/u      #Intellectual Disability  #Bipolar disorder  -c/w     #Asthma, not in acute exacerbation  #Esophageal varices  #Hiatal hernia  monitor , resume home meds       #DVT prophyalxis: lovenox BID  #GI prophylaxis:   #Activity: IAT  #Diet: puree and mod thick liquid diet     84-year-old female with PMHx of intellectual disability, bipolar disorder, DLD, breast mass (unknown side s/p lumpectomy and RT '07), asthma, hoover's esophagus, esophageal varices, s/p R cataract surgery, R hip fx s/p hemiarthroplasty (2019), HLD , recent admission 10/31-11/18 for L hip fx s/p hemiarthroplasty (surgery on 11.4, discharged on SQ lovenox) presenting from [facility] with left leg swelling    #New provoked DVT- no PE  - Duplex prelim result  - Chart reviewed, patient has no obvious contraindications to therapeutic AC  Plan  - Lovenox today  - Transition to Eliquis in AM  - Vascular eval?  - Follow up duplex official result    #Reported cough by aid   - No fever, leukocytosis  - CT chest with no evidence of pneumonia  - Monitor off Abx for now      #Femoral neck fracture-s/p L hip hemiarthroplasty 11/4      #Hx breast mass s/p lumpectomy and radiation  -outpt f/u and w/u      #Intellectual Disability  #Bipolar disorder  -c/w     #Asthma, not in acute exacerbation  #Esophageal varices  #Hiatal hernia  monitor , resume home meds       #DVT prophyalxis: lovenox BID  #GI prophylaxis:   #Activity: IAT  #Diet: puree and mod thick liquid diet     84-year-old female with PMHx of intellectual disability, bipolar disorder, DLD, breast mass (unknown side s/p lumpectomy and RT '07), asthma, hoover's esophagus, esophageal varices, s/p R cataract surgery, R hip fx s/p hemiarthroplasty (2019), HLD , recent admission 10/31-11/18 for L hip fx s/p hemiarthroplasty (surgery on 11.4, discharged on SQ lovenox) presenting from [facility] with left leg swelling    #New provoked DVT- no PE  #Femoral neck fracture-s/p L hip hemiarthroplasty 11/4  - Duplex prelim result with left sided femoral and popliteal DVTs  - Chart reviewed, patient has no obvious contraindications to therapeutic AC, there is reported history of esophageal varices in chart from 2019 being carried forward, no endoscopy in chart, no evidence of cirrhosis or varices on current CT AP, unclear significance  Plan  - Lovenox today  - Transition to Eliquis in AM  - No need for Vascular eval for now, no significant pain  - Follow up duplex official result    #Reported cough by aid   - No fever, leukocytosis  - CT chest with no evidence of pneumonia  - Monitor off Abx for now  - RVP negative    #Asthma, not in acute exacerbation  #Esophageal varices?  #Hiatal hernia  - There is reported history of esophageal varices in chart from 2019 being carried forward, no endoscopy in chart, no evidence of cirrhosis or varices on current CT AP, unclear significance  - OP follow up    #Intellectual Disability  #Bipolar disorder  #Possible tardive dyskinesia  -c/w valproic acid  - c/w Austedo     #Elevated lipase- hemolyzed  - No abdominal pain or evidence of pancreatitis on CT AP    #Elevated trop- stable  - No chest pain    #Hx breast mass s/p lumpectomy and radiation  -outpt f/u and w/u      #DVT prophylaxis: lovenox BID  #GI prophylaxis: not needed  #Activity: IAT  #Diet: puree and mod thick liquid diet 84-year-old female with PMHx of intellectual disability, bipolar disorder, DLD, breast mass (unknown side s/p lumpectomy and RT '07), asthma, hoover's esophagus, esophageal varices, s/p R cataract surgery, R hip fx s/p hemiarthroplasty (2019), HLD , recent admission 10/31-11/18 for L hip fx s/p hemiarthroplasty (surgery on 11.4, discharged on SQ lovenox) presenting from [facility] with left leg swelling    #New provoked DVT- no PE  #Femoral neck fracture-s/p L hip hemiarthroplasty 11/4  - Duplex prelim result with left sided femoral and popliteal DVTs  - Chart reviewed, patient has no obvious contraindications to therapeutic AC, there is reported history of esophageal varices in chart from 2019 being carried forward, no endoscopy in chart, no evidence of cirrhosis or varices on current CT AP, unclear significance  Plan  - Lovenox today x1  - Transition to loading Eliquis in AM  - No need for Vascular eval for now, no significant pain  - Follow up duplex official result  - Please establish follow up with vascular as OP    #Reported cough by aid   - No fever, leukocytosis  - CT chest with no evidence of pneumonia  - Monitor off Abx for now  - RVP negative    #Asthma, not in acute exacerbation  #Esophageal varices?  #Hiatal hernia  - There is reported history of esophageal varices in chart from 2019 being carried forward, no endoscopy in chart, no evidence of cirrhosis or varices on current CT AP, unclear significance  - OP follow up    #Intellectual Disability  #Bipolar disorder  #Possible tardive dyskinesia  -c/w valproic acid  - c/w Austedo     #Elevated lipase- hemolyzed  - No abdominal pain or evidence of pancreatitis on CT AP    #Elevated trop- stable  - No chest pain    #Hx breast mass s/p lumpectomy and radiation  -outpt f/u and w/u      #DVT prophylaxis: lovenox BID  #GI prophylaxis: not needed  #Activity: IAT  #Diet: puree and mod thick liquid diet

## 2024-12-11 NOTE — H&P ADULT - HISTORY OF PRESENT ILLNESS
Case of 84-year-old female with PMHx of intellectual disability, bipolar disorder, DLD, breast mass (unknown side s/p lumpectomy and RT '07), asthma, hoover's esophagus, esophageal varices, s/p R cataract surgery, R hip fx s/p hemiarthroplasty (2019), HLD , recent admission 10/31-11/18 for L hip fx s/p hemiarthroplasty (surgery on 11.4, discharged on SQ lovenox) presenting from [facility] with left leg swelling    Patient is A&Ox2 (not oriented to time), is a limited historian, group home aide here at bedside to assist.Per aide, patient has been barely mobile with increasing fatigue since coming home after surgery (wasn't ambulatory before, wheelchair bound at baseline) as she was scared to move without assistance. At time of my interview, patient is awake and alert.    Patient noted to have left leg swelling this morning by staff, patient reports also shortness of breath and constant sharp burning chest pain worse with inspiration, primarily in lower chest/epigastric region with nausea.    On ROS, aid mentioned productive cough for the past 3 days    Denies fevers, chills, chest pressure, vomiting, diarrhea, constipation, loss of sensation or pain of the lower extremities.    In the ED< vitals were unremarkable  Labs significant for dimer 594, trop 16 => 15    CTA chest and CT AP   1.  No acute pulmonary embolus.  2.  No CT evidence of an acute intrathoracic or abdominopelvic pathology.    Duplex LL prelim result  There is a left femoral vein and popliteal vein DVT.  No evidence of deep venous thrombosis in the right lower extremity    Given Lovenox 70mg x1 in ED      	   Case of 84-year-old female with PMHx of intellectual disability, bipolar disorder, DLD, breast mass (unknown side s/p lumpectomy and RT '07), asthma, hoover's esophagus, esophageal varices, s/p R cataract surgery, R hip fx s/p hemiarthroplasty (2019), HLD , recent admission 10/31-11/18 for L hip fx s/p hemiarthroplasty (surgery on 11.4, discharged on SQ lovenox) presenting with left leg swelling    Patient is A&Ox2 (not oriented to time), is a limited historian, group home aide here at bedside to assist. Per aide, they noticed left lower leg swelling and pain.    As per ED note, patient reports also shortness of breath and constant sharp burning chest pain worse with inspiration, primarily in lower chest/epigastric region with nausea. On my exam, patient has no complaints    On ROS, aid mentioned productive cough for the past 3 days    Denies fevers, chills, chest pain.    In the ED< vitals were unremarkable  Labs significant for dimer 594, trop 16 => 15    CTA chest and CT AP   1.  No acute pulmonary embolus.  2.  No CT evidence of an acute intrathoracic or abdominopelvic pathology.    Duplex LL prelim result  There is a left femoral vein and popliteal vein DVT.  No evidence of deep venous thrombosis in the right lower extremity    Given Lovenox 70mg x1 in ED

## 2024-12-11 NOTE — PATIENT PROFILE ADULT - NSPRONUTRITIONRISK_GEN_A_NUR
3-calculated by average/Not able to assess (calculate score using [facility] averaging method)
No indicators present

## 2024-12-11 NOTE — ED PROVIDER NOTE - NS ED ROS FT
CONSTITUTIONAL: No fevers, no chills, no dizziness  HEENT: no blurry vision, no change in hearing, no nasal congestion, no sore throat  CV: +chest pain, no palpitations  RESP: +SOB, +cough  GI: +abdominal pain, +nausea, no vomiting, no diarrhea, no constipation  : No dysuria, no hematuria  MSK: +swelling of extremities, no extremity pain  SKIN: No rashes  NEURO: no focal weakness, no decreased sensation/paresthesias  PSYCH: denies SI/HI

## 2024-12-11 NOTE — ED PROVIDER NOTE - OBJECTIVE STATEMENT
84yoF hx intellectual disability, bipolar disorder, Breast mass (unknown side s/p lumpectomy and RT '07), asthma, hoover's, esophageal varices, s/p R cataract surgery, R hip fx s/p hemiarthroplasty (2019), HLD , recent admission 10/31-11/18 for L hip fx s/p hemiarthroplasty (surgery on 11.4) presenting from [facility] with left leg swelling with associated pleuritic chest/epigastric pain, SOB, nausea. Patient is A&Ox2 (not oriented to time), is a limited historian, group home aide here at bedside to assist. Patient noted to have left leg swelling this morning by staff, patient reports also shortness of breath and constant sharp burning chest pain worse with inspiration, primarily in lower chest/epigastric region with nausea. Denies fevers, chills, chest pressure, vomiting, diarrhea, constipation, loss of sensation or pain of the extremity. Per aide, patient has been barely mobile with increasing fatigue since coming home after surgery (was ambulatory before) as she was scared to move without assistance. Patient was discharged on subq lovenox.    Per aide, patient has productive cough for the past 3 days, patient reports sputum appears like "phlegm." 84yoF hx intellectual disability, bipolar disorder, Breast mass (unknown side s/p lumpectomy and RT '07), asthma, hoover's, esophageal varices, s/p R cataract surgery, R hip fx s/p hemiarthroplasty (2019), HLD , recent admission 10/31-11/18 for L hip fx s/p hemiarthroplasty (surgery on 11.4, discharged on SQ lovenox) presenting from [facility] with left leg swelling with associated pleuritic chest/epigastric pain, SOB, nausea. Patient is A&Ox2 (not oriented to time), is a limited historian, group home aide here at bedside to assist. Patient noted to have left leg swelling this morning by staff, patient reports also shortness of breath and constant sharp burning chest pain worse with inspiration, primarily in lower chest/epigastric region with nausea. Denies fevers, chills, chest pressure, vomiting, diarrhea, constipation, loss of sensation or pain of the lower extremities. Per aide, patient has been barely mobile with increasing fatigue since coming home after surgery (was ambulatory before) as she was scared to move without assistance. At time of my interview, patient is awake and alert    Per aide, patient has productive cough for the past 3 days, patient reports sputum appears like "phlegm." 84yoF hx intellectual disability, bipolar disorder, Breast mass (unknown side s/p lumpectomy and RT '07), asthma, hoover's, esophageal varices, s/p R cataract surgery, R hip fx s/p hemiarthroplasty (2019), HLD , recent admission 10/31-11/18 for L hip fx s/p hemiarthroplasty (surgery on 11.4, discharged on SQ lovenox) presenting from [facility] with left leg swelling with associated pleuritic chest/epigastric pain, SOB, nausea. Patient is A&Ox2 (not oriented to time), is a limited historian, group home aide here at bedside to assist. Patient noted to have left leg swelling this morning by staff, patient reports also shortness of breath and constant sharp burning chest pain worse with inspiration, primarily in lower chest/epigastric region with nausea. Denies fevers, chills, chest pressure, vomiting, diarrhea, constipation, loss of sensation or pain of the lower extremities. Per aide, patient has been barely mobile with increasing fatigue since coming home after surgery (was ambulatory before) as she was scared to move without assistance. At time of my interview, patient is awake and alert    Of note per aide, patient has productive cough for the past 3 days, patient reports sputum appears like "phlegm." 84yoF hx intellectual disability, bipolar disorder, Breast mass (unknown side s/p lumpectomy and RT '07), asthma, hoover's, esophageal varices, s/p R cataract surgery, R hip fx s/p hemiarthroplasty (2019), HLD , recent admission 10/31-11/18 for L hip fx s/p hemiarthroplasty (surgery on 11.4, discharged on SQ lovenox) presenting from [facility] with left leg swelling with associated pleuritic chest/epigastric pain, SOB, nausea. Patient is A&Ox2 (not oriented to time), is a limited historian, group home aide here at bedside to assist. Patient noted to have left leg swelling this morning by staff, patient reports also shortness of breath and constant sharp burning chest pain worse with inspiration, primarily in lower chest/epigastric region with nausea. Denies fevers, chills, chest pressure, vomiting, diarrhea, constipation, loss of sensation or pain of the lower extremities. Per aide, patient has been barely mobile with increasing fatigue since coming home after surgery (wasn't ambulatory before, wheelchair bound at baseline) as she was scared to move without assistance. At time of my interview, patient is awake and alert    Of note per aide, patient has productive cough for the past 3 days, patient reports sputum appears like "phlegm." 84yoF hx intellectual disability, bipolar disorder, Breast mass (unknown side s/p lumpectomy and RT '07), asthma, hoover's, esophageal varices, s/p R cataract surgery, R hip fx s/p hemiarthroplasty (2019), HLD , recent admission 10/31-11/18 for L hip fx s/p hemiarthroplasty (surgery on 11.4, discharged on SQ lovenox) presenting from [facility] with left leg swelling with associated pleuritic chest/epigastric pain, SOB, nausea. Patient is A&Ox2 (not oriented to time), is a limited historian, group home aide here at bedside to assist. Patient noted to have left leg swelling this morning by staff, patient reports also shortness of breath and constant sharp burning chest pain worse with inspiration, primarily in lower chest/epigastric region with nausea. Denies fevers, chills, chest pressure, vomiting, diarrhea, constipation, loss of sensation or pain of the lower extremities. Per aide, patient has been barely mobile with increasing fatigue since coming home after surgery (wasn't ambulatory before, wheelchair bound at baseline) as she was scared to move without assistance. At time of my interview, patient is awake and alert.    Of note per aide, patient has productive cough for the past 3 days, patient reports sputum appears like "phlegm."

## 2024-12-11 NOTE — ED PROVIDER NOTE - PROGRESS NOTE DETAILS
Per vascular tech, prelim report +DVT L femoral and popliteal vein. Likely admit and start AC pending report Per vascular tech, prelim report +DVT L femoral and popliteal vein. Likely admit and start AC pending report. Group home updated

## 2024-12-11 NOTE — PATIENT PROFILE ADULT - INFORMATION COULD NOT BE OBTAINED DETAILS
Yusra x 1 from [facility] home Yusra x 1 from [facility] home

## 2024-12-11 NOTE — ED PROVIDER NOTE - PHYSICAL EXAMINATION
Gen: NAD, AOx2  HEENT: NCAT, normal conjunctiva, tongue midline, oral mucosa moist, poor dentition, repeated lip smacking  Lung: CTAB, no wheezes/rhonchi/rales B/L, speaking in full sentences, not tachypneic  CV: RRR, S1 S2  Abd: soft, +epigastric TTP, ND, no guarding, no rigidity, no rebound tenderness  Neuro/MSK: No focal sensory deficit of lower extremities b/l, +DF/PF 4+/5 b/l, HE/KF exam limited due to pt cooperation however passive ROM intact w/o pain or stiffness.   Extremities: 2+ L pedal and lower extremity edema to knee, warm, +calf tenderness, no overlying erythema; trace RLE edema w/o warmth tenderness erythema, cap refill <2s  Skin: Warm, well perfused, no rashes  Psych: normal affect Gen: NAD, AOx2  HEENT: NCAT, normal conjunctiva, tongue midline, oral mucosa moist, poor dentition, repeated lip smacking  Lung: CTAB, no wheezes/rhonchi/rales B/L, speaking in full sentences, not tachypneic  CV: RRR, S1 S2  Abd: soft, +epigastric TTP, ND, no guarding, no rigidity, no rebound tenderness  Neuro/MSK: No focal sensory deficit of lower extremities b/l, +DF/PF 4+/5 b/l, HE/KF exam limited due to pt cooperation however passive ROM intact w/o pain or stiffness.   Extremities: 2+ L pedal and lower extremity edema to knee, warm, +calf tenderness, no overlying erythema; trace RLE edema w/o warmth tenderness erythema, cap refill <2s, DP/PT pulses 2+ b/l  Skin: Warm, well perfused, no rashes  Psych: normal affect

## 2024-12-11 NOTE — PATIENT PROFILE ADULT - FALL HARM RISK - HARM RISK INTERVENTIONS

## 2024-12-11 NOTE — PATIENT PROFILE ADULT - STATED REASON FOR ADMISSION
Patient from [facility] group home with c/o eft leg pain and swelling

## 2024-12-11 NOTE — PATIENT PROFILE ADULT - FALL HARM RISK - FACTORS
Patient is wheelchair bound from group home/Impaired gait/IV and/or equipment tethered to patient/Other medical problems/Poor balance/Other

## 2024-12-11 NOTE — ED PROVIDER NOTE - NSICDXPASTSURGICALHX_GEN_ALL_CORE_FT
PAST SURGICAL HISTORY:  Feeding by G-tube s/p removal 2012    Femoral neck fracture     History of hemiarthroplasty of right hip     History of left hip hemiarthroplasty

## 2024-12-11 NOTE — H&P ADULT - NSHPLABSRESULTS_GEN_ALL_CORE
VITAL SIGNS: Last 24 Hours  T(C): 36.8 (11 Dec 2024 08:54), Max: 36.8 (11 Dec 2024 08:54)  T(F): 98.2 (11 Dec 2024 08:54), Max: 98.2 (11 Dec 2024 08:54)  HR: 88 (11 Dec 2024 08:54) (88 - 88)  BP: 125/60 (11 Dec 2024 08:54) (125/60 - 125/60)  BP(mean): --  RR: 18 (11 Dec 2024 08:54) (18 - 18)  SpO2: 95% (11 Dec 2024 08:54) (95% - 95%)    LABS:                        11.9   6.76  )-----------( 213      ( 11 Dec 2024 09:51 )             37.0     12-11    139  |  103  |  16  ----------------------------<  130[H]  4.5   |  27  |  <0.5[L]    Ca    9.7      11 Dec 2024 09:51  Mg     1.9     12-11    TPro  6.5  /  Alb  3.4[L]  /  TBili  0.3  /  DBili  x   /  AST  28  /  ALT  8   /  AlkPhos  73  12-11    PT/INR - ( 11 Dec 2024 09:51 )   PT: 12.20 sec;   INR: 1.03 ratio         PTT - ( 11 Dec 2024 09:51 )  PTT:33.5 sec  Urinalysis Basic - ( 11 Dec 2024 09:51 )    Color: x / Appearance: x / SG: x / pH: x  Gluc: 130 mg/dL / Ketone: x  / Bili: x / Urobili: x   Blood: x / Protein: x / Nitrite: x   Leuk Esterase: x / RBC: x / WBC x   Sq Epi: x / Non Sq Epi: x / Bacteria: x                RADIOLOGY:

## 2024-12-11 NOTE — ED ADULT NURSE NOTE - NSFALLRISKINTERV_ED_ALL_ED
Assistance OOB with selected safe patient handling equipment if applicable/Assistance with ambulation/Communicate fall risk and risk factors to all staff, patient, and family/Monitor gait and stability/Monitor for mental status changes and reorient to person, place, and time, as needed/Move patient closer to nursing station/within visual sight of ED staff/Provide visual cue: yellow wristband, yellow gown, etc/Reinforce activity limits and safety measures with patient and family/Toileting schedule using arm’s reach rule for commode and bathroom/Use of alarms - bed, stretcher, chair and/or video monitoring/Call bell, personal items and telephone in reach/Instruct patient to call for assistance before getting out of bed/chair/stretcher/Non-slip footwear applied when patient is off stretcher/[place] to call system/Physically safe environment - no spills, clutter or unnecessary equipment/Purposeful Proactive Rounding/Room/bathroom lighting operational, light cord in reach

## 2024-12-12 ENCOUNTER — TRANSCRIPTION ENCOUNTER (OUTPATIENT)
Age: 84
End: 2024-12-12

## 2024-12-12 LAB
ALBUMIN SERPL ELPH-MCNC: 3.3 G/DL — LOW (ref 3.5–5.2)
ALP SERPL-CCNC: 67 U/L — SIGNIFICANT CHANGE UP (ref 30–115)
ALT FLD-CCNC: 7 U/L — SIGNIFICANT CHANGE UP (ref 0–41)
ANION GAP SERPL CALC-SCNC: 8 MMOL/L — SIGNIFICANT CHANGE UP (ref 7–14)
AST SERPL-CCNC: 11 U/L — SIGNIFICANT CHANGE UP (ref 0–41)
BASOPHILS # BLD AUTO: 0.02 K/UL — SIGNIFICANT CHANGE UP (ref 0–0.2)
BASOPHILS NFR BLD AUTO: 0.4 % — SIGNIFICANT CHANGE UP (ref 0–1)
BILIRUB SERPL-MCNC: 0.2 MG/DL — SIGNIFICANT CHANGE UP (ref 0.2–1.2)
BLD GP AB SCN SERPL QL: SIGNIFICANT CHANGE UP
BUN SERPL-MCNC: 10 MG/DL — SIGNIFICANT CHANGE UP (ref 10–20)
CALCIUM SERPL-MCNC: 9 MG/DL — SIGNIFICANT CHANGE UP (ref 8.4–10.4)
CHLORIDE SERPL-SCNC: 103 MMOL/L — SIGNIFICANT CHANGE UP (ref 98–110)
CO2 SERPL-SCNC: 29 MMOL/L — SIGNIFICANT CHANGE UP (ref 17–32)
CREAT SERPL-MCNC: <0.5 MG/DL — LOW (ref 0.7–1.5)
EGFR: 98 ML/MIN/1.73M2 — SIGNIFICANT CHANGE UP
EOSINOPHIL # BLD AUTO: 0.11 K/UL — SIGNIFICANT CHANGE UP (ref 0–0.7)
EOSINOPHIL NFR BLD AUTO: 2.1 % — SIGNIFICANT CHANGE UP (ref 0–8)
GLUCOSE SERPL-MCNC: 79 MG/DL — SIGNIFICANT CHANGE UP (ref 70–99)
HCT VFR BLD CALC: 35.4 % — LOW (ref 37–47)
HGB BLD-MCNC: 11.4 G/DL — LOW (ref 12–16)
IMM GRANULOCYTES NFR BLD AUTO: 0.4 % — HIGH (ref 0.1–0.3)
LYMPHOCYTES # BLD AUTO: 1.93 K/UL — SIGNIFICANT CHANGE UP (ref 1.2–3.4)
LYMPHOCYTES # BLD AUTO: 36.1 % — SIGNIFICANT CHANGE UP (ref 20.5–51.1)
MCHC RBC-ENTMCNC: 31.8 PG — HIGH (ref 27–31)
MCHC RBC-ENTMCNC: 32.2 G/DL — SIGNIFICANT CHANGE UP (ref 32–37)
MCV RBC AUTO: 98.6 FL — SIGNIFICANT CHANGE UP (ref 81–99)
MONOCYTES # BLD AUTO: 0.43 K/UL — SIGNIFICANT CHANGE UP (ref 0.1–0.6)
MONOCYTES NFR BLD AUTO: 8.1 % — SIGNIFICANT CHANGE UP (ref 1.7–9.3)
NEUTROPHILS # BLD AUTO: 2.83 K/UL — SIGNIFICANT CHANGE UP (ref 1.4–6.5)
NEUTROPHILS NFR BLD AUTO: 52.9 % — SIGNIFICANT CHANGE UP (ref 42.2–75.2)
NRBC # BLD: 0 /100 WBCS — SIGNIFICANT CHANGE UP (ref 0–0)
PLATELET # BLD AUTO: 155 K/UL — SIGNIFICANT CHANGE UP (ref 130–400)
PMV BLD: 11 FL — HIGH (ref 7.4–10.4)
POTASSIUM SERPL-MCNC: 4.1 MMOL/L — SIGNIFICANT CHANGE UP (ref 3.5–5)
POTASSIUM SERPL-SCNC: 4.1 MMOL/L — SIGNIFICANT CHANGE UP (ref 3.5–5)
PROT SERPL-MCNC: 5.9 G/DL — LOW (ref 6–8)
RBC # BLD: 3.59 M/UL — LOW (ref 4.2–5.4)
RBC # FLD: 12.8 % — SIGNIFICANT CHANGE UP (ref 11.5–14.5)
SODIUM SERPL-SCNC: 140 MMOL/L — SIGNIFICANT CHANGE UP (ref 135–146)
WBC # BLD: 5.34 K/UL — SIGNIFICANT CHANGE UP (ref 4.8–10.8)
WBC # FLD AUTO: 5.34 K/UL — SIGNIFICANT CHANGE UP (ref 4.8–10.8)

## 2024-12-12 PROCEDURE — 99232 SBSQ HOSP IP/OBS MODERATE 35: CPT

## 2024-12-12 RX ADMIN — POLYETHYLENE GLYCOL 3350 17 GRAM(S): 17 POWDER, FOR SOLUTION ORAL at 18:26

## 2024-12-12 RX ADMIN — APIXABAN 10 MILLIGRAM(S): 2.5 TABLET, FILM COATED ORAL at 05:28

## 2024-12-12 RX ADMIN — APIXABAN 10 MILLIGRAM(S): 2.5 TABLET, FILM COATED ORAL at 18:25

## 2024-12-12 RX ADMIN — VALPROIC ACID 1000 MILLIGRAM(S): 250 SOLUTION ORAL at 21:21

## 2024-12-12 RX ADMIN — POLYETHYLENE GLYCOL 3350 17 GRAM(S): 17 POWDER, FOR SOLUTION ORAL at 05:28

## 2024-12-12 RX ADMIN — VALPROIC ACID 500 MILLIGRAM(S): 250 SOLUTION ORAL at 18:26

## 2024-12-12 NOTE — DISCHARGE NOTE PROVIDER - NSDCMRMEDTOKEN_GEN_ALL_CORE_FT
Austedo XR 24 mg oral tablet, extended release: 1 tab(s) orally once a day  cholecalciferol 25 mcg (1000 intl units) oral capsule: 1 cap(s) orally once a day  FiberCon 625 mg oral tablet: 1 tab(s) orally 2 times a day  Lovenox 40 mg/0.4 mL injectable solution: 40 milligram(s) subcutaneously once a day  magnesium gluconate 250 mg oral tablet: 1 tab(s) orally 2 times a day  Oysco 500 (1250 mg calcium carbonate) oral tablet: 1 tab(s) orally 2 times a day  polyethylene glycol 3350 oral powder for reconstitution: 17 gram(s) orally 2 times a day As needed Constipation  Tylenol 325 mg oral capsule: 2 cap(s) orally every 8 hours as needed for  mild pain  valproic acid 250 mg/5 mL oral liquid: 20 milliliter(s) orally once a day (at bedtime) at 9pm  valproic acid 250 mg/5 mL oral liquid: 10 milliliter(s) orally once a day at 5pm   Austedo XR 24 mg oral tablet, extended release: 1 tab(s) orally once a day  cholecalciferol 25 mcg (1000 intl units) oral capsule: 1 cap(s) orally once a day  Eliquis 5 mg oral tablet: 1 tab(s) orally 2 times a day take 2 tablets (10mg) 2 times a day until 12/18, starting 12/19 take 1 tablet (5mg) 2 times a day for the next 3 months  FiberCon 625 mg oral tablet: 1 tab(s) orally 2 times a day  magnesium gluconate 250 mg oral tablet: 1 tab(s) orally 2 times a day  Oysco 500 (1250 mg calcium carbonate) oral tablet: 1 tab(s) orally 2 times a day  polyethylene glycol 3350 oral powder for reconstitution: 17 gram(s) orally 2 times a day As needed Constipation  Tylenol 325 mg oral capsule: 2 cap(s) orally every 8 hours as needed for  mild pain  valproic acid 250 mg/5 mL oral liquid: 20 milliliter(s) orally once a day (at bedtime) at 9pm  valproic acid 250 mg/5 mL oral liquid: 10 milliliter(s) orally once a day at 5pm

## 2024-12-12 NOTE — DISCHARGE NOTE PROVIDER - CARE PROVIDER_API CALL
Jasper López  Internal Medicine  [address] [place] [address]  Phone: [phone]  Fax: [phone]  Follow Up Time: 2 weeks    David Locke  Vascular Surgery  [address] [place] [address], [facility] [address]  [place] [address]  Phone: [phone]  Fax: [phone]  Follow Up Time: 2 weeks   Jasper López  Internal Medicine  [address] [place] [address]  Phone: [phone]  Fax: [phone]  Follow Up Time: 2 weeks    David Locke  Vascular Surgery  [address] [place] [address], [address]  [place] [address]  Phone: [phone]  Fax: [phone]  Follow Up Time: 2 weeks    Carlos Taylor  Dental Medicine  [address] [place] [address]  Phone: [phone]  Fax: [phone]  Follow Up Time: 1 week

## 2024-12-12 NOTE — DISCHARGE NOTE PROVIDER - PROVIDER TOKENS
PROVIDER:[TOKEN:[[id number]],FOLLOWUP:[2 weeks]],PROVIDER:[TOKEN:[[id number]],FOLLOWUP:[2 weeks]] PROVIDER:[TOKEN:[[id number]],FOLLOWUP:[2 weeks]],PROVIDER:[TOKEN:[[id number]],FOLLOWUP:[2 weeks]],PROVIDER:[TOKEN:[[id number]],FOLLOWUP:[1 week]]

## 2024-12-12 NOTE — DISCHARGE NOTE PROVIDER - CARE PROVIDERS DIRECT ADDRESSES
,[email or web address]

## 2024-12-12 NOTE — DISCHARGE NOTE PROVIDER - HOSPITAL COURSE
84-year-old female with PMHx of intellectual disability, bipolar disorder, DLD, breast mass (unknown side s/p lumpectomy and RT '07), asthma, hoover's esophagus, esophageal varices, s/p R cataract surgery, R hip fx s/p hemiarthroplasty (2019), HLD , recent admission 10/31-11/18 for L hip fx s/p hemiarthroplasty (surgery on 11.4, discharged on SQ lovenox) presenting with left leg swelling.     Patient is A&Ox2 (not oriented to time), is a limited historian, group home aide here at bedside to assist. Per aide, they noticed left lower leg swelling and pain.    As per ED note, patient reports also shortness of breath and constant sharp burning chest pain worse with inspiration, primarily in lower chest/epigastric region with nausea.    On ROS, aid mentioned productive cough for the past 3 days    Denies fevers, chills, chest pain.    In the ED< vitals were unremarkable  Labs significant for dimer 594, trop 16 => 15    CTA chest and CT AP   1.  No acute pulmonary embolus.  2.  No CT evidence of an acute intrathoracic or abdominopelvic pathology.    Duplex LL prelim result  There is a left femoral vein and popliteal vein DVT.  No evidence of deep venous thrombosis in the right lower extremity    Given Lovenox 70mg x1 in ED    Patient was admitted to medicine for further management of her DVT. Patient was started on eliquis today. Plan is to take Eliquis 10mg BID for 7 days then 5mg BID for 3 months.    A/P  #New provoked DVT- no PE  #Femoral neck fracture-s/p L hip hemiarthroplasty 11/4  - Duplex prelim result with left sided femoral and popliteal DVTs  - Chart reviewed, patient has no obvious contraindications to therapeutic AC, there is reported history of esophageal varices in chart from 2019 being carried forward, no endoscopy in chart, no evidence of cirrhosis or varices on current CT AP, unclear significance  Plan  - Lovenox today x1  - Transition to loading Eliquis in AM  - No need for Vascular eval for now, no significant pain  - Follow up duplex official result  - Please establish follow up with vascular as OP    #Reported cough by aid   - No fever, leukocytosis  - CT chest with no evidence of pneumonia  - Monitor off Abx for now  - RVP negative    #Asthma, not in acute exacerbation  #Esophageal varices?  #Hiatal hernia  - There is reported history of esophageal varices in chart from 2019 being carried forward, no endoscopy in chart, no evidence of cirrhosis or varices on current CT AP, unclear significance  - OP follow up    #Intellectual Disability  #Bipolar disorder  #Possible tardive dyskinesia  -c/w valproic acid  - c/w Austedo     #Elevated lipase- hemolyzed  - No abdominal pain or evidence of pancreatitis on CT AP    #Elevated trop- stable  - No chest pain    #Hx breast mass s/p lumpectomy and radiation  -outpt f/u and w/u    Discussion of dc, dc diagnoses, and med rec was conducted with Dr. Dunne on 12/12/24 and dc was approved.      84-year-old female with PMHx of intellectual disability, bipolar disorder, DLD, breast mass (unknown side s/p lumpectomy and RT '07), asthma, hoover's esophagus, esophageal varices, s/p R cataract surgery, R hip fx s/p hemiarthroplasty (2019), HLD , recent admission 10/31-11/18 for L hip fx s/p hemiarthroplasty (surgery on 11.4, discharged on SQ lovenox) presenting with left leg swelling.     Patient is A&Ox2 (not oriented to time), is a limited historian, group home aide here at bedside to assist. Per aide, they noticed left lower leg swelling and pain.    As per ED note, patient reports also shortness of breath and constant sharp burning chest pain worse with inspiration, primarily in lower chest/epigastric region with nausea.    On ROS, aid mentioned productive cough for the past 3 days    Denies fevers, chills, chest pain.    In the ED< vitals were unremarkable  Labs significant for dimer 594, trop 16 => 15    CTA chest and CT AP   1.  No acute pulmonary embolus.  2.  No CT evidence of an acute intrathoracic or abdominopelvic pathology.    Duplex LL prelim result  There is a left femoral vein and popliteal vein DVT.  No evidence of deep venous thrombosis in the right lower extremity    Given Lovenox 70mg x1 in ED    Patient was admitted to medicine for further management of her DVT. Patient was started on eliquis today. Plan is to take Eliquis 10mg BID for 7 days then 5mg BID for 3 months.    A/P  #New provoked DVT- no PE  #Femoral neck fracture-s/p L hip hemiarthroplasty 11/4  - Chart reviewed, patient has no obvious contraindications to therapeutic AC, there is reported history of esophageal varices in chart from 2019 being carried forward, no endoscopy in chart, no evidence of cirrhosis or varices on current CT AP, unclear significance  -take Eliquis 10mg BID for 7 days starting 12/12 then 5mg BID for 3 months.  - No need for Vascular eval for now, no significant pain  -  duplex official result- No evidence of deep venous thrombosis in the right lower extremity. There is a left femoral vein and popliteal vein DVT.  - Please establish follow up with vascular as OP    #Reported cough by aid   - No fever, leukocytosis  - CT chest with no evidence of pneumonia  - Monitor off Abx for now  - RVP negative    #Asthma, not in acute exacerbation  #Esophageal varices?  #Hiatal hernia  - There is reported history of esophageal varices in chart from 2019 being carried forward, no endoscopy in chart, no evidence of cirrhosis or varices on current CT AP, unclear significance  - OP follow up    #Intellectual Disability  #Bipolar disorder  #Possible tardive dyskinesia  -c/w valproic acid  - c/w Austedo     #Elevated lipase- hemolyzed  - No abdominal pain or evidence of pancreatitis on CT AP    #Elevated trop- stable  - No chest pain    # poor dentition  - fu dentist OP    #Hx breast mass s/p lumpectomy and radiation  -outpt f/u and w/u    Discussion of dc, dc diagnoses, and med rec was conducted with Dr. Gonzalez on 12/13/24 and dc was approved.      84-year-old female with PMHx of intellectual disability, bipolar disorder, DLD, breast mass (unknown side s/p lumpectomy and RT '07), asthma, hoover's esophagus, esophageal varices, s/p R cataract surgery, R hip fx s/p hemiarthroplasty (2019), HLD , recent admission 10/31-11/18 for L hip fx s/p hemiarthroplasty (surgery on 11.4, discharged on SQ lovenox) presenting with left leg swelling.     Patient is A&Ox2 (not oriented to time), is a limited historian, group home aide here at bedside to assist. Per aide, they noticed left lower leg swelling and pain.    As per ED note, patient reports also shortness of breath and constant sharp burning chest pain worse with inspiration, primarily in lower chest/epigastric region with nausea.    On ROS, aid mentioned productive cough for the past 3 days    Denies fevers, chills, chest pain.    In the ED< vitals were unremarkable  Labs significant for dimer 594, trop 16 => 15    CTA chest and CT AP   1.  No acute pulmonary embolus.  2.  No CT evidence of an acute intrathoracic or abdominopelvic pathology.    Duplex LL prelim result  There is a left femoral vein and popliteal vein DVT.  No evidence of deep venous thrombosis in the right lower extremity    Given Lovenox 70mg x1 in ED    Patient was admitted to medicine for further management of her DVT. Patient was started on eliquis today. Plan is to take Eliquis 10mg BID for 7 days then 5mg BID for 3 months.    A/P  #New provoked DVT- no PE  #Femoral neck fracture-s/p L hip hemiarthroplasty 11/4  - Chart reviewed, patient has no obvious contraindications to therapeutic AC, there is reported history of esophageal varices in chart from 2019 being carried forward, no endoscopy in chart, no evidence of cirrhosis or varices on current CT AP, unclear significance  - take Eliquis 10mg BID for 7 days starting 12/12 then 5mg BID for 3 months.  - No need for Vascular eval for now, no significant pain  -  duplex official result- No evidence of deep venous thrombosis in the right lower extremity. There is a left femoral vein and popliteal vein DVT.  - Please establish follow up with vascular as OP    #Reported cough by aid   - No fever, leukocytosis  - CT chest with no evidence of pneumonia  - Monitor off Abx for now  - RVP negative    #Asthma, not in acute exacerbation  #Esophageal varices?  #Hiatal hernia  - There is reported history of esophageal varices in chart from 2019 being carried forward, no endoscopy in chart, no evidence of cirrhosis or varices on current CT AP, unclear significance  - OP follow up    #Intellectual Disability  #Bipolar disorder  #Possible tardive dyskinesia  -c/w valproic acid  - c/w Austedo     #Elevated lipase- hemolyzed  - No abdominal pain or evidence of pancreatitis on CT AP    #Elevated trop- stable  - No chest pain    # poor dentition  - fu dentist OP    #Hx breast mass s/p lumpectomy and radiation  -outpt f/u and w/u    Discussion of dc, dc diagnoses, and med rec was conducted with Dr. Gonzalez on 12/13/24 and dc was approved.      84-year-old female with PMHx of intellectual disability, bipolar disorder, DLD, breast mass (unknown side s/p lumpectomy and RT '07), asthma, hoover's esophagus, esophageal varices, s/p R cataract surgery, R hip fx s/p hemiarthroplasty (2019), HLD , recent admission 10/31-11/18 for L hip fx s/p hemiarthroplasty (surgery on 11.4, discharged on SQ lovenox) presenting with left leg swelling.     Patient is A&Ox2 (not oriented to time), is a limited historian, group home aide here at bedside to assist. Per aide, they noticed left lower leg swelling and pain.    As per ED note, patient reports also shortness of breath and constant sharp burning chest pain worse with inspiration, primarily in lower chest/epigastric region with nausea.    On ROS, aid mentioned productive cough for the past 3 days    Denies fevers, chills, chest pain.    In the ED< vitals were unremarkable  Labs significant for dimer 594, trop 16 => 15    CTA chest and CT AP   1.  No acute pulmonary embolus.  2.  No CT evidence of an acute intrathoracic or abdominopelvic pathology.    Duplex LL prelim result  There is a left femoral vein and popliteal vein DVT.  No evidence of deep venous thrombosis in the right lower extremity    Given Lovenox 70mg x1 in ED    Patient was admitted to medicine for further management of her DVT. Patient was started on eliquis today. Plan is to take Eliquis 10mg BID for 7 days then 5mg BID for 3 months.    A/P  #New provoked DVT- no PE  #Femoral neck fracture-s/p L hip hemiarthroplasty 11/4  - Chart reviewed, patient has no obvious contraindications to therapeutic AC, there is reported history of esophageal varices in chart from 2019 being carried forward, no endoscopy in chart, no evidence of cirrhosis or varices on current CT AP, unclear significance  - take Eliquis 10mg BID for 7 days starting 12/12 then 5mg BID for 3 months.  - No need for Vascular eval for now, no significant pain  -  duplex official result- No evidence of deep venous thrombosis in the right lower extremity. There is a left femoral vein and popliteal vein DVT.  - Please establish follow up with vascular as OP    #Reported cough by aid   - No fever, leukocytosis  - CT chest with no evidence of pneumonia  - Monitor off Abx for now  - RVP negative    #Asthma, not in acute exacerbation  #Esophageal varices?  #Hiatal hernia  - There is reported history of esophageal varices in chart from 2019 being carried forward, no endoscopy in chart, no evidence of cirrhosis or varices on current CT AP, unclear significance  - OP follow up    #Intellectual Disability  #Bipolar disorder  #Possible tardive dyskinesia  -c/w valproic acid  - c/w Austedo     #Elevated lipase- hemolyzed  - No abdominal pain or evidence of pancreatitis on CT AP    #Elevated trop- stable  - No chest pain    # poor dentition  - fu dentist OP    #Hx breast mass s/p lumpectomy and radiation  -outpt f/u and w/u    Patient is stable. Had a BM before leaving today.   Outpatient follow up with PMD for abnormal CT imaging findings. Discussed with group home.    Discussion of dc, dc diagnoses, and med rec was conducted with Dr. Gonzalez on 12/13/24 and dc was approved.

## 2024-12-12 NOTE — PROGRESS NOTE ADULT - SUBJECTIVE AND OBJECTIVE BOX
BABAR ENRIQUEZ  84y Female    INTERVAL HPI/OVERNIGHT EVENTS:    pt resting comfortably after breakfast  aide from group home at bedside and states that the left LE swelling is much improved from admission  per med student who saw the pt earlier today, the pt denied any leg pain or other complaints  no fever  per aide, pt has not ambulated for a while - she was starting to see physical therapy before she developed left leg pain.    T(F): 98.6 (12-12-24 @ 05:46), Max: 98.6 (12-12-24 @ 05:46)  HR: 96 (12-12-24 @ 05:46) (79 - 96)  BP: 121/83 (12-12-24 @ 05:46) (121/83 - 134/69)  RR: 18 (12-12-24 @ 05:46) (18 - 18)  SpO2: 95% (12-11-24 @ 16:15) (95% - 95%) on RA    PHYSICAL EXAM:  GENERAL: NAD  HEAD:  Normocephalic  EYES:  conjunctiva and sclera clear  ENMT: Moist mucous membranes  NERVOUS SYSTEM:  Alert, awake, Good concentration  CHEST/LUNG: CTA b/l  HEART: Regular rate and rhythm  ABDOMEN: Soft, Nontender, Nondistended  EXTREMITIES: decreased LE edema b/l  left LE slightly larger than right LE  SKIN: well healed left thigh scar    Consultant(s) Notes Reviewed:  [x ] YES  [ ] NO  Care Discussed with Consultants/Other Providers [ x] YES  [ ] NO    MEDICATIONS  (STANDING):  apixaban 10 milliGRAM(s) Oral every 12 hours  polyethylene glycol 3350 17 Gram(s) Oral two times a day  tetrabenazine 50 milliGRAM(s) Oral daily  valproic  acid Syrup 500 milliGRAM(s) Oral <User Schedule>  valproic  acid Syrup 1000 milliGRAM(s) Oral at bedtime    MEDICATIONS  (PRN):  guaiFENesin Oral Liquid (Sugar-Free) 200 milliGRAM(s) Oral every 6 hours PRN Cough      LABS:                        11.4   5.34  )-----------( 155      ( 12 Dec 2024 06:48 )             35.4     12-12    140  |  103  |  10  ----------------------------<  79  4.1   |  29  |  <0.5[L]    Ca    9.0      12 Dec 2024 06:48  Mg     1.9     12-11    TPro  5.9[L]  /  Alb  3.3[L]  /  TBili  0.2  /  DBili  x   /  AST  11  /  ALT  7   /  AlkPhos  67  12-12    PT/INR - ( 11 Dec 2024 09:51 )   PT: 12.20 sec;   INR: 1.03 ratio         PTT - ( 11 Dec 2024 09:51 )  PTT:33.5 sec            RADIOLOGY & ADDITIONAL TESTS:    Imaging or report Personally Reviewed:  [x x] YES  [ ] NO    < from: VA Duplex Lower Ext Vein Scan, Bilat (12.11.24 @ 14:29) >  IMPRESSION:  No evidence of deep venous thrombosis in the right lower extremity  There is a left femoral vein and popliteal vein DVT.    < end of copied text >        < from: CT Abdomen and Pelvis w/ IV Cont (12.11.24 @ 11:51) >  IMPRESSION:  1.  No acute pulmonary embolus.  2.  No CT evidence of an acute intrathoracic or abdominopelvic pathology.      < end of copied text >        < from: Xray Chest 1 View- PORTABLE-Urgent (12.11.24 @ 10:15) >    IMPRESSION:    No radiographic evidence of acute cardiopulmonary disease.      < end of copied text >              Case discussed with residents and RN on rounds today    Care discussed with group aide at bedside

## 2024-12-12 NOTE — DISCHARGE NOTE PROVIDER - NSDCCPCAREPLAN_GEN_ALL_CORE_FT
PRINCIPAL DISCHARGE DIAGNOSIS  Diagnosis: DVT, lower extremity  Assessment and Plan of Treatment: You came to the hospital because your left leg was swollen and painful. The doctors did some tests and found out you have a blood clot in a big vein in your left leg. It's called a DVT, which is short for Deep Vein Thrombosis.   The clot isn't in your lungs, which is something the doctors were worried about. They gave you a shot of a medicine called Lovenox to start treating the clot right away, and now we switched you to a pill called Eliquis. You'll take a higher dose for a week and then a lower dose for 3 months. This medicine will help thin your blood so more clots don't form.  You also had a cough, but the tests showed your lungs are okay, so you don't need antibiotics right now. The doctors also checked out your belly and heart, and those look good too.  We know you have some other health issues, and the doctors are keeping an eye on those as well. We'll need you to follow up with a vascular doctor after you leave the hospital so they can check on your leg and make sure the clot is gone.     PRINCIPAL DISCHARGE DIAGNOSIS  Diagnosis: DVT, lower extremity  Assessment and Plan of Treatment: You came to the hospital because your left leg was swollen and painful. The doctors did some tests and found out you have a blood clot in a big vein in your left leg. It's called a DVT, which is short for Deep Vein Thrombosis.   The clot isn't in your lungs, which is something the doctors were worried about. They gave you a shot of a medicine called Lovenox to start treating the clot right away, and now we switched you to a pill called Eliquis. You'll take a higher dose for a week and then a lower dose for 3 months. This medicine will help thin your blood so more clots don't form.  You also had a cough, but the tests showed your lungs are okay, so you don't need antibiotics right now. The doctors also checked out your belly and heart, and those look good too.  We know you have some other health issues, and the doctors are keeping an eye on those as well. We'll need you to follow up with a vascular doctor after you leave the hospital so they can check on your leg and make sure the clot is gone. Please follow up with a dentist for management of your multiple missing teeth.

## 2024-12-12 NOTE — DISCHARGE NOTE PROVIDER - NSDCFUSCHEDAPPT_GEN_ALL_CORE_FT
[facility]  [place] [address]  Scheduled Appointment: 01/24/2025

## 2024-12-13 ENCOUNTER — TRANSCRIPTION ENCOUNTER (OUTPATIENT)
Age: 84
End: 2024-12-13

## 2024-12-13 VITALS
OXYGEN SATURATION: 94 % | SYSTOLIC BLOOD PRESSURE: 113 MMHG | RESPIRATION RATE: 18 BRPM | TEMPERATURE: 99 F | HEART RATE: 81 BPM | DIASTOLIC BLOOD PRESSURE: 68 MMHG

## 2024-12-13 PROCEDURE — 99239 HOSP IP/OBS DSCHRG MGMT >30: CPT

## 2024-12-13 RX ORDER — APIXABAN 2.5 MG/1
1 TABLET, FILM COATED ORAL
Qty: 204 | Refills: 0
Start: 2024-12-13 | End: 2025-03-18

## 2024-12-13 RX ADMIN — TETRABENAZINE 50 MILLIGRAM(S): 25 TABLET ORAL at 06:34

## 2024-12-13 RX ADMIN — APIXABAN 10 MILLIGRAM(S): 2.5 TABLET, FILM COATED ORAL at 06:33

## 2024-12-13 NOTE — CHART NOTE - NSCHARTNOTEFT_GEN_A_CORE
Patient is stable. Had a BM before leaving today.   Outpatient follow up with PMD for abnormal CT imaging findings. Discussed with group home.

## 2024-12-13 NOTE — DIETITIAN INITIAL EVALUATION ADULT - COLLABORATION WITH OTHER PROVIDERS
Intervention: meals and snacks, oral nutrition supplements, coordination of care; RN/resident   Monitoring/Evaluation: diet order, energy intake, weights, labs, GI s/s, BG, skin status, NFPE

## 2024-12-13 NOTE — DIETITIAN INITIAL EVALUATION ADULT - OTHER INFO
85 y/o woman with PMH of intellectual disability, bipolar disorder, dyslipidemia, breast mass ( s/p lumpectomy and RT '07), asthma, hoover's esophagus, ?esophageal varices, recent admission 10/31-11/18 for Left hip fracture s/p hemiarthroplasty on 11/4 presented from the group home for left leg swelling and pain.     #New provoked DVT- no PE  #Femoral neck fracture-s/p L hip hemiarthroplasty   #Reported cough by aid   #poor dentition

## 2024-12-13 NOTE — PHYSICAL THERAPY INITIAL EVALUATION ADULT - LEVEL OF INDEPENDENCE: SIT/STAND, REHAB EVAL
2* to weakness/impaired balance./unable to perform Z Plasty Text: The lesion was extirpated to the level of the fat with a #15 scalpel blade.  Given the location of the defect, shape of the defect and the proximity to free margins a Z-plasty was deemed most appropriate for repair.  Using a sterile surgical marker, the appropriate transposition arms of the Z-plasty were drawn incorporating the defect and placing the expected incisions within the relaxed skin tension lines where possible.    The area thus outlined was incised deep to adipose tissue with a #15 scalpel blade.  The skin margins were undermined to an appropriate distance in all directions utilizing iris scissors.  The opposing transposition arms were then transposed into place in opposite direction and anchored with interrupted buried subcutaneous sutures.

## 2024-12-13 NOTE — DISCHARGE NOTE NURSING/CASE MANAGEMENT/SOCIAL WORK - NSFLUVACAGEDISCH_IMM_ALL_CORE
Adult
Bed in lowest position, wheels locked, appropriate side rails in place/Call bell, personal items and telephone in reach/Instruct patient to call for assistance before getting out of bed or chair/Non-slip footwear when patient is out of bed/[place] to call system/Physically safe environment - no spills, clutter or unnecessary equipment/Purposeful Proactive Rounding/Room/bathroom lighting operational, light cord in reach

## 2024-12-13 NOTE — DIETITIAN INITIAL EVALUATION ADULT - PERTINENT MEDS FT
MEDICATIONS  (STANDING):  apixaban 10 milliGRAM(s) Oral every 12 hours  polyethylene glycol 3350 17 Gram(s) Oral two times a day  tetrabenazine 50 milliGRAM(s) Oral daily  valproic  acid Syrup 500 milliGRAM(s) Oral <User Schedule>  valproic  acid Syrup 1000 milliGRAM(s) Oral at bedtime    MEDICATIONS  (PRN):  guaiFENesin Oral Liquid (Sugar-Free) 200 milliGRAM(s) Oral every 6 hours PRN Cough

## 2024-12-13 NOTE — DISCHARGE NOTE NURSING/CASE MANAGEMENT/SOCIAL WORK - FINANCIAL ASSISTANCE
[facility] provides services at a reduced cost to those who are determined to be eligible through [facility]’s financial assistance program. Information regarding [facility]’s financial assistance program can be found by going to [email or web address] or by calling [phone].

## 2024-12-13 NOTE — DIETITIAN INITIAL EVALUATION ADULT - ADD RECOMMEND
1. Continue with current diet order  2. Per health aide, pt is a picky eater please obtain food preferences to encourage adequate PO intake  3. Add Ensure Plus HP 2x/day (350 kcal, 20 gm protein per serving) - suitable for those with lactose intolerance. Please thicken appropriately to moderately thick liquids.   4. Assist with meal times with max encouragement    Pt at high risk  1. Continue with current diet order  2. Per health aide, pt is a picky eater please obtain food preferences to encourage adequate PO intake  3. Add Ensure Plus HP 2x/day (350 kcal, 20 gm protein per serving) - suitable for those with lactose intolerance. Please thicken appropriately to moderately thick liquids. Pt currently ordered Ensure Plant - recommend switching to Ensure Plus HP and if not tolerated can provide [employer] oral nutrition supplement. Will monitor  4. Assist with meal times with max encouragement    Pt at high risk

## 2024-12-13 NOTE — PHYSICAL THERAPY INITIAL EVALUATION ADULT - PERTINENT HX OF CURRENT PROBLEM, REHAB EVAL
Case of 84-year-old female with PMHx of intellectual disability, bipolar disorder, DLD, breast mass (unknown side s/p lumpectomy and RT '07), asthma, hoover's esophagus, esophageal varices, s/p R cataract surgery, R hip fx s/p hemiarthroplasty (2019), HLD , recent admission 10/31-11/18 for L hip fx s/p hemiarthroplasty (surgery on 11.4, discharged on SQ lovenox) presenting with left leg swelling.

## 2024-12-13 NOTE — DIETITIAN INITIAL EVALUATION ADULT - NS FNS DIET ORDER
Diet, Pureed:   Moderately Thick Liquids (MODTHICKLIQS)  Supplement Feeding Modality:  Oral  Ensure Plant-Based Cans or Servings Per Day:  1       Frequency:  Three Times a day (12-12-24 @ 08:07) [Active]

## 2024-12-13 NOTE — PHYSICAL THERAPY INITIAL EVALUATION ADULT - GENERAL OBSERVATIONS, REHAB EVAL
7:36-8:03am Pt encountered supine in bed, A & O x 2 in NAD, no c/o pain, and agreeable to PT. Pt is a limited historian. Group home caregiver Dior here at bedside to provide history. Pt is dependent in bed mobility and performed sitting balance at EOB. Pt unable to perform sit/stand transfer using Tiffany steady secondary to weakness and impaired balance. As per Dior pt was able to transfer with one person assist prior to Lt hip surgery last month but mostly wheelchair bound. Pt will benefit from skilled PT 3-5x/wk for thera ex, functional mobility training, balance activity and pre-gait training to improve her mobility status and return to previous level of function.

## 2024-12-13 NOTE — PHYSICAL THERAPY INITIAL EVALUATION ADULT - ADDITIONAL COMMENTS
Spoke to Patient and noted that she had been recommended to increase her Glipizide from 5mg Q day to to 5 mg BID to help reduce her am BGs (190's-160's)  but patient is declining to increase the frequency and would rather move her mid-day Glipizide, she actually takes her first dose before her lunch to before her dinner to see if that can optimize her am glucose levels. We will try this for 1 week and I will re-assess and notify her PCP if this does not help.      Pt lives in group home.

## 2024-12-13 NOTE — PROGRESS NOTE ADULT - SUBJECTIVE AND OBJECTIVE BOX
SUBJECTIVE/OVERNIGHT EVENTS  Today is hospital day 2d. This morning patient was seen and examined at bedside, resting comfortably in bed. No acute or major events overnight.      MEDICATIONS  STANDING MEDICATIONS  apixaban 10 milliGRAM(s) Oral every 12 hours  polyethylene glycol 3350 17 Gram(s) Oral two times a day  tetrabenazine 50 milliGRAM(s) Oral daily  valproic  acid Syrup 500 milliGRAM(s) Oral <User Schedule>  valproic  acid Syrup 1000 milliGRAM(s) Oral at bedtime    PRN MEDICATIONS  guaiFENesin Oral Liquid (Sugar-Free) 200 milliGRAM(s) Oral every 6 hours PRN    VITALS  T(F): 97.4 (12-13-24 @ 05:06), Max: 98.2 (12-12-24 @ 20:44)  HR: 74 (12-13-24 @ 05:06) (74 - 87)  BP: 127/73 (12-13-24 @ 05:06) (108/64 - 140/82)  RR: 18 (12-13-24 @ 05:06) (18 - 18)  SpO2: 95% (12-13-24 @ 05:06) (95% - 95%)    PHYSICAL EXAM  GENERAL  ( x ) NAD, lying in bed comfortably     (  ) obtunded     (  ) lethargic     (  ) somnolent    HEAD  (  ) Atraumatic     (  ) hematoma     (  ) laceration (specify location:       )     NECK  (  ) Supple     (  ) neck stiffness     (  ) nuchal rigidity     (  )  no JVD     (  ) JVD present ( -- cm)    HEART  Rate -->  (  x) normal rate    (  ) bradycardic    (  ) tachycardic  Rhythm -->  (x  ) regular    (  ) regularly irregular    (  ) irregularly irregular  Murmurs -->  (  ) normal s1/s2    (  ) systolic murmur    (  ) diastolic murmur    (  ) continuous murmur     (  ) S3 present    (  ) S4 present    LUNGS  (x  )Unlabored respirations     (  ) tachypnea  (x  ) B/L air entry     (  ) decreased breath sounds in:  (location     )    (  ) no adventitious sound     (  ) crackles     (  ) wheezing      (  ) rhonchi      (specify location:       )  (  ) chest wall tenderness (specify location:       )    ABDOMEN  (  x) Soft     (  ) tense   |   (  ) nondistended     (  ) distended   |   (  ) +BS     (  ) hypoactive bowel sounds     (  ) hyperactive bowel sounds  (  x) nontender     (  ) RUQ tenderness     (  ) RLQ tenderness     (  ) LLQ tenderness     (  ) epigastric tenderness     (  ) diffuse tenderness  (  ) Splenomegaly      (  ) Hepatomegaly      (  ) Jaundice     (  ) ecchymosis     EXTREMITIES  (  ) Normal     (  ) Rash     (  ) ecchymosis     (  ) varicose veins      (  ) pitting edema     (  ) non-pitting edema   (  ) ulceration     (  ) gangrene:     (location:     )  - L leg more swollen the R    LABS             11.4   5.34  )-----------( 155      ( 12-12-24 @ 06:48 )             35.4     140  |  103  |  10  -------------------------<  79   12-12-24 @ 06:48  4.1  |  29  |  <0.5    Ca      9.0     12-12-24 @ 06:48    TPro  5.9  /  Alb  3.3  /  TBili  0.2  /  DBili  x   /  AST  11  /  ALT  7   /  AlkPhos  67  /  GGT  x     12-12-24 @ 06:48      Troponin T, High Sensitivity Result: 15 ng/L (12-11-24 @ 13:43)  Troponin T, High Sensitivity Result: 16 ng/L (12-11-24 @ 09:51)  Pro-Brain Natriuretic Peptide: 360 pg/mL (12-11-24 @ 09:51)    Urinalysis Basic - ( 12 Dec 2024 06:48 )    Color: x / Appearance: x / SG: x / pH: x  Gluc: 79 mg/dL / Ketone: x  / Bili: x / Urobili: x   Blood: x / Protein: x / Nitrite: x   Leuk Esterase: x / RBC: x / WBC x   Sq Epi: x / Non Sq Epi: x / Bacteria: x          IMAGING

## 2024-12-13 NOTE — PROGRESS NOTE ADULT - ASSESSMENT
83 y/o woman with PMH of intellectual disability, bipolar disorder, dyslipidemia, breast mass ( s/p lumpectomy and RT '07), asthma, hoover's esophagus, ?esophageal varices, recent admission 10/31-11/18 for Left hip fracture s/p hemiarthroplasty on 11/4 presented from the group home for left leg swelling and pain.     1. Acute Left LE DVT  likely provoked - had recent hip fracture surgery   venous duplex showed left femoral and Popliteal vein DVT.   CTA chest negative for PE  Started on Eliquis 10mg BID for 7 days then 5mg BID for 3 months.  PT consulted  swelling improved and pain now resolved  stable for discharge back to group home    2. Continue current management for bipolar disorder, tardive dyskinesia, constipation    med rec and discharge papers will be reviewed by me    spent 50 minutes on the discharge process of this pt including chart review
83 y/o woman with PMH of intellectual disability, bipolar disorder, dyslipidemia, breast mass ( s/p lumpectomy and RT '07), asthma, hoover's esophagus, ?esophageal varices, recent admission 10/31-11/18 for Left hip fracture s/p hemiarthroplasty on 11/4 presented from the group home for left leg swelling and pain.     #New provoked DVT- no PE  #Femoral neck fracture-s/p L hip hemiarthroplasty 11/4  - Duplex prelim result with left sided femoral and popliteal DVTs  - Chart reviewed, patient has no obvious contraindications to therapeutic AC, there is reported history of esophageal varices in chart from 2019 being carried forward, no endoscopy in chart, no evidence of cirrhosis or varices on current CT AP, unclear significance  -take Eliquis 10mg BID for 7 days starting 12/12 then 5mg BID for 3 months.  - No need for Vascular eval for now, no significant pain  -  duplex official result- No evidence of deep venous thrombosis in the right lower extremity. There is a left femoral vein and popliteal vein DVT.  - Please establish follow up with vascular as OP    #Reported cough by aid   - No fever, leukocytosis  - CT chest with no evidence of pneumonia  - Monitor off Abx for now  - RVP negative    #Asthma, not in acute exacerbation  #Esophageal varices?  #Hiatal hernia  - There is reported history of esophageal varices in chart from 2019 being carried forward, no endoscopy in chart, no evidence of cirrhosis or varices on current CT AP, unclear significance  - OP follow up    #Intellectual Disability  #Bipolar disorder  #Possible tardive dyskinesia  -c/w valproic acid  - c/w [place]     #Elevated lipase- hemolyzed  - No abdominal pain or evidence of pancreatitis on CT AP    #Elevated trop- stable  - No chest pain    #poor dentition  - fu dentist OP    #Hx breast mass s/p lumpectomy and radiation  -outpt f/u and w/u

## 2024-12-13 NOTE — DIETITIAN INITIAL EVALUATION ADULT - ORAL INTAKE PTA/DIET HISTORY
Home health aide at bedside to provide hx. Reports patient with good PO intake PTA; pt on pureed diet having 3 meals per day on most days with feeding assistance. Unsure of vitamin use. Reports patient receives Ensure oral nutrition supplement at home sometimes if she skips meals. Home aide states patient is a picky eater at home and can be encouraged to eat meal items if told she can have dessert afterwards. Home aide unsure of wt hx or UBW. No allergies reported, however per EMR, pt with lactose intolerance.     Wt hx per EMR:  11/20/24: 70 kg   vs CBW on admission: 67.8 kg - 4.5 lb, 3% wt loss in 1 month - pt does not meet weight criteria for PCM at this time

## 2024-12-13 NOTE — DIETITIAN INITIAL EVALUATION ADULT - PERTINENT LABORATORY DATA
12-12    140  |  103  |  10  ----------------------------<  79  4.1   |  29  |  <0.5[L]    Ca    9.0      12 Dec 2024 06:48    TPro  5.9[L]  /  Alb  3.3[L]  /  TBili  0.2  /  DBili  x   /  AST  11  /  ALT  7   /  AlkPhos  67  12-12

## 2024-12-13 NOTE — DISCHARGE NOTE NURSING/CASE MANAGEMENT/SOCIAL WORK - PATIENT PORTAL LINK FT
You can access the FollowMyHealth Patient Portal offered by [facility] by registering at the following website: [email or web address]. By joining [employer]’s FollowMyHealth portal, you will also be able to view your health information using other applications (apps) compatible with our system.

## 2024-12-13 NOTE — DISCHARGE NOTE NURSING/CASE MANAGEMENT/SOCIAL WORK - NSDCVIVACCINE_GEN_ALL_CORE_FT
Tdap; 10-Dec-2022 08:36; Adriana Lloyd (RN); [employer]; [id number] (Exp. Date: 08-Dec-2024); IntraMuscular; Deltoid Right.; 0.5 milliLiter(s); VIS (VIS Published: 09-May-2013, VIS Presented: 10-Dec-2022);

## 2024-12-16 ENCOUNTER — NON-APPOINTMENT (OUTPATIENT)
Age: 84
End: 2024-12-16

## 2024-12-18 DIAGNOSIS — I70.212 ATHEROSCLEROSIS OF NATIVE ARTERIES OF EXTREMITIES WITH INTERMITTENT CLAUDICATION, LEFT LEG: ICD-10-CM

## 2024-12-19 DIAGNOSIS — G24.01 DRUG INDUCED SUBACUTE DYSKINESIA: ICD-10-CM

## 2024-12-19 DIAGNOSIS — Z92.3 PERSONAL HISTORY OF IRRADIATION: ICD-10-CM

## 2024-12-19 DIAGNOSIS — K22.70 BARRETT'S ESOPHAGUS WITHOUT DYSPLASIA: ICD-10-CM

## 2024-12-19 DIAGNOSIS — K44.9 DIAPHRAGMATIC HERNIA WITHOUT OBSTRUCTION OR GANGRENE: ICD-10-CM

## 2024-12-19 DIAGNOSIS — J45.909 UNSPECIFIED ASTHMA, UNCOMPLICATED: ICD-10-CM

## 2024-12-19 DIAGNOSIS — R79.89 OTHER SPECIFIED ABNORMAL FINDINGS OF BLOOD CHEMISTRY: ICD-10-CM
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
## 2024-12-19 DIAGNOSIS — I82.412 ACUTE EMBOLISM AND THROMBOSIS OF LEFT FEMORAL VEIN: ICD-10-CM

## 2024-12-19 DIAGNOSIS — F79 UNSPECIFIED INTELLECTUAL DISABILITIES: ICD-10-CM

## 2024-12-19 DIAGNOSIS — T42.6X5A ADVERSE EFFECT OF OTHER ANTIEPILEPTIC AND SEDATIVE-HYPNOTIC DRUGS, INITIAL ENCOUNTER: ICD-10-CM

## 2024-12-19 DIAGNOSIS — F31.9 BIPOLAR DISORDER, UNSPECIFIED: ICD-10-CM

## 2024-12-19 DIAGNOSIS — R05.9 COUGH, UNSPECIFIED: ICD-10-CM

## 2024-12-19 DIAGNOSIS — E73.9 LACTOSE INTOLERANCE, UNSPECIFIED: ICD-10-CM

## 2024-12-19 DIAGNOSIS — I82.432 ACUTE EMBOLISM AND THROMBOSIS OF LEFT POPLITEAL VEIN: ICD-10-CM

## 2024-12-19 DIAGNOSIS — Z87.81 PERSONAL HISTORY OF (HEALED) TRAUMATIC FRACTURE: ICD-10-CM

## 2024-12-19 DIAGNOSIS — Z96.643 PRESENCE OF ARTIFICIAL HIP JOINT, BILATERAL: ICD-10-CM

## 2024-12-19 DIAGNOSIS — E78.5 HYPERLIPIDEMIA, UNSPECIFIED: ICD-10-CM

## 2024-12-19 DIAGNOSIS — Z79.899 OTHER LONG TERM (CURRENT) DRUG THERAPY: ICD-10-CM

## 2024-12-19 DIAGNOSIS — T81.72XA COMPLICATION OF VEIN FOLLOWING A PROCEDURE, NOT ELSEWHERE CLASSIFIED, INITIAL ENCOUNTER: ICD-10-CM

## 2024-12-31 ENCOUNTER — APPOINTMENT (OUTPATIENT)
Dept: VASCULAR SURGERY | Facility: CLINIC | Age: 84
End: 2024-12-31

## 2024-12-31 VITALS — DIASTOLIC BLOOD PRESSURE: 98 MMHG | SYSTOLIC BLOOD PRESSURE: 156 MMHG | WEIGHT: 150 LBS | HEART RATE: 76 BPM

## 2024-12-31 VITALS — HEIGHT: 58 IN | BODY MASS INDEX: 31.35 KG/M2

## 2025-01-14 ENCOUNTER — APPOINTMENT (OUTPATIENT)
Dept: VASCULAR SURGERY | Facility: CLINIC | Age: 85
End: 2025-01-14
Payer: MEDICARE

## 2025-01-14 VITALS
BODY MASS INDEX: 31.49 KG/M2 | HEART RATE: 94 BPM | HEIGHT: 58 IN | DIASTOLIC BLOOD PRESSURE: 79 MMHG | SYSTOLIC BLOOD PRESSURE: 133 MMHG | WEIGHT: 150 LBS

## 2025-01-14 DIAGNOSIS — I82.512 CHRONIC EMBOLISM AND THROMBOSIS OF LEFT FEMORAL VEIN: ICD-10-CM

## 2025-01-14 DIAGNOSIS — M79.89 OTHER SPECIFIED SOFT TISSUE DISORDERS: ICD-10-CM

## 2025-01-14 PROCEDURE — 99203 OFFICE O/P NEW LOW 30 MIN: CPT

## 2025-01-14 PROCEDURE — 93971 EXTREMITY STUDY: CPT

## 2025-01-24 ENCOUNTER — RESULT CHARGE (OUTPATIENT)
Age: 85
End: 2025-01-24

## 2025-01-24 ENCOUNTER — APPOINTMENT (OUTPATIENT)
Dept: ORTHOPEDIC SURGERY | Facility: CLINIC | Age: 85
End: 2025-01-24
Payer: MEDICARE

## 2025-01-24 DIAGNOSIS — S72.002D FRACTURE OF UNSPECIFIED PART OF NECK OF LEFT FEMUR, SUBSEQUENT ENCOUNTER FOR CLOSED FRACTURE WITH ROUTINE HEALING: ICD-10-CM

## 2025-01-24 PROCEDURE — 73521 X-RAY EXAM HIPS BI 2 VIEWS: CPT

## 2025-01-24 PROCEDURE — 73503 X-RAY EXAM HIP UNI 4/> VIEWS: CPT | Mod: LT

## 2025-01-24 PROCEDURE — 99024 POSTOP FOLLOW-UP VISIT: CPT

## 2025-04-10 NOTE — CHART NOTE - NSCHARTNOTEFT_GEN_A_CORE
PACU ANESTHESIA ADMISSION NOTE      Procedure: Hemiarthroplasty of right hip    Post op diagnosis: Right hip FX      ____  Intubated  TV:______       Rate: ______      FiO2: ______    _x___  Patent Airway    _x___  Full return of protective reflexes    ___  Full recovery from anesthesia / back to baseline status    Vitals:  T-98.1  HR: 83  BP: 119/60  RR: 18 (05-22-19 @ 15:03) (17 - 18)  SpO2: 94%    Mental Status:  _x___ Awake   _____ Alert   _____ Drowsy   _____ Sedated    Nausea/Vomiting:  _x___  NO       ______Yes,   See Post - Op Orders         Pain Scale (0-10):  __0___    Treatment: _x___ None    ____ See Post - Op/PCA Orders    Post - Operative Fluids:   __x__ Oral   ____ See Post - Op Orders    Plan: Discharge:   ___Home       ___x__Floor     _____Critical Care    _____  Other:_________________    Comments: Pt bought to RR spontaneously breathing, hemodynamically stable  No anesthesia issues or complications noted.  Discharge when criteria met. [Negative] : Genitourinary [Nasal Discharge] : nasal discharge [Nasal Congestion] : nasal congestion

## 2025-05-07 ENCOUNTER — OUTPATIENT (OUTPATIENT)
Dept: OUTPATIENT SERVICES | Facility: HOSPITAL | Age: 85
LOS: 1 days | End: 2025-05-07

## 2025-05-07 DIAGNOSIS — Z01.20 ENCOUNTER FOR DENTAL EXAMINATION AND CLEANING WITHOUT ABNORMAL FINDINGS: ICD-10-CM

## 2025-05-07 DIAGNOSIS — Z96.641 PRESENCE OF RIGHT ARTIFICIAL HIP JOINT: Chronic | ICD-10-CM

## 2025-05-07 DIAGNOSIS — Z96.642 PRESENCE OF LEFT ARTIFICIAL HIP JOINT: Chronic | ICD-10-CM

## 2025-05-07 DIAGNOSIS — S72.009A FRACTURE OF UNSPECIFIED PART OF NECK OF UNSPECIFIED FEMUR, INITIAL ENCOUNTER FOR CLOSED FRACTURE: Chronic | ICD-10-CM

## 2025-05-07 DIAGNOSIS — Z93.1 GASTROSTOMY STATUS: Chronic | ICD-10-CM

## 2025-05-08 ENCOUNTER — EMERGENCY (EMERGENCY)
Facility: HOSPITAL | Age: 85
LOS: 0 days | Discharge: HOME | End: 2025-05-08
Attending: EMERGENCY MEDICINE
Payer: MEDICARE

## 2025-05-08 VITALS
TEMPERATURE: 98 F | RESPIRATION RATE: 16 BRPM | OXYGEN SATURATION: 97 % | SYSTOLIC BLOOD PRESSURE: 176 MMHG | HEART RATE: 73 BPM | DIASTOLIC BLOOD PRESSURE: 79 MMHG

## 2025-05-08 DIAGNOSIS — S72.009A FRACTURE OF UNSPECIFIED PART OF NECK OF UNSPECIFIED FEMUR, INITIAL ENCOUNTER FOR CLOSED FRACTURE: Chronic | ICD-10-CM

## 2025-05-08 DIAGNOSIS — Z96.642 PRESENCE OF LEFT ARTIFICIAL HIP JOINT: Chronic | ICD-10-CM

## 2025-05-08 DIAGNOSIS — Z96.641 PRESENCE OF RIGHT ARTIFICIAL HIP JOINT: Chronic | ICD-10-CM

## 2025-05-08 DIAGNOSIS — Z93.1 GASTROSTOMY STATUS: Chronic | ICD-10-CM

## 2025-05-08 PROCEDURE — 99283 EMERGENCY DEPT VISIT LOW MDM: CPT | Mod: FS

## 2025-05-08 PROCEDURE — 73660 X-RAY EXAM OF TOE(S): CPT | Mod: RT

## 2025-05-08 PROCEDURE — 99283 EMERGENCY DEPT VISIT LOW MDM: CPT | Mod: 25

## 2025-05-08 PROCEDURE — 73660 X-RAY EXAM OF TOE(S): CPT | Mod: 26,RT

## 2025-05-08 NOTE — ED PROVIDER NOTE - CARE PROVIDER_API CALL
your PMD,   Phone: (   )    -  Fax: (   )    -  Follow Up Time: 1-3 Days    Cullen Delcid  Podiatric Medicine and Surgery  [address], [address]  [place] [address]  Phone: [phone]  Fax: [phone]  Follow Up Time: 1-3 Days

## 2025-05-08 NOTE — ED PROVIDER NOTE - PROVIDER TOKENS
FREE:[LAST:[your PMD],PHONE:[(   )    -],FAX:[(   )    -],FOLLOWUP:[1-3 Days]],PROVIDER:[TOKEN:[[id number]],FOLLOWUP:[1-3 Days]]

## 2025-05-08 NOTE — ED PROVIDER NOTE - PHYSICAL EXAMINATION
Physical Exam    Vital Signs: I have reviewed the initial vital signs.  Constitutional: well-nourished, appears stated age, no acute distress  Skin: warm, dry  Muskuloskeletal: right foot: + discoloration consistent with ecchymosis noted to of dorsum 2nd toe. n/v intact  Neuro: AOx3, No focal deficits noted

## 2025-05-08 NOTE — ED PROVIDER NOTE - OBJECTIVE STATEMENT
History from patient and group home aide    84-year-old female history of bipolar, on Eliquis here for discoloration  to right second toe.  Per aide,  she came in to work today and was told to take patient to the ED for toe discoloration.  They believe discoloration started last night.  Patient denies any injury or fall.  No fevers. No pain.

## 2025-05-08 NOTE — ED ADULT TRIAGE NOTE - AS TEMP SITE
Detail Level: Detailed
Add 98639 Cpt? (Important Note: In 2017 The Use Of 82726 Is Being Tracked By Cms To Determine Future Global Period Reimbursement For Global Periods): yes
oral

## 2025-05-08 NOTE — ED ADULT NURSE NOTE - NSFALLUNIVINTERV_ED_ALL_ED
Bed/Stretcher in lowest position, wheels locked, appropriate side rails in place/Call bell, personal items and telephone in reach/Instruct patient to call for assistance before getting out of bed/chair/stretcher/Non-slip footwear applied when patient is off stretcher/[place] to call system/Physically safe environment - no spills, clutter or unnecessary equipment/Purposeful proactive rounding/Room/bathroom lighting operational, light cord in reach

## 2025-05-08 NOTE — ED PROVIDER NOTE - PATIENT PORTAL LINK FT
You can access the FollowMyHealth Patient Portal offered by [facility] by registering at the following website: [email or web address]. By joining [employer]’s FollowMyHealth portal, you will also be able to view your health information using other applications (apps) compatible with our system.

## 2025-07-11 ENCOUNTER — APPOINTMENT (OUTPATIENT)
Dept: ORTHOPEDIC SURGERY | Facility: CLINIC | Age: 85
End: 2025-07-11

## 2025-07-15 ENCOUNTER — APPOINTMENT (OUTPATIENT)
Dept: VASCULAR SURGERY | Facility: CLINIC | Age: 85
End: 2025-07-15
Payer: MEDICARE

## 2025-07-15 PROCEDURE — 93971 EXTREMITY STUDY: CPT

## 2025-07-15 PROCEDURE — 99213 OFFICE O/P EST LOW 20 MIN: CPT

## 2025-08-08 ENCOUNTER — APPOINTMENT (OUTPATIENT)
Dept: ORTHOPEDIC SURGERY | Facility: CLINIC | Age: 85
End: 2025-08-08
Payer: MEDICARE

## 2025-08-08 DIAGNOSIS — S72.002D FRACTURE OF UNSPECIFIED PART OF NECK OF LEFT FEMUR, SUBSEQUENT ENCOUNTER FOR CLOSED FRACTURE WITH ROUTINE HEALING: ICD-10-CM

## 2025-08-08 PROCEDURE — 99213 OFFICE O/P EST LOW 20 MIN: CPT
